# Patient Record
Sex: FEMALE | Race: WHITE | Employment: OTHER | URBAN - METROPOLITAN AREA
[De-identification: names, ages, dates, MRNs, and addresses within clinical notes are randomized per-mention and may not be internally consistent; named-entity substitution may affect disease eponyms.]

---

## 2020-11-17 ENCOUNTER — DOCUMENTATION ONLY (OUTPATIENT)
Dept: FAMILY MEDICINE CLINIC | Age: 80
End: 2020-11-17

## 2020-11-17 ENCOUNTER — VIRTUAL VISIT (OUTPATIENT)
Dept: FAMILY MEDICINE CLINIC | Age: 80
End: 2020-11-17
Payer: MEDICARE

## 2020-11-17 DIAGNOSIS — R41.3 MEMORY PROBLEM: ICD-10-CM

## 2020-11-17 DIAGNOSIS — I63.9 CEREBROVASCULAR ACCIDENT (CVA), UNSPECIFIED MECHANISM (HCC): ICD-10-CM

## 2020-11-17 DIAGNOSIS — R19.00 PELVIC MASS: ICD-10-CM

## 2020-11-17 DIAGNOSIS — L03.115 CELLULITIS OF RIGHT LOWER EXTREMITY: ICD-10-CM

## 2020-11-17 DIAGNOSIS — E78.5 HYPERLIPIDEMIA LDL GOAL <70: ICD-10-CM

## 2020-11-17 DIAGNOSIS — R53.1 WEAKNESS GENERALIZED: ICD-10-CM

## 2020-11-17 DIAGNOSIS — Z87.898 H/O ASCITES: ICD-10-CM

## 2020-11-17 DIAGNOSIS — I10 ESSENTIAL HYPERTENSION: Primary | ICD-10-CM

## 2020-11-17 PROCEDURE — G8432 DEP SCR NOT DOC, RNG: HCPCS | Performed by: NURSE PRACTITIONER

## 2020-11-17 PROCEDURE — G8400 PT W/DXA NO RESULTS DOC: HCPCS | Performed by: NURSE PRACTITIONER

## 2020-11-17 PROCEDURE — G8427 DOCREV CUR MEDS BY ELIG CLIN: HCPCS | Performed by: NURSE PRACTITIONER

## 2020-11-17 PROCEDURE — 1090F PRES/ABSN URINE INCON ASSESS: CPT | Performed by: NURSE PRACTITIONER

## 2020-11-17 PROCEDURE — G0463 HOSPITAL OUTPT CLINIC VISIT: HCPCS | Performed by: NURSE PRACTITIONER

## 2020-11-17 PROCEDURE — 99204 OFFICE O/P NEW MOD 45 MIN: CPT | Performed by: NURSE PRACTITIONER

## 2020-11-17 PROCEDURE — 1101F PT FALLS ASSESS-DOCD LE1/YR: CPT | Performed by: NURSE PRACTITIONER

## 2020-11-17 RX ORDER — AMLODIPINE BESYLATE 5 MG/1
5 TABLET ORAL DAILY
COMMUNITY
End: 2021-02-24 | Stop reason: SDUPTHER

## 2020-11-17 RX ORDER — SIMVASTATIN 20 MG/1
20 TABLET, FILM COATED ORAL
COMMUNITY
End: 2021-02-24 | Stop reason: SDUPTHER

## 2020-11-17 RX ORDER — ASPIRIN 81 MG/1
81 TABLET ORAL DAILY
COMMUNITY
End: 2021-02-24 | Stop reason: SDUPTHER

## 2020-11-17 NOTE — PROGRESS NOTES
Mena Mendenhall is a 68 y.o. female who was seen by synchronous (real-time) audio-video technology on 11/17/2020 for New Patient (Pt recenlty moved from [place]. ) and Hospital Follow Up ([address] [place] [address])        Assessment & Plan:   Diagnoses and all orders for this visit:    1. Essential hypertension  Control unknown  Continue amlodipine  Low sodium diet  Home health assessment  -     REFERRAL TO HOME HEALTH    2. Hyperlipidemia LDL goal <70  Continue rx  Heart healthy diet  Repeat fasting lipids in 6 weeks    3. Pelvic mass  4. H/O ascites  Obtain records from recent admission  Refer for further eval  -     REFERRAL TO GYN ONCOLOGY  -     REFERRAL TO [address]    5. Cellulitis of right lower extremity   resolving  -     [address]    6. Memory problem  Refer for further eval  -     REFERRAL TO NEUROLOGY    7. Weakness generalized  -     REFERRAL TO HOME HEALTH    8. Cerebrovascular accident (CVA), unspecified mechanism ([facility]  Refer for further eval/PT/OT  Continue secondary prevention (ASA, statin)  Smoking cessation strongly recommended  -     REFERRAL TO HOME HEALTH        Follow-up and Dispositions    · Return in about 4 weeks (around 12/15/2020), or if symptoms worsen or fail to improve, for blood pressure. I have discussed the diagnosis with the patient and the intended plan as seen in the above orders, and questions were answered concerning future plans. Patient conveyed understanding of the plan at the time of the visit. Subjective:     HPI:  Seen with her daughter. Hx obtained from daughter, patient did not offer any additional information on hx. Daughter does not have much information to offer as she was not present in [facility] during her mother's admission and has recently moved her up to [place] to provide care. Presents to establish care and for transition of care visit following hospitalization. No records available for review at time of visit.     Daughter reports recent admission to hospital in [facility] for cellulitis, unsure of dates of admission and discharge. Daughter also reports paracentesis for ascites during her admission and possible ovarian ca dx. States her brother told her that mom refused any surgical or chemotherapy for the dx. Daughter reports mom did not schedule any f/u or establish with a PCP in [facility] after discharge. Daughter has noticed memory problems- difficulty remembering recent events, poor short term memory, changes in mood and behavior such as becoming angry/irritable very easily. No assessment by neurologist in the past.   Daughter also expresses concern that patient seems weak, has difficulty ambulating. Daughter has purchased a walker for her but mom needs frequent breaks when walking. HPI addendum 11/18/2020:  Reviewed patient discharge instructions dropped at office by daughter. ED visit on 10/12/2020 [facility] for Cellulitis of right leg, discharged from ED on Bactrim. Admission on 10/27/2020 at [address] [place] [address], reason for visit listed as abdominal mass, acute CVA, ascites, intertrigo, pulmonary nodule, thyroid nodule, and weakness. Discharged on 11/2/2020 into care of family, with instructions to f/u for physical therapy after discharge and to see gyn oncology at [facility]. Prior to Admission medications    Medication Sig Start Date End Date Taking? Authorizing Provider   amLODIPine (NORVASC) 5 mg tablet Take 5 mg by mouth daily. Yes Provider, Historical   simvastatin (ZOCOR) 20 mg tablet Take 20 mg by mouth nightly. Yes Provider, Historical   aspirin delayed-release 81 mg tablet Take 81 mg by mouth daily. Yes Provider, Historical     There is no problem list on file for this patient. No Known Allergies  History reviewed. No pertinent past medical history. History reviewed. No pertinent surgical history. History reviewed. No pertinent family history.   Social History Tobacco Use    Smoking status: Current Every Day Smoker    Smokeless tobacco: Never Used   Substance Use Topics    Alcohol use: Not Currently       Review of Systems   Constitutional: Negative for chills, fever, malaise/fatigue and weight loss. HENT: Negative. Eyes: Negative. Respiratory: Negative. Cardiovascular: Negative. Gastrointestinal: Negative. Genitourinary: Negative. Musculoskeletal: Negative. Skin: Positive for rash. Neurological: Positive for weakness. Endo/Heme/Allergies: Negative. Psychiatric/Behavioral: Positive for memory loss. Objective:   No flowsheet data found.      [INSTRUCTIONS:  \"[x]\" Indicates a positive item  \"[]\" Indicates a negative item  -- DELETE ALL ITEMS NOT EXAMINED]    Constitutional: [x] Appears well-developed and well-nourished [x] No apparent distress      [] Abnormal -     Mental status: [x] Alert and awake  [x] Oriented to person/place/time [x] Able to follow commands    [] Abnormal -     Eyes:   EOM    [x]  Normal    [] Abnormal -   Sclera  [x]  Normal    [] Abnormal -          Discharge [x]  None visible   [] Abnormal -     HENT: [x] Normocephalic, atraumatic  [] Abnormal -   [x] Mouth/Throat: Mucous membranes are moist    External Ears [x] Normal  [] Abnormal -    Neck: [x] No visualized mass [] Abnormal -     Pulmonary/Chest: [x] Respiratory effort normal   [x] No visualized signs of difficulty breathing or respiratory distress        [] Abnormal -      Musculoskeletal:   [] Normal gait with no signs of ataxia         [x] Normal range of motion of neck        [x] Abnormal -   Generalized weakness, ambulates with walker    Neurological:        [x] No Facial Asymmetry (Cranial nerve 7 motor function) (limited exam due to video visit)          [x] No gaze palsy        [] Abnormal -          Skin:        [x] No significant exanthematous lesions or discoloration noted on facial skin         [] Abnormal -            Psychiatric:       [x] Normal Affect [] Abnormal -        [x] No Hallucinations    Other pertinent observable physical exam findings:-  Mild erythema  Lateral knee and calf right leg      We discussed the expected course, resolution and complications of the diagnosis(es) in detail. Medication risks, benefits, costs, interactions, and alternatives were discussed as indicated. I advised her to contact the office if her condition worsens, changes or fails to improve as anticipated. She expressed understanding with the diagnosis(es) and plan. Taye Douglas, who was evaluated through a patient-initiated, synchronous (real-time) audio-video encounter, and/or her healthcare decision maker, is aware that it is a billable service, with coverage as determined by her insurance carrier. She provided verbal consent to proceed: Yes, and patient identification was verified. It was conducted pursuant to the emergency declaration under the [facility], [address] [place] [address] authority and the [employer] Act. A caregiver was present when appropriate. Ability to conduct physical exam was limited. I was at home. The patient was at home.       Segun Hathaway NP

## 2020-11-17 NOTE — PROGRESS NOTES
Patients daughter dropped off records from [address] in provider bin on 11/17/2020.  TM

## 2020-11-18 PROBLEM — R53.1 WEAKNESS GENERALIZED: Status: ACTIVE | Noted: 2020-11-18

## 2020-11-18 PROBLEM — E78.5 HYPERLIPIDEMIA LDL GOAL <70: Status: ACTIVE | Noted: 2020-11-18

## 2020-11-18 PROBLEM — Z87.898 H/O ASCITES: Status: ACTIVE | Noted: 2020-11-18

## 2020-11-18 PROBLEM — L03.115 CELLULITIS OF RIGHT LOWER EXTREMITY: Status: ACTIVE | Noted: 2020-11-18

## 2020-11-18 PROBLEM — I63.9 CEREBROVASCULAR ACCIDENT (CVA) (HCC): Status: ACTIVE | Noted: 2020-11-18

## 2020-11-18 PROBLEM — R19.00 PELVIC MASS: Status: ACTIVE | Noted: 2020-11-18

## 2020-11-18 PROBLEM — I10 ESSENTIAL HYPERTENSION: Status: ACTIVE | Noted: 2020-11-18

## 2020-11-19 ENCOUNTER — HOME HEALTH ADMISSION (OUTPATIENT)
Dept: HOME HEALTH SERVICES | Facility: HOME HEALTH | Age: 80
End: 2020-11-19
Payer: MEDICARE

## 2020-11-19 NOTE — PROGRESS NOTES
Attempted to reach patient's daughter by phone to discuss findings from the discharge instruction sheets she dropped at the office, LM that I would try to reach her again tomorrow. If she calls back, please advise that I have entered referrals to neurology, gyn oncology, and home Health for nursing, PT, and OT assessments based on my review.     Adolfo James NP'

## 2020-11-19 NOTE — PATIENT INSTRUCTIONS
Low Sodium Diet (2,000 Milligram): Care Instructions Your Care Instructions Too much sodium causes your body to hold on to extra water. This can raise your blood pressure and force your heart and kidneys to work harder. In very serious cases, this could cause you to be put in the hospital. It might even be life-threatening. By limiting sodium, you will feel better and lower your risk of serious problems. The most common source of sodium is salt. People get most of the salt in their diet from canned, prepared, and packaged foods. Fast food and restaurant meals also are very high in sodium. Your doctor will probably limit your sodium to less than 2,000 milligrams (mg) a day. This limit counts all the sodium in prepared and packaged foods and any salt you add to your food. Follow-up care is a key part of your treatment and safety. Be sure to make and go to all appointments, and call your doctor if you are having problems. It's also a good idea to know your test results and keep a list of the medicines you take. How can you care for yourself at home? Read food labels · Read labels on cans and food packages. The labels tell you how much sodium is in each serving. Make sure that you look at the serving size. If you eat more than the serving size, you have eaten more sodium. · Food labels also tell you the Percent Daily Value for sodium. Choose products with low Percent Daily Values for sodium. · Be aware that sodium can come in forms other than salt, including monosodium glutamate (MSG), sodium citrate, and sodium bicarbonate (baking soda). MSG is often added to Asian food. When you eat out, you can sometimes ask for food without MSG or added salt. Buy low-sodium foods · Buy foods that are labeled \"unsalted\" (no salt added), \"sodium-free\" (less than 5 mg of sodium per serving), or \"low-sodium\" (less than 140 mg of sodium per serving).  Foods labeled \"reduced-sodium\" and \"light sodium\" may still have too much sodium. Be sure to read the label to see how much sodium you are getting. · Buy fresh vegetables, or frozen vegetables without added sauces. Buy low-sodium versions of canned vegetables, soups, and other canned goods. Prepare low-sodium meals · Cut back on the amount of salt you use in cooking. This will help you adjust to the taste. Do not add salt after cooking. One teaspoon of salt has about 2,300 mg of sodium. · Take the salt shaker off the table. · Flavor your food with garlic, lemon juice, onion, vinegar, herbs, and spices. Do not use soy sauce, lite soy sauce, steak sauce, onion salt, garlic salt, celery salt, mustard, or ketchup on your food. · Use low-sodium salad dressings, sauces, and ketchup. Or make your own salad dressings and sauces without adding salt. · Use less salt (or none) when recipes call for it. You can often use half the salt a recipe calls for without losing flavor. Other foods such as rice, pasta, and grains do not need added salt. · Rinse canned vegetables, and cook them in fresh water. This removes somebut not allof the salt. · Avoid water that is naturally high in sodium or that has been treated with water softeners, which add sodium. Call your local water company to find out the sodium content of your water supply. If you buy bottled water, read the label and choose a sodium-free brand. Avoid high-sodium foods · Avoid eating: 
? Smoked, cured, salted, and canned meat, fish, and poultry. ? Ham, mar, hot dogs, and luncheon meats. ? Regular, hard, and processed cheese and regular peanut butter. ? Crackers with salted tops, and other salted snack foods such as pretzels, chips, and salted popcorn. ? Frozen prepared meals, unless labeled low-sodium. ? Canned and dried soups, broths, and bouillon, unless labeled sodium-free or low-sodium. ? Canned vegetables, unless labeled sodium-free or low-sodium. ? [place] fries, pizza, tacos, and other fast foods. ? Pickles, olives, ketchup, and other condiments, especially soy sauce, unless labeled sodium-free or low-sodium. Where can you learn more? Go to http://www.gray.com/ Enter Z313 in the search box to learn more about \"Low Sodium Diet (2,000 Milligram): Care Instructions. \" Current as of: August 22, 2019               Content Version: 12.6 © 0474-6726 [employer]. Care instructions adapted under license by [employer] (which disclaims liability or warranty for this information). If you have questions about a medical condition or this instruction, always ask your healthcare professional. [address] any warranty or liability for your use of this information. Learning About How to Prevent Another Stroke What can you do to prevent another stroke? After a stroke, people feel lots of different emotions. Some people are worried that they could have another stroke. Or they may feel overwhelmed by how much there is to learn and do. Some people feel sad or depressed. No matter what emotions you are feeling, you can give yourself some control and peace of mind by making a plan to lower your risk of having another stroke. Take your medicines You'll need to take medicines to help prevent another stroke. Be sure to take your medicines exactly as prescribed. And don't stop taking them unless your doctor tells you to. If you stop taking your medicines, you can increase your risk of having another stroke. Some of the medicines your doctor may prescribe include: · Aspirin or some other blood thinner to prevent blood clots. · Statins and other medicines to lower cholesterol. · Blood pressure medicines to lower blood pressure. Manage other health problems You can help lower your chance of having another stroke by managing certain other health problems. Problems that increase your risk of having another stroke include: · High blood pressure. · High cholesterol. · Atrial fibrillation. · Diabetes. If you have any of these health problems, you can manage them with healthy lifestyle changes along with medicine. Adopt a healthy lifestyle · Do not smoke or allow others to smoke around you. If you need help quitting, talk to your doctor about stop-smoking programs and medicines. These can increase your chances of quitting for good. Smoking makes a stroke more likely. · Limit alcohol to 2 drinks a day for men and 1 drink a day for women. · Lose weight if you need to. Controlling your weight will help you keep your heart and body healthy. · Be active. Ask your doctor what type and level of activity is safe for you. · Eat heart-healthy foods, like fruits, vegetables, and high-fiber foods. It's also important to: · Get a flu shot every year. · Ask for help if you think you are depressed. Do stroke rehab Taking part in a stroke rehabilitation (rehab) program will help you to regain skills you lost or make the most of your abilities after a stroke. It also helps you take steps to prevent another stroke. Your rehab team will give you education and support to help you build new, healthy habits. You'll learn how to manage any other health problems that you might have. Rosemary Councilman also learn how to exercise safely, eat a healthy diet, and quit smoking if you smoke. Rosemary Councilman work with your team to decide what lifestyle choices are best for you. If your doctor hasn't already suggested it, ask him or her if stroke rehab is right for you. Know stroke symptoms Make sure you know the symptoms of stroke. FAST is a simple way to remember. Recognizing these symptoms helps you know when to call for medical help. FAST stands for: · F ace drooping. · A rm weakness. · S peech difficulty. · T jessica to call 911. Follow-up care is a key part of your treatment and safety. Be sure to make and go to all appointments, and call your doctor if you are having problems. It's also a good idea to know your test results and keep a list of the medicines you take. Where can you learn more? Go to http://www.gray.com/ Enter J656 in the search box to learn more about \"Learning About How to Prevent Another Stroke. \" Current as of: March 4, 2020               Content Version: 12.6 © 2006-2020 [employer]. Care instructions adapted under license by "[employer]" (which disclaims liability or warranty for this information). If you have questions about a medical condition or this instruction, always ask your healthcare professional. [address] any warranty or liability for your use of this information.

## 2020-11-20 ENCOUNTER — HOME CARE VISIT (OUTPATIENT)
Dept: SCHEDULING | Facility: HOME HEALTH | Age: 80
End: 2020-11-20
Payer: MEDICARE

## 2020-11-20 ENCOUNTER — DOCUMENTATION ONLY (OUTPATIENT)
Dept: FAMILY MEDICINE CLINIC | Age: 80
End: 2020-11-20

## 2020-11-20 PROCEDURE — G0299 HHS/HOSPICE OF RN EA 15 MIN: HCPCS

## 2020-11-20 PROCEDURE — 400013 HH SOC

## 2020-11-20 PROCEDURE — 3331090002 HH PPS REVENUE DEBIT

## 2020-11-20 PROCEDURE — 3331090001 HH PPS REVENUE CREDIT

## 2020-11-21 VITALS
TEMPERATURE: 97.8 F | SYSTOLIC BLOOD PRESSURE: 168 MMHG | DIASTOLIC BLOOD PRESSURE: 78 MMHG | RESPIRATION RATE: 20 BRPM | HEART RATE: 78 BPM | OXYGEN SATURATION: 97 %

## 2020-11-21 PROCEDURE — 3331090002 HH PPS REVENUE DEBIT

## 2020-11-21 PROCEDURE — 3331090001 HH PPS REVENUE CREDIT

## 2020-11-22 PROCEDURE — 3331090001 HH PPS REVENUE CREDIT

## 2020-11-22 PROCEDURE — 3331090002 HH PPS REVENUE DEBIT

## 2020-11-23 PROCEDURE — 3331090001 HH PPS REVENUE CREDIT

## 2020-11-23 PROCEDURE — 3331090002 HH PPS REVENUE DEBIT

## 2020-11-24 ENCOUNTER — HOME CARE VISIT (OUTPATIENT)
Dept: SCHEDULING | Facility: HOME HEALTH | Age: 80
End: 2020-11-24
Payer: MEDICARE

## 2020-11-24 VITALS
SYSTOLIC BLOOD PRESSURE: 152 MMHG | DIASTOLIC BLOOD PRESSURE: 90 MMHG | TEMPERATURE: 97.1 F | RESPIRATION RATE: 20 BRPM | OXYGEN SATURATION: 97 % | HEART RATE: 91 BPM

## 2020-11-24 VITALS
HEART RATE: 99 BPM | SYSTOLIC BLOOD PRESSURE: 158 MMHG | DIASTOLIC BLOOD PRESSURE: 78 MMHG | RESPIRATION RATE: 18 BRPM | OXYGEN SATURATION: 95 % | TEMPERATURE: 96.9 F

## 2020-11-24 VITALS
HEART RATE: 91 BPM | RESPIRATION RATE: 20 BRPM | DIASTOLIC BLOOD PRESSURE: 90 MMHG | SYSTOLIC BLOOD PRESSURE: 152 MMHG | OXYGEN SATURATION: 97 % | TEMPERATURE: 97.1 F

## 2020-11-24 PROCEDURE — G0152 HHCP-SERV OF OT,EA 15 MIN: HCPCS

## 2020-11-24 PROCEDURE — 3331090001 HH PPS REVENUE CREDIT

## 2020-11-24 PROCEDURE — G0153 HHCP-SVS OF S/L PATH,EA 15MN: HCPCS

## 2020-11-24 PROCEDURE — 3331090002 HH PPS REVENUE DEBIT

## 2020-11-24 PROCEDURE — G0300 HHS/HOSPICE OF LPN EA 15 MIN: HCPCS

## 2020-11-25 ENCOUNTER — HOME CARE VISIT (OUTPATIENT)
Dept: SCHEDULING | Facility: HOME HEALTH | Age: 80
End: 2020-11-25
Payer: MEDICARE

## 2020-11-25 ENCOUNTER — HOSPITAL ENCOUNTER (OUTPATIENT)
Dept: LAB | Age: 80
Discharge: HOME | End: 2020-11-25
Payer: MEDICARE

## 2020-11-25 ENCOUNTER — OFFICE VISIT (OUTPATIENT)
Dept: GYNECOLOGY | Age: 80
End: 2020-11-25
Payer: MEDICARE

## 2020-11-25 VITALS
HEART RATE: 103 BPM | SYSTOLIC BLOOD PRESSURE: 132 MMHG | WEIGHT: 219 LBS | DIASTOLIC BLOOD PRESSURE: 81 MMHG | HEIGHT: 66 IN | BODY MASS INDEX: 35.2 KG/M2

## 2020-11-25 DIAGNOSIS — E66.01 SEVERE OBESITY (HCC): ICD-10-CM

## 2020-11-25 DIAGNOSIS — C56.3 MALIGNANT NEOPLASM OF BOTH OVARIES (HCC): ICD-10-CM

## 2020-11-25 DIAGNOSIS — C56.3 MALIGNANT NEOPLASM OF BOTH OVARIES (HCC): Primary | ICD-10-CM

## 2020-11-25 PROCEDURE — 1101F PT FALLS ASSESS-DOCD LE1/YR: CPT | Performed by: OBSTETRICS & GYNECOLOGY

## 2020-11-25 PROCEDURE — 99204 OFFICE O/P NEW MOD 45 MIN: CPT | Performed by: OBSTETRICS & GYNECOLOGY

## 2020-11-25 PROCEDURE — G0463 HOSPITAL OUTPT CLINIC VISIT: HCPCS | Performed by: OBSTETRICS & GYNECOLOGY

## 2020-11-25 PROCEDURE — G8536 NO DOC ELDER MAL SCRN: HCPCS | Performed by: OBSTETRICS & GYNECOLOGY

## 2020-11-25 PROCEDURE — 80053 COMPREHEN METABOLIC PANEL: CPT

## 2020-11-25 PROCEDURE — G8400 PT W/DXA NO RESULTS DOC: HCPCS | Performed by: OBSTETRICS & GYNECOLOGY

## 2020-11-25 PROCEDURE — G8417 CALC BMI ABV UP PARAM F/U: HCPCS | Performed by: OBSTETRICS & GYNECOLOGY

## 2020-11-25 PROCEDURE — 3331090001 HH PPS REVENUE CREDIT

## 2020-11-25 PROCEDURE — 36415 COLL VENOUS BLD VENIPUNCTURE: CPT

## 2020-11-25 PROCEDURE — 3331090002 HH PPS REVENUE DEBIT

## 2020-11-25 PROCEDURE — G8432 DEP SCR NOT DOC, RNG: HCPCS | Performed by: OBSTETRICS & GYNECOLOGY

## 2020-11-25 PROCEDURE — 85025 COMPLETE CBC W/AUTO DIFF WBC: CPT

## 2020-11-25 PROCEDURE — G8427 DOCREV CUR MEDS BY ELIG CLIN: HCPCS | Performed by: OBSTETRICS & GYNECOLOGY

## 2020-11-25 PROCEDURE — 1090F PRES/ABSN URINE INCON ASSESS: CPT | Performed by: OBSTETRICS & GYNECOLOGY

## 2020-11-25 PROCEDURE — 86304 IMMUNOASSAY TUMOR CA 125: CPT

## 2020-11-25 PROCEDURE — G0158 HHC OT ASSISTANT EA 15: HCPCS

## 2020-11-25 NOTE — LETTER
2020 10:57 AM 
 
Patient:  Jyoti Valero YOB: 1940 Date of Visit: 2020 Dear Tommy Butler NP 
[address] [place] [address] [address] [id number] [facility] [address] VIA In Basket: Thank you for referring Ms. Jyoti Valero to me for evaluation/treatment. Below are the relevant portions of my assessment and plan of care. [address] [facility], [address] [facility], [address] 
P [phone]  F [phone] Office Note Patient ID: 
Name:  Jyoti Valero MRN:  [id number] :  1940/80 y.o. Date:  2020 HISTORY OF PRESENT ILLNESS: 
Jyoti Valero is a 68 y.o.  postmenopausal female who is being seen for probable ovarian cancer. She is referred by Lillian Briceno NP. She was admitted to the hospital in [place] in late October. A CT of the abdomen/pelvis revealed a a large heterogenous mass measuring 17 x 21 x 13 cm, presumed to be of gynecologic origin. There was also moderate ascites. There was no retroperitoneal lymphadenopathy and no mention of carcinomatosis. A CT of the chest revealed a spiculated pulmonary nodule in the right upper lobe. There was also mediastinal and left hilar lymphadenopathy, along with moderate left and trace right pleural effusions. A paracentesis was performed, removing 6.2 liters of clear yellow fluid, but the cytology was negative for malignancy. Tumor markers were drawn, including CEA, CA 19-9, and CA-125. The CA-125 was markedly elevated at 2344. The other markers were normal.  I have been asked to see her for further evaluation and management. ROS: 
 and GI review:  Negative Cardiopulmonary review:  Negative Musculoskeletal:  Negative A comprehensive review of systems was negative except for that written in the History of Present Illness. , 10 point ROS Problem List: 
Patient Active Problem List  
 Diagnosis Date Noted  Severe obesity ([address] 2020  Essential hypertension 11/18/2020  Hyperlipidemia LDL goal <70 11/18/2020  Pelvic mass 11/18/2020  H/O ascites 11/18/2020  Cellulitis of right lower extremity 11/18/2020  Cerebrovascular accident (CVA) ([facility] 11/18/2020  Weakness generalized 11/18/2020 PMH: 
Past Medical History:  
Diagnosis Date  CVA (cerebral vascular accident) ([facility] [address] [facility]  Hyperlipidemia  Hypertension PSH: 
History reviewed. No pertinent surgical history. Social History: 
Social History Tobacco Use  Smoking status: Current Every Day Smoker  Smokeless tobacco: Never Used Substance Use Topics  Alcohol use: Not Currently Family History: 
History reviewed. No pertinent family history. Medications: (reviewed) Current Outpatient Medications Medication Sig  
 menthol (GOLD BOND PAIN RELIEVING EX) Apply 1 Applicator to affected area two (2) times a day. thin layer lower extremities  naproxen sodium (Aleve) 220 mg cap Take 1 Tab by mouth daily as needed for Pain.  amLODIPine (NORVASC) 5 mg tablet Take 5 mg by mouth daily.  simvastatin (ZOCOR) 20 mg tablet Take 20 mg by mouth nightly.  aspirin delayed-release 81 mg tablet Take 81 mg by mouth daily. No current facility-administered medications for this visit. Allergies: (reviewed) No Known Allergies OBJECTIVE: 
 
Physical Exam: VITAL SIGNS: Vitals:  
 11/25/20 1102 BP: 132/81 Pulse: (!) 103 Weight: 219 lb (99.3 kg) Height: 5' 6\" (1.676 m) Body mass index is 35.35 kg/m². GENERAL ISAAK: Conversant, alert, oriented. No acute distress. HEENT: HEENT. No thyroid enlargement. No JVD. Neck: Supple without restrictions. RESPIRATORY: Clear to auscultation and percussion to the bases. No CVAT. CARDIOVASC: RRR without murmur/rub. GASTROINT: soft, non-tender, distended with ascites; mass in lower abdomen MUSCULOSKEL: no joint tenderness, deformity or swelling EXTREMITIES: extremities normal, atraumatic, no cyanosis or edema PELVIC: Normal vagina and cervix. Bimanual exam limited due to ascites. No appreciable cul de sac nodularity RECTAL: Deferred NAVEED SURVEY: No suspicious lymphadenopathy or edema noted. NEURO: Grossly intact. No acute deficit. Lab Data: 
 
No results found for: WBC, HGB, HCT, PLT, MCV, HGBEXT, HCTEXT, PLTEXT, HGBEXT, HCTEXT, PLTEXT No results found for: NA, K, CL, CO2, AGAP, GLU, BUN, CREA, BUCR, GFRAA, GFRNA, CA Imaging: Outside CT reports from [place] reviewed. Pertinent findings noted in HPI. IMPRESSION/PLAN: 
Cydney Charles is a 68 y.o. 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  This could also represent a second lung primary. I reviewed with Cydney Charles her medical records, physical exam, and review of symptoms. She presents with her daughter today. I explained what the likely diagnosis is and what the recommendations for treatment would be. The patient states that she doesn't want surgery or chemotherapy and doesn't want anything done. I suggested that we at least repeat imaging and labs prior to making any decisions on treatment. I will have her back after her CT and labs to review the results with her and her family. Signed By: Eloisa Larsen MD   
 11/25/2020/9:30 AM  
 
 
New patient referred by Aj Pete NP for a pelvic mass. If you have questions, please do not hesitate to call me. I look forward to following Ms. Lopezley  along with you.  
 
 
 
Sincerely, 
 
 
Eloisa Larsen MD

## 2020-11-25 NOTE — PROGRESS NOTES
[address] [place] [address] Mathias Moritz [address], [address]  P [phone]  F [phone]    Office Note  Patient ID:  Name:  Sean Hill  MRN:  [id number]  :   y.o. Date:  2020      HISTORY OF PRESENT ILLNESS:  Sean Hill is a 68 y.o.  postmenopausal female who is being seen for probable ovarian cancer. She is referred by Gita Parker NP. She was admitted to the hospital in [place] in late October. A CT of the abdomen/pelvis revealed a a large heterogenous mass measuring 17 x 21 x 13 cm, presumed to be of gynecologic origin. There was also moderate ascites. There was no retroperitoneal lymphadenopathy and no mention of carcinomatosis. A CT of the chest revealed a spiculated pulmonary nodule in the right upper lobe. There was also mediastinal and left hilar lymphadenopathy, along with moderate left and trace right pleural effusions. A paracentesis was performed, removing 6.2 liters of clear yellow fluid, but the cytology was negative for malignancy. Tumor markers were drawn, including CEA, CA 19-9, and CA-125. The CA-125 was markedly elevated at 2344. The other markers were normal.  I have been asked to see her for further evaluation and management. ROS:   and GI review:  Negative  Cardiopulmonary review:  Negative   Musculoskeletal:  Negative    A comprehensive review of systems was negative except for that written in the History of Present Illness. , 10 point ROS      Problem List:  Patient Active Problem List    Diagnosis Date Noted    Severe obesity ([address] 2020    Essential hypertension 2020    Hyperlipidemia LDL goal <70 2020    Pelvic mass 2020    H/O ascites 2020    Cellulitis of right lower extremity 2020    Cerebrovascular accident (CVA) ([address] 2020    Weakness generalized 2020     PMH:  Past Medical History:   Diagnosis Date    [facility]     Hyperlipidemia     Hypertension       PSH:  History reviewed. No pertinent surgical history. Social History:  Social History     Tobacco Use    Smoking status: Current Every Day Smoker    Smokeless tobacco: Never Used   Substance Use Topics    Alcohol use: Not Currently      Family History:  History reviewed. No pertinent family history. Medications: (reviewed)  Current Outpatient Medications   Medication Sig    menthol (GOLD BOND PAIN RELIEVING EX) Apply 1 Applicator to affected area two (2) times a day. thin layer lower extremities    naproxen sodium (Aleve) 220 mg cap Take 1 Tab by mouth daily as needed for Pain.  amLODIPine (NORVASC) 5 mg tablet Take 5 mg by mouth daily.  simvastatin (ZOCOR) 20 mg tablet Take 20 mg by mouth nightly.  aspirin delayed-release 81 mg tablet Take 81 mg by mouth daily. No current facility-administered medications for this visit. Allergies: (reviewed)  No Known Allergies       OBJECTIVE:    Physical Exam:  VITAL SIGNS: Vitals:    11/25/20 1102   BP: 132/81   Pulse: (!) 103   Weight: 219 lb (99.3 kg)   Height: 5' 6\" (1.676 m)     Body mass index is 35.35 kg/m². GENERAL ISAAK: Conversant, alert, oriented. No acute distress. HEENT: HEENT. No thyroid enlargement. No JVD. Neck: Supple without restrictions. RESPIRATORY: Clear to auscultation and percussion to the bases. No CVAT. CARDIOVASC: RRR without murmur/rub. GASTROINT: soft, non-tender, distended with ascites; mass in lower abdomen   MUSCULOSKEL: no joint tenderness, deformity or swelling   EXTREMITIES: extremities normal, atraumatic, no cyanosis or edema   PELVIC: Normal vagina and cervix. Bimanual exam limited due to ascites. No appreciable cul de sac nodularity   RECTAL: Deferred   NAVEED SURVEY: No suspicious lymphadenopathy or edema noted. NEURO: Grossly intact. No acute deficit.        Lab Data:    No results found for: WBC, HGB, HCT, PLT, MCV, HGBEXT, HCTEXT, PLTEXT, HGBEXT, HCTEXT, PLTEXT  No results found for: NA, K, CL, CO2, AGAP, GLU, BUN, CREA, BUCR, GFRAA, GFRNA, CA      Imaging: Outside CT reports from [place] reviewed. Pertinent findings noted in HPI. IMPRESSION/PLAN:  Miryam Francis is a 68 y.o. 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  This could also represent a second lung primary. I reviewed with Miryam Francis her medical records, physical exam, and review of symptoms. She presents with her daughter today. I explained what the likely diagnosis is and what the recommendations for treatment would be. The patient states that she doesn't want surgery or chemotherapy and doesn't want anything done. I suggested that we at least repeat imaging and labs prior to making any decisions on treatment. I will have her back after her CT and labs to review the results with her and her family.         Signed By: Kofi Troy MD     11/25/2020/9:30 AM

## 2020-11-26 ENCOUNTER — HOME CARE VISIT (OUTPATIENT)
Dept: HOME HEALTH SERVICES | Facility: HOME HEALTH | Age: 80
End: 2020-11-26
Payer: MEDICARE

## 2020-11-26 LAB
ALBUMIN SERPL-MCNC: 3.9 G/DL (ref 3.7–4.7)
ALBUMIN/GLOB SERPL: 1.1 {RATIO} (ref 1.2–2.2)
ALP SERPL-CCNC: 109 IU/L (ref 39–117)
ALT SERPL-CCNC: 16 IU/L (ref 0–32)
AST SERPL-CCNC: 23 IU/L (ref 0–40)
BASOPHILS # BLD AUTO: 0 X10E3/UL (ref 0–0.2)
BASOPHILS NFR BLD AUTO: 0 %
BILIRUB SERPL-MCNC: 0.3 MG/DL (ref 0–1.2)
BUN SERPL-MCNC: 22 MG/DL (ref 8–27)
BUN/CREAT SERPL: 26 (ref 12–28)
CALCIUM SERPL-MCNC: 9.9 MG/DL (ref 8.7–10.3)
CANCER AG125 SERPL-ACNC: 749 U/ML (ref 0–38.1)
CHLORIDE SERPL-SCNC: 100 MMOL/L (ref 96–106)
CO2 SERPL-SCNC: 23 MMOL/L (ref 20–29)
CREAT SERPL-MCNC: 0.85 MG/DL (ref 0.57–1)
EOSINOPHIL # BLD AUTO: 0.2 X10E3/UL (ref 0–0.4)
EOSINOPHIL NFR BLD AUTO: 2 %
ERYTHROCYTE [DISTWIDTH] IN BLOOD BY AUTOMATED COUNT: 15.7 % (ref 11.7–15.4)
GLOBULIN SER CALC-MCNC: 3.6 G/DL (ref 1.5–4.5)
GLUCOSE SERPL-MCNC: 105 MG/DL (ref 65–99)
HCT VFR BLD AUTO: 38.2 % (ref 34–46.6)
HGB BLD-MCNC: 12.4 G/DL (ref 11.1–15.9)
IMM GRANULOCYTES # BLD AUTO: 0 X10E3/UL (ref 0–0.1)
IMM GRANULOCYTES NFR BLD AUTO: 1 %
LYMPHOCYTES # BLD AUTO: 1.6 X10E3/UL (ref 0.7–3.1)
LYMPHOCYTES NFR BLD AUTO: 21 %
MCH RBC QN AUTO: 28.3 PG (ref 26.6–33)
MCHC RBC AUTO-ENTMCNC: 32.5 G/DL (ref 31.5–35.7)
MCV RBC AUTO: 87 FL (ref 79–97)
MONOCYTES # BLD AUTO: 0.7 X10E3/UL (ref 0.1–0.9)
MONOCYTES NFR BLD AUTO: 9 %
NEUTROPHILS # BLD AUTO: 5.3 X10E3/UL (ref 1.4–7)
NEUTROPHILS NFR BLD AUTO: 67 %
PLATELET # BLD AUTO: 442 X10E3/UL (ref 150–450)
POTASSIUM SERPL-SCNC: 4.9 MMOL/L (ref 3.5–5.2)
PROT SERPL-MCNC: 7.5 G/DL (ref 6–8.5)
RBC # BLD AUTO: 4.38 X10E6/UL (ref 3.77–5.28)
SODIUM SERPL-SCNC: 139 MMOL/L (ref 134–144)
WBC # BLD AUTO: 7.9 X10E3/UL (ref 3.4–10.8)

## 2020-11-26 PROCEDURE — 3331090001 HH PPS REVENUE CREDIT

## 2020-11-26 PROCEDURE — 3331090002 HH PPS REVENUE DEBIT

## 2020-11-27 VITALS
DIASTOLIC BLOOD PRESSURE: 80 MMHG | RESPIRATION RATE: 18 BRPM | SYSTOLIC BLOOD PRESSURE: 130 MMHG | OXYGEN SATURATION: 92 % | TEMPERATURE: 98.1 F | HEART RATE: 100 BPM

## 2020-11-27 PROCEDURE — 3331090002 HH PPS REVENUE DEBIT

## 2020-11-27 PROCEDURE — 3331090001 HH PPS REVENUE CREDIT

## 2020-11-28 PROCEDURE — 3331090002 HH PPS REVENUE DEBIT

## 2020-11-28 PROCEDURE — 3331090001 HH PPS REVENUE CREDIT

## 2020-11-29 PROCEDURE — 3331090001 HH PPS REVENUE CREDIT

## 2020-11-29 PROCEDURE — 3331090002 HH PPS REVENUE DEBIT

## 2020-11-30 ENCOUNTER — HOME CARE VISIT (OUTPATIENT)
Dept: HOME HEALTH SERVICES | Facility: HOME HEALTH | Age: 80
End: 2020-11-30
Payer: MEDICARE

## 2020-11-30 ENCOUNTER — HOME CARE VISIT (OUTPATIENT)
Dept: SCHEDULING | Facility: HOME HEALTH | Age: 80
End: 2020-11-30
Payer: MEDICARE

## 2020-11-30 ENCOUNTER — VIRTUAL VISIT (OUTPATIENT)
Dept: FAMILY MEDICINE CLINIC | Age: 80
End: 2020-11-30
Payer: MEDICARE

## 2020-11-30 VITALS
SYSTOLIC BLOOD PRESSURE: 130 MMHG | RESPIRATION RATE: 18 BRPM | HEART RATE: 96 BPM | DIASTOLIC BLOOD PRESSURE: 65 MMHG | TEMPERATURE: 97.4 F | OXYGEN SATURATION: 97 %

## 2020-11-30 DIAGNOSIS — R91.1 RIGHT UPPER LOBE PULMONARY NODULE: ICD-10-CM

## 2020-11-30 DIAGNOSIS — R53.1 WEAKNESS GENERALIZED: ICD-10-CM

## 2020-11-30 DIAGNOSIS — I10 ESSENTIAL HYPERTENSION: Primary | ICD-10-CM

## 2020-11-30 DIAGNOSIS — R19.00 PELVIC MASS: ICD-10-CM

## 2020-11-30 DIAGNOSIS — I63.9 CEREBROVASCULAR ACCIDENT (CVA), UNSPECIFIED MECHANISM (HCC): ICD-10-CM

## 2020-11-30 PROCEDURE — 3331090002 HH PPS REVENUE DEBIT

## 2020-11-30 PROCEDURE — 1101F PT FALLS ASSESS-DOCD LE1/YR: CPT | Performed by: NURSE PRACTITIONER

## 2020-11-30 PROCEDURE — G0152 HHCP-SERV OF OT,EA 15 MIN: HCPCS

## 2020-11-30 PROCEDURE — 99214 OFFICE O/P EST MOD 30 MIN: CPT | Performed by: NURSE PRACTITIONER

## 2020-11-30 PROCEDURE — G0463 HOSPITAL OUTPT CLINIC VISIT: HCPCS | Performed by: NURSE PRACTITIONER

## 2020-11-30 PROCEDURE — G8432 DEP SCR NOT DOC, RNG: HCPCS | Performed by: NURSE PRACTITIONER

## 2020-11-30 PROCEDURE — G8427 DOCREV CUR MEDS BY ELIG CLIN: HCPCS | Performed by: NURSE PRACTITIONER

## 2020-11-30 PROCEDURE — 3331090001 HH PPS REVENUE CREDIT

## 2020-11-30 PROCEDURE — 1090F PRES/ABSN URINE INCON ASSESS: CPT | Performed by: NURSE PRACTITIONER

## 2020-11-30 PROCEDURE — G8400 PT W/DXA NO RESULTS DOC: HCPCS | Performed by: NURSE PRACTITIONER

## 2020-12-01 ENCOUNTER — HOSPITAL ENCOUNTER (OUTPATIENT)
Dept: CT IMAGING | Age: 80
Discharge: HOME | End: 2020-12-01
Attending: OBSTETRICS & GYNECOLOGY
Payer: MEDICARE

## 2020-12-01 ENCOUNTER — HOME CARE VISIT (OUTPATIENT)
Dept: HOME HEALTH SERVICES | Facility: HOME HEALTH | Age: 80
End: 2020-12-01
Payer: MEDICARE

## 2020-12-01 DIAGNOSIS — C56.3 MALIGNANT NEOPLASM OF BOTH OVARIES (HCC): ICD-10-CM

## 2020-12-01 PROCEDURE — 74011000636 HC RX REV CODE- 636: Performed by: RADIOLOGY

## 2020-12-01 PROCEDURE — 71260 CT THORAX DX C+: CPT

## 2020-12-01 PROCEDURE — 74177 CT ABD & PELVIS W/CONTRAST: CPT

## 2020-12-01 PROCEDURE — 3331090001 HH PPS REVENUE CREDIT

## 2020-12-01 PROCEDURE — 3331090002 HH PPS REVENUE DEBIT

## 2020-12-01 RX ORDER — SODIUM CHLORIDE 0.9 % (FLUSH) 0.9 %
10 SYRINGE (ML) INJECTION
Status: DISCONTINUED | OUTPATIENT
Start: 2020-12-01 | End: 2020-12-02 | Stop reason: HOSPADM

## 2020-12-01 RX ADMIN — IOPAMIDOL 100 ML: 755 INJECTION, SOLUTION INTRAVENOUS at 14:51

## 2020-12-01 NOTE — PATIENT INSTRUCTIONS
Stopping Smoking: Care Instructions Your Care Instructions Cigarette smokers crave the nicotine in cigarettes. Giving it up is much harder than simply changing a habit. Your body has to stop craving the nicotine. It is hard to quit, but you can do it. There are many tools that people use to quit smoking. You may find that combining tools works best for you. There are several steps to quitting. First you get ready to quit. Then you get support to help you. After that, you learn new skills and behaviors to become a nonsmoker. For many people, a necessary step is getting and using medicine. Your doctor will help you set up the plan that best meets your needs. You may want to attend a smoking cessation program to help you quit smoking. When you choose a program, look for one that has proven success. Ask your doctor for ideas. You will greatly increase your chances of success if you take medicine as well as get counseling or join a cessation program. 
Some of the changes you feel when you first quit tobacco are uncomfortable. Your body will miss the nicotine at first, and you may feel short-tempered and grumpy. You may have trouble sleeping or concentrating. Medicine can help you deal with these symptoms. You may struggle with changing your smoking habits and rituals. The last step is the tricky one: Be prepared for the smoking urge to continue for a time. This is a lot to deal with, but keep at it. You will feel better. Follow-up care is a key part of your treatment and safety. Be sure to make and go to all appointments, and call your doctor if you are having problems. It's also a good idea to know your test results and keep a list of the medicines you take. How can you care for yourself at home? · Ask your family, friends, and coworkers for support. You have a better chance of quitting if you have help and support.  
· Join a support group, such as Nicotine Anonymous, for people who are trying to quit smoking. · Consider signing up for a smoking cessation program, such as the American Lung Association's Freedom from Smoking program. 
· Get text messaging support. Go to the website at www.smokefree. gov to sign up for the [facility] program. 
· Set a quit date. Pick your date carefully so that it is not right in the middle of a big deadline or stressful time. Once you quit, do not even take a puff. Get rid of all ashtrays and lighters after your last cigarette. Clean your house and your clothes so that they do not smell of smoke. · Learn how to be a nonsmoker. Think about ways you can avoid those things that make you reach for a cigarette. ? Avoid situations that put you at greatest risk for smoking. For some people, it is hard to have a drink with friends without smoking. For others, they might skip a coffee break with coworkers who smoke. ? Change your daily routine. Take a different route to work or eat a meal in a different place. · Cut down on stress. Calm yourself or release tension by doing an activity you enjoy, such as reading a book, taking a hot bath, or gardening. · Talk to your doctor or pharmacist about nicotine replacement therapy, which replaces the nicotine in your body. You still get nicotine but you do not use tobacco. Nicotine replacement products help you slowly reduce the amount of nicotine you need. These products come in several forms, many of them available over-the-counter: ? Nicotine patches ? Nicotine gum and lozenges ? Nicotine inhaler · Ask your doctor about bupropion (Wellbutrin) or varenicline (Chantix), which are prescription medicines. They do not contain nicotine. They help you by reducing withdrawal symptoms, such as stress and anxiety. · Some people find hypnosis, acupuncture, and massage helpful for ending the smoking habit. · Eat a healthy diet and get regular exercise. Having healthy habits will help your body move past its craving for nicotine. · Be prepared to keep trying. Most people are not successful the first few times they try to quit. Do not get mad at yourself if you smoke again. Make a list of things you learned and think about when you want to try again, such as next week, next month, or next year. Where can you learn more? Go to http://www.gray.com/ Enter U052 in the search box to learn more about \"Stopping Smoking: Care Instructions. \" Current as of: March 12, 2020               Content Version: 12.6 © 9664-3530 [employer]. Care instructions adapted under license by [employer] (which disclaims liability or warranty for this information). If you have questions about a medical condition or this instruction, always ask your healthcare professional. [address] any warranty or liability for your use of this information.

## 2020-12-01 NOTE — PROGRESS NOTES
Bowen Damian is a 68 y.o. female who was seen by synchronous (real-time) audio-video technology on 11/30/2020 for Other (Discuss getting DMV Placard )        Assessment & Plan:   Diagnoses and all orders for this visit:    1. Essential hypertension  At goal (home health data reviewed)  Continue amlodipine  Low sodium diet  Smoking cessation strongly encouraged    2. Pelvic mass  3. Right upper lobe pulmonary nodule  Further imaging/staging is planned  At this point patient does not want to consider any treatment, but has agreed to f/u to discuss again with Dr. Shabbir Ramires after imaging completed  I had a preliminary discussion about palliative care referral with patient's daughter today    4. Cerebrovascular accident (CVA), unspecified mechanism ([facility]  5. Weakness generalized  Continue home OT/PT  Will complete DMV application for handicapped placard, daughter will  at office      Follow-up and Dispositions    · Return in about 4 weeks (around 12/28/2020), or if symptoms worsen or fail to improve. I have discussed the diagnosis with the patient and the intended plan as seen in the above orders, and questions were answered concerning future plans. Patient conveyed understanding of the plan at the time of the visit. 712  Subjective:     HPI:    Presents for evaluation of HTN, pelvic mass, lung nodule, CVA, and generalized weakness. Seen with daughter today. Continues to c/o generalized weakness, difficulty ambulating and difficulty with ADLs. She has started home PT and OT. Daughter is requesting form completion for handicapped parking placard. Cardiovascular risk analysis - LDL goal is under 70  hypertension  hyperlipidemia  smoker  prior CVA/TIA or known carotid artery disease. Compliance with treatment thus far has been unsatisfactory. ROS: taking medications as instructed, no medication side effects noted.      Diet and Lifestyle: not attempting to follow a low fat, low cholesterol diet, not attempting to follow a low sodium diet, sedentary, smoker current every day  Home BP Monitoring: is well controlled at home    Cardiovascular ROS: taking medications as instructed, no medication side effects noted, no TIA's, no chest pain on exertion, no dyspnea on exertion, no swelling of ankles, no orthostatic dizziness or lightheadedness, no orthopnea or paroxysmal nocturnal dyspnea, no palpitations, no intermittent claudication symptoms. Note appt last week with Dr. Vane Matt for pelvic mass, spiculated pulm nodule R upper lobe and lymphadenopathy in chest, felt to represent either metastatic ovarian or peritoneal cancer vs second primary lung ca. Patient affirmed previous decision against treatment but agreed to proceed with additional imaging and will have follow up appt later this week with Dr. Vane Matt. Prior to Admission medications    Medication Sig Start Date End Date Taking? Authorizing Provider   naproxen sodium (Aleve) 220 mg cap Take 1 Tab by mouth daily as needed for Pain. Yes Provider, Historical   amLODIPine (NORVASC) 5 mg tablet Take 5 mg by mouth daily. Yes Provider, Historical   simvastatin (ZOCOR) 20 mg tablet Take 20 mg by mouth nightly. Yes Provider, Historical   aspirin delayed-release 81 mg tablet Take 81 mg by mouth daily. Yes Provider, Historical   menthol (GOLD BOND PAIN RELIEVING EX) Apply 1 Applicator to affected area two (2) times a day. thin layer lower extremities    Provider, Historical     Patient Active Problem List   Diagnosis Code    Essential hypertension I10    Hyperlipidemia LDL goal <70 E78.5    Pelvic mass R19.00    H/O ascites Z87.898    Cellulitis of right lower extremity L03.115    Cerebrovascular accident (CVA) ([address] I63.9    Weakness generalized R53.1    Severe obesity ([address] E66.01     No Known Allergies  Past Medical History:   Diagnosis Date    CVA (cerebral vascular accident) ([address]     Hyperlipidemia     Hypertension      History reviewed.  No pertinent surgical history. History reviewed. No pertinent family history. Social History     Tobacco Use    Smoking status: Current Every Day Smoker    Smokeless tobacco: Never Used   Substance Use Topics    Alcohol use: Not Currently       Review of Systems   Constitutional: Negative for chills, fever, malaise/fatigue and weight loss. HENT: Negative. Eyes: Negative. Respiratory: Positive for cough, sputum production and shortness of breath. Negative for hemoptysis and wheezing. Cardiovascular: Negative. Gastrointestinal: Negative. Genitourinary: Negative. Musculoskeletal: Negative. Skin: Negative. Neurological: Positive for weakness. Endo/Heme/Allergies: Negative. Psychiatric/Behavioral: Positive for memory loss. Negative for depression, hallucinations, substance abuse and suicidal ideas. The patient is not nervous/anxious and does not have insomnia. Objective:   No flowsheet data found. General: alert, cooperative, no distress   Mental  status: normal mood, behavior, speech, dress, motor activity, and thought processes, able to follow commands   HENT: NCAT   Neck: no visualized mass   Resp: no respiratory distress   Neuro: no gross deficits   Skin: no discoloration or lesions of concern on visible areas   Psychiatric: normal affect, consistent with stated mood, no evidence of hallucinations     Additional exam findings:   none    We discussed the expected course, resolution and complications of the diagnosis(es) in detail. Medication risks, benefits, costs, interactions, and alternatives were discussed as indicated. I advised her to contact the office if her condition worsens, changes or fails to improve as anticipated. She expressed understanding with the diagnosis(es) and plan.        Alondra Carballo, who was evaluated through a patient-initiated, synchronous (real-time) audio-video encounter, and/or her healthcare decision maker, is aware that it is a billable service, with coverage as determined by her insurance carrier. She provided verbal consent to proceed: Yes, and patient identification was verified. It was conducted pursuant to the emergency declaration under the [address] [place] [address] authority and the [employer] Act. A caregiver was present when appropriate. Ability to conduct physical exam was limited. I was at home. The patient was at home.       Elaina Lundberg NP

## 2020-12-02 ENCOUNTER — HOME CARE VISIT (OUTPATIENT)
Dept: SCHEDULING | Facility: HOME HEALTH | Age: 80
End: 2020-12-02
Payer: MEDICARE

## 2020-12-02 PROCEDURE — 3331090002 HH PPS REVENUE DEBIT

## 2020-12-02 PROCEDURE — 3331090001 HH PPS REVENUE CREDIT

## 2020-12-02 PROCEDURE — G0151 HHCP-SERV OF PT,EA 15 MIN: HCPCS

## 2020-12-03 ENCOUNTER — HOME CARE VISIT (OUTPATIENT)
Dept: SCHEDULING | Facility: HOME HEALTH | Age: 80
End: 2020-12-03
Payer: MEDICARE

## 2020-12-03 ENCOUNTER — VIRTUAL VISIT (OUTPATIENT)
Dept: GYNECOLOGY | Age: 80
End: 2020-12-03
Payer: MEDICARE

## 2020-12-03 VITALS
SYSTOLIC BLOOD PRESSURE: 142 MMHG | RESPIRATION RATE: 21 BRPM | HEART RATE: 90 BPM | OXYGEN SATURATION: 95 % | DIASTOLIC BLOOD PRESSURE: 78 MMHG | TEMPERATURE: 97.4 F

## 2020-12-03 VITALS
HEART RATE: 88 BPM | OXYGEN SATURATION: 95 % | SYSTOLIC BLOOD PRESSURE: 132 MMHG | DIASTOLIC BLOOD PRESSURE: 76 MMHG | TEMPERATURE: 98.4 F | RESPIRATION RATE: 20 BRPM

## 2020-12-03 DIAGNOSIS — C56.3 MALIGNANT NEOPLASM OF BOTH OVARIES (HCC): Primary | ICD-10-CM

## 2020-12-03 PROCEDURE — 3331090001 HH PPS REVENUE CREDIT

## 2020-12-03 PROCEDURE — G8428 CUR MEDS NOT DOCUMENT: HCPCS | Performed by: OBSTETRICS & GYNECOLOGY

## 2020-12-03 PROCEDURE — G0153 HHCP-SVS OF S/L PATH,EA 15MN: HCPCS

## 2020-12-03 PROCEDURE — 1090F PRES/ABSN URINE INCON ASSESS: CPT | Performed by: OBSTETRICS & GYNECOLOGY

## 2020-12-03 PROCEDURE — 1101F PT FALLS ASSESS-DOCD LE1/YR: CPT | Performed by: OBSTETRICS & GYNECOLOGY

## 2020-12-03 PROCEDURE — G0300 HHS/HOSPICE OF LPN EA 15 MIN: HCPCS

## 2020-12-03 PROCEDURE — 3331090002 HH PPS REVENUE DEBIT

## 2020-12-03 PROCEDURE — G8400 PT W/DXA NO RESULTS DOC: HCPCS | Performed by: OBSTETRICS & GYNECOLOGY

## 2020-12-03 PROCEDURE — G0463 HOSPITAL OUTPT CLINIC VISIT: HCPCS | Performed by: OBSTETRICS & GYNECOLOGY

## 2020-12-03 PROCEDURE — 99213 OFFICE O/P EST LOW 20 MIN: CPT | Performed by: OBSTETRICS & GYNECOLOGY

## 2020-12-03 PROCEDURE — G8432 DEP SCR NOT DOC, RNG: HCPCS | Performed by: OBSTETRICS & GYNECOLOGY

## 2020-12-03 NOTE — PROGRESS NOTES
[address] [place] [address] Mathias Moritz 901, 9[address] [facility]  P [phone]  F [phone]    Office Note  Patient ID:  Name:  Sean Hill  MRN:  [id number]  :   y.o. Date:  12/3/2020      HISTORY OF PRESENT ILLNESS:  Sean Hill is a 68 y.o.  postmenopausal female who is being seen for probable ovarian cancer. She is referred by Gita Parker NP. She was admitted to the hospital in [place] in late October. A CT of the abdomen/pelvis revealed a a large heterogenous mass measuring 17 x 21 x 13 cm, presumed to be of gynecologic origin. There was also moderate ascites. There was no retroperitoneal lymphadenopathy and no mention of carcinomatosis. A CT of the chest revealed a spiculated pulmonary nodule in the right upper lobe. There was also mediastinal and left hilar lymphadenopathy, along with moderate left and trace right pleural effusions. A paracentesis was performed, removing 6.2 liters of clear yellow fluid, but the cytology was negative for malignancy. Tumor markers were drawn, including CEA, CA 19-9, and CA-125. The CA-125 was markedly elevated at 2344. The other markers were normal.  I have been asked to see her for further evaluation and management. She presented for her initial consultation with her daughter. I explained what the likely diagnosis was and what the recommendations for treatment would be. The patient stated that she doesn't want surgery or chemotherapy and doesn't want anything done. I suggested that we at least repeat imaging and labs prior to making any decisions on treatment. She presents today to review those results. She has a large pelvic mass and ascites, plus what appears to be metastatic disease to the chest.  Her CA-125 was also very elevated.          ROS:   and GI review:  Negative  Cardiopulmonary review:  Negative   Musculoskeletal:  Negative    A comprehensive review of systems was negative except for that written in the History of Present Illness. , 10 point ROS      Problem List:  Patient Active Problem List    Diagnosis Date Noted    Severe obesity ([facility] [address] 11/25/2020    Essential hypertension 11/18/2020    Hyperlipidemia LDL goal <70 11/18/2020    Pelvic mass 11/18/2020    H/O ascites 11/18/2020    Cellulitis of right lower extremity 11/18/2020    Cerebrovascular accident (CVA) ([facility] [address] 11/18/2020    Weakness generalized 11/18/2020     PMH:  Past Medical History:   Diagnosis Date    CVA (cerebral vascular accident) ([facility] [address]     Hyperlipidemia     Hypertension       PSH:  No past surgical history on file. Social History:  Social History     Tobacco Use    Smoking status: Current Every Day Smoker    Smokeless tobacco: Never Used   Substance Use Topics    Alcohol use: Not Currently      Family History:  No family history on file. Medications: (reviewed)  Current Outpatient Medications   Medication Sig    menthol (GOLD BOND PAIN RELIEVING EX) Apply 1 Applicator to affected area two (2) times a day. thin layer lower extremities    naproxen sodium (Aleve) 220 mg cap Take 1 Tab by mouth daily as needed for Pain.  amLODIPine (NORVASC) 5 mg tablet Take 5 mg by mouth daily.  simvastatin (ZOCOR) 20 mg tablet Take 20 mg by mouth nightly.  aspirin delayed-release 81 mg tablet Take 81 mg by mouth daily. No current facility-administered medications for this visit. Allergies: (reviewed)  No Known Allergies       OBJECTIVE:    Physical Exam:  VITAL SIGNS: There were no vitals filed for this visit. There is no height or weight on file to calculate BMI.    GENERAL ISAAK:    HEENT:    RESPIRATORY:    CARDIOVASC:    GASTROINT:    MUSCULOSKEL:    EXTREMITIES:    PELVIC:    RECTAL:    NAVEED SURVEY:    NEURO:        Lab Data:    Lab Results   Component Value Date/Time    WBC 7.9 11/25/2020 12:31 PM    HGB 12.4 11/25/2020 12:31 PM    HCT 38.2 11/25/2020 12:31 PM    PLATELET 789 35/56/1250 12:31 PM    MCV 87 11/25/2020 12:31 PM     Lab Results   Component Value Date/Time    Sodium 139 11/25/2020 12:31 PM    Potassium 4.9 11/25/2020 12:31 PM    Chloride 100 11/25/2020 12:31 PM    CO2 23 11/25/2020 12:31 PM    Glucose 105 (H) 11/25/2020 12:31 PM    BUN 22 11/25/2020 12:31 PM    Creatinine 0.85 11/25/2020 12:31 PM    BUN/Creatinine ratio 26 11/25/2020 12:31 PM    GFR est AA 76 11/25/2020 12:31 PM    GFR est non-AA 66 11/25/2020 12:31 PM    Calcium 9.9 11/25/2020 12:31 PM     Lab Results   Component Value Date/Time    Cancer Ag (CA) 125 749.0 (H) 11/25/2020 12:31 PM       CT of chest/abdomen/pelivs (12/1/20)   THYROID: No nodule. MEDIASTINUM: There is a 19 mm AP window lymph node. A 13 mm pretracheal lymph  node. An 18 mm subcarinal lymph node which is confluent with a left hilar lymph  node which measures approximately 22 mm. JAY JAY: As above  THORACIC AORTA: Atherosclerotic disease. MAIN PULMONARY ARTERY: Normal in caliber. TRACHEA/BRONCHI: Patent. ESOPHAGUS: No wall thickening or dilatation. HEART: Coronary atherosclerotic disease. PLEURA: Small left pleural effusion. LUNGS: 11 mm right upper lobe pulmonary nodule series 4 image 19 is a calcified  granuloma in the right lower lobe. 12 mm pleural-based nodule left lower lobe  image 17. LIVER: No mass or biliary dilatation. GALLBLADDER: Cholecystectomy changes. SPLEEN: No mass. PANCREAS: No mass or ductal dilatation. ADRENALS: Unremarkable. KIDNEYS: No mass, calculus, or hydronephrosis. STOMACH: Unremarkable. SMALL BOWEL: No dilatation or wall thickening. COLON: Colonic diverticulosis. APPENDIX: Not visualized  PERITONEUM: Large mass in the central abdomen extending into the pelvis possibly  associated with the right ovary measuring approximately 12.9 cm AP by 20.5 cm  transverse and extending 20.5 cm craniocaudal. Perihepatic and perisplenic  ascites is noted with fluid tracking in the right paracolic gutter and into the  pelvis.   RETROPERITONEUM: No lymphadenopathy or aortic aneurysm. Slightly prominent  retroperitoneal lymph nodes which do not meet size criteria. REPRODUCTIVE ORGANS: The visualized uterus is unremarkable. URINARY BLADDER: Nondistended  BONES: Lucent lesion at L1 likely hemangioma. Remote right-sided rib fractures. ADDITIONAL COMMENTS: N/A     IMPRESSION:  1. Large mass in the central abdomen and pelvis possibly arising from right  ovary consistent with patient's history of ovarian malignancy.     2.  Mediastinal adenopathy extending into the left hilum consistent with  metastatic disease.     3. Nodule opacity suspicious for metastatic disease.     4. Abdominal and pelvic ascites. IMPRESSION/PLAN:  Ozzie Parry is a 68 y.o. 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  This could also represent a second lung primary. I again explained what the likely diagnosis is and what the recommendations for treatment would be. The patient again was somewhat uncertain whether she wanted surgery or chemotherapy, but her daughter convinced her to proceed. I recommended an X-lap with resection of mass, JOANA, BSO, tumor debulking. We will confirm the diagnosis with frozen section pathology. She was counseled on the risks, benefits, indications, and alternatives of surgery. Her questions were answered and she wishes to proceed. Signed By: Jeremiah Garcia MD     12/3/2020/9:30 AM       Ozzie Parry is a 68 y.o. female who was seen by synchronous (real-time) audio-video technology on 12/3/2020 for Results (Virtual visit. Follow up to review ct scan results)        Assessment & Plan:   Diagnoses and all orders for this visit:    1. Malignant neoplasm of both ovaries ([address]        I spent at least 15 minutes on this visit with this established patient.   712  Subjective:       Prior to Admission medications Medication Sig Start Date End Date Taking? Authorizing Provider   menthol (GOLD BOND PAIN RELIEVING EX) Apply 1 Applicator to affected area two (2) times a day. thin layer lower extremities   Yes Provider, Historical   naproxen sodium (Aleve) 220 mg cap Take 1 Tab by mouth daily as needed for Pain. Yes Provider, Historical   amLODIPine (NORVASC) 5 mg tablet Take 5 mg by mouth daily. Yes Provider, Historical   simvastatin (ZOCOR) 20 mg tablet Take 20 mg by mouth nightly. Yes Provider, Historical   aspirin delayed-release 81 mg tablet Take 81 mg by mouth daily. Yes Provider, Historical     Patient Active Problem List   Diagnosis Code    Essential hypertension I10    Hyperlipidemia LDL goal <70 E78.5    Pelvic mass R19.00    H/O ascites Z87.898    Cellulitis of right lower extremity L03.115    Cerebrovascular accident (CVA) ([address] I63.9    Weakness generalized R53.1    Severe obesity ([address] E66.01     Patient Active Problem List    Diagnosis Date Noted    Severe obesity ([address] 11/25/2020    Essential hypertension 11/18/2020    Hyperlipidemia LDL goal <70 11/18/2020    Pelvic mass 11/18/2020    H/O ascites 11/18/2020    Cellulitis of right lower extremity 11/18/2020    Cerebrovascular accident (CVA) ([address] 11/18/2020    Weakness generalized 11/18/2020     Current Outpatient Medications   Medication Sig Dispense Refill    menthol (GOLD BOND PAIN RELIEVING EX) Apply 1 Applicator to affected area two (2) times a day. thin layer lower extremities      naproxen sodium (Aleve) 220 mg cap Take 1 Tab by mouth daily as needed for Pain.  amLODIPine (NORVASC) 5 mg tablet Take 5 mg by mouth daily.  simvastatin (ZOCOR) 20 mg tablet Take 20 mg by mouth nightly.  aspirin delayed-release 81 mg tablet Take 81 mg by mouth daily.        No Known Allergies  Past Medical History:   Diagnosis Date    CVA (cerebral vascular accident) ([address]     Hyperlipidemia     Hypertension      No past surgical history on file. No family history on file. Social History     Tobacco Use    Smoking status: Current Every Day Smoker    Smokeless tobacco: Never Used   Substance Use Topics    Alcohol use: Not Currently       ROS    Objective:   No flowsheet data found. General: alert, cooperative, no distress   Mental  status: normal mood, behavior, speech, dress, motor activity, and thought processes, able to follow commands   HENT: NCAT   Neck: no visualized mass   Resp: no respiratory distress   Neuro: no gross deficits   Skin: no discoloration or lesions of concern on visible areas   Psychiatric: normal affect, consistent with stated mood, no evidence of hallucinations     Additional exam findings: We discussed the expected course, resolution and complications of the diagnosis(es) in detail. Medication risks, benefits, costs, interactions, and alternatives were discussed as indicated. I advised her to contact the office if her condition worsens, changes or fails to improve as anticipated. She expressed understanding with the diagnosis(es) and plan. Shannan Cloud, who was evaluated through a patient-initiated, synchronous (real-time) audio-video encounter, and/or her healthcare decision maker, is aware that it is a billable service, with coverage as determined by her insurance carrier. She provided verbal consent to proceed: Yes, and patient identification was verified. It was conducted pursuant to the emergency declaration under the [address] [place] [address] authority and the [employer] General Act. A caregiver was present when appropriate. Ability to conduct physical exam was limited. I was in the office. The patient was at home.       Gabriel Montenegro MD

## 2020-12-04 ENCOUNTER — HOME CARE VISIT (OUTPATIENT)
Dept: SCHEDULING | Facility: HOME HEALTH | Age: 80
End: 2020-12-04
Payer: MEDICARE

## 2020-12-04 VITALS
SYSTOLIC BLOOD PRESSURE: 148 MMHG | OXYGEN SATURATION: 98 % | DIASTOLIC BLOOD PRESSURE: 80 MMHG | HEART RATE: 78 BPM | TEMPERATURE: 98.1 F | RESPIRATION RATE: 18 BRPM

## 2020-12-04 PROCEDURE — 3331090002 HH PPS REVENUE DEBIT

## 2020-12-04 PROCEDURE — 3331090001 HH PPS REVENUE CREDIT

## 2020-12-04 PROCEDURE — G0152 HHCP-SERV OF OT,EA 15 MIN: HCPCS

## 2020-12-05 VITALS
HEART RATE: 85 BPM | RESPIRATION RATE: 14 BRPM | DIASTOLIC BLOOD PRESSURE: 80 MMHG | TEMPERATURE: 97.4 F | OXYGEN SATURATION: 96 % | SYSTOLIC BLOOD PRESSURE: 148 MMHG

## 2020-12-05 PROCEDURE — 3331090002 HH PPS REVENUE DEBIT

## 2020-12-05 PROCEDURE — 3331090001 HH PPS REVENUE CREDIT

## 2020-12-06 PROCEDURE — 3331090002 HH PPS REVENUE DEBIT

## 2020-12-06 PROCEDURE — 3331090001 HH PPS REVENUE CREDIT

## 2020-12-07 PROCEDURE — 3331090002 HH PPS REVENUE DEBIT

## 2020-12-07 PROCEDURE — 3331090001 HH PPS REVENUE CREDIT

## 2020-12-08 ENCOUNTER — HOME CARE VISIT (OUTPATIENT)
Dept: SCHEDULING | Facility: HOME HEALTH | Age: 80
End: 2020-12-08
Payer: MEDICARE

## 2020-12-08 VITALS
HEART RATE: 90 BPM | SYSTOLIC BLOOD PRESSURE: 134 MMHG | DIASTOLIC BLOOD PRESSURE: 68 MMHG | OXYGEN SATURATION: 98 % | RESPIRATION RATE: 22 BRPM | TEMPERATURE: 97.4 F

## 2020-12-08 VITALS
RESPIRATION RATE: 19 BRPM | DIASTOLIC BLOOD PRESSURE: 82 MMHG | OXYGEN SATURATION: 98 % | SYSTOLIC BLOOD PRESSURE: 140 MMHG | HEART RATE: 94 BPM | TEMPERATURE: 98.1 F

## 2020-12-08 PROCEDURE — 3331090002 HH PPS REVENUE DEBIT

## 2020-12-08 PROCEDURE — G0153 HHCP-SVS OF S/L PATH,EA 15MN: HCPCS

## 2020-12-08 PROCEDURE — 3331090001 HH PPS REVENUE CREDIT

## 2020-12-08 PROCEDURE — G0300 HHS/HOSPICE OF LPN EA 15 MIN: HCPCS

## 2020-12-09 ENCOUNTER — HOME CARE VISIT (OUTPATIENT)
Dept: SCHEDULING | Facility: HOME HEALTH | Age: 80
End: 2020-12-09
Payer: MEDICARE

## 2020-12-09 ENCOUNTER — OFFICE VISIT (OUTPATIENT)
Dept: NEUROLOGY | Age: 80
End: 2020-12-09
Payer: MEDICARE

## 2020-12-09 VITALS
TEMPERATURE: 97 F | HEART RATE: 70 BPM | DIASTOLIC BLOOD PRESSURE: 60 MMHG | OXYGEN SATURATION: 99 % | WEIGHT: 219 LBS | BODY MASS INDEX: 35.2 KG/M2 | HEIGHT: 66 IN | SYSTOLIC BLOOD PRESSURE: 142 MMHG | RESPIRATION RATE: 16 BRPM

## 2020-12-09 VITALS
HEART RATE: 94 BPM | TEMPERATURE: 97.4 F | DIASTOLIC BLOOD PRESSURE: 82 MMHG | OXYGEN SATURATION: 98 % | SYSTOLIC BLOOD PRESSURE: 130 MMHG | RESPIRATION RATE: 12 BRPM

## 2020-12-09 DIAGNOSIS — F03.90 DEMENTIA WITHOUT BEHAVIORAL DISTURBANCE, UNSPECIFIED DEMENTIA TYPE: Primary | ICD-10-CM

## 2020-12-09 DIAGNOSIS — F03.90 DEMENTIA WITHOUT BEHAVIORAL DISTURBANCE, UNSPECIFIED DEMENTIA TYPE: ICD-10-CM

## 2020-12-09 DIAGNOSIS — I63.9 CEREBROVASCULAR ACCIDENT (CVA), UNSPECIFIED MECHANISM (HCC): ICD-10-CM

## 2020-12-09 PROCEDURE — 1090F PRES/ABSN URINE INCON ASSESS: CPT | Performed by: PSYCHIATRY & NEUROLOGY

## 2020-12-09 PROCEDURE — 3331090002 HH PPS REVENUE DEBIT

## 2020-12-09 PROCEDURE — 3331090001 HH PPS REVENUE CREDIT

## 2020-12-09 PROCEDURE — G0151 HHCP-SERV OF PT,EA 15 MIN: HCPCS

## 2020-12-09 PROCEDURE — 99205 OFFICE O/P NEW HI 60 MIN: CPT | Performed by: PSYCHIATRY & NEUROLOGY

## 2020-12-09 RX ORDER — DONEPEZIL HYDROCHLORIDE 5 MG/1
5 TABLET, FILM COATED ORAL DAILY
Qty: 30 TAB | Refills: 0 | Status: SHIPPED | OUTPATIENT
Start: 2020-12-09 | End: 2021-02-24 | Stop reason: SDUPTHER

## 2020-12-09 NOTE — PROGRESS NOTES
NEUROLOGY NEW PATIENT OFFICE CONSULTATION      12/9/2020    RE: Bowen Damian         1940      REFERRED BY:  Kenny Leblanc NP        CHIEF COMPLAINT:  This is Bowen Damain is a 68 y.o. female  who had concerns including New Patient (Patient here today for some memory loss. Daughter states she seems better since moving here, and being on a set schedule. She said she has a speech therapist that comes to the house and administers cognitive tests--the therapist recommended to her PCP for her to see neuro.) and Memory Loss. HPI:     Patient previously lived in [place]. Daughter when she talks over the phone, that patient is forgetful. Patient now staying with daughter here in [place]. Patient will wake up in the middle of the night, patient will not sure where she is. Problem with recall. 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  This could also represent a second lung primary    Recent tests done:  MRI Brain from outside (10/29/20):  No mets; (+) subacute R parietal and L occipital strokes    ROS   (-) fever  (+) L leg cellulitis  All other systems reviewed and are negative    Past Medical Hx  Past Medical History:   Diagnosis Date    [facility]cere[facility]) [facility] [address]     Hyperlipidemia     Hypertension        Social Hx  Social History     Socioeconomic History    Marital status:      Spouse name: Not on file    Number of children: Not on file    Years of education: Not on file    Highest education level: Not on file   Tobacco Use    Smoking status: Current Every Day Smoker    Smokeless tobacco: Never Used   Substance and Sexual Activity    Alcohol use: Not Currently    Drug use: Not Currently       Family Hx  History reviewed. No pertinent family history. ALLERGIES  No Known Allergies    CURRENT MEDS  Current Outpatient Medications   Medication Sig Dispense Refill    donepeziL (Aricept) 5 mg tablet Take 1 Tab by mouth daily. 30 Tab 0    menthol (GOLD BOND PAIN RELIEVING EX) Apply 1 Applicator to affected area two (2) times a day. thin layer lower extremities      naproxen sodium (Aleve) 220 mg cap Take 1 Tab by mouth daily as needed for Pain.  amLODIPine (NORVASC) 5 mg tablet Take 5 mg by mouth daily.  simvastatin (ZOCOR) 20 mg tablet Take 20 mg by mouth nightly.  aspirin delayed-release 81 mg tablet Take 81 mg by mouth daily. PREVIOUS WORKUP: (reviewed)  IMAGING:    CT Results (recent):  Results from Hospital Encounter encounter on 12/01/20   CT ABD PELV W CONT    Narrative EXAM: CT CHEST W CONT, CT ABD PELV W CONT    INDICATION: Malignant ovarian neoplasm    COMPARISON: No comparison    CONTRAST: 100 mL of Isovue-370. TECHNIQUE:   Following the uneventful intravenous administration of contrast, thin axial  images were obtained through the chest, abdomen and pelvis. Coronal and sagittal  reconstructions were generated. Oral contrast was administered. CT dose  reduction was achieved through use of a standardized protocol tailored for this  examination and automatic exposure control for dose modulation. FINDINGS:     THYROID: No nodule. MEDIASTINUM: There is a 19 mm AP window lymph node. A 13 mm pretracheal lymph  node. An 18 mm subcarinal lymph node which is confluent with a left hilar lymph  node which measures approximately 22 mm. JAY JAY: As above  THORACIC AORTA: Atherosclerotic disease. MAIN PULMONARY ARTERY: Normal in caliber. TRACHEA/BRONCHI: Patent. ESOPHAGUS: No wall thickening or dilatation. HEART: Coronary atherosclerotic disease. PLEURA: Small left pleural effusion.   LUNGS: 11 mm right upper lobe pulmonary nodule series 4 image 19 is a calcified  granuloma in the right lower lobe. 12 mm pleural-based nodule left lower lobe  image 17. LIVER: No mass or biliary dilatation. GALLBLADDER: Cholecystectomy changes. SPLEEN: No mass. PANCREAS: No mass or ductal dilatation. ADRENALS: Unremarkable. KIDNEYS: No mass, calculus, or hydronephrosis. STOMACH: Unremarkable. SMALL BOWEL: No dilatation or wall thickening. COLON: Colonic diverticulosis. APPENDIX: Not visualized  PERITONEUM: Large mass in the central abdomen extending into the pelvis possibly  associated with the right ovary measuring approximately 12.9 cm AP by 20.5 cm  transverse and extending 20.5 cm craniocaudal. Perihepatic and perisplenic  ascites is noted with fluid tracking in the right paracolic gutter and into the  pelvis. RETROPERITONEUM: No lymphadenopathy or aortic aneurysm. Slightly prominent  retroperitoneal lymph nodes which do not meet size criteria. REPRODUCTIVE ORGANS: The visualized uterus is unremarkable. URINARY BLADDER: Nondistended  BONES: Lucent lesion at L1 likely hemangioma. Remote right-sided rib fractures. ADDITIONAL COMMENTS: N/A      Impression IMPRESSION:  1. Large mass in the central abdomen and pelvis possibly arising from right  ovary consistent with patient's history of ovarian malignancy. 2.  Mediastinal adenopathy extending into the left hilum consistent with  metastatic disease. 3.  Nodule opacity suspicious for metastatic disease. 4.  Abdominal and pelvic ascites. MRI Results (recent):  No results found for this or any previous visit. IR Results (recent):  No results found for this or any previous visit. VAS/US Results (recent):  No results found for this or any previous visit.         LABS (reviewed)  Results for orders placed or performed in visit on 11/25/20   CANCER ANTIGEN 125   Result Value Ref Range    Cancer Ag (CA) 125 749.0 (H) 0.0 - 38.1 U/mL   CBC WITH AUTOMATED DIFF   Result Value Ref Range    WBC 7.9 3.4 - 10.8 x10E3/uL    RBC 4.38 3.77 - 5.28 x10E6/uL    HGB 12.4 11.1 - 15.9 g/dL    HCT 38.2 34.0 - 46.6 %    MCV 87 79 - 97 fL    MCH 28.3 26.6 - 33.0 pg    MCHC 32.5 31.5 - 35.7 g/dL    RDW 15.7 (H) 11.7 - 15.4 %    PLATELET 659 797 - 023 x10E3/uL    NEUTROPHILS 67 Not Estab. %    Lymphocytes 21 Not Estab. %    MONOCYTES 9 Not Estab. %    EOSINOPHILS 2 Not Estab. %    BASOPHILS 0 Not Estab. %    ABS. NEUTROPHILS 5.3 1.4 - 7.0 x10E3/uL    Abs Lymphocytes 1.6 0.7 - 3.1 x10E3/uL    ABS. MONOCYTES 0.7 0.1 - 0.9 x10E3/uL    ABS. EOSINOPHILS 0.2 0.0 - 0.4 x10E3/uL    ABS. BASOPHILS 0.0 0.0 - 0.2 x10E3/uL    IMMATURE GRANULOCYTES 1 Not Estab. %    ABS. IMM. GRANS. 0.0 0.0 - 0.1 Z25O4/CE   METABOLIC PANEL, COMPREHENSIVE   Result Value Ref Range    Glucose 105 (H) 65 - 99 mg/dL    BUN 22 8 - 27 mg/dL    Creatinine 0.85 0.57 - 1.00 mg/dL    GFR est non-AA 66 >59 mL/min/1.73    GFR est AA 76 >59 mL/min/1.73    BUN/Creatinine ratio 26 12 - 28    Sodium 139 134 - 144 mmol/L    Potassium 4.9 3.5 - 5.2 mmol/L    Chloride 100 96 - 106 mmol/L    CO2 23 20 - 29 mmol/L    Calcium 9.9 8.7 - 10.3 mg/dL    Protein, total 7.5 6.0 - 8.5 g/dL    Albumin 3.9 3.7 - 4.7 g/dL    GLOBULIN, TOTAL 3.6 1.5 - 4.5 g/dL    A-G Ratio 1.1 (L) 1.2 - 2.2    Bilirubin, total 0.3 0.0 - 1.2 mg/dL    Alk. phosphatase 109 39 - 117 IU/L    AST (SGOT) 23 0 - 40 IU/L    ALT (SGPT) 16 0 - 32 IU/L       Physical Exam:     Visit Vitals  BP (!) 142/60 (BP 1 Location: Right arm, BP Patient Position: Sitting)   Pulse 70   Temp 97 °F (36.1 °C)   Resp 16   Ht 5' 6\" (1.676 m)   Wt 99.3 kg (219 lb)   SpO2 99%   BMI 35.35 kg/m²     General:  Alert, cooperative, no distress. Head:  Normocephalic, without obvious abnormality, atraumatic. Eyes:  Conjunctivae/corneas clear. Lungs:  Heart:   Non labored breathing  Regular rate and rhythm, no carotid bruits   Abdomen:   Soft, non-distended   Extremities: Extremities normal, atraumatic, no cyanosis or edema.    Pulses: 2+ and symmetric all extremities. Skin: Skin color, texture, turgor normal. No rashes or lesions. Neurologic Exam     Gen: MMSE 19/30 Attention normal  Disoriented to place and date             Language: naming, repetition, fluency normal             Memory: 0/3  Problem with spelling and copying  Cranial Nerves:  I: smell Not tested   II: visual fields Full to confrontation   II: pupils Equal, round, reactive to light   II: optic disc No papilledema   III,VII: ptosis none   III,IV,VI: extraocular muscles  Full ROM   V: mastication normal   V: facial light touch sensation  normal   VII: facial muscle function   symmetric   VIII: hearing symmetric   IX: soft palate elevation  normal   XI: trapezius strength  5/5   XI: sternocleidomastoid strength 5/5   XI: neck flexion strength  5/5   XII: tongue  midline     Motor: normal bulk and tone, no tremor              Strength: 5/5 all four extremities  Sensory: intact to LT, PP, vibration, and JPS  Reflexes: 2+ throughout; Down going toes  Coordination: Good FTN and HTS  Gait: normal gait including tandem            Impression:     Sahil Gonzalez is a 68 y.o. female who  has a past medical history of CVA (cerebral vascular accident) ([facility] Hyperlipidemia, and Hypertension. 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 Patient previously lived in [place]. Daughter when she talks over the phone, that patient is forgetful for at least 1 yr. Patient will wake up in the middle of the night, patient will not sure where she is. Problem with recall, misplaces things, problems with names, daughter is taking care of finances, loss of hygiene. MMSE is 19/30 consistent with dementia, moderate stage. Consideration includes vascular dementia (MRI Brain from outside (10/29/20):  No mets; (+) subacute R parietal and L occipital strokes) and/or Alzheimer's dementia. RECOMMENDATIONS  1. I had a long discussion with patient and daugther. Discussed diagnosis, prognosis, pathophysiology and available treatment. Reviewed test results. All questions were answered. 2. Reviewed medical records from outside  3. Blood test for TSH, Folate and Vit B12  4. Start Aricept 5 mg QHS  5. Already on ASA 81 every day for stroke prevention  6. Advise to quit smoking. Patient states she will not quit  7. Discussed the importance of having a will and testament and advance directive (power-of- and living will)  8. LDL 65 on Simvastatin 20 mg every day   9. Follow up with her oncologist regarding Ovarian CA treatment (going to get surgery)      Follow-up and Dispositions    · Return in about 1 month (around 1/9/2021).             Thank you for the consultation      Cachorro Mark MD  Diplomate, American Board of Psychiatry and Neurology  Diplomate, Neuromuscular Medicine  Diplomate, American Board of Electrodiagnostic Medicine        CC: More Neal NP  Fax: [phone]

## 2020-12-09 NOTE — LETTER
12/9/20    Patient: Cydney Charles   YOB: 1940   Date of Visit: 12/9/2020     Jacki Ren NP  [address] [place] [address]    Dear Jacki Ren NP,      Thank you for referring Ms. Cydney Charles to [facility] for evaluation. My notes for this consultation are attached. If you have questions, please do not hesitate to call me. I look forward to following your patient along with you.       Sincerely,    Emerald Corral MD

## 2020-12-09 NOTE — PROGRESS NOTES
Chief Complaint   Patient presents with    New Patient     Patient here today for some memory loss. Daughter states she seems better since moving here, and being on a set schedule. She said she has a speech therapist that comes to the house and administers cognitive tests--the therapist recommended to her PCP for her to see neuro.     Memory Loss     Visit Vitals  BP (!) 142/60 (BP 1 Location: Right arm, BP Patient Position: Sitting)   Pulse 70   Temp 97 °F (36.1 °C)   Resp 16   Ht 5' 6\" (1.676 m)   Wt 99.3 kg (219 lb)   SpO2 99%   BMI 35.35 kg/m²

## 2020-12-10 ENCOUNTER — HOME CARE VISIT (OUTPATIENT)
Dept: SCHEDULING | Facility: HOME HEALTH | Age: 80
End: 2020-12-10
Payer: MEDICARE

## 2020-12-10 VITALS
SYSTOLIC BLOOD PRESSURE: 149 MMHG | HEART RATE: 84 BPM | OXYGEN SATURATION: 98 % | DIASTOLIC BLOOD PRESSURE: 75 MMHG | TEMPERATURE: 97.1 F

## 2020-12-10 LAB
FOLATE SERPL-MCNC: 2.3 NG/ML
TSH SERPL DL<=0.005 MIU/L-ACNC: 6.54 UIU/ML (ref 0.45–4.5)
VIT B12 SERPL-MCNC: 263 PG/ML (ref 232–1245)

## 2020-12-10 PROCEDURE — 3331090001 HH PPS REVENUE CREDIT

## 2020-12-10 PROCEDURE — G0153 HHCP-SVS OF S/L PATH,EA 15MN: HCPCS

## 2020-12-10 PROCEDURE — 3331090002 HH PPS REVENUE DEBIT

## 2020-12-10 NOTE — PROGRESS NOTES
Cyndi Bedolla MD  [facility], April , LPN   Cc: Vinay Necessary, NP   Pls inform patient she has low Vit B12, low Folate and abnormal TSH     P> Advise to take Folate 800 mcg daily   Also to talk to PCP regarding abnormal TSH and to get Vit B12 1000 mcg IM injection Q weekly to bring up Vit B12 to high normal range (I.e. 900 to 1000)     Will CC: her PCP     Ivonne Saha MD   12/10/20 Sent a [facility] message with lab results and MDs recommendations.

## 2020-12-11 ENCOUNTER — HOME CARE VISIT (OUTPATIENT)
Dept: SCHEDULING | Facility: HOME HEALTH | Age: 80
End: 2020-12-11
Payer: MEDICARE

## 2020-12-11 VITALS
TEMPERATURE: 98 F | RESPIRATION RATE: 16 BRPM | DIASTOLIC BLOOD PRESSURE: 70 MMHG | OXYGEN SATURATION: 94 % | SYSTOLIC BLOOD PRESSURE: 130 MMHG | HEART RATE: 92 BPM

## 2020-12-11 PROCEDURE — 3331090001 HH PPS REVENUE CREDIT

## 2020-12-11 PROCEDURE — 3331090002 HH PPS REVENUE DEBIT

## 2020-12-11 PROCEDURE — G0152 HHCP-SERV OF OT,EA 15 MIN: HCPCS

## 2020-12-12 PROCEDURE — 3331090002 HH PPS REVENUE DEBIT

## 2020-12-12 PROCEDURE — 3331090001 HH PPS REVENUE CREDIT

## 2020-12-13 PROCEDURE — 3331090001 HH PPS REVENUE CREDIT

## 2020-12-13 PROCEDURE — 3331090002 HH PPS REVENUE DEBIT

## 2020-12-14 PROCEDURE — 3331090001 HH PPS REVENUE CREDIT

## 2020-12-14 PROCEDURE — 3331090002 HH PPS REVENUE DEBIT

## 2020-12-15 ENCOUNTER — HOME CARE VISIT (OUTPATIENT)
Dept: SCHEDULING | Facility: HOME HEALTH | Age: 80
End: 2020-12-15
Payer: MEDICARE

## 2020-12-15 VITALS
HEART RATE: 109 BPM | SYSTOLIC BLOOD PRESSURE: 108 MMHG | OXYGEN SATURATION: 96 % | TEMPERATURE: 96.8 F | DIASTOLIC BLOOD PRESSURE: 80 MMHG | RESPIRATION RATE: 18 BRPM

## 2020-12-15 VITALS
OXYGEN SATURATION: 96 % | RESPIRATION RATE: 21 BRPM | SYSTOLIC BLOOD PRESSURE: 106 MMHG | TEMPERATURE: 96 F | DIASTOLIC BLOOD PRESSURE: 77 MMHG | HEART RATE: 83 BPM

## 2020-12-15 PROCEDURE — 3331090002 HH PPS REVENUE DEBIT

## 2020-12-15 PROCEDURE — G0153 HHCP-SVS OF S/L PATH,EA 15MN: HCPCS

## 2020-12-15 PROCEDURE — G0300 HHS/HOSPICE OF LPN EA 15 MIN: HCPCS

## 2020-12-15 PROCEDURE — 3331090001 HH PPS REVENUE CREDIT

## 2020-12-15 NOTE — PROGRESS NOTES
Spoke to pt's daughter(hippa verified). Patient x2 id verified. VV schedule to discuss B12 as no office visit available until next year.

## 2020-12-16 PROCEDURE — 3331090001 HH PPS REVENUE CREDIT

## 2020-12-16 PROCEDURE — 3331090002 HH PPS REVENUE DEBIT

## 2020-12-17 PROCEDURE — 3331090001 HH PPS REVENUE CREDIT

## 2020-12-17 PROCEDURE — 3331090002 HH PPS REVENUE DEBIT

## 2020-12-18 PROCEDURE — 3331090002 HH PPS REVENUE DEBIT

## 2020-12-18 PROCEDURE — 3331090001 HH PPS REVENUE CREDIT

## 2020-12-19 PROCEDURE — 3331090001 HH PPS REVENUE CREDIT

## 2020-12-19 PROCEDURE — 3331090002 HH PPS REVENUE DEBIT

## 2020-12-20 PROCEDURE — 3331090002 HH PPS REVENUE DEBIT

## 2020-12-20 PROCEDURE — 3331090001 HH PPS REVENUE CREDIT

## 2020-12-21 PROCEDURE — 3331090002 HH PPS REVENUE DEBIT

## 2020-12-21 PROCEDURE — 3331090001 HH PPS REVENUE CREDIT

## 2020-12-22 PROCEDURE — 3331090001 HH PPS REVENUE CREDIT

## 2020-12-22 PROCEDURE — 3331090002 HH PPS REVENUE DEBIT

## 2020-12-23 ENCOUNTER — HOME CARE VISIT (OUTPATIENT)
Dept: SCHEDULING | Facility: HOME HEALTH | Age: 80
End: 2020-12-23
Payer: MEDICARE

## 2020-12-23 VITALS
TEMPERATURE: 98.2 F | HEART RATE: 107 BPM | DIASTOLIC BLOOD PRESSURE: 80 MMHG | SYSTOLIC BLOOD PRESSURE: 124 MMHG | RESPIRATION RATE: 16 BRPM | OXYGEN SATURATION: 96 %

## 2020-12-23 PROCEDURE — 3331090002 HH PPS REVENUE DEBIT

## 2020-12-23 PROCEDURE — G0299 HHS/HOSPICE OF RN EA 15 MIN: HCPCS

## 2020-12-23 PROCEDURE — 400013 HH SOC

## 2020-12-23 PROCEDURE — 3331090001 HH PPS REVENUE CREDIT

## 2020-12-24 PROCEDURE — 3331090002 HH PPS REVENUE DEBIT

## 2020-12-24 PROCEDURE — 3331090001 HH PPS REVENUE CREDIT

## 2020-12-25 PROCEDURE — 3331090001 HH PPS REVENUE CREDIT

## 2020-12-25 PROCEDURE — 3331090002 HH PPS REVENUE DEBIT

## 2020-12-26 PROCEDURE — 3331090002 HH PPS REVENUE DEBIT

## 2020-12-26 PROCEDURE — 3331090001 HH PPS REVENUE CREDIT

## 2020-12-27 PROCEDURE — 3331090001 HH PPS REVENUE CREDIT

## 2020-12-27 PROCEDURE — 3331090002 HH PPS REVENUE DEBIT

## 2020-12-28 ENCOUNTER — TELEPHONE (OUTPATIENT)
Dept: FAMILY MEDICINE CLINIC | Age: 80
End: 2020-12-28

## 2020-12-28 ENCOUNTER — VIRTUAL VISIT (OUTPATIENT)
Dept: FAMILY MEDICINE CLINIC | Age: 80
End: 2020-12-28
Payer: MEDICARE

## 2020-12-28 DIAGNOSIS — R79.89 ELEVATED TSH: ICD-10-CM

## 2020-12-28 DIAGNOSIS — E53.8 FOLATE DEFICIENCY: ICD-10-CM

## 2020-12-28 DIAGNOSIS — R19.00 PELVIC MASS: ICD-10-CM

## 2020-12-28 DIAGNOSIS — E53.8 B12 DEFICIENCY: Primary | ICD-10-CM

## 2020-12-28 PROCEDURE — 3331090002 HH PPS REVENUE DEBIT

## 2020-12-28 PROCEDURE — 3331090001 HH PPS REVENUE CREDIT

## 2020-12-28 PROCEDURE — G8432 DEP SCR NOT DOC, RNG: HCPCS | Performed by: NURSE PRACTITIONER

## 2020-12-28 PROCEDURE — G0463 HOSPITAL OUTPT CLINIC VISIT: HCPCS | Performed by: NURSE PRACTITIONER

## 2020-12-28 PROCEDURE — 1101F PT FALLS ASSESS-DOCD LE1/YR: CPT | Performed by: NURSE PRACTITIONER

## 2020-12-28 PROCEDURE — 1090F PRES/ABSN URINE INCON ASSESS: CPT | Performed by: NURSE PRACTITIONER

## 2020-12-28 PROCEDURE — 99214 OFFICE O/P EST MOD 30 MIN: CPT | Performed by: NURSE PRACTITIONER

## 2020-12-28 PROCEDURE — G8427 DOCREV CUR MEDS BY ELIG CLIN: HCPCS | Performed by: NURSE PRACTITIONER

## 2020-12-28 PROCEDURE — G8400 PT W/DXA NO RESULTS DOC: HCPCS | Performed by: NURSE PRACTITIONER

## 2020-12-28 RX ORDER — UREA 10 %
800 LOTION (ML) TOPICAL DAILY
COMMUNITY
End: 2021-02-24 | Stop reason: SDUPTHER

## 2020-12-28 RX ORDER — CYANOCOBALAMIN 1000 UG/ML
1000 INJECTION, SOLUTION INTRAMUSCULAR; SUBCUTANEOUS
Qty: 4 VIAL | Refills: 0 | Status: SHIPPED | OUTPATIENT
Start: 2020-12-28 | End: 2021-01-19

## 2020-12-28 RX ORDER — CYANOCOBALAMIN 1000 UG/ML
1000 INJECTION, SOLUTION INTRAMUSCULAR; SUBCUTANEOUS
Qty: 4 VIAL | Refills: 0
Start: 2020-12-28 | End: 2020-12-28 | Stop reason: SDUPTHER

## 2020-12-28 NOTE — PATIENT INSTRUCTIONS
Vitamin B12 Deficiency: Care Instructions Overview A vitamin B12 deficiency means that your body doesn't have enough of this vitamin. You need vitamin B12 to keep red blood cells and nerve cells healthy. Not enough B12 can cause anemia. It can also damage nerves and cause trouble with memory and thinking. Many things can cause low levels of vitamin B12. They include: · Not getting enough of this vitamin through food. · An autoimmune problem, like pernicious anemia. · Weight-loss surgery, like gastric bypass. · Long-term use of heartburn medicines. Low levels of B12 may not cause symptoms. But symptoms may include fatigue, depression, and thinking or memory problems. You may have tingling in your hands or feet and changes in the way you walk. Treatment depends on the reason for low vitamin B12. Eating more foods rich in B12 may be enough. Or you might take the vitamin as a pill, as shots, or as nasal spray. How can you care for yourself? · Take vitamin B12 as your doctor recommends. · Go to your appointments if you are getting B12 shots. · Eat more foods rich in vitamin B12. Examples are: ? Animal products. These include meat, seafood, milk products, poultry, and eggs. ? Foods that have B12 added. These are called fortified foods. They include soy products, nutritional yeast, and dry cereals. · Work with a nutritionist or dietitian if you need help getting more vitamin B12 from food. · Talk to your doctor about stopping medicines if they are adding to your B12 deficiency. When should you call for help? Call 911 anytime you think you may need emergency care. For example, call if: 
  · You passed out (lost consciousness). Call your doctor now or seek immediate medical care if: 
  · You are dizzy or lightheaded, or you feel like you may faint. Watch closely for changes in your health. Be sure to call your doctor if: 
  · You are confused or can't think clearly.   · You don't get better as expected. Where can you learn more? Go to http://www.gray.com/ Enter [phone] in the search box to learn more about \"Vitamin B12 Deficiency: Care Instructions. \" Current as of: November 8, 2019               Content Version: 12.6 © 7591-2495 [employer]. Care instructions adapted under license by [employer] (which disclaims liability or warranty for this information). If you have questions about a medical condition or this instruction, always ask your healthcare professional. [address] any warranty or liability for your use of this information.

## 2020-12-28 NOTE — PROGRESS NOTES
Pippa Delvalle is a 68 y.o. female who was seen by synchronous (real-time) audio-video technology on 12/28/2020 for Labs (discuss labs that were done by neuro) and Injection B12 (discuss starting b12 injections )        Assessment & Plan:   Diagnoses and all orders for this visit:    1. B12 deficiency  Will schedule nurse visit Wednesday to start injections  -     cyanocobalamin (VITAMIN B12) 1,000 mcg/mL injection; 1 mL by IntraMUSCular route every seven (7) days for 4 doses. 2. Folate deficiency  Continue OTC supplement    3. Elevated TSH  Repeat in 4 weeks, start replacement if remains elevated    4. Pelvic mass  Surgery is scheduled on 1/11/2021      Follow-up and Dispositions    · Return in about 2 days (around 12/30/2020) for b12 injection. I have discussed the diagnosis with the patient and the intended plan as seen in the above orders, and questions were answered concerning future plans. Patient conveyed understanding of the plan at the time of the visit. 712  Subjective:     HPI:    Presents for evaluation of elevated TSH, low vit b12 and folate. Seen with daughter today. Reports recent labs with neuro (Dr. Mariah Marc) which showed abnormal TSH, folate and vit b12 def. F/u was recommended with PCP. Folic acid supplement 814 mcg has been started. Weekly b12 injections recommended by neurologist until levels are 900-1000. TSH was noted to be 6.5. no known hx of hypothyroidism. Patient denies fatigue, constipation. + dry skin. Daughter notes decreased activity tolerance. Exp lap, resection of mass, JOANA, BSO, tumor debulking is scheduled at [facility] with Dr. Tien Martino on 1/11. Pre-op eval is scheduled on 12/30. Prior to Admission medications    Medication Sig Start Date End Date Taking? Authorizing Provider   cyanocobalamin (VITAMIN B12) 1,000 mcg/mL injection 1 mL by IntraMUSCular route every seven (7) days for 4 doses.  12/28/20 1/19/21 Yes Lorena Hernandez NP   folic acid ([place]) 800 mcg tablet Take 800 mcg by mouth daily. Indications: inadequate folic acid   Yes Provider, Historical   donepeziL (Aricept) 5 mg tablet Take 1 Tab by mouth daily. 12/9/20  Yes Hamzah Rincon MD   menthol (GOLD BOND PAIN RELIEVING EX) Apply 1 Applicator to affected area two (2) times a day. thin layer lower extremities   Yes Provider, Historical   amLODIPine (NORVASC) 5 mg tablet Take 5 mg by mouth daily. Yes Provider, Historical   simvastatin (ZOCOR) 20 mg tablet Take 20 mg by mouth nightly. Yes Provider, Historical   aspirin delayed-release 81 mg tablet Take 81 mg by mouth daily. Yes Provider, Historical   cyanocobalamin (VITAMIN B12) 1,000 mcg/mL injection 1 mL by IntraMUSCular route every seven (7) days for 4 doses. 12/28/20 12/28/20  Eloisa Arteaga NP   naproxen sodium (Aleve) 220 mg cap Take 1 Tab by mouth daily as needed for Pain. Provider, Historical     Patient Active Problem List   Diagnosis Code    Essential hypertension I10    Hyperlipidemia LDL goal <70 E78.5    Pelvic mass R19.00    H/O ascites Z87.898    Cellulitis of right lower extremity L03.115    Cerebrovascular accident (CVA) ([address] I63.9    Weakness generalized R53.1    Severe obesity ([address] E66.01     No Known Allergies  Past Medical History:   Diagnosis Date    [facility]     Hyperlipidemia     Hypertension      History reviewed. No pertinent surgical history. History reviewed. No pertinent family history. Social History     Tobacco Use    Smoking status: Current Every Day Smoker    Smokeless tobacco: Never Used   Substance Use Topics    Alcohol use: Not Currently       Review of Systems   Constitutional: Negative for chills, fever, malaise/fatigue and weight loss. HENT: Negative. Eyes: Negative. Respiratory: Negative. Cardiovascular: Negative. Gastrointestinal: Negative. Genitourinary: Negative. Musculoskeletal: Negative. Skin: Negative.     Neurological: Negative. Endo/Heme/Allergies: Negative. Psychiatric/Behavioral: Positive for memory loss. Objective:   No flowsheet data found. General: alert, cooperative, no distress   Mental  status: normal mood, behavior, speech, dress, motor activity, and thought processes, able to follow commands   HENT: NCAT   Neck: no visualized mass   Resp: no respiratory distress   Neuro: no gross deficits   Skin: no discoloration or lesions of concern on visible areas   Psychiatric: normal affect, consistent with stated mood, no evidence of hallucinations     Additional exam findings:   none    We discussed the expected course, resolution and complications of the diagnosis(es) in detail. Medication risks, benefits, costs, interactions, and alternatives were discussed as indicated. I advised her to contact the office if her condition worsens, changes or fails to improve as anticipated. She expressed understanding with the diagnosis(es) and plan. Saima Irving, who was evaluated through a patient-initiated, synchronous (real-time) audio-video encounter, and/or her healthcare decision maker, is aware that it is a billable service, with coverage as determined by her insurance carrier. She provided verbal consent to proceed: Yes, and patient identification was verified. It was conducted pursuant to the emergency declaration under the [facility], [address] [place] [address] authority and the [employer] General Act. A caregiver was present when appropriate. Ability to conduct physical exam was limited. I was at home. The patient was at home.       Dmitriy Nathan NP  12/28/20

## 2020-12-28 NOTE — TELEPHONE ENCOUNTER
----- Message from Tequila Calderon NP sent at 12/28/2020  9:58 AM EST -----  Regarding: b12 injection  Please add patient to my schedule Wednesday morning for b12 injection. Call daughter with appt time.

## 2020-12-28 NOTE — TELEPHONE ENCOUNTER
Pt's dgt Inez id x 3, notified that pt's appt is scheduled for 12/30 @ 10:00 and verbalized understanding.

## 2020-12-28 NOTE — PROGRESS NOTES
Bowen Damian is a 68 y.o. female , id x 2(name and ). Reviewed questionnaires, and  medications.     Chief Complaint   Patient presents with    Labs     discuss labs that were done by neuro    Injection B12     discuss starting b12 injections        3 most recent PHQ Screens 2020   Little interest or pleasure in doing things Not at all   Feeling down, depressed, irritable, or hopeless Not at all   Total Score PHQ 2 0

## 2020-12-28 NOTE — PERIOP NOTES
PATIENT'S DAUGHTER CALLED AND MADE AWARE OF COVID-19 TESTING NEEDED TO BE DONE WITHIN 96 HOURS OF SURGERY. COVID-19 TESTING APPOINTMENT MADE FOR PATIENT. INSTRUCTED ON SELF QUARANTINE BETWEEN TESTING AND ARRIVAL TIME DAY OF SURGERY. ALSO INFORMED TO NOT USE ANY NASAL SPRAYS OR NASAL OINTMENTS PRIOR TO NASAL SWAB.

## 2020-12-29 PROCEDURE — 3331090001 HH PPS REVENUE CREDIT

## 2020-12-29 PROCEDURE — 3331090002 HH PPS REVENUE DEBIT

## 2020-12-30 ENCOUNTER — DOCUMENTATION ONLY (OUTPATIENT)
Dept: FAMILY MEDICINE CLINIC | Age: 80
End: 2020-12-30

## 2020-12-30 ENCOUNTER — TRANSCRIBE ORDER (OUTPATIENT)
Dept: REGISTRATION | Age: 80
End: 2020-12-30

## 2020-12-30 ENCOUNTER — HOSPITAL ENCOUNTER (OUTPATIENT)
Dept: PREADMISSION TESTING | Age: 80
Discharge: HOME | End: 2020-12-30
Admitting: OBSTETRICS & GYNECOLOGY
Payer: MEDICARE

## 2020-12-30 ENCOUNTER — OFFICE VISIT (OUTPATIENT)
Dept: FAMILY MEDICINE CLINIC | Age: 80
End: 2020-12-30
Payer: MEDICARE

## 2020-12-30 VITALS
SYSTOLIC BLOOD PRESSURE: 173 MMHG | HEIGHT: 65 IN | HEART RATE: 99 BPM | TEMPERATURE: 98 F | DIASTOLIC BLOOD PRESSURE: 83 MMHG | OXYGEN SATURATION: 96 % | WEIGHT: 216.05 LBS | BODY MASS INDEX: 36 KG/M2

## 2020-12-30 DIAGNOSIS — Z01.812 PRE-PROCEDURE LAB EXAM: Primary | ICD-10-CM

## 2020-12-30 DIAGNOSIS — E53.8 B12 DEFICIENCY: Primary | ICD-10-CM

## 2020-12-30 LAB
ALBUMIN SERPL-MCNC: 3.4 G/DL (ref 3.5–5)
ALBUMIN/GLOB SERPL: 0.7 {RATIO} (ref 1.1–2.2)
ALP SERPL-CCNC: 118 U/L (ref 45–117)
ALT SERPL-CCNC: 26 U/L (ref 12–78)
ANION GAP SERPL CALC-SCNC: 4 MMOL/L (ref 5–15)
AST SERPL-CCNC: 20 U/L (ref 15–37)
ATRIAL RATE: 89 BPM
BASOPHILS # BLD: 0.1 K/UL (ref 0–0.1)
BASOPHILS NFR BLD: 1 % (ref 0–1)
BILIRUB SERPL-MCNC: 0.3 MG/DL (ref 0.2–1)
BUN SERPL-MCNC: 20 MG/DL (ref 6–20)
BUN/CREAT SERPL: 25 (ref 12–20)
CALCIUM SERPL-MCNC: 9.3 MG/DL (ref 8.5–10.1)
CALCULATED P AXIS, ECG09: 68 DEGREES
CALCULATED R AXIS, ECG10: 37 DEGREES
CALCULATED T AXIS, ECG11: 22 DEGREES
CHLORIDE SERPL-SCNC: 104 MMOL/L (ref 97–108)
CO2 SERPL-SCNC: 30 MMOL/L (ref 21–32)
CREAT SERPL-MCNC: 0.8 MG/DL (ref 0.55–1.02)
DIAGNOSIS, 93000: NORMAL
DIFFERENTIAL METHOD BLD: ABNORMAL
EOSINOPHIL # BLD: 0.2 K/UL (ref 0–0.4)
EOSINOPHIL NFR BLD: 2 % (ref 0–7)
ERYTHROCYTE [DISTWIDTH] IN BLOOD BY AUTOMATED COUNT: 16.8 % (ref 11.5–14.5)
GLOBULIN SER CALC-MCNC: 4.6 G/DL (ref 2–4)
GLUCOSE SERPL-MCNC: 102 MG/DL (ref 65–100)
HCT VFR BLD AUTO: 38.5 % (ref 35–47)
HGB BLD-MCNC: 11.9 G/DL (ref 11.5–16)
IMM GRANULOCYTES # BLD AUTO: 0 K/UL (ref 0–0.04)
IMM GRANULOCYTES NFR BLD AUTO: 0 % (ref 0–0.5)
LYMPHOCYTES # BLD: 1.9 K/UL (ref 0.8–3.5)
LYMPHOCYTES NFR BLD: 25 % (ref 12–49)
MCH RBC QN AUTO: 27.9 PG (ref 26–34)
MCHC RBC AUTO-ENTMCNC: 30.9 G/DL (ref 30–36.5)
MCV RBC AUTO: 90.4 FL (ref 80–99)
MONOCYTES # BLD: 0.8 K/UL (ref 0–1)
MONOCYTES NFR BLD: 10 % (ref 5–13)
NEUTS SEG # BLD: 4.8 K/UL (ref 1.8–8)
NEUTS SEG NFR BLD: 62 % (ref 32–75)
NRBC # BLD: 0 K/UL (ref 0–0.01)
NRBC BLD-RTO: 0 PER 100 WBC
P-R INTERVAL, ECG05: 186 MS
PLATELET # BLD AUTO: 393 K/UL (ref 150–400)
PMV BLD AUTO: 9.7 FL (ref 8.9–12.9)
POTASSIUM SERPL-SCNC: 4.1 MMOL/L (ref 3.5–5.1)
PROT SERPL-MCNC: 8 G/DL (ref 6.4–8.2)
Q-T INTERVAL, ECG07: 382 MS
QRS DURATION, ECG06: 86 MS
QTC CALCULATION (BEZET), ECG08: 464 MS
RBC # BLD AUTO: 4.26 M/UL (ref 3.8–5.2)
SODIUM SERPL-SCNC: 138 MMOL/L (ref 136–145)
VENTRICULAR RATE, ECG03: 89 BPM
WBC # BLD AUTO: 7.8 K/UL (ref 3.6–11)

## 2020-12-30 PROCEDURE — 85025 COMPLETE CBC W/AUTO DIFF WBC: CPT

## 2020-12-30 PROCEDURE — 93005 ELECTROCARDIOGRAM TRACING: CPT

## 2020-12-30 PROCEDURE — 36415 COLL VENOUS BLD VENIPUNCTURE: CPT

## 2020-12-30 PROCEDURE — 3331090002 HH PPS REVENUE DEBIT

## 2020-12-30 PROCEDURE — 3331090001 HH PPS REVENUE CREDIT

## 2020-12-30 PROCEDURE — 80053 COMPREHEN METABOLIC PANEL: CPT

## 2020-12-30 PROCEDURE — 96372 THER/PROPH/DIAG INJ SC/IM: CPT | Performed by: NURSE PRACTITIONER

## 2020-12-30 RX ORDER — CYANOCOBALAMIN 1000 UG/ML
1000 INJECTION, SOLUTION INTRAMUSCULAR; SUBCUTANEOUS ONCE
Status: COMPLETED | OUTPATIENT
Start: 2020-12-30 | End: 2020-12-30

## 2020-12-30 RX ADMIN — CYANOCOBALAMIN 1000 MCG: 1000 INJECTION, SOLUTION INTRAMUSCULAR at 11:00

## 2020-12-30 NOTE — PROGRESS NOTES
Here for injection only. No contraindications noted. Orders Placed This Encounter    cyanocobalamin (VITAMIN B12) injection 1,000 mcg       See nursing documentation for administration information.     Tommy Butler NP  12/30/20

## 2020-12-30 NOTE — PERIOP NOTES
Preoperative instructions reviewed with patient. Patient given SSI infection FAQS sheet. Given two bottles of skin prep chlorhexidine soap; given written and verbal instructions on use. Patient was given the opportunity to ask questions on the information provided. INFORMATION REGARDING COVID 19 TESTING PRIOR TO SURGERY WAS PROVIDED TO THE PATIENT AND HER DAUGHTER WITH OPPORTUNITY FOR QUESTIONS. PATIENT'S DAUGHTER, LIZZ, WAS WITH PT DURING PAT APPOINTMENT AND PROVIDED POWER OF  FOR HEALTH CARE PAPER WORK THAT WAS SENT FOR SCANNING. PT'S DAUGHTER WILL BE WITH HER ON DOS.

## 2020-12-31 PROCEDURE — 3331090002 HH PPS REVENUE DEBIT

## 2020-12-31 PROCEDURE — 3331090001 HH PPS REVENUE CREDIT

## 2021-01-01 ENCOUNTER — APPOINTMENT (OUTPATIENT)
Dept: ULTRASOUND IMAGING | Age: 81
DRG: 180 | End: 2021-01-01
Attending: INTERNAL MEDICINE
Payer: MEDICARE

## 2021-01-01 ENCOUNTER — HOME CARE VISIT (OUTPATIENT)
Dept: SCHEDULING | Facility: HOME HEALTH | Age: 81
End: 2021-01-01
Payer: MEDICARE

## 2021-01-01 ENCOUNTER — APPOINTMENT (OUTPATIENT)
Dept: GENERAL RADIOLOGY | Age: 81
DRG: 180 | End: 2021-01-01
Attending: NURSE PRACTITIONER
Payer: MEDICARE

## 2021-01-01 ENCOUNTER — APPOINTMENT (OUTPATIENT)
Dept: GENERAL RADIOLOGY | Age: 81
DRG: 180 | End: 2021-01-01
Attending: HOSPITALIST
Payer: MEDICARE

## 2021-01-01 ENCOUNTER — HOME CARE VISIT (OUTPATIENT)
Dept: HOSPICE | Facility: HOSPICE | Age: 81
End: 2021-01-01
Payer: MEDICARE

## 2021-01-01 ENCOUNTER — TELEPHONE (OUTPATIENT)
Dept: FAMILY MEDICINE CLINIC | Age: 81
End: 2021-01-01

## 2021-01-01 ENCOUNTER — APPOINTMENT (OUTPATIENT)
Dept: GENERAL RADIOLOGY | Age: 81
DRG: 180 | End: 2021-01-01
Attending: PHYSICIAN ASSISTANT
Payer: MEDICARE

## 2021-01-01 ENCOUNTER — APPOINTMENT (OUTPATIENT)
Dept: NON INVASIVE DIAGNOSTICS | Age: 81
DRG: 180 | End: 2021-01-01
Payer: MEDICARE

## 2021-01-01 ENCOUNTER — APPOINTMENT (OUTPATIENT)
Dept: CT IMAGING | Age: 81
DRG: 180 | End: 2021-01-01
Attending: INTERNAL MEDICINE
Payer: MEDICARE

## 2021-01-01 ENCOUNTER — HOSPICE ADMISSION (OUTPATIENT)
Dept: HOSPICE | Facility: HOSPICE | Age: 81
End: 2021-01-01
Payer: MEDICARE

## 2021-01-01 ENCOUNTER — APPOINTMENT (OUTPATIENT)
Dept: ULTRASOUND IMAGING | Age: 81
DRG: 180 | End: 2021-01-01
Attending: HOSPITALIST
Payer: MEDICARE

## 2021-01-01 ENCOUNTER — APPOINTMENT (OUTPATIENT)
Dept: GENERAL RADIOLOGY | Age: 81
DRG: 180 | End: 2021-01-01
Attending: ANESTHESIOLOGY
Payer: MEDICARE

## 2021-01-01 ENCOUNTER — APPOINTMENT (OUTPATIENT)
Dept: GENERAL RADIOLOGY | Age: 81
DRG: 180 | End: 2021-01-01
Attending: STUDENT IN AN ORGANIZED HEALTH CARE EDUCATION/TRAINING PROGRAM
Payer: MEDICARE

## 2021-01-01 ENCOUNTER — APPOINTMENT (OUTPATIENT)
Dept: GENERAL RADIOLOGY | Age: 81
DRG: 180 | End: 2021-01-01
Attending: EMERGENCY MEDICINE
Payer: MEDICARE

## 2021-01-01 ENCOUNTER — DOCUMENTATION ONLY (OUTPATIENT)
Dept: FAMILY MEDICINE CLINIC | Age: 81
End: 2021-01-01

## 2021-01-01 ENCOUNTER — HOSPITAL ENCOUNTER (EMERGENCY)
Age: 81
Discharge: OTHER INSTITUTION | DRG: 180 | End: 2021-07-29
Attending: EMERGENCY MEDICINE
Payer: MEDICARE

## 2021-01-01 ENCOUNTER — PATIENT OUTREACH (OUTPATIENT)
Dept: CASE MANAGEMENT | Age: 81
End: 2021-01-01

## 2021-01-01 ENCOUNTER — HOSPITAL ENCOUNTER (INPATIENT)
Age: 81
LOS: 18 days | Discharge: HOSPICE | DRG: 180 | End: 2021-08-16
Attending: EMERGENCY MEDICINE | Admitting: STUDENT IN AN ORGANIZED HEALTH CARE EDUCATION/TRAINING PROGRAM
Payer: MEDICARE

## 2021-01-01 ENCOUNTER — APPOINTMENT (OUTPATIENT)
Dept: MRI IMAGING | Age: 81
DRG: 180 | End: 2021-01-01
Attending: HOSPITALIST
Payer: MEDICARE

## 2021-01-01 ENCOUNTER — ANESTHESIA (OUTPATIENT)
Dept: ENDOSCOPY | Age: 81
DRG: 180 | End: 2021-01-01
Payer: MEDICARE

## 2021-01-01 ENCOUNTER — APPOINTMENT (OUTPATIENT)
Dept: CT IMAGING | Age: 81
DRG: 180 | End: 2021-01-01
Attending: RADIOLOGY
Payer: MEDICARE

## 2021-01-01 ENCOUNTER — APPOINTMENT (OUTPATIENT)
Dept: GENERAL RADIOLOGY | Age: 81
DRG: 180 | End: 2021-01-01
Attending: INTERNAL MEDICINE
Payer: MEDICARE

## 2021-01-01 ENCOUNTER — HOSPITAL ENCOUNTER (INPATIENT)
Dept: RADIATION THERAPY | Age: 81
Discharge: HOME | DRG: 180 | End: 2021-08-10
Payer: MEDICARE

## 2021-01-01 ENCOUNTER — APPOINTMENT (OUTPATIENT)
Dept: ULTRASOUND IMAGING | Age: 81
DRG: 180 | End: 2021-01-01
Attending: PHYSICIAN ASSISTANT
Payer: MEDICARE

## 2021-01-01 ENCOUNTER — HOSPITAL ENCOUNTER (INPATIENT)
Dept: RADIATION THERAPY | Age: 81
Discharge: HOME | DRG: 180 | End: 2021-08-12
Payer: MEDICARE

## 2021-01-01 ENCOUNTER — HOSPITAL ENCOUNTER (INPATIENT)
Dept: RADIATION THERAPY | Age: 81
Discharge: HOME | DRG: 180 | End: 2021-08-11
Payer: MEDICARE

## 2021-01-01 ENCOUNTER — VIRTUAL VISIT (OUTPATIENT)
Dept: FAMILY MEDICINE CLINIC | Age: 81
End: 2021-01-01
Payer: MEDICARE

## 2021-01-01 ENCOUNTER — ANESTHESIA EVENT (OUTPATIENT)
Dept: ENDOSCOPY | Age: 81
DRG: 180 | End: 2021-01-01
Payer: MEDICARE

## 2021-01-01 ENCOUNTER — HOSPITAL ENCOUNTER (INPATIENT)
Dept: RADIATION THERAPY | Age: 81
Discharge: HOME | DRG: 180 | End: 2021-08-06
Payer: MEDICARE

## 2021-01-01 ENCOUNTER — APPOINTMENT (OUTPATIENT)
Dept: ULTRASOUND IMAGING | Age: 81
DRG: 180 | End: 2021-01-01
Attending: NURSE PRACTITIONER
Payer: MEDICARE

## 2021-01-01 ENCOUNTER — HOSPITAL ENCOUNTER (OUTPATIENT)
Dept: CT IMAGING | Age: 81
Discharge: HOME | DRG: 180 | End: 2021-07-29
Attending: NURSE PRACTITIONER
Payer: MEDICARE

## 2021-01-01 VITALS — HEART RATE: 73 BPM | DIASTOLIC BLOOD PRESSURE: 54 MMHG | RESPIRATION RATE: 20 BRPM | SYSTOLIC BLOOD PRESSURE: 110 MMHG

## 2021-01-01 VITALS
RESPIRATION RATE: 20 BRPM | DIASTOLIC BLOOD PRESSURE: 67 MMHG | SYSTOLIC BLOOD PRESSURE: 141 MMHG | TEMPERATURE: 98.3 F | HEART RATE: 82 BPM | OXYGEN SATURATION: 93 %

## 2021-01-01 VITALS
SYSTOLIC BLOOD PRESSURE: 130 MMHG | RESPIRATION RATE: 18 BRPM | HEART RATE: 74 BPM | DIASTOLIC BLOOD PRESSURE: 62 MMHG | TEMPERATURE: 98.4 F

## 2021-01-01 VITALS
DIASTOLIC BLOOD PRESSURE: 62 MMHG | RESPIRATION RATE: 18 BRPM | SYSTOLIC BLOOD PRESSURE: 128 MMHG | TEMPERATURE: 98.4 F | HEART RATE: 70 BPM

## 2021-01-01 VITALS
DIASTOLIC BLOOD PRESSURE: 47 MMHG | OXYGEN SATURATION: 96 % | TEMPERATURE: 99.1 F | RESPIRATION RATE: 20 BRPM | SYSTOLIC BLOOD PRESSURE: 101 MMHG | HEART RATE: 97 BPM

## 2021-01-01 VITALS
SYSTOLIC BLOOD PRESSURE: 148 MMHG | RESPIRATION RATE: 18 BRPM | TEMPERATURE: 98.8 F | DIASTOLIC BLOOD PRESSURE: 70 MMHG | HEART RATE: 84 BPM

## 2021-01-01 VITALS — RESPIRATION RATE: 20 BRPM | DIASTOLIC BLOOD PRESSURE: 93 MMHG | HEART RATE: 78 BPM | SYSTOLIC BLOOD PRESSURE: 148 MMHG

## 2021-01-01 VITALS
DIASTOLIC BLOOD PRESSURE: 60 MMHG | HEART RATE: 68 BPM | TEMPERATURE: 98.8 F | SYSTOLIC BLOOD PRESSURE: 132 MMHG | RESPIRATION RATE: 18 BRPM

## 2021-01-01 VITALS
HEART RATE: 74 BPM | DIASTOLIC BLOOD PRESSURE: 61 MMHG | OXYGEN SATURATION: 97 % | TEMPERATURE: 98.2 F | RESPIRATION RATE: 22 BRPM | SYSTOLIC BLOOD PRESSURE: 120 MMHG

## 2021-01-01 VITALS
HEIGHT: 67 IN | OXYGEN SATURATION: 95 % | TEMPERATURE: 97.7 F | HEART RATE: 68 BPM | WEIGHT: 198.63 LBS | BODY MASS INDEX: 31.18 KG/M2 | DIASTOLIC BLOOD PRESSURE: 90 MMHG | RESPIRATION RATE: 22 BRPM | SYSTOLIC BLOOD PRESSURE: 162 MMHG

## 2021-01-01 VITALS
TEMPERATURE: 98.5 F | DIASTOLIC BLOOD PRESSURE: 71 MMHG | HEART RATE: 67 BPM | TEMPERATURE: 98.3 F | RESPIRATION RATE: 24 BRPM | OXYGEN SATURATION: 95 % | SYSTOLIC BLOOD PRESSURE: 134 MMHG | TEMPERATURE: 98.3 F | DIASTOLIC BLOOD PRESSURE: 47 MMHG | HEART RATE: 67 BPM | SYSTOLIC BLOOD PRESSURE: 117 MMHG | RESPIRATION RATE: 24 BRPM | HEART RATE: 73 BPM | OXYGEN SATURATION: 96 % | SYSTOLIC BLOOD PRESSURE: 132 MMHG | OXYGEN SATURATION: 96 % | RESPIRATION RATE: 24 BRPM | DIASTOLIC BLOOD PRESSURE: 71 MMHG

## 2021-01-01 VITALS
TEMPERATURE: 99.6 F | DIASTOLIC BLOOD PRESSURE: 64 MMHG | SYSTOLIC BLOOD PRESSURE: 141 MMHG | HEART RATE: 77 BPM | OXYGEN SATURATION: 95 %

## 2021-01-01 VITALS
RESPIRATION RATE: 18 BRPM | SYSTOLIC BLOOD PRESSURE: 135 MMHG | DIASTOLIC BLOOD PRESSURE: 41 MMHG | TEMPERATURE: 99 F | HEART RATE: 84 BPM | DIASTOLIC BLOOD PRESSURE: 70 MMHG | RESPIRATION RATE: 20 BRPM | OXYGEN SATURATION: 95 % | TEMPERATURE: 98.4 F | HEART RATE: 78 BPM | SYSTOLIC BLOOD PRESSURE: 161 MMHG | OXYGEN SATURATION: 94 %

## 2021-01-01 VITALS
TEMPERATURE: 98.4 F | HEART RATE: 76 BPM | DIASTOLIC BLOOD PRESSURE: 62 MMHG | SYSTOLIC BLOOD PRESSURE: 128 MMHG | RESPIRATION RATE: 18 BRPM

## 2021-01-01 VITALS
TEMPERATURE: 98.8 F | RESPIRATION RATE: 22 BRPM | SYSTOLIC BLOOD PRESSURE: 122 MMHG | HEART RATE: 82 BPM | DIASTOLIC BLOOD PRESSURE: 62 MMHG

## 2021-01-01 VITALS
SYSTOLIC BLOOD PRESSURE: 133 MMHG | DIASTOLIC BLOOD PRESSURE: 67 MMHG | OXYGEN SATURATION: 94 % | TEMPERATURE: 98.7 F | HEART RATE: 72 BPM | RESPIRATION RATE: 24 BRPM

## 2021-01-01 VITALS
TEMPERATURE: 98.4 F | TEMPERATURE: 98.3 F | HEART RATE: 64 BPM | SYSTOLIC BLOOD PRESSURE: 135 MMHG | HEART RATE: 78 BPM | OXYGEN SATURATION: 97 % | SYSTOLIC BLOOD PRESSURE: 128 MMHG | RESPIRATION RATE: 20 BRPM | RESPIRATION RATE: 22 BRPM | DIASTOLIC BLOOD PRESSURE: 64 MMHG | DIASTOLIC BLOOD PRESSURE: 70 MMHG

## 2021-01-01 VITALS
TEMPERATURE: 99 F | TEMPERATURE: 98.4 F | SYSTOLIC BLOOD PRESSURE: 130 MMHG | RESPIRATION RATE: 18 BRPM | DIASTOLIC BLOOD PRESSURE: 56 MMHG | OXYGEN SATURATION: 90 % | DIASTOLIC BLOOD PRESSURE: 68 MMHG | RESPIRATION RATE: 18 BRPM | HEART RATE: 88 BPM | SYSTOLIC BLOOD PRESSURE: 138 MMHG | HEART RATE: 72 BPM

## 2021-01-01 VITALS
SYSTOLIC BLOOD PRESSURE: 124 MMHG | TEMPERATURE: 98.4 F | DIASTOLIC BLOOD PRESSURE: 66 MMHG | OXYGEN SATURATION: 92 % | RESPIRATION RATE: 18 BRPM | HEART RATE: 74 BPM

## 2021-01-01 VITALS
HEART RATE: 88 BPM | RESPIRATION RATE: 18 BRPM | OXYGEN SATURATION: 95 % | TEMPERATURE: 98.4 F | DIASTOLIC BLOOD PRESSURE: 64 MMHG | SYSTOLIC BLOOD PRESSURE: 128 MMHG

## 2021-01-01 VITALS
TEMPERATURE: 98.8 F | RESPIRATION RATE: 22 BRPM | SYSTOLIC BLOOD PRESSURE: 124 MMHG | OXYGEN SATURATION: 95 % | HEART RATE: 78 BPM | DIASTOLIC BLOOD PRESSURE: 54 MMHG

## 2021-01-01 VITALS
SYSTOLIC BLOOD PRESSURE: 134 MMHG | TEMPERATURE: 99.1 F | RESPIRATION RATE: 24 BRPM | OXYGEN SATURATION: 95 % | HEART RATE: 88 BPM | DIASTOLIC BLOOD PRESSURE: 71 MMHG

## 2021-01-01 VITALS
TEMPERATURE: 99.4 F | DIASTOLIC BLOOD PRESSURE: 56 MMHG | SYSTOLIC BLOOD PRESSURE: 101 MMHG | HEART RATE: 74 BPM | OXYGEN SATURATION: 94 % | RESPIRATION RATE: 22 BRPM

## 2021-01-01 VITALS — SYSTOLIC BLOOD PRESSURE: 126 MMHG | DIASTOLIC BLOOD PRESSURE: 72 MMHG | HEART RATE: 85 BPM | RESPIRATION RATE: 20 BRPM

## 2021-01-01 VITALS
HEART RATE: 79 BPM | OXYGEN SATURATION: 94 % | DIASTOLIC BLOOD PRESSURE: 57 MMHG | RESPIRATION RATE: 20 BRPM | TEMPERATURE: 98.3 F | SYSTOLIC BLOOD PRESSURE: 121 MMHG

## 2021-01-01 VITALS
HEART RATE: 78 BPM | DIASTOLIC BLOOD PRESSURE: 64 MMHG | SYSTOLIC BLOOD PRESSURE: 136 MMHG | OXYGEN SATURATION: 94 % | TEMPERATURE: 98.4 F | RESPIRATION RATE: 18 BRPM

## 2021-01-01 VITALS
RESPIRATION RATE: 20 BRPM | OXYGEN SATURATION: 96 % | TEMPERATURE: 97.8 F | SYSTOLIC BLOOD PRESSURE: 131 MMHG | DIASTOLIC BLOOD PRESSURE: 69 MMHG | HEART RATE: 68 BPM

## 2021-01-01 VITALS
HEART RATE: 74 BPM | SYSTOLIC BLOOD PRESSURE: 140 MMHG | DIASTOLIC BLOOD PRESSURE: 67 MMHG | OXYGEN SATURATION: 97 % | TEMPERATURE: 97.1 F | RESPIRATION RATE: 20 BRPM

## 2021-01-01 VITALS
SYSTOLIC BLOOD PRESSURE: 140 MMHG | HEART RATE: 81 BPM | DIASTOLIC BLOOD PRESSURE: 70 MMHG | OXYGEN SATURATION: 99 % | RESPIRATION RATE: 17 BRPM

## 2021-01-01 VITALS — RESPIRATION RATE: 18 BRPM | TEMPERATURE: 98.4 F | DIASTOLIC BLOOD PRESSURE: 62 MMHG | SYSTOLIC BLOOD PRESSURE: 130 MMHG

## 2021-01-01 VITALS
RESPIRATION RATE: 22 BRPM | SYSTOLIC BLOOD PRESSURE: 144 MMHG | OXYGEN SATURATION: 97 % | HEART RATE: 96 BPM | TEMPERATURE: 97.3 F | DIASTOLIC BLOOD PRESSURE: 83 MMHG

## 2021-01-01 VITALS
OXYGEN SATURATION: 97 % | RESPIRATION RATE: 22 BRPM | DIASTOLIC BLOOD PRESSURE: 78 MMHG | HEART RATE: 70 BPM | SYSTOLIC BLOOD PRESSURE: 138 MMHG

## 2021-01-01 VITALS
DIASTOLIC BLOOD PRESSURE: 58 MMHG | OXYGEN SATURATION: 94 % | HEART RATE: 65 BPM | SYSTOLIC BLOOD PRESSURE: 130 MMHG | TEMPERATURE: 97.4 F | RESPIRATION RATE: 22 BRPM

## 2021-01-01 VITALS
BODY MASS INDEX: 31.38 KG/M2 | HEART RATE: 121 BPM | SYSTOLIC BLOOD PRESSURE: 148 MMHG | DIASTOLIC BLOOD PRESSURE: 91 MMHG | HEIGHT: 67 IN | RESPIRATION RATE: 26 BRPM | OXYGEN SATURATION: 100 % | WEIGHT: 199.96 LBS | TEMPERATURE: 100 F

## 2021-01-01 VITALS
HEART RATE: 55 BPM | RESPIRATION RATE: 17 BRPM | OXYGEN SATURATION: 98 % | DIASTOLIC BLOOD PRESSURE: 69 MMHG | SYSTOLIC BLOOD PRESSURE: 142 MMHG

## 2021-01-01 VITALS — DIASTOLIC BLOOD PRESSURE: 64 MMHG | SYSTOLIC BLOOD PRESSURE: 128 MMHG | TEMPERATURE: 99 F

## 2021-01-01 DIAGNOSIS — R04.2 COUGH WITH HEMOPTYSIS: ICD-10-CM

## 2021-01-01 DIAGNOSIS — C34.90 MALIGNANT NEOPLASM OF LUNG, UNSPECIFIED LATERALITY, UNSPECIFIED PART OF LUNG (HCC): Primary | ICD-10-CM

## 2021-01-01 DIAGNOSIS — J96.01 ACUTE RESPIRATORY FAILURE WITH HYPOXIA (HCC): ICD-10-CM

## 2021-01-01 DIAGNOSIS — J90 PLEURAL EFFUSION, LEFT: ICD-10-CM

## 2021-01-01 DIAGNOSIS — K52.9 GASTROENTERITIS: ICD-10-CM

## 2021-01-01 DIAGNOSIS — J90 PLEURAL EFFUSION: ICD-10-CM

## 2021-01-01 DIAGNOSIS — I48.91 ATRIAL FIBRILLATION WITH RAPID VENTRICULAR RESPONSE (HCC): ICD-10-CM

## 2021-01-01 DIAGNOSIS — Z79.899 ON ANTINEOPLASTIC CHEMOTHERAPY: ICD-10-CM

## 2021-01-01 DIAGNOSIS — R05.9 COUGH: ICD-10-CM

## 2021-01-01 DIAGNOSIS — Z71.89 GOALS OF CARE, COUNSELING/DISCUSSION: ICD-10-CM

## 2021-01-01 DIAGNOSIS — R04.2 COUGH WITH HEMOPTYSIS: Primary | ICD-10-CM

## 2021-01-01 DIAGNOSIS — E88.09 HYPOALBUMINEMIA: ICD-10-CM

## 2021-01-01 DIAGNOSIS — I47.29 NONSUSTAINED VENTRICULAR TACHYCARDIA: ICD-10-CM

## 2021-01-01 DIAGNOSIS — A41.9 SEPSIS, DUE TO UNSPECIFIED ORGANISM, UNSPECIFIED WHETHER ACUTE ORGAN DYSFUNCTION PRESENT (HCC): ICD-10-CM

## 2021-01-01 DIAGNOSIS — F03.90 DEMENTIA WITHOUT BEHAVIORAL DISTURBANCE, UNSPECIFIED DEMENTIA TYPE: ICD-10-CM

## 2021-01-01 DIAGNOSIS — R53.1 WEAKNESS GENERALIZED: ICD-10-CM

## 2021-01-01 DIAGNOSIS — R06.02 SHORTNESS OF BREATH: ICD-10-CM

## 2021-01-01 DIAGNOSIS — C34.90 SMALL CELL LUNG CANCER (HCC): ICD-10-CM

## 2021-01-01 DIAGNOSIS — J90 PLEURAL EFFUSION ON LEFT: ICD-10-CM

## 2021-01-01 DIAGNOSIS — J96.01 ACUTE RESPIRATORY FAILURE WITH HYPOXIA (HCC): Primary | ICD-10-CM

## 2021-01-01 DIAGNOSIS — R09.02 HYPOXIA: ICD-10-CM

## 2021-01-01 DIAGNOSIS — R06.09 DYSPNEA ON EXERTION: ICD-10-CM

## 2021-01-01 LAB
ALBUMIN SERPL-MCNC: 1.9 G/DL (ref 3.5–5)
ALBUMIN SERPL-MCNC: 2 G/DL (ref 3.5–5)
ALBUMIN SERPL-MCNC: 2.1 G/DL (ref 3.5–5)
ALBUMIN SERPL-MCNC: 2.1 G/DL (ref 3.5–5)
ALBUMIN SERPL-MCNC: 2.2 G/DL (ref 3.5–5)
ALBUMIN SERPL-MCNC: 2.2 G/DL (ref 3.5–5)
ALBUMIN SERPL-MCNC: 2.3 G/DL (ref 3.5–5)
ALBUMIN/GLOB SERPL: 0.4 {RATIO} (ref 1.1–2.2)
ALP SERPL-CCNC: 107 U/L (ref 45–117)
ALP SERPL-CCNC: 83 U/L (ref 45–117)
ALP SERPL-CCNC: 85 U/L (ref 45–117)
ALP SERPL-CCNC: 85 U/L (ref 45–117)
ALT SERPL-CCNC: 18 U/L (ref 12–78)
ALT SERPL-CCNC: 19 U/L (ref 12–78)
ALT SERPL-CCNC: 19 U/L (ref 12–78)
ALT SERPL-CCNC: 28 U/L (ref 12–78)
ANION GAP SERPL CALC-SCNC: 2 MMOL/L (ref 5–15)
ANION GAP SERPL CALC-SCNC: 3 MMOL/L (ref 5–15)
ANION GAP SERPL CALC-SCNC: 3 MMOL/L (ref 5–15)
ANION GAP SERPL CALC-SCNC: 4 MMOL/L (ref 5–15)
ANION GAP SERPL CALC-SCNC: 5 MMOL/L (ref 5–15)
ANION GAP SERPL CALC-SCNC: 6 MMOL/L (ref 5–15)
ANION GAP SERPL CALC-SCNC: 6 MMOL/L (ref 5–15)
ANION GAP SERPL CALC-SCNC: 7 MMOL/L (ref 5–15)
ANION GAP SERPL CALC-SCNC: 8 MMOL/L (ref 5–15)
ANION GAP SERPL CALC-SCNC: 8 MMOL/L (ref 5–15)
APPEARANCE FLD: ABNORMAL
APPEARANCE FLD: ABNORMAL
APPEARANCE FLD: CLEAR
APPEARANCE UR: CLEAR
APTT PPP: 102.2 SEC (ref 22.1–31)
APTT PPP: 26.1 SEC (ref 22.1–31)
APTT PPP: 26.5 SEC (ref 22.1–31)
APTT PPP: 27.2 SEC (ref 22.1–31)
APTT PPP: 28.6 SEC (ref 22.1–31)
APTT PPP: 29.1 SEC (ref 22.1–31)
APTT PPP: 30.5 SEC (ref 22.1–31)
APTT PPP: 30.8 SEC (ref 22.1–31)
APTT PPP: 36.3 SEC (ref 22.1–31)
APTT PPP: 37.2 SEC (ref 22.1–31)
APTT PPP: 40.2 SEC (ref 22.1–31)
APTT PPP: 46.6 SEC (ref 22.1–31)
APTT PPP: 59.7 SEC (ref 22.1–31)
APTT PPP: 64.8 SEC (ref 22.1–31)
APTT PPP: 67.4 SEC (ref 22.1–31)
APTT PPP: 75 SEC (ref 22.1–31)
APTT PPP: 80.5 SEC (ref 22.1–31)
APTT PPP: NORMAL SEC (ref 22.1–31)
AST SERPL-CCNC: 15 U/L (ref 15–37)
AST SERPL-CCNC: 17 U/L (ref 15–37)
AST SERPL-CCNC: 20 U/L (ref 15–37)
AST SERPL-CCNC: 25 U/L (ref 15–37)
ATRIAL RATE: 119 BPM
ATRIAL RATE: 163 BPM
BACTERIA SPEC CULT: NORMAL
BACTERIA URNS QL MICRO: NEGATIVE /HPF
BASOPHILS # BLD: 0 K/UL (ref 0–0.1)
BASOPHILS # BLD: 0.1 K/UL (ref 0–0.1)
BASOPHILS NFR BLD: 0 % (ref 0–1)
BASOPHILS NFR BLD: 1 % (ref 0–1)
BILIRUB SERPL-MCNC: 0.2 MG/DL (ref 0.2–1)
BILIRUB SERPL-MCNC: 0.3 MG/DL (ref 0.2–1)
BILIRUB SERPL-MCNC: 0.3 MG/DL (ref 0.2–1)
BILIRUB SERPL-MCNC: 0.5 MG/DL (ref 0.2–1)
BILIRUB UR QL: NEGATIVE
BNP SERPL-MCNC: 1466 PG/ML
BODY FLD TYPE: NORMAL
BUN SERPL-MCNC: 12 MG/DL (ref 6–20)
BUN SERPL-MCNC: 14 MG/DL (ref 6–20)
BUN SERPL-MCNC: 15 MG/DL (ref 6–20)
BUN SERPL-MCNC: 15 MG/DL (ref 6–20)
BUN SERPL-MCNC: 19 MG/DL (ref 6–20)
BUN SERPL-MCNC: 21 MG/DL (ref 6–20)
BUN SERPL-MCNC: 26 MG/DL (ref 6–20)
BUN SERPL-MCNC: 36 MG/DL (ref 6–20)
BUN SERPL-MCNC: 37 MG/DL (ref 6–20)
BUN SERPL-MCNC: 41 MG/DL (ref 6–20)
BUN SERPL-MCNC: 42 MG/DL (ref 6–20)
BUN SERPL-MCNC: 46 MG/DL (ref 6–20)
BUN SERPL-MCNC: 47 MG/DL (ref 6–20)
BUN SERPL-MCNC: 54 MG/DL (ref 6–20)
BUN SERPL-MCNC: 55 MG/DL (ref 6–20)
BUN SERPL-MCNC: 64 MG/DL (ref 6–20)
BUN SERPL-MCNC: 7 MG/DL (ref 6–20)
BUN SERPL-MCNC: 8 MG/DL (ref 6–20)
BUN/CREAT SERPL: 11 (ref 12–20)
BUN/CREAT SERPL: 11 (ref 12–20)
BUN/CREAT SERPL: 12 (ref 12–20)
BUN/CREAT SERPL: 12 (ref 12–20)
BUN/CREAT SERPL: 13 (ref 12–20)
BUN/CREAT SERPL: 14 (ref 12–20)
BUN/CREAT SERPL: 14 (ref 12–20)
BUN/CREAT SERPL: 15 (ref 12–20)
BUN/CREAT SERPL: 18 (ref 12–20)
BUN/CREAT SERPL: 20 (ref 12–20)
BUN/CREAT SERPL: 21 (ref 12–20)
BUN/CREAT SERPL: 23 (ref 12–20)
BUN/CREAT SERPL: 25 (ref 12–20)
BUN/CREAT SERPL: 26 (ref 12–20)
BUN/CREAT SERPL: 8 (ref 12–20)
BUN/CREAT SERPL: 9 (ref 12–20)
BUN/CREAT SERPL: 9 (ref 12–20)
CALCIUM SERPL-MCNC: 8.1 MG/DL (ref 8.5–10.1)
CALCIUM SERPL-MCNC: 8.1 MG/DL (ref 8.5–10.1)
CALCIUM SERPL-MCNC: 8.2 MG/DL (ref 8.5–10.1)
CALCIUM SERPL-MCNC: 8.2 MG/DL (ref 8.5–10.1)
CALCIUM SERPL-MCNC: 8.4 MG/DL (ref 8.5–10.1)
CALCIUM SERPL-MCNC: 8.5 MG/DL (ref 8.5–10.1)
CALCIUM SERPL-MCNC: 8.7 MG/DL (ref 8.5–10.1)
CALCIUM SERPL-MCNC: 8.7 MG/DL (ref 8.5–10.1)
CALCIUM SERPL-MCNC: 8.8 MG/DL (ref 8.5–10.1)
CALCIUM SERPL-MCNC: 8.9 MG/DL (ref 8.5–10.1)
CALCIUM SERPL-MCNC: 9 MG/DL (ref 8.5–10.1)
CALCIUM SERPL-MCNC: 9.1 MG/DL (ref 8.5–10.1)
CALCIUM SERPL-MCNC: 9.2 MG/DL (ref 8.5–10.1)
CALCULATED R AXIS, ECG10: 55 DEGREES
CALCULATED R AXIS, ECG10: 65 DEGREES
CALCULATED T AXIS, ECG11: -2 DEGREES
CALCULATED T AXIS, ECG11: 22 DEGREES
CHLORIDE SERPL-SCNC: 100 MMOL/L (ref 97–108)
CHLORIDE SERPL-SCNC: 103 MMOL/L (ref 97–108)
CHLORIDE SERPL-SCNC: 104 MMOL/L (ref 97–108)
CHLORIDE SERPL-SCNC: 104 MMOL/L (ref 97–108)
CHLORIDE SERPL-SCNC: 105 MMOL/L (ref 97–108)
CHLORIDE SERPL-SCNC: 106 MMOL/L (ref 97–108)
CHLORIDE SERPL-SCNC: 108 MMOL/L (ref 97–108)
CHLORIDE SERPL-SCNC: 108 MMOL/L (ref 97–108)
CHLORIDE SERPL-SCNC: 92 MMOL/L (ref 97–108)
CHLORIDE SERPL-SCNC: 92 MMOL/L (ref 97–108)
CHLORIDE SERPL-SCNC: 93 MMOL/L (ref 97–108)
CHLORIDE SERPL-SCNC: 95 MMOL/L (ref 97–108)
CHLORIDE SERPL-SCNC: 96 MMOL/L (ref 97–108)
CHLORIDE SERPL-SCNC: 96 MMOL/L (ref 97–108)
CHLORIDE SERPL-SCNC: 98 MMOL/L (ref 97–108)
CHLORIDE SERPL-SCNC: 98 MMOL/L (ref 97–108)
CHLORIDE SERPL-SCNC: 99 MMOL/L (ref 97–108)
CHLORIDE UR-SCNC: 71 MMOL/L
CK SERPL-CCNC: 61 U/L (ref 26–192)
CO2 SERPL-SCNC: 24 MMOL/L (ref 21–32)
CO2 SERPL-SCNC: 25 MMOL/L (ref 21–32)
CO2 SERPL-SCNC: 27 MMOL/L (ref 21–32)
CO2 SERPL-SCNC: 28 MMOL/L (ref 21–32)
CO2 SERPL-SCNC: 28 MMOL/L (ref 21–32)
CO2 SERPL-SCNC: 29 MMOL/L (ref 21–32)
CO2 SERPL-SCNC: 30 MMOL/L (ref 21–32)
CO2 SERPL-SCNC: 30 MMOL/L (ref 21–32)
CO2 SERPL-SCNC: 31 MMOL/L (ref 21–32)
CO2 SERPL-SCNC: 31 MMOL/L (ref 21–32)
CO2 SERPL-SCNC: 32 MMOL/L (ref 21–32)
CO2 SERPL-SCNC: 33 MMOL/L (ref 21–32)
CO2 SERPL-SCNC: 33 MMOL/L (ref 21–32)
CO2 SERPL-SCNC: 35 MMOL/L (ref 21–32)
CO2 SERPL-SCNC: 36 MMOL/L (ref 21–32)
CO2 SERPL-SCNC: 37 MMOL/L (ref 21–32)
CO2 SERPL-SCNC: 38 MMOL/L (ref 21–32)
COLOR FLD: ABNORMAL
COLOR FLD: ABNORMAL
COLOR FLD: YELLOW
COLOR UR: NORMAL
COMMENT, HOLDF: NORMAL
COVID-19 RAPID TEST, COVR: NOT DETECTED
CREAT SERPL-MCNC: 0.61 MG/DL (ref 0.55–1.02)
CREAT SERPL-MCNC: 0.64 MG/DL (ref 0.55–1.02)
CREAT SERPL-MCNC: 0.67 MG/DL (ref 0.55–1.02)
CREAT SERPL-MCNC: 0.72 MG/DL (ref 0.55–1.02)
CREAT SERPL-MCNC: 0.8 MG/DL (ref 0.55–1.02)
CREAT SERPL-MCNC: 0.93 MG/DL (ref 0.55–1.02)
CREAT SERPL-MCNC: 1.92 MG/DL (ref 0.55–1.02)
CREAT SERPL-MCNC: 2.21 MG/DL (ref 0.55–1.02)
CREAT SERPL-MCNC: 2.37 MG/DL (ref 0.55–1.02)
CREAT SERPL-MCNC: 2.42 MG/DL (ref 0.55–1.02)
CREAT SERPL-MCNC: 2.45 MG/DL (ref 0.55–1.02)
CREAT SERPL-MCNC: 2.5 MG/DL (ref 0.55–1.02)
CREAT SERPL-MCNC: 2.51 MG/DL (ref 0.55–1.02)
CREAT SERPL-MCNC: 2.57 MG/DL (ref 0.55–1.02)
CREAT SERPL-MCNC: 2.58 MG/DL (ref 0.55–1.02)
CREAT SERPL-MCNC: 2.63 MG/DL (ref 0.55–1.02)
CREAT SERPL-MCNC: 2.78 MG/DL (ref 0.55–1.02)
CREAT SERPL-MCNC: 2.92 MG/DL (ref 0.55–1.02)
CREAT SERPL-MCNC: 2.94 MG/DL (ref 0.55–1.02)
CREAT SERPL-MCNC: 3 MG/DL (ref 0.55–1.02)
CREAT SERPL-MCNC: 3.44 MG/DL (ref 0.55–1.02)
CREAT SERPL-MCNC: 3.64 MG/DL (ref 0.55–1.02)
CREAT UR-MCNC: 71.9 MG/DL
DIAGNOSIS, 93000: NORMAL
DIAGNOSIS, 93000: NORMAL
DIFFERENTIAL METHOD BLD: ABNORMAL
ECHO AO ROOT DIAM: 3.13 CM
ECHO AV AREA PEAK VELOCITY: 1.56 CM2
ECHO AV AREA VTI: 1.82 CM2
ECHO AV AREA/BSA PEAK VELOCITY: 0.8 CM2/M2
ECHO AV AREA/BSA VTI: 0.9 CM2/M2
ECHO AV MEAN GRADIENT: 10.43 MMHG
ECHO AV PEAK GRADIENT: 17.17 MMHG
ECHO AV PEAK VELOCITY: 207.21 CM/S
ECHO AV VTI: 46.61 CM
ECHO EST RA PRESSURE: 3 MMHG
ECHO LA AREA 4C: 17.41 CM2
ECHO LA VOL 2C: 34.59 ML (ref 22–52)
ECHO LA VOL 4C: 46.97 ML (ref 22–52)
ECHO LA VOL BP: 44.13 ML (ref 22–52)
ECHO LA VOL/BSA BIPLANE: 21.85 ML/M2 (ref 16–28)
ECHO LA VOLUME INDEX A2C: 17.12 ML/M2 (ref 16–28)
ECHO LA VOLUME INDEX A4C: 23.25 ML/M2 (ref 16–28)
ECHO LV E' LATERAL VELOCITY: 5.68 CM/S
ECHO LV E' SEPTAL VELOCITY: 4.55 CM/S
ECHO LVOT DIAM: 2.04 CM
ECHO LVOT PEAK GRADIENT: 3.91 MMHG
ECHO LVOT PEAK VELOCITY: 98.82 CM/S
ECHO LVOT SV: 84.9 ML
ECHO LVOT VTI: 25.99 CM
ECHO MV A VELOCITY: 122.5 CM/S
ECHO MV AREA PHT: 4.09 CM2
ECHO MV E DECELERATION TIME (DT): 185.27 MS
ECHO MV E VELOCITY: 110.85 CM/S
ECHO MV E/A RATIO: 0.9
ECHO MV E/E' LATERAL: 19.52
ECHO MV E/E' RATIO (AVERAGED): 21.94
ECHO MV E/E' SEPTAL: 24.36
ECHO MV PRESSURE HALF TIME (PHT): 53.73 MS
ECHO PV MAX VELOCITY: 91.53 CM/S
ECHO PV PEAK INSTANTANEOUS GRADIENT SYSTOLIC: 3.35 MMHG
ECHO RIGHT VENTRICULAR SYSTOLIC PRESSURE (RVSP): 43.43 MMHG
ECHO RV TAPSE: 1.96 CM (ref 1.5–2)
ECHO TV REGURGITANT MAX VELOCITY: 317.91 CM/S
ECHO TV REGURGITANT PEAK GRADIENT: 40.43 MMHG
EOSINOPHIL # BLD: 0 K/UL (ref 0–0.4)
EOSINOPHIL # BLD: 0 K/UL (ref 0–0.4)
EOSINOPHIL # BLD: 0.1 K/UL (ref 0–0.4)
EOSINOPHIL # BLD: 0.2 K/UL (ref 0–0.4)
EOSINOPHIL # BLD: 0.3 K/UL (ref 0–0.4)
EOSINOPHIL # BLD: 0.3 K/UL (ref 0–0.4)
EOSINOPHIL #/AREA URNS HPF: NEGATIVE /[HPF]
EOSINOPHIL NFR BLD: 0 % (ref 0–7)
EOSINOPHIL NFR BLD: 0 % (ref 0–7)
EOSINOPHIL NFR BLD: 1 % (ref 0–7)
EOSINOPHIL NFR BLD: 2 % (ref 0–7)
EOSINOPHIL NFR BLD: 2 % (ref 0–7)
EOSINOPHIL NFR BLD: 3 % (ref 0–7)
EPITH CASTS URNS QL MICRO: NORMAL /LPF
ERYTHROCYTE [DISTWIDTH] IN BLOOD BY AUTOMATED COUNT: 13.5 % (ref 11.5–14.5)
ERYTHROCYTE [DISTWIDTH] IN BLOOD BY AUTOMATED COUNT: 13.7 % (ref 11.5–14.5)
ERYTHROCYTE [DISTWIDTH] IN BLOOD BY AUTOMATED COUNT: 13.8 % (ref 11.5–14.5)
ERYTHROCYTE [DISTWIDTH] IN BLOOD BY AUTOMATED COUNT: 13.9 % (ref 11.5–14.5)
ERYTHROCYTE [DISTWIDTH] IN BLOOD BY AUTOMATED COUNT: 13.9 % (ref 11.5–14.5)
ERYTHROCYTE [DISTWIDTH] IN BLOOD BY AUTOMATED COUNT: 14 % (ref 11.5–14.5)
ERYTHROCYTE [DISTWIDTH] IN BLOOD BY AUTOMATED COUNT: 14.3 % (ref 11.5–14.5)
ERYTHROCYTE [DISTWIDTH] IN BLOOD BY AUTOMATED COUNT: 14.6 % (ref 11.5–14.5)
ERYTHROCYTE [DISTWIDTH] IN BLOOD BY AUTOMATED COUNT: 14.6 % (ref 11.5–14.5)
ERYTHROCYTE [DISTWIDTH] IN BLOOD BY AUTOMATED COUNT: 14.8 % (ref 11.5–14.5)
GLOBULIN SER CALC-MCNC: 4.8 G/DL (ref 2–4)
GLOBULIN SER CALC-MCNC: 5 G/DL (ref 2–4)
GLOBULIN SER CALC-MCNC: 5.4 G/DL (ref 2–4)
GLOBULIN SER CALC-MCNC: 5.5 G/DL (ref 2–4)
GLUCOSE BLD STRIP.AUTO-MCNC: 92 MG/DL (ref 65–117)
GLUCOSE BLD STRIP.AUTO-MCNC: 97 MG/DL (ref 65–117)
GLUCOSE FLD-MCNC: 115 MG/DL
GLUCOSE FLD-MCNC: 129 MG/DL
GLUCOSE SERPL-MCNC: 100 MG/DL (ref 65–100)
GLUCOSE SERPL-MCNC: 103 MG/DL (ref 65–100)
GLUCOSE SERPL-MCNC: 103 MG/DL (ref 65–100)
GLUCOSE SERPL-MCNC: 104 MG/DL (ref 65–100)
GLUCOSE SERPL-MCNC: 105 MG/DL (ref 65–100)
GLUCOSE SERPL-MCNC: 110 MG/DL (ref 65–100)
GLUCOSE SERPL-MCNC: 117 MG/DL (ref 65–100)
GLUCOSE SERPL-MCNC: 118 MG/DL (ref 65–100)
GLUCOSE SERPL-MCNC: 125 MG/DL (ref 65–100)
GLUCOSE SERPL-MCNC: 126 MG/DL (ref 65–100)
GLUCOSE SERPL-MCNC: 129 MG/DL (ref 65–100)
GLUCOSE SERPL-MCNC: 138 MG/DL (ref 65–100)
GLUCOSE SERPL-MCNC: 142 MG/DL (ref 65–100)
GLUCOSE SERPL-MCNC: 268 MG/DL (ref 65–100)
GLUCOSE SERPL-MCNC: 75 MG/DL (ref 65–100)
GLUCOSE SERPL-MCNC: 81 MG/DL (ref 65–100)
GLUCOSE SERPL-MCNC: 87 MG/DL (ref 65–100)
GLUCOSE SERPL-MCNC: 90 MG/DL (ref 65–100)
GLUCOSE SERPL-MCNC: 95 MG/DL (ref 65–100)
GLUCOSE SERPL-MCNC: 96 MG/DL (ref 65–100)
GLUCOSE UR STRIP.AUTO-MCNC: NEGATIVE MG/DL
GRAM STN SPEC: NORMAL
HCT VFR BLD AUTO: 30.7 % (ref 35–47)
HCT VFR BLD AUTO: 33.1 % (ref 35–47)
HCT VFR BLD AUTO: 33.5 % (ref 35–47)
HCT VFR BLD AUTO: 34.2 % (ref 35–47)
HCT VFR BLD AUTO: 34.8 % (ref 35–47)
HCT VFR BLD AUTO: 35.2 % (ref 35–47)
HCT VFR BLD AUTO: 35.7 % (ref 35–47)
HCT VFR BLD AUTO: 36.5 % (ref 35–47)
HCT VFR BLD AUTO: 36.9 % (ref 35–47)
HCT VFR BLD AUTO: 37.5 % (ref 35–47)
HCT VFR BLD AUTO: 38.1 % (ref 35–47)
HCT VFR BLD AUTO: 39.8 % (ref 35–47)
HGB BLD-MCNC: 10.3 G/DL (ref 11.5–16)
HGB BLD-MCNC: 10.4 G/DL (ref 11.5–16)
HGB BLD-MCNC: 10.7 G/DL (ref 11.5–16)
HGB BLD-MCNC: 10.7 G/DL (ref 11.5–16)
HGB BLD-MCNC: 10.9 G/DL (ref 11.5–16)
HGB BLD-MCNC: 11.1 G/DL (ref 11.5–16)
HGB BLD-MCNC: 11.3 G/DL (ref 11.5–16)
HGB BLD-MCNC: 11.3 G/DL (ref 11.5–16)
HGB BLD-MCNC: 11.4 G/DL (ref 11.5–16)
HGB BLD-MCNC: 11.7 G/DL (ref 11.5–16)
HGB BLD-MCNC: 12.6 G/DL (ref 11.5–16)
HGB BLD-MCNC: 9.5 G/DL (ref 11.5–16)
HGB UR QL STRIP: NEGATIVE
HYALINE CASTS URNS QL MICRO: NORMAL /LPF (ref 0–5)
IMM GRANULOCYTES # BLD AUTO: 0 K/UL (ref 0–0.04)
IMM GRANULOCYTES # BLD AUTO: 0.1 K/UL (ref 0–0.04)
IMM GRANULOCYTES NFR BLD AUTO: 0 % (ref 0–0.5)
IMM GRANULOCYTES NFR BLD AUTO: 1 % (ref 0–0.5)
INR PPP: 1.1 (ref 0.9–1.1)
INR PPP: 1.1 (ref 0.9–1.1)
KETONES UR QL STRIP.AUTO: NEGATIVE MG/DL
LACTATE SERPL-SCNC: 1.2 MMOL/L (ref 0.4–2)
LDH FLD L TO P-CCNC: 153 U/L
LDH FLD L TO P-CCNC: 172 U/L
LDH SERPL L TO P-CCNC: 199 U/L (ref 81–246)
LEUKOCYTE ESTERASE UR QL STRIP.AUTO: NEGATIVE
LYMPHOCYTES # BLD: 0.3 K/UL (ref 0.8–3.5)
LYMPHOCYTES # BLD: 0.4 K/UL (ref 0.8–3.5)
LYMPHOCYTES # BLD: 0.5 K/UL (ref 0.8–3.5)
LYMPHOCYTES # BLD: 0.6 K/UL (ref 0.8–3.5)
LYMPHOCYTES # BLD: 0.6 K/UL (ref 0.8–3.5)
LYMPHOCYTES # BLD: 0.7 K/UL (ref 0.8–3.5)
LYMPHOCYTES # BLD: 0.8 K/UL (ref 0.8–3.5)
LYMPHOCYTES # BLD: 0.8 K/UL (ref 0.8–3.5)
LYMPHOCYTES # BLD: 0.9 K/UL (ref 0.8–3.5)
LYMPHOCYTES # BLD: 1.4 K/UL (ref 0.8–3.5)
LYMPHOCYTES NFR BLD: 10 % (ref 12–49)
LYMPHOCYTES NFR BLD: 11 % (ref 12–49)
LYMPHOCYTES NFR BLD: 11 % (ref 12–49)
LYMPHOCYTES NFR BLD: 12 % (ref 12–49)
LYMPHOCYTES NFR BLD: 3 % (ref 12–49)
LYMPHOCYTES NFR BLD: 5 % (ref 12–49)
LYMPHOCYTES NFR BLD: 6 % (ref 12–49)
LYMPHOCYTES NFR BLD: 6 % (ref 12–49)
LYMPHOCYTES NFR BLD: 7 % (ref 12–49)
LYMPHOCYTES NFR BLD: 7 % (ref 12–49)
LYMPHOCYTES NFR BLD: 8 % (ref 12–49)
LYMPHOCYTES NFR BLD: 9 % (ref 12–49)
LYMPHOCYTES NFR FLD: 21 %
LYMPHOCYTES NFR FLD: 56 %
LYMPHOCYTES NFR FLD: 72 %
MAGNESIUM SERPL-MCNC: 1.5 MG/DL (ref 1.6–2.4)
MAGNESIUM SERPL-MCNC: 1.7 MG/DL (ref 1.6–2.4)
MAGNESIUM SERPL-MCNC: 1.7 MG/DL (ref 1.6–2.4)
MAGNESIUM SERPL-MCNC: 1.8 MG/DL (ref 1.6–2.4)
MAGNESIUM SERPL-MCNC: 1.9 MG/DL (ref 1.6–2.4)
MAGNESIUM SERPL-MCNC: 2 MG/DL (ref 1.6–2.4)
MAGNESIUM SERPL-MCNC: 2.1 MG/DL (ref 1.6–2.4)
MAGNESIUM SERPL-MCNC: 2.2 MG/DL (ref 1.6–2.4)
MAGNESIUM SERPL-MCNC: 2.2 MG/DL (ref 1.6–2.4)
MAGNESIUM SERPL-MCNC: NORMAL MG/DL (ref 1.6–2.4)
MCH RBC QN AUTO: 28.5 PG (ref 26–34)
MCH RBC QN AUTO: 28.5 PG (ref 26–34)
MCH RBC QN AUTO: 28.6 PG (ref 26–34)
MCH RBC QN AUTO: 28.6 PG (ref 26–34)
MCH RBC QN AUTO: 28.7 PG (ref 26–34)
MCH RBC QN AUTO: 28.7 PG (ref 26–34)
MCH RBC QN AUTO: 28.8 PG (ref 26–34)
MCH RBC QN AUTO: 28.9 PG (ref 26–34)
MCH RBC QN AUTO: 29.2 PG (ref 26–34)
MCHC RBC AUTO-ENTMCNC: 29.9 G/DL (ref 30–36.5)
MCHC RBC AUTO-ENTMCNC: 30.1 G/DL (ref 30–36.5)
MCHC RBC AUTO-ENTMCNC: 30.4 G/DL (ref 30–36.5)
MCHC RBC AUTO-ENTMCNC: 30.9 G/DL (ref 30–36.5)
MCHC RBC AUTO-ENTMCNC: 31 G/DL (ref 30–36.5)
MCHC RBC AUTO-ENTMCNC: 31 G/DL (ref 30–36.5)
MCHC RBC AUTO-ENTMCNC: 31.1 G/DL (ref 30–36.5)
MCHC RBC AUTO-ENTMCNC: 31.1 G/DL (ref 30–36.5)
MCHC RBC AUTO-ENTMCNC: 31.3 G/DL (ref 30–36.5)
MCHC RBC AUTO-ENTMCNC: 31.3 G/DL (ref 30–36.5)
MCHC RBC AUTO-ENTMCNC: 31.7 G/DL (ref 30–36.5)
MCHC RBC AUTO-ENTMCNC: 31.7 G/DL (ref 30–36.5)
MCV RBC AUTO: 90.4 FL (ref 80–99)
MCV RBC AUTO: 90.9 FL (ref 80–99)
MCV RBC AUTO: 91.3 FL (ref 80–99)
MCV RBC AUTO: 91.9 FL (ref 80–99)
MCV RBC AUTO: 92.2 FL (ref 80–99)
MCV RBC AUTO: 92.2 FL (ref 80–99)
MCV RBC AUTO: 92.5 FL (ref 80–99)
MCV RBC AUTO: 92.8 FL (ref 80–99)
MCV RBC AUTO: 93.3 FL (ref 80–99)
MCV RBC AUTO: 94.5 FL (ref 80–99)
MCV RBC AUTO: 95.5 FL (ref 80–99)
MCV RBC AUTO: 96.2 FL (ref 80–99)
MESOTHL CELL NFR FLD: 1 %
MESOTHL CELL NFR FLD: 4 %
MONOCYTES # BLD: 0.6 K/UL (ref 0–1)
MONOCYTES # BLD: 0.6 K/UL (ref 0–1)
MONOCYTES # BLD: 0.7 K/UL (ref 0–1)
MONOCYTES # BLD: 0.7 K/UL (ref 0–1)
MONOCYTES # BLD: 0.8 K/UL (ref 0–1)
MONOCYTES # BLD: 0.9 K/UL (ref 0–1)
MONOCYTES # BLD: 1 K/UL (ref 0–1)
MONOCYTES # BLD: 1.1 K/UL (ref 0–1)
MONOCYTES NFR BLD: 10 % (ref 5–13)
MONOCYTES NFR BLD: 11 % (ref 5–13)
MONOCYTES NFR BLD: 12 % (ref 5–13)
MONOCYTES NFR BLD: 5 % (ref 5–13)
MONOCYTES NFR BLD: 7 % (ref 5–13)
MONOCYTES NFR BLD: 7 % (ref 5–13)
MONOCYTES NFR BLD: 8 % (ref 5–13)
MONOCYTES NFR BLD: 8 % (ref 5–13)
MONOCYTES NFR BLD: 9 % (ref 5–13)
MONOCYTES NFR BLD: 9 % (ref 5–13)
MONOS+MACROS NFR FLD: 15 %
MONOS+MACROS NFR FLD: 23 %
MONOS+MACROS NFR FLD: 70 %
NEUTROPHILS NFR FLD: 13 %
NEUTROPHILS NFR FLD: 20 %
NEUTROPHILS NFR FLD: 5 %
NEUTS SEG # BLD: 10.5 K/UL (ref 1.8–8)
NEUTS SEG # BLD: 5.6 K/UL (ref 1.8–8)
NEUTS SEG # BLD: 5.6 K/UL (ref 1.8–8)
NEUTS SEG # BLD: 6 K/UL (ref 1.8–8)
NEUTS SEG # BLD: 6.1 K/UL (ref 1.8–8)
NEUTS SEG # BLD: 6.4 K/UL (ref 1.8–8)
NEUTS SEG # BLD: 7.2 K/UL (ref 1.8–8)
NEUTS SEG # BLD: 8.2 K/UL (ref 1.8–8)
NEUTS SEG # BLD: 8.5 K/UL (ref 1.8–8)
NEUTS SEG # BLD: 8.5 K/UL (ref 1.8–8)
NEUTS SEG # BLD: 8.6 K/UL (ref 1.8–8)
NEUTS SEG # BLD: 9 K/UL (ref 1.8–8)
NEUTS SEG NFR BLD: 73 % (ref 32–75)
NEUTS SEG NFR BLD: 75 % (ref 32–75)
NEUTS SEG NFR BLD: 75 % (ref 32–75)
NEUTS SEG NFR BLD: 76 % (ref 32–75)
NEUTS SEG NFR BLD: 76 % (ref 32–75)
NEUTS SEG NFR BLD: 80 % (ref 32–75)
NEUTS SEG NFR BLD: 80 % (ref 32–75)
NEUTS SEG NFR BLD: 82 % (ref 32–75)
NEUTS SEG NFR BLD: 84 % (ref 32–75)
NEUTS SEG NFR BLD: 84 % (ref 32–75)
NEUTS SEG NFR BLD: 85 % (ref 32–75)
NEUTS SEG NFR BLD: 91 % (ref 32–75)
NITRITE UR QL STRIP.AUTO: NEGATIVE
NRBC # BLD: 0 K/UL (ref 0–0.01)
NRBC BLD-RTO: 0 PER 100 WBC
NUC CELL # FLD: 1183 /CU MM
NUC CELL # FLD: 1222 /CU MM
NUC CELL # FLD: 1389 /CU MM
P-R INTERVAL, ECG05: 240 MS
PH FLD: 8 [PH]
PH UR STRIP: 5 [PH] (ref 5–8)
PHOSPHATE SERPL-MCNC: 2.8 MG/DL (ref 2.6–4.7)
PHOSPHATE SERPL-MCNC: 3 MG/DL (ref 2.6–4.7)
PHOSPHATE SERPL-MCNC: 3.3 MG/DL (ref 2.6–4.7)
PHOSPHATE SERPL-MCNC: 3.4 MG/DL (ref 2.6–4.7)
PHOSPHATE SERPL-MCNC: 3.4 MG/DL (ref 2.6–4.7)
PHOSPHATE SERPL-MCNC: 3.6 MG/DL (ref 2.6–4.7)
PHOSPHATE SERPL-MCNC: 3.7 MG/DL (ref 2.6–4.7)
PHOSPHATE SERPL-MCNC: 3.9 MG/DL (ref 2.6–4.7)
PHOSPHATE SERPL-MCNC: 4.1 MG/DL (ref 2.6–4.7)
PHOSPHATE SERPL-MCNC: 4.2 MG/DL (ref 2.6–4.7)
PHOSPHATE SERPL-MCNC: 4.4 MG/DL (ref 2.6–4.7)
PHOSPHATE SERPL-MCNC: 4.9 MG/DL (ref 2.6–4.7)
PHOSPHATE SERPL-MCNC: 4.9 MG/DL (ref 2.6–4.7)
PHOSPHATE SERPL-MCNC: 5.3 MG/DL (ref 2.6–4.7)
PHOSPHATE SERPL-MCNC: 5.3 MG/DL (ref 2.6–4.7)
PHOSPHATE SERPL-MCNC: 5.4 MG/DL (ref 2.6–4.7)
PHOSPHATE SERPL-MCNC: 5.6 MG/DL (ref 2.6–4.7)
PHOSPHATE SERPL-MCNC: 5.7 MG/DL (ref 2.6–4.7)
PHOSPHATE SERPL-MCNC: 7.4 MG/DL (ref 2.6–4.7)
PHOSPHATE SERPL-MCNC: NORMAL MG/DL (ref 2.6–4.7)
PLATELET # BLD AUTO: 330 K/UL (ref 150–400)
PLATELET # BLD AUTO: 336 K/UL (ref 150–400)
PLATELET # BLD AUTO: 336 K/UL (ref 150–400)
PLATELET # BLD AUTO: 339 K/UL (ref 150–400)
PLATELET # BLD AUTO: 352 K/UL (ref 150–400)
PLATELET # BLD AUTO: 376 K/UL (ref 150–400)
PLATELET # BLD AUTO: 379 K/UL (ref 150–400)
PLATELET # BLD AUTO: 395 K/UL (ref 150–400)
PLATELET # BLD AUTO: 399 K/UL (ref 150–400)
PLATELET # BLD AUTO: 418 K/UL (ref 150–400)
PLATELET # BLD AUTO: 466 K/UL (ref 150–400)
PLATELET # BLD AUTO: 471 K/UL (ref 150–400)
PLATELET COMMENTS,PCOM: ABNORMAL
PMV BLD AUTO: 10.2 FL (ref 8.9–12.9)
PMV BLD AUTO: 9.2 FL (ref 8.9–12.9)
PMV BLD AUTO: 9.3 FL (ref 8.9–12.9)
PMV BLD AUTO: 9.4 FL (ref 8.9–12.9)
PMV BLD AUTO: 9.4 FL (ref 8.9–12.9)
PMV BLD AUTO: 9.5 FL (ref 8.9–12.9)
PMV BLD AUTO: 9.6 FL (ref 8.9–12.9)
PMV BLD AUTO: 9.6 FL (ref 8.9–12.9)
PMV BLD AUTO: 9.7 FL (ref 8.9–12.9)
PMV BLD AUTO: 9.8 FL (ref 8.9–12.9)
POTASSIUM SERPL-SCNC: 2.9 MMOL/L (ref 3.5–5.1)
POTASSIUM SERPL-SCNC: 3.1 MMOL/L (ref 3.5–5.1)
POTASSIUM SERPL-SCNC: 3.2 MMOL/L (ref 3.5–5.1)
POTASSIUM SERPL-SCNC: 3.2 MMOL/L (ref 3.5–5.1)
POTASSIUM SERPL-SCNC: 3.6 MMOL/L (ref 3.5–5.1)
POTASSIUM SERPL-SCNC: 3.6 MMOL/L (ref 3.5–5.1)
POTASSIUM SERPL-SCNC: 3.7 MMOL/L (ref 3.5–5.1)
POTASSIUM SERPL-SCNC: 3.8 MMOL/L (ref 3.5–5.1)
POTASSIUM SERPL-SCNC: 4 MMOL/L (ref 3.5–5.1)
POTASSIUM SERPL-SCNC: 4 MMOL/L (ref 3.5–5.1)
POTASSIUM SERPL-SCNC: 4.1 MMOL/L (ref 3.5–5.1)
POTASSIUM SERPL-SCNC: 4.5 MMOL/L (ref 3.5–5.1)
POTASSIUM SERPL-SCNC: 4.6 MMOL/L (ref 3.5–5.1)
POTASSIUM SERPL-SCNC: 4.6 MMOL/L (ref 3.5–5.1)
POTASSIUM SERPL-SCNC: 4.7 MMOL/L (ref 3.5–5.1)
POTASSIUM SERPL-SCNC: 4.7 MMOL/L (ref 3.5–5.1)
POTASSIUM SERPL-SCNC: 5.6 MMOL/L (ref 3.5–5.1)
PROCALCITONIN SERPL-MCNC: 0.05 NG/ML
PROT FLD-MCNC: 2.8 G/DL
PROT FLD-MCNC: 3.7 G/DL
PROT SERPL-MCNC: 6.4 G/DL (ref 6.4–8.2)
PROT SERPL-MCNC: 6.8 G/DL (ref 6.4–8.2)
PROT SERPL-MCNC: 7.1 G/DL (ref 6.4–8.2)
PROT SERPL-MCNC: 7.7 G/DL (ref 6.4–8.2)
PROT SERPL-MCNC: 7.8 G/DL (ref 6.4–8.2)
PROT UR STRIP-MCNC: NEGATIVE MG/DL
PROTHROMBIN TIME: 10.9 SEC (ref 9–11.1)
PROTHROMBIN TIME: 11 SEC (ref 9–11.1)
Q-T INTERVAL, ECG07: 278 MS
Q-T INTERVAL, ECG07: 334 MS
QRS DURATION, ECG06: 82 MS
QRS DURATION, ECG06: 84 MS
QTC CALCULATION (BEZET), ECG08: 409 MS
QTC CALCULATION (BEZET), ECG08: 458 MS
RBC # BLD AUTO: 3.29 M/UL (ref 3.8–5.2)
RBC # BLD AUTO: 3.58 M/UL (ref 3.8–5.2)
RBC # BLD AUTO: 3.61 M/UL (ref 3.8–5.2)
RBC # BLD AUTO: 3.66 M/UL (ref 3.8–5.2)
RBC # BLD AUTO: 3.72 M/UL (ref 3.8–5.2)
RBC # BLD AUTO: 3.81 M/UL (ref 3.8–5.2)
RBC # BLD AUTO: 3.87 M/UL (ref 3.8–5.2)
RBC # BLD AUTO: 3.96 M/UL (ref 3.8–5.2)
RBC # BLD AUTO: 3.97 M/UL (ref 3.8–5.2)
RBC # BLD AUTO: 3.99 M/UL (ref 3.8–5.2)
RBC # BLD AUTO: 4.08 M/UL (ref 3.8–5.2)
RBC # BLD AUTO: 4.38 M/UL (ref 3.8–5.2)
RBC # FLD: >100 /CU MM
RBC #/AREA URNS HPF: NORMAL /HPF (ref 0–5)
RBC MORPH BLD: ABNORMAL
SAMPLES BEING HELD,HOLD: NORMAL
SERVICE CMNT-IMP: NORMAL
SODIUM SERPL-SCNC: 132 MMOL/L (ref 136–145)
SODIUM SERPL-SCNC: 132 MMOL/L (ref 136–145)
SODIUM SERPL-SCNC: 133 MMOL/L (ref 136–145)
SODIUM SERPL-SCNC: 135 MMOL/L (ref 136–145)
SODIUM SERPL-SCNC: 135 MMOL/L (ref 136–145)
SODIUM SERPL-SCNC: 136 MMOL/L (ref 136–145)
SODIUM SERPL-SCNC: 137 MMOL/L (ref 136–145)
SODIUM SERPL-SCNC: 138 MMOL/L (ref 136–145)
SODIUM SERPL-SCNC: 139 MMOL/L (ref 136–145)
SODIUM SERPL-SCNC: 140 MMOL/L (ref 136–145)
SODIUM UR-SCNC: 81 MMOL/L
SOURCE, COVRS: NORMAL
SP GR UR REFRACTOMETRY: 1.01 (ref 1–1.03)
SPECIMEN SOURCE FLD: ABNORMAL
SPECIMEN SOURCE FLD: NORMAL
THERAPEUTIC RANGE,PTTT: ABNORMAL SECS (ref 58–77)
THERAPEUTIC RANGE,PTTT: NORMAL SECS (ref 58–77)
TROPONIN I SERPL-MCNC: <0.05 NG/ML
TSH SERPL DL<=0.05 MIU/L-ACNC: 3.12 UIU/ML (ref 0.36–3.74)
UR CULT HOLD, URHOLD: NORMAL
UROBILINOGEN UR QL STRIP.AUTO: 0.2 EU/DL (ref 0.2–1)
VANCOMYCIN SERPL-MCNC: 15.5 UG/ML
VANCOMYCIN SERPL-MCNC: 28.5 UG/ML
VANCOMYCIN SERPL-MCNC: NORMAL UG/ML
VENTRICULAR RATE, ECG03: 113 BPM
VENTRICULAR RATE, ECG03: 130 BPM
WBC # BLD AUTO: 10.2 K/UL (ref 3.6–11)
WBC # BLD AUTO: 10.2 K/UL (ref 3.6–11)
WBC # BLD AUTO: 10.4 K/UL (ref 3.6–11)
WBC # BLD AUTO: 10.5 K/UL (ref 3.6–11)
WBC # BLD AUTO: 11.5 K/UL (ref 3.6–11)
WBC # BLD AUTO: 11.8 K/UL (ref 3.6–11)
WBC # BLD AUTO: 7.4 K/UL (ref 3.6–11)
WBC # BLD AUTO: 7.6 K/UL (ref 3.6–11)
WBC # BLD AUTO: 7.6 K/UL (ref 3.6–11)
WBC # BLD AUTO: 7.9 K/UL (ref 3.6–11)
WBC # BLD AUTO: 8 K/UL (ref 3.6–11)
WBC # BLD AUTO: 8.6 K/UL (ref 3.6–11)
WBC URNS QL MICRO: NORMAL /HPF (ref 0–4)

## 2021-01-01 PROCEDURE — 77300 RADIATION THERAPY DOSE PLAN: CPT

## 2021-01-01 PROCEDURE — G0463 HOSPITAL OUTPT CLINIC VISIT: HCPCS | Performed by: NURSE PRACTITIONER

## 2021-01-01 PROCEDURE — 99222 1ST HOSP IP/OBS MODERATE 55: CPT | Performed by: NURSE PRACTITIONER

## 2021-01-01 PROCEDURE — 77030002986 HC SUT PROL J&J -A

## 2021-01-01 PROCEDURE — 82570 ASSAY OF URINE CREATININE: CPT

## 2021-01-01 PROCEDURE — 97535 SELF CARE MNGMENT TRAINING: CPT

## 2021-01-01 PROCEDURE — 77030038269 HC DRN EXT URIN PURWCK BARD -A

## 2021-01-01 PROCEDURE — 65660000001 HC RM ICU INTERMED STEPDOWN

## 2021-01-01 PROCEDURE — HOSPICE MEDICATION HC HH HOSPICE MEDICATION

## 2021-01-01 PROCEDURE — 65660000000 HC RM CCU STEPDOWN

## 2021-01-01 PROCEDURE — 85610 PROTHROMBIN TIME: CPT

## 2021-01-01 PROCEDURE — 77387 GUIDANCE FOR RADJ TX DLVR: CPT

## 2021-01-01 PROCEDURE — 07D78ZX EXTRACTION OF THORAX LYMPHATIC, VIA NATURAL OR ARTIFICIAL OPENING ENDOSCOPIC, DIAGNOSTIC: ICD-10-PCS | Performed by: INTERNAL MEDICINE

## 2021-01-01 PROCEDURE — 77030026438 HC STYL ET INTUB CARD -A: Performed by: ANESTHESIOLOGY

## 2021-01-01 PROCEDURE — 0651 HSPC ROUTINE HOME CARE

## 2021-01-01 PROCEDURE — 85730 THROMBOPLASTIN TIME PARTIAL: CPT

## 2021-01-01 PROCEDURE — G0299 HHS/HOSPICE OF RN EA 15 MIN: HCPCS

## 2021-01-01 PROCEDURE — 76937 US GUIDE VASCULAR ACCESS: CPT

## 2021-01-01 PROCEDURE — 74011250637 HC RX REV CODE- 250/637: Performed by: NURSE PRACTITIONER

## 2021-01-01 PROCEDURE — 84100 ASSAY OF PHOSPHORUS: CPT

## 2021-01-01 PROCEDURE — 74011250636 HC RX REV CODE- 250/636: Performed by: NURSE PRACTITIONER

## 2021-01-01 PROCEDURE — 94760 N-INVAS EAR/PLS OXIMETRY 1: CPT

## 2021-01-01 PROCEDURE — G0156 HHCP-SVS OF AIDE,EA 15 MIN: HCPCS

## 2021-01-01 PROCEDURE — 77010033678 HC OXYGEN DAILY

## 2021-01-01 PROCEDURE — 88305 TISSUE EXAM BY PATHOLOGIST: CPT

## 2021-01-01 PROCEDURE — 32555 ASPIRATE PLEURA W/ IMAGING: CPT

## 2021-01-01 PROCEDURE — 85025 COMPLETE CBC W/AUTO DIFF WBC: CPT

## 2021-01-01 PROCEDURE — 74011000250 HC RX REV CODE- 250: Performed by: INTERNAL MEDICINE

## 2021-01-01 PROCEDURE — 88173 CYTOPATH EVAL FNA REPORT: CPT

## 2021-01-01 PROCEDURE — 71250 CT THORAX DX C-: CPT

## 2021-01-01 PROCEDURE — 88184 FLOWCYTOMETRY/ TC 1 MARKER: CPT

## 2021-01-01 PROCEDURE — 74011250636 HC RX REV CODE- 250/636: Performed by: EMERGENCY MEDICINE

## 2021-01-01 PROCEDURE — 97530 THERAPEUTIC ACTIVITIES: CPT

## 2021-01-01 PROCEDURE — 82436 ASSAY OF URINE CHLORIDE: CPT

## 2021-01-01 PROCEDURE — 74011250636 HC RX REV CODE- 250/636: Performed by: INTERNAL MEDICINE

## 2021-01-01 PROCEDURE — 99232 SBSQ HOSP IP/OBS MODERATE 35: CPT | Performed by: INTERNAL MEDICINE

## 2021-01-01 PROCEDURE — 36415 COLL VENOUS BLD VENIPUNCTURE: CPT

## 2021-01-01 PROCEDURE — 77010033711 HC HIGH FLOW OXYGEN

## 2021-01-01 PROCEDURE — 89050 BODY FLUID CELL COUNT: CPT

## 2021-01-01 PROCEDURE — 99231 SBSQ HOSP IP/OBS SF/LOW 25: CPT | Performed by: PHYSICIAN ASSISTANT

## 2021-01-01 PROCEDURE — 74011000258 HC RX REV CODE- 258: Performed by: EMERGENCY MEDICINE

## 2021-01-01 PROCEDURE — 74011000258 HC RX REV CODE- 258: Performed by: INTERNAL MEDICINE

## 2021-01-01 PROCEDURE — 74011000250 HC RX REV CODE- 250: Performed by: NURSE ANESTHETIST, CERTIFIED REGISTERED

## 2021-01-01 PROCEDURE — 99231 SBSQ HOSP IP/OBS SF/LOW 25: CPT | Performed by: INTERNAL MEDICINE

## 2021-01-01 PROCEDURE — 96374 THER/PROPH/DIAG INJ IV PUSH: CPT

## 2021-01-01 PROCEDURE — 88112 CYTOPATH CELL ENHANCE TECH: CPT

## 2021-01-01 PROCEDURE — 99285 EMERGENCY DEPT VISIT HI MDM: CPT

## 2021-01-01 PROCEDURE — 94664 DEMO&/EVAL PT USE INHALER: CPT

## 2021-01-01 PROCEDURE — 74011000250 HC RX REV CODE- 250: Performed by: PHYSICIAN ASSISTANT

## 2021-01-01 PROCEDURE — 83735 ASSAY OF MAGNESIUM: CPT

## 2021-01-01 PROCEDURE — 83880 ASSAY OF NATRIURETIC PEPTIDE: CPT

## 2021-01-01 PROCEDURE — 3336500001 HSPC ELECTION

## 2021-01-01 PROCEDURE — 74011000250 HC RX REV CODE- 250: Performed by: NURSE PRACTITIONER

## 2021-01-01 PROCEDURE — 94640 AIRWAY INHALATION TREATMENT: CPT

## 2021-01-01 PROCEDURE — 76770 US EXAM ABDO BACK WALL COMP: CPT

## 2021-01-01 PROCEDURE — 80202 ASSAY OF VANCOMYCIN: CPT

## 2021-01-01 PROCEDURE — 88172 CYTP DX EVAL FNA 1ST EA SITE: CPT

## 2021-01-01 PROCEDURE — 96376 TX/PRO/DX INJ SAME DRUG ADON: CPT

## 2021-01-01 PROCEDURE — 80048 BASIC METABOLIC PNL TOTAL CA: CPT

## 2021-01-01 PROCEDURE — 77334 RADIATION TREATMENT AID(S): CPT

## 2021-01-01 PROCEDURE — 82945 GLUCOSE OTHER FLUID: CPT

## 2021-01-01 PROCEDURE — 71045 X-RAY EXAM CHEST 1 VIEW: CPT

## 2021-01-01 PROCEDURE — 3331090001 HH PPS REVENUE CREDIT

## 2021-01-01 PROCEDURE — 74011250636 HC RX REV CODE- 250/636: Performed by: NURSE ANESTHETIST, CERTIFIED REGISTERED

## 2021-01-01 PROCEDURE — 80069 RENAL FUNCTION PANEL: CPT

## 2021-01-01 PROCEDURE — 87205 SMEAR GRAM STAIN: CPT

## 2021-01-01 PROCEDURE — 87040 BLOOD CULTURE FOR BACTERIA: CPT

## 2021-01-01 PROCEDURE — 77336 RADIATION PHYSICS CONSULT: CPT

## 2021-01-01 PROCEDURE — 2709999900 HC NON-CHARGEABLE SUPPLY

## 2021-01-01 PROCEDURE — 74011000250 HC RX REV CODE- 250: Performed by: HOSPITALIST

## 2021-01-01 PROCEDURE — 77014 CT GUIDED THERAPY PLAN/PLACEMENT: CPT

## 2021-01-01 PROCEDURE — 80053 COMPREHEN METABOLIC PANEL: CPT

## 2021-01-01 PROCEDURE — 84443 ASSAY THYROID STIM HORMONE: CPT

## 2021-01-01 PROCEDURE — 76040000020: Performed by: INTERNAL MEDICINE

## 2021-01-01 PROCEDURE — 84145 PROCALCITONIN (PCT): CPT

## 2021-01-01 PROCEDURE — 74011250637 HC RX REV CODE- 250/637: Performed by: INTERNAL MEDICINE

## 2021-01-01 PROCEDURE — 83986 ASSAY PH BODY FLUID NOS: CPT

## 2021-01-01 PROCEDURE — 87070 CULTURE OTHR SPECIMN AEROBIC: CPT

## 2021-01-01 PROCEDURE — 99232 SBSQ HOSP IP/OBS MODERATE 35: CPT | Performed by: NURSE PRACTITIONER

## 2021-01-01 PROCEDURE — 74011000258 HC RX REV CODE- 258: Performed by: NURSE PRACTITIONER

## 2021-01-01 PROCEDURE — G8432 DEP SCR NOT DOC, RNG: HCPCS | Performed by: NURSE PRACTITIONER

## 2021-01-01 PROCEDURE — 76604 US EXAM CHEST: CPT

## 2021-01-01 PROCEDURE — 84484 ASSAY OF TROPONIN QUANT: CPT

## 2021-01-01 PROCEDURE — 77030040212 HC SYS EVAC ASEPT DISP BBMI -A

## 2021-01-01 PROCEDURE — 74011250636 HC RX REV CODE- 250/636: Performed by: STUDENT IN AN ORGANIZED HEALTH CARE EDUCATION/TRAINING PROGRAM

## 2021-01-01 PROCEDURE — 87015 SPECIMEN INFECT AGNT CONCNTJ: CPT

## 2021-01-01 PROCEDURE — 74011250636 HC RX REV CODE- 250/636: Performed by: HOSPITALIST

## 2021-01-01 PROCEDURE — 77030008684 HC TU ET CUF COVD -B: Performed by: ANESTHESIOLOGY

## 2021-01-01 PROCEDURE — 93306 TTE W/DOPPLER COMPLETE: CPT | Performed by: INTERNAL MEDICINE

## 2021-01-01 PROCEDURE — 99223 1ST HOSP IP/OBS HIGH 75: CPT | Performed by: SPECIALIST

## 2021-01-01 PROCEDURE — G0300 HHS/HOSPICE OF LPN EA 15 MIN: HCPCS

## 2021-01-01 PROCEDURE — 96365 THER/PROPH/DIAG IV INF INIT: CPT

## 2021-01-01 PROCEDURE — 65610000006 HC RM INTENSIVE CARE

## 2021-01-01 PROCEDURE — 3331090002 HH PPS REVENUE DEBIT

## 2021-01-01 PROCEDURE — 83615 LACTATE (LD) (LDH) ENZYME: CPT

## 2021-01-01 PROCEDURE — G8400 PT W/DXA NO RESULTS DOC: HCPCS | Performed by: NURSE PRACTITIONER

## 2021-01-01 PROCEDURE — 77412 RADIATION TX DELIVERY LVL 3: CPT

## 2021-01-01 PROCEDURE — 84157 ASSAY OF PROTEIN OTHER: CPT

## 2021-01-01 PROCEDURE — 82962 GLUCOSE BLOOD TEST: CPT

## 2021-01-01 PROCEDURE — C1729 CATH, DRAINAGE: HCPCS

## 2021-01-01 PROCEDURE — 74011250637 HC RX REV CODE- 250/637: Performed by: HOSPITALIST

## 2021-01-01 PROCEDURE — 99214 OFFICE O/P EST MOD 30 MIN: CPT | Performed by: NURSE PRACTITIONER

## 2021-01-01 PROCEDURE — 82550 ASSAY OF CK (CPK): CPT

## 2021-01-01 PROCEDURE — 83605 ASSAY OF LACTIC ACID: CPT

## 2021-01-01 PROCEDURE — 76060000034 HC ANESTHESIA 1.5 TO 2 HR: Performed by: INTERNAL MEDICINE

## 2021-01-01 PROCEDURE — 84155 ASSAY OF PROTEIN SERUM: CPT

## 2021-01-01 PROCEDURE — 77417 THER RADIOLOGY PORT IMAGE(S): CPT

## 2021-01-01 PROCEDURE — 99233 SBSQ HOSP IP/OBS HIGH 50: CPT | Performed by: NURSE PRACTITIONER

## 2021-01-01 PROCEDURE — 74011000258 HC RX REV CODE- 258: Performed by: NURSE ANESTHETIST, CERTIFIED REGISTERED

## 2021-01-01 PROCEDURE — 77290 THER RAD SIMULAJ FIELD CPLX: CPT

## 2021-01-01 PROCEDURE — 77030011229 HC DIL VESL COON COOK -A

## 2021-01-01 PROCEDURE — 1101F PT FALLS ASSESS-DOCD LE1/YR: CPT | Performed by: NURSE PRACTITIONER

## 2021-01-01 PROCEDURE — 74150 CT ABDOMEN W/O CONTRAST: CPT

## 2021-01-01 PROCEDURE — 84300 ASSAY OF URINE SODIUM: CPT

## 2021-01-01 PROCEDURE — G0155 HHCP-SVS OF CSW,EA 15 MIN: HCPCS

## 2021-01-01 PROCEDURE — 93306 TTE W/DOPPLER COMPLETE: CPT

## 2021-01-01 PROCEDURE — 0W9B30Z DRAINAGE OF LEFT PLEURAL CAVITY WITH DRAINAGE DEVICE, PERCUTANEOUS APPROACH: ICD-10-PCS | Performed by: STUDENT IN AN ORGANIZED HEALTH CARE EDUCATION/TRAINING PROGRAM

## 2021-01-01 PROCEDURE — 76040000019: Performed by: INTERNAL MEDICINE

## 2021-01-01 PROCEDURE — 88341 IMHCHEM/IMCYTCHM EA ADD ANTB: CPT

## 2021-01-01 PROCEDURE — 97165 OT EVAL LOW COMPLEX 30 MIN: CPT

## 2021-01-01 PROCEDURE — 99233 SBSQ HOSP IP/OBS HIGH 50: CPT | Performed by: INTERNAL MEDICINE

## 2021-01-01 PROCEDURE — 99223 1ST HOSP IP/OBS HIGH 75: CPT | Performed by: INTERNAL MEDICINE

## 2021-01-01 PROCEDURE — C1769 GUIDE WIRE: HCPCS

## 2021-01-01 PROCEDURE — 0W9B3ZX DRAINAGE OF LEFT PLEURAL CAVITY, PERCUTANEOUS APPROACH, DIAGNOSTIC: ICD-10-PCS | Performed by: RADIOLOGY

## 2021-01-01 PROCEDURE — 77030029684 HC NEB SM VOL KT MONA -A

## 2021-01-01 PROCEDURE — 93005 ELECTROCARDIOGRAM TRACING: CPT

## 2021-01-01 PROCEDURE — 0W993ZZ DRAINAGE OF RIGHT PLEURAL CAVITY, PERCUTANEOUS APPROACH: ICD-10-PCS | Performed by: RADIOLOGY

## 2021-01-01 PROCEDURE — 88185 FLOWCYTOMETRY/TC ADD-ON: CPT

## 2021-01-01 PROCEDURE — 87102 FUNGUS ISOLATION CULTURE: CPT

## 2021-01-01 PROCEDURE — 87635 SARS-COV-2 COVID-19 AMP PRB: CPT

## 2021-01-01 PROCEDURE — 96375 TX/PRO/DX INJ NEW DRUG ADDON: CPT

## 2021-01-01 PROCEDURE — 99233 SBSQ HOSP IP/OBS HIGH 50: CPT | Performed by: SPECIALIST

## 2021-01-01 PROCEDURE — 99221 1ST HOSP IP/OBS SF/LOW 40: CPT | Performed by: PHYSICIAN ASSISTANT

## 2021-01-01 PROCEDURE — 51798 US URINE CAPACITY MEASURE: CPT

## 2021-01-01 PROCEDURE — 99232 SBSQ HOSP IP/OBS MODERATE 35: CPT | Performed by: SPECIALIST

## 2021-01-01 PROCEDURE — 77030012390 HC DRN CHST BTL GTNG -B

## 2021-01-01 PROCEDURE — G8427 DOCREV CUR MEDS BY ELIG CLIN: HCPCS | Performed by: NURSE PRACTITIONER

## 2021-01-01 PROCEDURE — 81001 URINALYSIS AUTO W/SCOPE: CPT

## 2021-01-01 PROCEDURE — 94761 N-INVAS EAR/PLS OXIMETRY MLT: CPT

## 2021-01-01 PROCEDURE — 1090F PRES/ABSN URINE INCON ASSESS: CPT | Performed by: NURSE PRACTITIONER

## 2021-01-01 PROCEDURE — 77295 3-D RADIOTHERAPY PLAN: CPT

## 2021-01-01 PROCEDURE — 94762 N-INVAS EAR/PLS OXIMTRY CONT: CPT

## 2021-01-01 PROCEDURE — 97161 PT EVAL LOW COMPLEX 20 MIN: CPT

## 2021-01-01 PROCEDURE — 88342 IMHCHEM/IMCYTCHM 1ST ANTB: CPT

## 2021-01-01 PROCEDURE — G8756 NO BP MEASURE DOC: HCPCS | Performed by: NURSE PRACTITIONER

## 2021-01-01 RX ORDER — SODIUM CHLORIDE 9 MG/ML
100 INJECTION, SOLUTION INTRAVENOUS CONTINUOUS
Status: DISPENSED | OUTPATIENT
Start: 2021-01-01 | End: 2021-01-01

## 2021-01-01 RX ORDER — HYDRALAZINE HYDROCHLORIDE 20 MG/ML
10 INJECTION INTRAMUSCULAR; INTRAVENOUS
Status: DISCONTINUED | OUTPATIENT
Start: 2021-01-01 | End: 2021-01-01 | Stop reason: HOSPADM

## 2021-01-01 RX ORDER — MAGNESIUM SULFATE HEPTAHYDRATE 40 MG/ML
2 INJECTION, SOLUTION INTRAVENOUS ONCE
Status: COMPLETED | OUTPATIENT
Start: 2021-01-01 | End: 2021-01-01

## 2021-01-01 RX ORDER — SODIUM CHLORIDE 0.9 % (FLUSH) 0.9 %
10 SYRINGE (ML) INJECTION AS NEEDED
Status: CANCELLED | OUTPATIENT
Start: 2021-01-01

## 2021-01-01 RX ORDER — AMLODIPINE BESYLATE 5 MG/1
5 TABLET ORAL DAILY
Status: DISCONTINUED | OUTPATIENT
Start: 2021-01-01 | End: 2021-01-01 | Stop reason: HOSPADM

## 2021-01-01 RX ORDER — LIDOCAINE HYDROCHLORIDE AND EPINEPHRINE 10; 10 MG/ML; UG/ML
INJECTION, SOLUTION INFILTRATION; PERINEURAL
Status: DISPENSED
Start: 2021-01-01 | End: 2021-01-01

## 2021-01-01 RX ORDER — HEPARIN SODIUM 10000 [USP'U]/100ML
11-25 INJECTION, SOLUTION INTRAVENOUS
Status: DISCONTINUED | OUTPATIENT
Start: 2021-01-01 | End: 2021-01-01 | Stop reason: SDUPTHER

## 2021-01-01 RX ORDER — HEPARIN SODIUM 10000 [USP'U]/100ML
11-25 INJECTION, SOLUTION INTRAVENOUS
Status: DISCONTINUED | OUTPATIENT
Start: 2021-01-01 | End: 2021-01-01

## 2021-01-01 RX ORDER — ALBUTEROL SULFATE 0.83 MG/ML
2.5 SOLUTION RESPIRATORY (INHALATION) AS NEEDED
Status: CANCELLED
Start: 2021-01-01

## 2021-01-01 RX ORDER — SODIUM CHLORIDE 450 MG/100ML
50 INJECTION, SOLUTION INTRAVENOUS CONTINUOUS
Status: DISCONTINUED | OUTPATIENT
Start: 2021-01-01 | End: 2021-01-01

## 2021-01-01 RX ORDER — ASPIRIN 81 MG/1
81 TABLET ORAL DAILY
Status: DISCONTINUED | OUTPATIENT
Start: 2021-01-01 | End: 2021-01-01 | Stop reason: HOSPADM

## 2021-01-01 RX ORDER — ONDANSETRON 2 MG/ML
4 INJECTION INTRAMUSCULAR; INTRAVENOUS
Status: DISCONTINUED | OUTPATIENT
Start: 2021-01-01 | End: 2021-01-01 | Stop reason: HOSPADM

## 2021-01-01 RX ORDER — EPINEPHRINE 1 MG/ML
0.3 INJECTION, SOLUTION, CONCENTRATE INTRAVENOUS AS NEEDED
Status: CANCELLED | OUTPATIENT
Start: 2021-01-01

## 2021-01-01 RX ORDER — SODIUM BICARBONATE 42 MG/ML
2 INJECTION, SOLUTION INTRAVENOUS
Status: COMPLETED | OUTPATIENT
Start: 2021-01-01 | End: 2021-01-01

## 2021-01-01 RX ORDER — DEXAMETHASONE SODIUM PHOSPHATE 4 MG/ML
8 INJECTION, SOLUTION INTRA-ARTICULAR; INTRALESIONAL; INTRAMUSCULAR; INTRAVENOUS; SOFT TISSUE EVERY 24 HOURS
Status: COMPLETED | OUTPATIENT
Start: 2021-01-01 | End: 2021-01-01

## 2021-01-01 RX ORDER — SODIUM CHLORIDE 9 MG/ML
75 INJECTION, SOLUTION INTRAVENOUS CONTINUOUS
Status: DISPENSED | OUTPATIENT
Start: 2021-01-01 | End: 2021-01-01

## 2021-01-01 RX ORDER — POLYETHYLENE GLYCOL 3350 17 G/17G
17 POWDER, FOR SOLUTION ORAL DAILY
Qty: 30 PACKET | Refills: 1 | Status: SHIPPED
Start: 2021-01-01 | End: 2021-01-01

## 2021-01-01 RX ORDER — SODIUM CHLORIDE 9 MG/ML
50 INJECTION, SOLUTION INTRAVENOUS CONTINUOUS
Status: DISCONTINUED | OUTPATIENT
Start: 2021-01-01 | End: 2021-01-01

## 2021-01-01 RX ORDER — SODIUM CHLORIDE 0.9 % (FLUSH) 0.9 %
5-40 SYRINGE (ML) INJECTION EVERY 8 HOURS
Status: DISCONTINUED | OUTPATIENT
Start: 2021-01-01 | End: 2021-01-01 | Stop reason: HOSPADM

## 2021-01-01 RX ORDER — LIDOCAINE HYDROCHLORIDE AND EPINEPHRINE 10; 10 MG/ML; UG/ML
INJECTION, SOLUTION INFILTRATION; PERINEURAL AS NEEDED
Status: DISCONTINUED | OUTPATIENT
Start: 2021-01-01 | End: 2021-01-01 | Stop reason: HOSPADM

## 2021-01-01 RX ORDER — SUCCINYLCHOLINE CHLORIDE 20 MG/ML
INJECTION INTRAMUSCULAR; INTRAVENOUS AS NEEDED
Status: DISCONTINUED | OUTPATIENT
Start: 2021-01-01 | End: 2021-01-01 | Stop reason: HOSPADM

## 2021-01-01 RX ORDER — IPRATROPIUM BROMIDE AND ALBUTEROL SULFATE 2.5; .5 MG/3ML; MG/3ML
3 SOLUTION RESPIRATORY (INHALATION)
Status: DISCONTINUED | OUTPATIENT
Start: 2021-01-01 | End: 2021-01-01

## 2021-01-01 RX ORDER — ONDANSETRON 4 MG/1
4 TABLET, ORALLY DISINTEGRATING ORAL
Status: DISCONTINUED | OUTPATIENT
Start: 2021-01-01 | End: 2021-01-01 | Stop reason: HOSPADM

## 2021-01-01 RX ORDER — SODIUM CHLORIDE 9 MG/ML
25 INJECTION, SOLUTION INTRAVENOUS CONTINUOUS
Status: DISPENSED | OUTPATIENT
Start: 2021-01-01 | End: 2021-01-01

## 2021-01-01 RX ORDER — ATORVASTATIN CALCIUM 10 MG/1
10 TABLET, FILM COATED ORAL
Status: DISCONTINUED | OUTPATIENT
Start: 2021-01-01 | End: 2021-01-01 | Stop reason: HOSPADM

## 2021-01-01 RX ORDER — AMIODARONE HYDROCHLORIDE 200 MG/1
400 TABLET ORAL DAILY
Status: DISCONTINUED | OUTPATIENT
Start: 2021-01-01 | End: 2021-01-01 | Stop reason: HOSPADM

## 2021-01-01 RX ORDER — OXYCODONE HYDROCHLORIDE 5 MG/1
5 TABLET ORAL
Qty: 30 TABLET | Refills: 0 | Status: SHIPPED | OUTPATIENT
Start: 2021-01-01 | End: 2021-01-01

## 2021-01-01 RX ORDER — ROCURONIUM BROMIDE 10 MG/ML
INJECTION, SOLUTION INTRAVENOUS AS NEEDED
Status: DISCONTINUED | OUTPATIENT
Start: 2021-01-01 | End: 2021-01-01 | Stop reason: HOSPADM

## 2021-01-01 RX ORDER — FUROSEMIDE 10 MG/ML
40 INJECTION INTRAMUSCULAR; INTRAVENOUS 2 TIMES DAILY
Status: DISCONTINUED | OUTPATIENT
Start: 2021-01-01 | End: 2021-01-01

## 2021-01-01 RX ORDER — LIDOCAINE HYDROCHLORIDE 10 MG/ML
10 INJECTION, SOLUTION EPIDURAL; INFILTRATION; INTRACAUDAL; PERINEURAL
Status: COMPLETED | OUTPATIENT
Start: 2021-01-01 | End: 2021-01-01

## 2021-01-01 RX ORDER — HEPARIN SODIUM 1000 [USP'U]/ML
2000 INJECTION, SOLUTION INTRAVENOUS; SUBCUTANEOUS ONCE
Status: COMPLETED | OUTPATIENT
Start: 2021-01-01 | End: 2021-01-01

## 2021-01-01 RX ORDER — HEPARIN SODIUM 5000 [USP'U]/ML
4000 INJECTION, SOLUTION INTRAVENOUS; SUBCUTANEOUS ONCE
Status: COMPLETED | OUTPATIENT
Start: 2021-01-01 | End: 2021-01-01

## 2021-01-01 RX ORDER — ACETAMINOPHEN 650 MG/1
650 SUPPOSITORY RECTAL
Status: DISCONTINUED | OUTPATIENT
Start: 2021-01-01 | End: 2021-01-01 | Stop reason: HOSPADM

## 2021-01-01 RX ORDER — HEPARIN SODIUM 10000 [USP'U]/100ML
12-25 INJECTION, SOLUTION INTRAVENOUS
Status: DISCONTINUED | OUTPATIENT
Start: 2021-01-01 | End: 2021-01-01 | Stop reason: HOSPADM

## 2021-01-01 RX ORDER — POTASSIUM CHLORIDE 750 MG/1
40 TABLET, FILM COATED, EXTENDED RELEASE ORAL EVERY 4 HOURS
Status: COMPLETED | OUTPATIENT
Start: 2021-01-01 | End: 2021-01-01

## 2021-01-01 RX ORDER — BALSAM PERU/CASTOR OIL
OINTMENT (GRAM) TOPICAL 2 TIMES DAILY
Status: DISCONTINUED | OUTPATIENT
Start: 2021-01-01 | End: 2021-01-01 | Stop reason: HOSPADM

## 2021-01-01 RX ORDER — AMLODIPINE BESYLATE 5 MG/1
TABLET ORAL
Qty: 90 TABLET | Refills: 1 | Status: SHIPPED | OUTPATIENT
Start: 2021-01-01

## 2021-01-01 RX ORDER — ACETAMINOPHEN 325 MG/1
650 TABLET ORAL
Status: DISCONTINUED | OUTPATIENT
Start: 2021-01-01 | End: 2021-01-01 | Stop reason: HOSPADM

## 2021-01-01 RX ORDER — SIMVASTATIN 20 MG/1
TABLET, FILM COATED ORAL
Qty: 90 TABLET | Refills: 1 | Status: SHIPPED | OUTPATIENT
Start: 2021-01-01

## 2021-01-01 RX ORDER — HEPARIN 100 UNIT/ML
300-500 SYRINGE INTRAVENOUS AS NEEDED
Status: CANCELLED
Start: 2021-01-01

## 2021-01-01 RX ORDER — LIDOCAINE HYDROCHLORIDE 20 MG/ML
INJECTION, SOLUTION EPIDURAL; INFILTRATION; INTRACAUDAL; PERINEURAL AS NEEDED
Status: DISCONTINUED | OUTPATIENT
Start: 2021-01-01 | End: 2021-01-01 | Stop reason: HOSPADM

## 2021-01-01 RX ORDER — SODIUM CHLORIDE 0.9 % (FLUSH) 0.9 %
5-40 SYRINGE (ML) INJECTION AS NEEDED
Status: DISCONTINUED | OUTPATIENT
Start: 2021-01-01 | End: 2021-01-01 | Stop reason: HOSPADM

## 2021-01-01 RX ORDER — ONDANSETRON 2 MG/ML
8 INJECTION INTRAMUSCULAR; INTRAVENOUS AS NEEDED
Status: CANCELLED | OUTPATIENT
Start: 2021-01-01

## 2021-01-01 RX ORDER — HYDROCORTISONE SODIUM SUCCINATE 100 MG/2ML
100 INJECTION, POWDER, FOR SOLUTION INTRAMUSCULAR; INTRAVENOUS AS NEEDED
Status: CANCELLED | OUTPATIENT
Start: 2021-01-01

## 2021-01-01 RX ORDER — ONDANSETRON 2 MG/ML
INJECTION INTRAMUSCULAR; INTRAVENOUS AS NEEDED
Status: DISCONTINUED | OUTPATIENT
Start: 2021-01-01 | End: 2021-01-01 | Stop reason: HOSPADM

## 2021-01-01 RX ORDER — HEPARIN SODIUM 1000 [USP'U]/ML
2721 INJECTION, SOLUTION INTRAVENOUS; SUBCUTANEOUS ONCE
Status: DISCONTINUED | OUTPATIENT
Start: 2021-01-01 | End: 2021-01-01

## 2021-01-01 RX ORDER — IPRATROPIUM BROMIDE AND ALBUTEROL SULFATE 2.5; .5 MG/3ML; MG/3ML
3 SOLUTION RESPIRATORY (INHALATION)
Status: DISCONTINUED | OUTPATIENT
Start: 2021-01-01 | End: 2021-01-01 | Stop reason: HOSPADM

## 2021-01-01 RX ORDER — ACETAMINOPHEN 325 MG/1
650 TABLET ORAL AS NEEDED
Status: CANCELLED
Start: 2021-01-01

## 2021-01-01 RX ORDER — DIPHENHYDRAMINE HYDROCHLORIDE 50 MG/ML
50 INJECTION, SOLUTION INTRAMUSCULAR; INTRAVENOUS AS NEEDED
Status: CANCELLED
Start: 2021-01-01

## 2021-01-01 RX ORDER — SODIUM CHLORIDE 9 MG/ML
10 INJECTION INTRAMUSCULAR; INTRAVENOUS; SUBCUTANEOUS AS NEEDED
Status: CANCELLED | OUTPATIENT
Start: 2021-01-01

## 2021-01-01 RX ORDER — OXYCODONE HYDROCHLORIDE 5 MG/1
5 TABLET ORAL
Status: DISCONTINUED | OUTPATIENT
Start: 2021-01-01 | End: 2021-01-01 | Stop reason: HOSPADM

## 2021-01-01 RX ORDER — MICONAZOLE NITRATE 2 %
POWDER (GRAM) TOPICAL 2 TIMES DAILY
Status: DISCONTINUED | OUTPATIENT
Start: 2021-01-01 | End: 2021-01-01 | Stop reason: HOSPADM

## 2021-01-01 RX ORDER — POTASSIUM CHLORIDE 750 MG/1
40 TABLET, FILM COATED, EXTENDED RELEASE ORAL
Status: COMPLETED | OUTPATIENT
Start: 2021-01-01 | End: 2021-01-01

## 2021-01-01 RX ORDER — DEXAMETHASONE SODIUM PHOSPHATE 4 MG/ML
INJECTION, SOLUTION INTRA-ARTICULAR; INTRALESIONAL; INTRAMUSCULAR; INTRAVENOUS; SOFT TISSUE AS NEEDED
Status: DISCONTINUED | OUTPATIENT
Start: 2021-01-01 | End: 2021-01-01 | Stop reason: HOSPADM

## 2021-01-01 RX ORDER — PROPOFOL 10 MG/ML
INJECTION, EMULSION INTRAVENOUS AS NEEDED
Status: DISCONTINUED | OUTPATIENT
Start: 2021-01-01 | End: 2021-01-01 | Stop reason: HOSPADM

## 2021-01-01 RX ORDER — VANCOMYCIN 2 GRAM/500 ML IN 0.9 % SODIUM CHLORIDE INTRAVENOUS
2 ONCE
Status: DISCONTINUED | OUTPATIENT
Start: 2021-01-01 | End: 2021-01-01 | Stop reason: DRUGHIGH

## 2021-01-01 RX ORDER — FUROSEMIDE 10 MG/ML
20 INJECTION INTRAMUSCULAR; INTRAVENOUS 2 TIMES DAILY
Status: DISCONTINUED | OUTPATIENT
Start: 2021-01-01 | End: 2021-01-01

## 2021-01-01 RX ORDER — DEXAMETHASONE SODIUM PHOSPHATE 4 MG/ML
8 INJECTION, SOLUTION INTRA-ARTICULAR; INTRALESIONAL; INTRAMUSCULAR; INTRAVENOUS; SOFT TISSUE EVERY 24 HOURS
Status: CANCELLED | OUTPATIENT
Start: 2021-01-01

## 2021-01-01 RX ORDER — DIPHENHYDRAMINE HYDROCHLORIDE 50 MG/ML
25 INJECTION, SOLUTION INTRAMUSCULAR; INTRAVENOUS AS NEEDED
Status: CANCELLED
Start: 2021-01-01

## 2021-01-01 RX ORDER — POTASSIUM CHLORIDE 7.45 MG/ML
10 INJECTION INTRAVENOUS
Status: DISPENSED | OUTPATIENT
Start: 2021-01-01 | End: 2021-01-01

## 2021-01-01 RX ORDER — IBUPROFEN 200 MG
1 TABLET ORAL DAILY PRN
Status: DISCONTINUED | OUTPATIENT
Start: 2021-01-01 | End: 2021-01-01 | Stop reason: HOSPADM

## 2021-01-01 RX ORDER — POLYETHYLENE GLYCOL 3350 17 G/17G
17 POWDER, FOR SOLUTION ORAL DAILY PRN
Status: DISCONTINUED | OUTPATIENT
Start: 2021-01-01 | End: 2021-01-01 | Stop reason: HOSPADM

## 2021-01-01 RX ORDER — POTASSIUM CHLORIDE 750 MG/1
20 TABLET, FILM COATED, EXTENDED RELEASE ORAL EVERY 4 HOURS
Status: DISCONTINUED | OUTPATIENT
Start: 2021-01-01 | End: 2021-01-01

## 2021-01-01 RX ORDER — SODIUM CHLORIDE 9 MG/ML
25 INJECTION, SOLUTION INTRAVENOUS CONTINUOUS
Status: CANCELLED | OUTPATIENT
Start: 2021-01-01

## 2021-01-01 RX ORDER — SODIUM CHLORIDE 0.9 % (FLUSH) 0.9 %
5-10 SYRINGE (ML) INJECTION AS NEEDED
Status: DISCONTINUED | OUTPATIENT
Start: 2021-01-01 | End: 2021-01-01 | Stop reason: HOSPADM

## 2021-01-01 RX ORDER — AMIODARONE HYDROCHLORIDE 400 MG/1
400 TABLET ORAL DAILY
Qty: 30 TABLET | Refills: 4 | Status: SHIPPED | OUTPATIENT
Start: 2021-01-01

## 2021-01-01 RX ORDER — ONDANSETRON 4 MG/1
4 TABLET, ORALLY DISINTEGRATING ORAL
Qty: 30 TABLET | Refills: 2 | Status: SHIPPED | OUTPATIENT
Start: 2021-01-01 | End: 2022-01-01

## 2021-01-01 RX ORDER — PHENYLEPHRINE HCL IN 0.9% NACL 0.4MG/10ML
SYRINGE (ML) INTRAVENOUS AS NEEDED
Status: DISCONTINUED | OUTPATIENT
Start: 2021-01-01 | End: 2021-01-01 | Stop reason: HOSPADM

## 2021-01-01 RX ORDER — OXYCODONE HYDROCHLORIDE 5 MG/1
5 TABLET ORAL ONCE
Status: COMPLETED | OUTPATIENT
Start: 2021-01-01 | End: 2021-01-01

## 2021-01-01 RX ORDER — POTASSIUM CHLORIDE 14.9 MG/ML
10 INJECTION INTRAVENOUS
Status: DISPENSED | OUTPATIENT
Start: 2021-01-01 | End: 2021-01-01

## 2021-01-01 RX ADMIN — Medication: at 17:57

## 2021-01-01 RX ADMIN — Medication 10 ML: at 15:57

## 2021-01-01 RX ADMIN — AMLODIPINE BESYLATE 5 MG: 5 TABLET ORAL at 11:05

## 2021-01-01 RX ADMIN — FAMOTIDINE 20 MG: 10 INJECTION, SOLUTION INTRAVENOUS at 17:30

## 2021-01-01 RX ADMIN — AMIODARONE HYDROCHLORIDE 0.5 MG/MIN: 50 INJECTION, SOLUTION INTRAVENOUS at 23:16

## 2021-01-01 RX ADMIN — IPRATROPIUM BROMIDE AND ALBUTEROL SULFATE 3 ML: .5; 3 SOLUTION RESPIRATORY (INHALATION) at 23:06

## 2021-01-01 RX ADMIN — CARBOPLATIN 147 MG: 10 INJECTION, SOLUTION INTRAVENOUS at 13:50

## 2021-01-01 RX ADMIN — ETOPOSIDE 103 MG: 20 INJECTION INTRAVENOUS at 14:23

## 2021-01-01 RX ADMIN — SODIUM CHLORIDE 75 ML/HR: 9 INJECTION, SOLUTION INTRAVENOUS at 17:16

## 2021-01-01 RX ADMIN — SODIUM BICARBONATE 1 ML: 42 INJECTION, SOLUTION INTRAVENOUS at 15:11

## 2021-01-01 RX ADMIN — VANCOMYCIN HYDROCHLORIDE 1000 MG: 1 INJECTION, POWDER, LYOPHILIZED, FOR SOLUTION INTRAVENOUS at 04:43

## 2021-01-01 RX ADMIN — FAMOTIDINE 20 MG: 10 INJECTION, SOLUTION INTRAVENOUS at 09:49

## 2021-01-01 RX ADMIN — AMIODARONE HYDROCHLORIDE 1 MG/MIN: 50 INJECTION, SOLUTION INTRAVENOUS at 18:20

## 2021-01-01 RX ADMIN — Medication: at 09:00

## 2021-01-01 RX ADMIN — AMIODARONE HYDROCHLORIDE 1 MG/MIN: 50 INJECTION, SOLUTION INTRAVENOUS at 03:39

## 2021-01-01 RX ADMIN — SODIUM CHLORIDE 75 ML/HR: 9 INJECTION, SOLUTION INTRAVENOUS at 09:01

## 2021-01-01 RX ADMIN — Medication: at 09:46

## 2021-01-01 RX ADMIN — Medication: at 11:20

## 2021-01-01 RX ADMIN — Medication: at 21:00

## 2021-01-01 RX ADMIN — IPRATROPIUM BROMIDE AND ALBUTEROL SULFATE 3 ML: .5; 3 SOLUTION RESPIRATORY (INHALATION) at 14:34

## 2021-01-01 RX ADMIN — PROPOFOL 150 MG: 10 INJECTION, EMULSION INTRAVENOUS at 13:12

## 2021-01-01 RX ADMIN — DEXAMETHASONE SODIUM PHOSPHATE 12 MG: 4 INJECTION, SOLUTION INTRAMUSCULAR; INTRAVENOUS at 12:35

## 2021-01-01 RX ADMIN — IPRATROPIUM BROMIDE AND ALBUTEROL SULFATE 3 ML: .5; 3 SOLUTION RESPIRATORY (INHALATION) at 09:20

## 2021-01-01 RX ADMIN — LIDOCAINE HYDROCHLORIDE 100 MG: 20 INJECTION, SOLUTION EPIDURAL; INFILTRATION; INTRACAUDAL; PERINEURAL at 13:12

## 2021-01-01 RX ADMIN — Medication 10 ML: at 12:27

## 2021-01-01 RX ADMIN — SUCCINYLCHOLINE CHLORIDE 20 MG: 20 INJECTION, SOLUTION INTRAMUSCULAR; INTRAVENOUS at 13:33

## 2021-01-01 RX ADMIN — OXYCODONE 5 MG: 5 TABLET ORAL at 20:01

## 2021-01-01 RX ADMIN — ATORVASTATIN CALCIUM 10 MG: 10 TABLET, FILM COATED ORAL at 21:18

## 2021-01-01 RX ADMIN — Medication: at 18:06

## 2021-01-01 RX ADMIN — MICONAZOLE NITRATE 2 % TOPICAL POWDER: at 21:00

## 2021-01-01 RX ADMIN — SUCCINYLCHOLINE CHLORIDE 20 MG: 20 INJECTION, SOLUTION INTRAMUSCULAR; INTRAVENOUS at 13:34

## 2021-01-01 RX ADMIN — MICONAZOLE NITRATE 2 % TOPICAL POWDER: at 11:20

## 2021-01-01 RX ADMIN — MICONAZOLE NITRATE 2 % TOPICAL POWDER: at 17:21

## 2021-01-01 RX ADMIN — AMIODARONE HYDROCHLORIDE 400 MG: 200 TABLET ORAL at 08:45

## 2021-01-01 RX ADMIN — FOSAPREPITANT 150 MG: 150 INJECTION, POWDER, LYOPHILIZED, FOR SOLUTION INTRAVENOUS at 12:30

## 2021-01-01 RX ADMIN — PIPERACILLIN AND TAZOBACTAM 3.38 G: 3; .375 INJECTION, POWDER, LYOPHILIZED, FOR SOLUTION INTRAVENOUS at 07:05

## 2021-01-01 RX ADMIN — Medication 10 ML: at 06:47

## 2021-01-01 RX ADMIN — OXYCODONE 5 MG: 5 TABLET ORAL at 09:12

## 2021-01-01 RX ADMIN — MICONAZOLE NITRATE 2 % TOPICAL POWDER: at 09:00

## 2021-01-01 RX ADMIN — ASPIRIN 81 MG: 81 TABLET, COATED ORAL at 09:15

## 2021-01-01 RX ADMIN — ROCURONIUM BROMIDE 5 MG: 10 SOLUTION INTRAVENOUS at 13:12

## 2021-01-01 RX ADMIN — HEPARIN SODIUM 18 UNITS/KG/HR: 10000 INJECTION, SOLUTION INTRAVENOUS at 06:24

## 2021-01-01 RX ADMIN — AMIODARONE HYDROCHLORIDE 0.5 MG/MIN: 50 INJECTION, SOLUTION INTRAVENOUS at 05:44

## 2021-01-01 RX ADMIN — FUROSEMIDE 20 MG: 10 INJECTION, SOLUTION INTRAMUSCULAR; INTRAVENOUS at 09:14

## 2021-01-01 RX ADMIN — IPRATROPIUM BROMIDE AND ALBUTEROL SULFATE 3 ML: .5; 3 SOLUTION RESPIRATORY (INHALATION) at 02:53

## 2021-01-01 RX ADMIN — ONDANSETRON 4 MG: 2 INJECTION INTRAMUSCULAR; INTRAVENOUS at 08:59

## 2021-01-01 RX ADMIN — FAMOTIDINE 20 MG: 10 INJECTION, SOLUTION INTRAVENOUS at 10:51

## 2021-01-01 RX ADMIN — FUROSEMIDE 40 MG: 10 INJECTION, SOLUTION INTRAMUSCULAR; INTRAVENOUS at 23:00

## 2021-01-01 RX ADMIN — ASPIRIN 81 MG: 81 TABLET, COATED ORAL at 10:17

## 2021-01-01 RX ADMIN — Medication 10 ML: at 06:00

## 2021-01-01 RX ADMIN — HEPARIN SODIUM 4000 UNITS: 5000 INJECTION INTRAVENOUS; SUBCUTANEOUS at 18:05

## 2021-01-01 RX ADMIN — SODIUM CHLORIDE 75 ML/HR: 9 INJECTION, SOLUTION INTRAVENOUS at 08:01

## 2021-01-01 RX ADMIN — POTASSIUM CHLORIDE 10 MEQ: 14.9 INJECTION, SOLUTION INTRAVENOUS at 07:21

## 2021-01-01 RX ADMIN — OXYCODONE 5 MG: 5 TABLET ORAL at 21:24

## 2021-01-01 RX ADMIN — Medication: at 20:19

## 2021-01-01 RX ADMIN — SODIUM CHLORIDE 50 ML/HR: 4.5 INJECTION, SOLUTION INTRAVENOUS at 04:07

## 2021-01-01 RX ADMIN — PIPERACILLIN AND TAZOBACTAM 3.38 G: 3; .375 INJECTION, POWDER, LYOPHILIZED, FOR SOLUTION INTRAVENOUS at 10:02

## 2021-01-01 RX ADMIN — MICONAZOLE NITRATE 2 % TOPICAL POWDER: at 17:18

## 2021-01-01 RX ADMIN — ATORVASTATIN CALCIUM 10 MG: 10 TABLET, FILM COATED ORAL at 21:58

## 2021-01-01 RX ADMIN — MICONAZOLE NITRATE 2 % TOPICAL POWDER: at 18:00

## 2021-01-01 RX ADMIN — Medication 10 ML: at 06:28

## 2021-01-01 RX ADMIN — MICONAZOLE NITRATE 2 % TOPICAL POWDER: at 09:46

## 2021-01-01 RX ADMIN — POTASSIUM CHLORIDE 40 MEQ: 750 TABLET, FILM COATED, EXTENDED RELEASE ORAL at 10:31

## 2021-01-01 RX ADMIN — PROPOFOL 50 MG: 10 INJECTION, EMULSION INTRAVENOUS at 13:50

## 2021-01-01 RX ADMIN — SODIUM CHLORIDE 100 ML/HR: 900 INJECTION, SOLUTION INTRAVENOUS at 13:30

## 2021-01-01 RX ADMIN — ASPIRIN 81 MG: 81 TABLET, COATED ORAL at 09:14

## 2021-01-01 RX ADMIN — HEPARIN SODIUM 4000 UNITS: 5000 INJECTION INTRAVENOUS; SUBCUTANEOUS at 04:14

## 2021-01-01 RX ADMIN — ATORVASTATIN CALCIUM 10 MG: 10 TABLET, FILM COATED ORAL at 23:15

## 2021-01-01 RX ADMIN — FUROSEMIDE 20 MG: 10 INJECTION, SOLUTION INTRAMUSCULAR; INTRAVENOUS at 18:05

## 2021-01-01 RX ADMIN — Medication: at 08:45

## 2021-01-01 RX ADMIN — IPRATROPIUM BROMIDE AND ALBUTEROL SULFATE 3 ML: .5; 3 SOLUTION RESPIRATORY (INHALATION) at 07:50

## 2021-01-01 RX ADMIN — ASPIRIN 81 MG: 81 TABLET, COATED ORAL at 08:45

## 2021-01-01 RX ADMIN — Medication 10 ML: at 06:22

## 2021-01-01 RX ADMIN — Medication: at 09:16

## 2021-01-01 RX ADMIN — SUCCINYLCHOLINE CHLORIDE 40 MG: 20 INJECTION, SOLUTION INTRAMUSCULAR; INTRAVENOUS at 13:50

## 2021-01-01 RX ADMIN — VANCOMYCIN HYDROCHLORIDE 2250 MG: 10 INJECTION, POWDER, LYOPHILIZED, FOR SOLUTION INTRAVENOUS at 17:19

## 2021-01-01 RX ADMIN — AMIODARONE HYDROCHLORIDE 400 MG: 200 TABLET ORAL at 11:05

## 2021-01-01 RX ADMIN — AMLODIPINE BESYLATE 5 MG: 5 TABLET ORAL at 09:19

## 2021-01-01 RX ADMIN — Medication 10 ML: at 13:30

## 2021-01-01 RX ADMIN — ATORVASTATIN CALCIUM 10 MG: 10 TABLET, FILM COATED ORAL at 22:52

## 2021-01-01 RX ADMIN — FUROSEMIDE 20 MG: 10 INJECTION, SOLUTION INTRAMUSCULAR; INTRAVENOUS at 17:17

## 2021-01-01 RX ADMIN — PIPERACILLIN AND TAZOBACTAM 3.38 G: 3; .375 INJECTION, POWDER, LYOPHILIZED, FOR SOLUTION INTRAVENOUS at 23:30

## 2021-01-01 RX ADMIN — OXYCODONE 5 MG: 5 TABLET ORAL at 00:19

## 2021-01-01 RX ADMIN — ASPIRIN 81 MG: 81 TABLET, COATED ORAL at 10:00

## 2021-01-01 RX ADMIN — SODIUM CHLORIDE 50 ML/HR: 900 INJECTION, SOLUTION INTRAVENOUS at 21:59

## 2021-01-01 RX ADMIN — AMIODARONE HYDROCHLORIDE 0.5 MG/MIN: 50 INJECTION, SOLUTION INTRAVENOUS at 15:51

## 2021-01-01 RX ADMIN — Medication: at 18:49

## 2021-01-01 RX ADMIN — IPRATROPIUM BROMIDE AND ALBUTEROL SULFATE 3 ML: .5; 3 SOLUTION RESPIRATORY (INHALATION) at 03:31

## 2021-01-01 RX ADMIN — HEPARIN SODIUM 4000 UNITS: 5000 INJECTION INTRAVENOUS; SUBCUTANEOUS at 17:47

## 2021-01-01 RX ADMIN — Medication 10 ML: at 14:00

## 2021-01-01 RX ADMIN — Medication 10 ML: at 21:03

## 2021-01-01 RX ADMIN — ASPIRIN 81 MG: 81 TABLET, COATED ORAL at 09:45

## 2021-01-01 RX ADMIN — HYDRALAZINE HYDROCHLORIDE 10 MG: 20 INJECTION, SOLUTION INTRAMUSCULAR; INTRAVENOUS at 21:17

## 2021-01-01 RX ADMIN — Medication: at 14:24

## 2021-01-01 RX ADMIN — ONDANSETRON HYDROCHLORIDE 4 MG: 2 INJECTION, SOLUTION INTRAMUSCULAR; INTRAVENOUS at 13:17

## 2021-01-01 RX ADMIN — MICONAZOLE NITRATE 2 % TOPICAL POWDER: at 20:15

## 2021-01-01 RX ADMIN — AMIODARONE HYDROCHLORIDE 400 MG: 200 TABLET ORAL at 09:43

## 2021-01-01 RX ADMIN — Medication 10 ML: at 14:38

## 2021-01-01 RX ADMIN — AMLODIPINE BESYLATE 5 MG: 5 TABLET ORAL at 09:06

## 2021-01-01 RX ADMIN — AMLODIPINE BESYLATE 5 MG: 5 TABLET ORAL at 09:15

## 2021-01-01 RX ADMIN — HEPARIN SODIUM 19 UNITS/KG/HR: 10000 INJECTION, SOLUTION INTRAVENOUS at 18:06

## 2021-01-01 RX ADMIN — PIPERACILLIN AND TAZOBACTAM 3.38 G: 3; .375 INJECTION, POWDER, LYOPHILIZED, FOR SOLUTION INTRAVENOUS at 15:10

## 2021-01-01 RX ADMIN — AMIODARONE HYDROCHLORIDE 400 MG: 200 TABLET ORAL at 11:17

## 2021-01-01 RX ADMIN — AMIODARONE HYDROCHLORIDE 0.5 MG/MIN: 50 INJECTION, SOLUTION INTRAVENOUS at 13:36

## 2021-01-01 RX ADMIN — IPRATROPIUM BROMIDE AND ALBUTEROL SULFATE 3 ML: .5; 3 SOLUTION RESPIRATORY (INHALATION) at 21:14

## 2021-01-01 RX ADMIN — IPRATROPIUM BROMIDE AND ALBUTEROL SULFATE 3 ML: 2.5; .5 SOLUTION RESPIRATORY (INHALATION) at 10:50

## 2021-01-01 RX ADMIN — ASPIRIN 81 MG: 81 TABLET, COATED ORAL at 08:53

## 2021-01-01 RX ADMIN — HEPARIN SODIUM 21 UNITS/KG/HR: 10000 INJECTION, SOLUTION INTRAVENOUS at 12:41

## 2021-01-01 RX ADMIN — AMIODARONE HYDROCHLORIDE 400 MG: 200 TABLET ORAL at 09:00

## 2021-01-01 RX ADMIN — MICONAZOLE NITRATE 2 % TOPICAL POWDER: at 11:11

## 2021-01-01 RX ADMIN — Medication: at 09:27

## 2021-01-01 RX ADMIN — HEPARIN SODIUM 12 UNITS/KG/HR: 10000 INJECTION, SOLUTION INTRAVENOUS at 17:49

## 2021-01-01 RX ADMIN — SODIUM CHLORIDE 25 ML/HR: 9 INJECTION, SOLUTION INTRAVENOUS at 12:35

## 2021-01-01 RX ADMIN — VANCOMYCIN HYDROCHLORIDE 750 MG: 750 INJECTION, POWDER, LYOPHILIZED, FOR SOLUTION INTRAVENOUS at 18:23

## 2021-01-01 RX ADMIN — VANCOMYCIN HYDROCHLORIDE 1000 MG: 1 INJECTION, POWDER, LYOPHILIZED, FOR SOLUTION INTRAVENOUS at 17:10

## 2021-01-01 RX ADMIN — FAMOTIDINE 20 MG: 10 INJECTION, SOLUTION INTRAVENOUS at 10:30

## 2021-01-01 RX ADMIN — PIPERACILLIN AND TAZOBACTAM 3.38 G: 3; .375 INJECTION, POWDER, LYOPHILIZED, FOR SOLUTION INTRAVENOUS at 22:20

## 2021-01-01 RX ADMIN — Medication 10 ML: at 05:35

## 2021-01-01 RX ADMIN — FUROSEMIDE 20 MG: 10 INJECTION, SOLUTION INTRAMUSCULAR; INTRAVENOUS at 10:30

## 2021-01-01 RX ADMIN — Medication: at 08:20

## 2021-01-01 RX ADMIN — IPRATROPIUM BROMIDE AND ALBUTEROL SULFATE 3 ML: .5; 3 SOLUTION RESPIRATORY (INHALATION) at 09:50

## 2021-01-01 RX ADMIN — HEPARIN SODIUM 20 UNITS/KG/HR: 10000 INJECTION, SOLUTION INTRAVENOUS at 08:00

## 2021-01-01 RX ADMIN — Medication 10 ML: at 22:37

## 2021-01-01 RX ADMIN — SODIUM CHLORIDE 50 ML/HR: 900 INJECTION, SOLUTION INTRAVENOUS at 15:25

## 2021-01-01 RX ADMIN — Medication: at 09:07

## 2021-01-01 RX ADMIN — Medication 10 ML: at 22:02

## 2021-01-01 RX ADMIN — OXYCODONE 5 MG: 5 TABLET ORAL at 08:54

## 2021-01-01 RX ADMIN — MICONAZOLE NITRATE 2 % TOPICAL POWDER: at 08:46

## 2021-01-01 RX ADMIN — AMLODIPINE BESYLATE 5 MG: 5 TABLET ORAL at 09:00

## 2021-01-01 RX ADMIN — Medication: at 18:00

## 2021-01-01 RX ADMIN — AMIODARONE HYDROCHLORIDE 400 MG: 200 TABLET ORAL at 09:16

## 2021-01-01 RX ADMIN — HEPARIN SODIUM 4000 UNITS: 5000 INJECTION INTRAVENOUS; SUBCUTANEOUS at 05:45

## 2021-01-01 RX ADMIN — AMIODARONE HYDROCHLORIDE 150 MG: 50 INJECTION, SOLUTION INTRAVENOUS at 17:31

## 2021-01-01 RX ADMIN — FUROSEMIDE 20 MG: 10 INJECTION, SOLUTION INTRAMUSCULAR; INTRAVENOUS at 17:10

## 2021-01-01 RX ADMIN — PIPERACILLIN AND TAZOBACTAM 3.38 G: 3; .375 INJECTION, POWDER, LYOPHILIZED, FOR SOLUTION INTRAVENOUS at 03:40

## 2021-01-01 RX ADMIN — ASPIRIN 81 MG: 81 TABLET, COATED ORAL at 09:19

## 2021-01-01 RX ADMIN — MICONAZOLE NITRATE 2 % TOPICAL POWDER: at 20:17

## 2021-01-01 RX ADMIN — ASPIRIN 81 MG: 81 TABLET, COATED ORAL at 09:11

## 2021-01-01 RX ADMIN — Medication: at 17:19

## 2021-01-01 RX ADMIN — PIPERACILLIN AND TAZOBACTAM 3.38 G: 3; .375 INJECTION, POWDER, LYOPHILIZED, FOR SOLUTION INTRAVENOUS at 06:56

## 2021-01-01 RX ADMIN — HYDRALAZINE HYDROCHLORIDE 10 MG: 20 INJECTION, SOLUTION INTRAMUSCULAR; INTRAVENOUS at 09:55

## 2021-01-01 RX ADMIN — FUROSEMIDE 20 MG: 10 INJECTION, SOLUTION INTRAMUSCULAR; INTRAVENOUS at 17:30

## 2021-01-01 RX ADMIN — ATORVASTATIN CALCIUM 10 MG: 10 TABLET, FILM COATED ORAL at 23:30

## 2021-01-01 RX ADMIN — FUROSEMIDE 20 MG: 10 INJECTION, SOLUTION INTRAMUSCULAR; INTRAVENOUS at 09:46

## 2021-01-01 RX ADMIN — Medication 10 ML: at 21:17

## 2021-01-01 RX ADMIN — Medication: at 21:18

## 2021-01-01 RX ADMIN — FAMOTIDINE 20 MG: 10 INJECTION, SOLUTION INTRAVENOUS at 17:10

## 2021-01-01 RX ADMIN — ASPIRIN 81 MG: 81 TABLET, COATED ORAL at 11:05

## 2021-01-01 RX ADMIN — VANCOMYCIN HYDROCHLORIDE 1000 MG: 1 INJECTION, POWDER, LYOPHILIZED, FOR SOLUTION INTRAVENOUS at 05:15

## 2021-01-01 RX ADMIN — Medication: at 12:00

## 2021-01-01 RX ADMIN — AMIODARONE HYDROCHLORIDE 400 MG: 200 TABLET ORAL at 09:11

## 2021-01-01 RX ADMIN — Medication: at 11:06

## 2021-01-01 RX ADMIN — AMLODIPINE BESYLATE 5 MG: 5 TABLET ORAL at 09:16

## 2021-01-01 RX ADMIN — ATORVASTATIN CALCIUM 10 MG: 10 TABLET, FILM COATED ORAL at 21:35

## 2021-01-01 RX ADMIN — FAMOTIDINE 20 MG: 10 INJECTION, SOLUTION INTRAVENOUS at 22:59

## 2021-01-01 RX ADMIN — AMIODARONE HYDROCHLORIDE 400 MG: 200 TABLET ORAL at 09:46

## 2021-01-01 RX ADMIN — ATORVASTATIN CALCIUM 10 MG: 10 TABLET, FILM COATED ORAL at 21:26

## 2021-01-01 RX ADMIN — HEPARIN SODIUM 11 UNITS/KG/HR: 10000 INJECTION, SOLUTION INTRAVENOUS at 20:46

## 2021-01-01 RX ADMIN — DEXAMETHASONE SODIUM PHOSPHATE 8 MG: 4 INJECTION, SOLUTION INTRAMUSCULAR; INTRAVENOUS at 13:15

## 2021-01-01 RX ADMIN — PIPERACILLIN AND TAZOBACTAM 3.38 G: 3; .375 INJECTION, POWDER, LYOPHILIZED, FOR SOLUTION INTRAVENOUS at 00:30

## 2021-01-01 RX ADMIN — HEPARIN SODIUM 2000 UNITS: 1000 INJECTION INTRAVENOUS; SUBCUTANEOUS at 00:58

## 2021-01-01 RX ADMIN — Medication: at 17:56

## 2021-01-01 RX ADMIN — AMIODARONE HYDROCHLORIDE 0.5 MG/MIN: 50 INJECTION, SOLUTION INTRAVENOUS at 01:38

## 2021-01-01 RX ADMIN — Medication 10 ML: at 16:52

## 2021-01-01 RX ADMIN — Medication: at 08:46

## 2021-01-01 RX ADMIN — Medication: at 20:18

## 2021-01-01 RX ADMIN — Medication: at 17:52

## 2021-01-01 RX ADMIN — AMIODARONE HYDROCHLORIDE 400 MG: 200 TABLET ORAL at 14:17

## 2021-01-01 RX ADMIN — OXYCODONE 5 MG: 5 TABLET ORAL at 02:33

## 2021-01-01 RX ADMIN — Medication 10 ML: at 21:35

## 2021-01-01 RX ADMIN — ATORVASTATIN CALCIUM 10 MG: 10 TABLET, FILM COATED ORAL at 21:17

## 2021-01-01 RX ADMIN — ATORVASTATIN CALCIUM 10 MG: 10 TABLET, FILM COATED ORAL at 21:02

## 2021-01-01 RX ADMIN — HEPARIN SODIUM 16 UNITS/KG/HR: 10000 INJECTION, SOLUTION INTRAVENOUS at 02:31

## 2021-01-01 RX ADMIN — Medication: at 11:00

## 2021-01-01 RX ADMIN — ASPIRIN 81 MG: 81 TABLET, COATED ORAL at 11:18

## 2021-01-01 RX ADMIN — VANCOMYCIN HYDROCHLORIDE 750 MG: 750 INJECTION, POWDER, LYOPHILIZED, FOR SOLUTION INTRAVENOUS at 05:03

## 2021-01-01 RX ADMIN — DEXAMETHASONE SODIUM PHOSPHATE 4 MG: 4 INJECTION, SOLUTION INTRAMUSCULAR; INTRAVENOUS at 13:17

## 2021-01-01 RX ADMIN — FUROSEMIDE 20 MG: 10 INJECTION, SOLUTION INTRAMUSCULAR; INTRAVENOUS at 10:51

## 2021-01-01 RX ADMIN — Medication 10 ML: at 21:19

## 2021-01-01 RX ADMIN — SODIUM CHLORIDE 50 ML/HR: 900 INJECTION, SOLUTION INTRAVENOUS at 13:05

## 2021-01-01 RX ADMIN — Medication 10 ML: at 07:07

## 2021-01-01 RX ADMIN — SUCCINYLCHOLINE CHLORIDE 120 MG: 20 INJECTION, SOLUTION INTRAMUSCULAR; INTRAVENOUS at 13:12

## 2021-01-01 RX ADMIN — Medication 80 MCG: at 13:19

## 2021-01-01 RX ADMIN — Medication: at 08:50

## 2021-01-01 RX ADMIN — MICONAZOLE NITRATE 2 % TOPICAL POWDER: at 10:42

## 2021-01-01 RX ADMIN — AMIODARONE HYDROCHLORIDE 400 MG: 200 TABLET ORAL at 09:06

## 2021-01-01 RX ADMIN — ATORVASTATIN CALCIUM 10 MG: 10 TABLET, FILM COATED ORAL at 21:03

## 2021-01-01 RX ADMIN — AMIODARONE HYDROCHLORIDE 0.5 MG/MIN: 50 INJECTION, SOLUTION INTRAVENOUS at 18:47

## 2021-01-01 RX ADMIN — ATORVASTATIN CALCIUM 10 MG: 10 TABLET, FILM COATED ORAL at 21:37

## 2021-01-01 RX ADMIN — Medication 10 ML: at 06:26

## 2021-01-01 RX ADMIN — AMLODIPINE BESYLATE 5 MG: 5 TABLET ORAL at 09:11

## 2021-01-01 RX ADMIN — OXYCODONE 5 MG: 5 TABLET ORAL at 03:43

## 2021-01-01 RX ADMIN — OXYCODONE 5 MG: 5 TABLET ORAL at 21:26

## 2021-01-01 RX ADMIN — PIPERACILLIN AND TAZOBACTAM 3.38 G: 3; .375 INJECTION, POWDER, LYOPHILIZED, FOR SOLUTION INTRAVENOUS at 15:00

## 2021-01-01 RX ADMIN — OXYCODONE HYDROCHLORIDE 5 MG: 5 TABLET ORAL at 22:02

## 2021-01-01 RX ADMIN — Medication 10 ML: at 17:22

## 2021-01-01 RX ADMIN — MICONAZOLE NITRATE 2 % TOPICAL POWDER: at 18:28

## 2021-01-01 RX ADMIN — POTASSIUM CHLORIDE 10 MEQ: 14.9 INJECTION, SOLUTION INTRAVENOUS at 09:57

## 2021-01-01 RX ADMIN — AMLODIPINE BESYLATE 5 MG: 5 TABLET ORAL at 08:45

## 2021-01-01 RX ADMIN — MICONAZOLE NITRATE 2 % TOPICAL POWDER: at 08:20

## 2021-01-01 RX ADMIN — HEPARIN SODIUM 12 UNITS/KG/HR: 10000 INJECTION, SOLUTION INTRAVENOUS at 18:04

## 2021-01-01 RX ADMIN — POTASSIUM BICARBONATE 40 MEQ: 782 TABLET, EFFERVESCENT ORAL at 07:21

## 2021-01-01 RX ADMIN — MICONAZOLE NITRATE 2 % TOPICAL POWDER: at 17:52

## 2021-01-01 RX ADMIN — PIPERACILLIN AND TAZOBACTAM 3.38 G: 3; .375 INJECTION, POWDER, LYOPHILIZED, FOR SOLUTION INTRAVENOUS at 06:25

## 2021-01-01 RX ADMIN — ATORVASTATIN CALCIUM 10 MG: 10 TABLET, FILM COATED ORAL at 22:01

## 2021-01-01 RX ADMIN — AMLODIPINE BESYLATE 5 MG: 5 TABLET ORAL at 09:14

## 2021-01-01 RX ADMIN — POTASSIUM CHLORIDE 10 MEQ: 7.46 INJECTION, SOLUTION INTRAVENOUS at 17:19

## 2021-01-01 RX ADMIN — AMIODARONE HYDROCHLORIDE 400 MG: 200 TABLET ORAL at 08:53

## 2021-01-01 RX ADMIN — Medication: at 18:07

## 2021-01-01 RX ADMIN — SODIUM CHLORIDE 75 ML/HR: 4.5 INJECTION, SOLUTION INTRAVENOUS at 16:43

## 2021-01-01 RX ADMIN — AMIODARONE HYDROCHLORIDE 400 MG: 200 TABLET ORAL at 09:19

## 2021-01-01 RX ADMIN — IPRATROPIUM BROMIDE AND ALBUTEROL SULFATE 3 ML: .5; 3 SOLUTION RESPIRATORY (INHALATION) at 02:46

## 2021-01-01 RX ADMIN — ASPIRIN 81 MG: 81 TABLET, COATED ORAL at 09:00

## 2021-01-01 RX ADMIN — ATORVASTATIN CALCIUM 10 MG: 10 TABLET, FILM COATED ORAL at 21:15

## 2021-01-01 RX ADMIN — PIPERACILLIN AND TAZOBACTAM 3.38 G: 3; .375 INJECTION, POWDER, LYOPHILIZED, FOR SOLUTION INTRAVENOUS at 14:21

## 2021-01-01 RX ADMIN — AMLODIPINE BESYLATE 5 MG: 5 TABLET ORAL at 11:19

## 2021-01-01 RX ADMIN — ATORVASTATIN CALCIUM 10 MG: 10 TABLET, FILM COATED ORAL at 22:26

## 2021-01-01 RX ADMIN — MICONAZOLE NITRATE 2 % TOPICAL POWDER: at 14:23

## 2021-01-01 RX ADMIN — MAGNESIUM SULFATE HEPTAHYDRATE 2 G: 40 INJECTION, SOLUTION INTRAVENOUS at 07:21

## 2021-01-01 RX ADMIN — Medication: at 09:12

## 2021-01-01 RX ADMIN — Medication 10 ML: at 21:16

## 2021-01-01 RX ADMIN — AMLODIPINE BESYLATE 5 MG: 5 TABLET ORAL at 10:05

## 2021-01-01 RX ADMIN — LIDOCAINE HYDROCHLORIDE 5 ML: 10 INJECTION, SOLUTION EPIDURAL; INFILTRATION; INTRACAUDAL; PERINEURAL at 15:22

## 2021-01-01 RX ADMIN — FAMOTIDINE 20 MG: 10 INJECTION, SOLUTION INTRAVENOUS at 17:17

## 2021-01-01 RX ADMIN — ASPIRIN 81 MG: 81 TABLET, COATED ORAL at 09:43

## 2021-01-01 RX ADMIN — FAMOTIDINE 20 MG: 10 INJECTION, SOLUTION INTRAVENOUS at 09:14

## 2021-01-01 RX ADMIN — AMIODARONE HYDROCHLORIDE 0.5 MG/MIN: 50 INJECTION, SOLUTION INTRAVENOUS at 09:55

## 2021-01-01 RX ADMIN — ASPIRIN 81 MG: 81 TABLET, COATED ORAL at 10:52

## 2021-01-01 RX ADMIN — AMLODIPINE BESYLATE 5 MG: 5 TABLET ORAL at 09:46

## 2021-01-01 RX ADMIN — OXYCODONE 5 MG: 5 TABLET ORAL at 17:10

## 2021-01-01 RX ADMIN — DEXAMETHASONE SODIUM PHOSPHATE 8 MG: 4 INJECTION, SOLUTION INTRAMUSCULAR; INTRAVENOUS at 12:00

## 2021-01-01 RX ADMIN — MICONAZOLE NITRATE 2 % TOPICAL POWDER: at 09:11

## 2021-01-01 RX ADMIN — FAMOTIDINE 20 MG: 10 INJECTION, SOLUTION INTRAVENOUS at 10:15

## 2021-01-01 RX ADMIN — MICONAZOLE NITRATE 2 % TOPICAL POWDER: at 08:30

## 2021-01-01 RX ADMIN — SODIUM CHLORIDE 100 ML/HR: 900 INJECTION, SOLUTION INTRAVENOUS at 21:17

## 2021-01-01 RX ADMIN — Medication 10 ML: at 06:56

## 2021-01-01 RX ADMIN — IPRATROPIUM BROMIDE AND ALBUTEROL SULFATE 3 ML: .5; 3 SOLUTION RESPIRATORY (INHALATION) at 21:48

## 2021-01-01 RX ADMIN — ASPIRIN 81 MG: 81 TABLET, COATED ORAL at 09:17

## 2021-01-01 RX ADMIN — AMLODIPINE BESYLATE 5 MG: 5 TABLET ORAL at 10:52

## 2021-01-01 RX ADMIN — Medication: at 11:12

## 2021-01-01 RX ADMIN — HEPARIN SODIUM 4000 UNITS: 5000 INJECTION INTRAVENOUS; SUBCUTANEOUS at 03:40

## 2021-01-01 RX ADMIN — AMIODARONE HYDROCHLORIDE 400 MG: 200 TABLET ORAL at 09:15

## 2021-01-01 RX ADMIN — Medication 10 ML: at 07:20

## 2021-01-01 RX ADMIN — MICONAZOLE NITRATE 2 % TOPICAL POWDER: at 09:16

## 2021-01-01 RX ADMIN — Medication 10 ML: at 13:19

## 2021-01-01 RX ADMIN — Medication 10 ML: at 23:58

## 2021-01-01 RX ADMIN — VANCOMYCIN HYDROCHLORIDE 1000 MG: 1 INJECTION, POWDER, LYOPHILIZED, FOR SOLUTION INTRAVENOUS at 17:17

## 2021-01-01 RX ADMIN — MICONAZOLE NITRATE 2 % TOPICAL POWDER: at 18:49

## 2021-01-01 RX ADMIN — MICONAZOLE NITRATE 2 % TOPICAL POWDER: at 18:06

## 2021-01-01 RX ADMIN — Medication: at 17:51

## 2021-01-01 RX ADMIN — ETOPOSIDE 103 MG: 20 INJECTION INTRAVENOUS at 13:12

## 2021-01-01 RX ADMIN — ONDANSETRON 4 MG: 2 INJECTION INTRAMUSCULAR; INTRAVENOUS at 06:47

## 2021-01-01 RX ADMIN — Medication 10 ML: at 06:01

## 2021-01-01 RX ADMIN — ATORVASTATIN CALCIUM 10 MG: 10 TABLET, FILM COATED ORAL at 22:00

## 2021-01-01 RX ADMIN — MICONAZOLE NITRATE 2 % TOPICAL POWDER: at 11:06

## 2021-01-01 RX ADMIN — PIPERACILLIN AND TAZOBACTAM 3.38 G: 3; .375 INJECTION, POWDER, LYOPHILIZED, FOR SOLUTION INTRAVENOUS at 00:00

## 2021-01-01 RX ADMIN — PIPERACILLIN AND TAZOBACTAM 3.38 G: 3; .375 INJECTION, POWDER, LYOPHILIZED, FOR SOLUTION INTRAVENOUS at 23:02

## 2021-01-01 RX ADMIN — Medication 10 ML: at 21:01

## 2021-01-01 RX ADMIN — ASPIRIN 81 MG: 81 TABLET, COATED ORAL at 09:08

## 2021-01-01 RX ADMIN — ATORVASTATIN CALCIUM 10 MG: 10 TABLET, FILM COATED ORAL at 21:59

## 2021-01-01 RX ADMIN — PIPERACILLIN AND TAZOBACTAM 3.38 G: 3; .375 INJECTION, POWDER, LYOPHILIZED, FOR SOLUTION INTRAVENOUS at 06:01

## 2021-01-01 RX ADMIN — MAGNESIUM SULFATE HEPTAHYDRATE 2 G: 40 INJECTION, SOLUTION INTRAVENOUS at 10:30

## 2021-01-01 RX ADMIN — Medication 10 ML: at 22:20

## 2021-01-01 RX ADMIN — OXYCODONE 5 MG: 5 TABLET ORAL at 09:23

## 2021-01-01 RX ADMIN — FUROSEMIDE 20 MG: 10 INJECTION, SOLUTION INTRAMUSCULAR; INTRAVENOUS at 10:13

## 2021-01-01 RX ADMIN — POTASSIUM CHLORIDE 40 MEQ: 750 TABLET, FILM COATED, EXTENDED RELEASE ORAL at 13:18

## 2021-01-01 RX ADMIN — Medication 10 ML: at 03:41

## 2021-01-01 RX ADMIN — Medication 10 ML: at 15:26

## 2021-01-01 RX ADMIN — PIPERACILLIN AND TAZOBACTAM 3.38 G: 3; .375 INJECTION, POWDER, LYOPHILIZED, FOR SOLUTION INTRAVENOUS at 17:17

## 2021-01-01 RX ADMIN — ASPIRIN 81 MG: 81 TABLET, COATED ORAL at 09:54

## 2021-01-01 RX ADMIN — IPRATROPIUM BROMIDE AND ALBUTEROL SULFATE 3 ML: .5; 3 SOLUTION RESPIRATORY (INHALATION) at 13:18

## 2021-01-01 RX ADMIN — Medication 10 ML: at 22:00

## 2021-01-01 RX ADMIN — MICONAZOLE NITRATE 2 % TOPICAL POWDER: at 17:57

## 2021-01-01 RX ADMIN — AMIODARONE HYDROCHLORIDE 1 MG/MIN: 50 INJECTION, SOLUTION INTRAVENOUS at 20:02

## 2021-01-01 RX ADMIN — PHENYLEPHRINE HYDROCHLORIDE 40 MCG/MIN: 10 INJECTION INTRAVENOUS at 13:16

## 2021-01-01 RX ADMIN — FAMOTIDINE 20 MG: 10 INJECTION, SOLUTION INTRAVENOUS at 18:05

## 2021-01-01 RX ADMIN — SODIUM CHLORIDE 75 ML/HR: 4.5 INJECTION, SOLUTION INTRAVENOUS at 04:15

## 2021-01-01 RX ADMIN — IPRATROPIUM BROMIDE AND ALBUTEROL SULFATE 3 ML: .5; 3 SOLUTION RESPIRATORY (INHALATION) at 07:52

## 2021-01-01 RX ADMIN — Medication 10 ML: at 22:26

## 2021-01-01 RX ADMIN — ASPIRIN 81 MG: 81 TABLET, COATED ORAL at 08:44

## 2021-01-01 RX ADMIN — Medication 10 ML: at 05:45

## 2021-01-01 RX ADMIN — IPRATROPIUM BROMIDE AND ALBUTEROL SULFATE 3 ML: .5; 3 SOLUTION RESPIRATORY (INHALATION) at 07:44

## 2021-01-01 RX ADMIN — Medication 10 ML: at 09:15

## 2021-01-01 RX ADMIN — POTASSIUM CHLORIDE 40 MEQ: 750 TABLET, FILM COATED, EXTENDED RELEASE ORAL at 10:52

## 2021-01-01 RX ADMIN — MICONAZOLE NITRATE 2 % TOPICAL POWDER: at 09:06

## 2021-01-01 RX ADMIN — VANCOMYCIN HYDROCHLORIDE 750 MG: 750 INJECTION, POWDER, LYOPHILIZED, FOR SOLUTION INTRAVENOUS at 04:13

## 2021-01-01 RX ADMIN — ETOPOSIDE 103 MG: 20 INJECTION INTRAVENOUS at 13:59

## 2021-01-01 RX ADMIN — ATORVASTATIN CALCIUM 10 MG: 10 TABLET, FILM COATED ORAL at 22:20

## 2021-01-01 RX ADMIN — AMLODIPINE BESYLATE 5 MG: 5 TABLET ORAL at 09:43

## 2021-01-01 RX ADMIN — AMLODIPINE BESYLATE 5 MG: 5 TABLET ORAL at 10:31

## 2021-01-02 PROCEDURE — 3331090002 HH PPS REVENUE DEBIT

## 2021-01-02 PROCEDURE — 3331090001 HH PPS REVENUE CREDIT

## 2021-01-03 PROCEDURE — 3331090001 HH PPS REVENUE CREDIT

## 2021-01-03 PROCEDURE — 3331090002 HH PPS REVENUE DEBIT

## 2021-01-04 PROCEDURE — 3331090002 HH PPS REVENUE DEBIT

## 2021-01-04 PROCEDURE — 3331090001 HH PPS REVENUE CREDIT

## 2021-01-05 PROCEDURE — 3331090002 HH PPS REVENUE DEBIT

## 2021-01-05 PROCEDURE — 3331090001 HH PPS REVENUE CREDIT

## 2021-01-06 ENCOUNTER — HOME CARE VISIT (OUTPATIENT)
Dept: SCHEDULING | Facility: HOME HEALTH | Age: 81
End: 2021-01-06
Payer: MEDICARE

## 2021-01-06 PROCEDURE — 3331090001 HH PPS REVENUE CREDIT

## 2021-01-06 PROCEDURE — 3331090002 HH PPS REVENUE DEBIT

## 2021-01-06 PROCEDURE — G0300 HHS/HOSPICE OF LPN EA 15 MIN: HCPCS

## 2021-01-07 ENCOUNTER — HOSPITAL ENCOUNTER (OUTPATIENT)
Dept: PREADMISSION TESTING | Age: 81
Discharge: HOME | End: 2021-01-07
Payer: MEDICARE

## 2021-01-07 DIAGNOSIS — Z01.812 PRE-PROCEDURE LAB EXAM: ICD-10-CM

## 2021-01-07 PROCEDURE — 87635 SARS-COV-2 COVID-19 AMP PRB: CPT

## 2021-01-07 PROCEDURE — 3331090002 HH PPS REVENUE DEBIT

## 2021-01-07 PROCEDURE — 3331090001 HH PPS REVENUE CREDIT

## 2021-01-08 ENCOUNTER — ANESTHESIA EVENT (OUTPATIENT)
Dept: SURGERY | Age: 81
DRG: 742 | End: 2021-01-08
Payer: MEDICARE

## 2021-01-08 LAB — SARS-COV-2, COV2NT: NOT DETECTED

## 2021-01-08 PROCEDURE — 3331090002 HH PPS REVENUE DEBIT

## 2021-01-08 PROCEDURE — 3331090001 HH PPS REVENUE CREDIT

## 2021-01-09 PROCEDURE — 3331090002 HH PPS REVENUE DEBIT

## 2021-01-09 PROCEDURE — 3331090001 HH PPS REVENUE CREDIT

## 2021-01-10 VITALS
DIASTOLIC BLOOD PRESSURE: 89 MMHG | SYSTOLIC BLOOD PRESSURE: 150 MMHG | TEMPERATURE: 97.1 F | OXYGEN SATURATION: 97 % | HEART RATE: 104 BPM | RESPIRATION RATE: 18 BRPM

## 2021-01-10 PROCEDURE — 3331090002 HH PPS REVENUE DEBIT

## 2021-01-10 PROCEDURE — 3331090001 HH PPS REVENUE CREDIT

## 2021-01-11 ENCOUNTER — ANESTHESIA (OUTPATIENT)
Dept: SURGERY | Age: 81
DRG: 742 | End: 2021-01-11
Payer: MEDICARE

## 2021-01-11 ENCOUNTER — HOSPITAL ENCOUNTER (INPATIENT)
Age: 81
LOS: 4 days | Discharge: HOME | DRG: 742 | End: 2021-01-15
Attending: OBSTETRICS & GYNECOLOGY | Admitting: OBSTETRICS & GYNECOLOGY
Payer: MEDICARE

## 2021-01-11 ENCOUNTER — HOME CARE VISIT (OUTPATIENT)
Dept: HOME HEALTH SERVICES | Facility: HOME HEALTH | Age: 81
End: 2021-01-11
Payer: MEDICARE

## 2021-01-11 DIAGNOSIS — R19.00 PELVIC MASS: Primary | ICD-10-CM

## 2021-01-11 PROCEDURE — 65410000002 HC RM PRIVATE OB

## 2021-01-11 PROCEDURE — 0UT20ZZ RESECTION OF BILATERAL OVARIES, OPEN APPROACH: ICD-10-PCS | Performed by: OBSTETRICS & GYNECOLOGY

## 2021-01-11 PROCEDURE — 77030008462 HC STPLR SKN PROX J&J -A: Performed by: OBSTETRICS & GYNECOLOGY

## 2021-01-11 PROCEDURE — 74011000250 HC RX REV CODE- 250: Performed by: NURSE ANESTHETIST, CERTIFIED REGISTERED

## 2021-01-11 PROCEDURE — 2709999900 HC NON-CHARGEABLE SUPPLY: Performed by: OBSTETRICS & GYNECOLOGY

## 2021-01-11 PROCEDURE — 74011250637 HC RX REV CODE- 250/637: Performed by: ANESTHESIOLOGY

## 2021-01-11 PROCEDURE — 0DBU0ZZ EXCISION OF OMENTUM, OPEN APPROACH: ICD-10-PCS | Performed by: OBSTETRICS & GYNECOLOGY

## 2021-01-11 PROCEDURE — 77030040361 HC SLV COMPR DVT MDII -B: Performed by: OBSTETRICS & GYNECOLOGY

## 2021-01-11 PROCEDURE — 0UT70ZZ RESECTION OF BILATERAL FALLOPIAN TUBES, OPEN APPROACH: ICD-10-PCS | Performed by: OBSTETRICS & GYNECOLOGY

## 2021-01-11 PROCEDURE — 2709999900 HC NON-CHARGEABLE SUPPLY

## 2021-01-11 PROCEDURE — 76210000000 HC OR PH I REC 2 TO 2.5 HR: Performed by: OBSTETRICS & GYNECOLOGY

## 2021-01-11 PROCEDURE — 76010000153 HC OR TIME 1.5 TO 2 HR: Performed by: OBSTETRICS & GYNECOLOGY

## 2021-01-11 PROCEDURE — 88342 IMHCHEM/IMCYTCHM 1ST ANTB: CPT

## 2021-01-11 PROCEDURE — 74011250636 HC RX REV CODE- 250/636: Performed by: ANESTHESIOLOGY

## 2021-01-11 PROCEDURE — 74011000258 HC RX REV CODE- 258: Performed by: OBSTETRICS & GYNECOLOGY

## 2021-01-11 PROCEDURE — 77030002966 HC SUT PDS J&J -A: Performed by: OBSTETRICS & GYNECOLOGY

## 2021-01-11 PROCEDURE — 74011250636 HC RX REV CODE- 250/636: Performed by: NURSE ANESTHETIST, CERTIFIED REGISTERED

## 2021-01-11 PROCEDURE — 77030031139 HC SUT VCRL2 J&J -A: Performed by: OBSTETRICS & GYNECOLOGY

## 2021-01-11 PROCEDURE — 77030011278 HC ELECTRD LIG IMPT COVD -F: Performed by: OBSTETRICS & GYNECOLOGY

## 2021-01-11 PROCEDURE — 88307 TISSUE EXAM BY PATHOLOGIST: CPT

## 2021-01-11 PROCEDURE — 88341 IMHCHEM/IMCYTCHM EA ADD ANTB: CPT

## 2021-01-11 PROCEDURE — 74011000250 HC RX REV CODE- 250: Performed by: OBSTETRICS & GYNECOLOGY

## 2021-01-11 PROCEDURE — 88305 TISSUE EXAM BY PATHOLOGIST: CPT

## 2021-01-11 PROCEDURE — 3331090003 HH PPS REVENUE ADJ

## 2021-01-11 PROCEDURE — 74011000250 HC RX REV CODE- 250: Performed by: ANESTHESIOLOGY

## 2021-01-11 PROCEDURE — 77030011264 HC ELECTRD BLD EXT COVD -A: Performed by: OBSTETRICS & GYNECOLOGY

## 2021-01-11 PROCEDURE — 77030012935 HC DRSG AQUACEL BMS -B: Performed by: OBSTETRICS & GYNECOLOGY

## 2021-01-11 PROCEDURE — 77030040922 HC BLNKT HYPOTHRM STRY -A

## 2021-01-11 PROCEDURE — 77030028271 HC SRGFL HEMSTAT MTRX KT J&J -C: Performed by: OBSTETRICS & GYNECOLOGY

## 2021-01-11 PROCEDURE — 74011250637 HC RX REV CODE- 250/637: Performed by: OBSTETRICS & GYNECOLOGY

## 2021-01-11 PROCEDURE — 3331090002 HH PPS REVENUE DEBIT

## 2021-01-11 PROCEDURE — 88331 PATH CONSLTJ SURG 1 BLK 1SPC: CPT

## 2021-01-11 PROCEDURE — 76060000035 HC ANESTHESIA 2 TO 2.5 HR: Performed by: OBSTETRICS & GYNECOLOGY

## 2021-01-11 PROCEDURE — 3331090001 HH PPS REVENUE CREDIT

## 2021-01-11 PROCEDURE — 88112 CYTOPATH CELL ENHANCE TECH: CPT

## 2021-01-11 PROCEDURE — 77030040830 HC CATH URETH FOL MDII -A: Performed by: OBSTETRICS & GYNECOLOGY

## 2021-01-11 PROCEDURE — 74011250636 HC RX REV CODE- 250/636: Performed by: OBSTETRICS & GYNECOLOGY

## 2021-01-11 RX ORDER — LIDOCAINE HYDROCHLORIDE 10 MG/ML
0.1 INJECTION, SOLUTION EPIDURAL; INFILTRATION; INTRACAUDAL; PERINEURAL AS NEEDED
Status: DISCONTINUED | OUTPATIENT
Start: 2021-01-11 | End: 2021-01-11 | Stop reason: HOSPADM

## 2021-01-11 RX ORDER — ACETAMINOPHEN 325 MG/1
650 TABLET ORAL ONCE
Status: COMPLETED | OUTPATIENT
Start: 2021-01-11 | End: 2021-01-11

## 2021-01-11 RX ORDER — NALOXONE HYDROCHLORIDE 0.4 MG/ML
INJECTION, SOLUTION INTRAMUSCULAR; INTRAVENOUS; SUBCUTANEOUS AS NEEDED
Status: DISCONTINUED | OUTPATIENT
Start: 2021-01-11 | End: 2021-01-11 | Stop reason: HOSPADM

## 2021-01-11 RX ORDER — SODIUM CHLORIDE 0.9 % (FLUSH) 0.9 %
5-40 SYRINGE (ML) INJECTION AS NEEDED
Status: DISCONTINUED | OUTPATIENT
Start: 2021-01-11 | End: 2021-01-15 | Stop reason: HOSPADM

## 2021-01-11 RX ORDER — ONDANSETRON 2 MG/ML
INJECTION INTRAMUSCULAR; INTRAVENOUS AS NEEDED
Status: DISCONTINUED | OUTPATIENT
Start: 2021-01-11 | End: 2021-01-11 | Stop reason: HOSPADM

## 2021-01-11 RX ORDER — LORAZEPAM 2 MG/ML
1 INJECTION INTRAMUSCULAR
Status: DISCONTINUED | OUTPATIENT
Start: 2021-01-11 | End: 2021-01-15 | Stop reason: HOSPADM

## 2021-01-11 RX ORDER — PHENYLEPHRINE HCL IN 0.9% NACL 0.4MG/10ML
SYRINGE (ML) INTRAVENOUS AS NEEDED
Status: DISCONTINUED | OUTPATIENT
Start: 2021-01-11 | End: 2021-01-11 | Stop reason: HOSPADM

## 2021-01-11 RX ORDER — SUCCINYLCHOLINE CHLORIDE 20 MG/ML
INJECTION INTRAMUSCULAR; INTRAVENOUS AS NEEDED
Status: DISCONTINUED | OUTPATIENT
Start: 2021-01-11 | End: 2021-01-11 | Stop reason: HOSPADM

## 2021-01-11 RX ORDER — ROCURONIUM BROMIDE 10 MG/ML
INJECTION, SOLUTION INTRAVENOUS AS NEEDED
Status: DISCONTINUED | OUTPATIENT
Start: 2021-01-11 | End: 2021-01-11 | Stop reason: HOSPADM

## 2021-01-11 RX ORDER — FENTANYL CITRATE 50 UG/ML
25 INJECTION, SOLUTION INTRAMUSCULAR; INTRAVENOUS
Status: COMPLETED | OUTPATIENT
Start: 2021-01-11 | End: 2021-01-11

## 2021-01-11 RX ORDER — DONEPEZIL HYDROCHLORIDE 5 MG/1
5 TABLET, FILM COATED ORAL DAILY
Status: DISCONTINUED | OUTPATIENT
Start: 2021-01-12 | End: 2021-01-15 | Stop reason: HOSPADM

## 2021-01-11 RX ORDER — FENTANYL CITRATE 50 UG/ML
50 INJECTION, SOLUTION INTRAMUSCULAR; INTRAVENOUS AS NEEDED
Status: DISCONTINUED | OUTPATIENT
Start: 2021-01-11 | End: 2021-01-11 | Stop reason: HOSPADM

## 2021-01-11 RX ORDER — HYDROCODONE BITARTRATE AND ACETAMINOPHEN 5; 325 MG/1; MG/1
1 TABLET ORAL AS NEEDED
Status: DISCONTINUED | OUTPATIENT
Start: 2021-01-11 | End: 2021-01-11 | Stop reason: HOSPADM

## 2021-01-11 RX ORDER — SODIUM CHLORIDE, SODIUM LACTATE, POTASSIUM CHLORIDE, CALCIUM CHLORIDE 600; 310; 30; 20 MG/100ML; MG/100ML; MG/100ML; MG/100ML
1000 INJECTION, SOLUTION INTRAVENOUS CONTINUOUS
Status: DISCONTINUED | OUTPATIENT
Start: 2021-01-11 | End: 2021-01-11 | Stop reason: HOSPADM

## 2021-01-11 RX ORDER — NEOSTIGMINE METHYLSULFATE 1 MG/ML
INJECTION INTRAVENOUS AS NEEDED
Status: DISCONTINUED | OUTPATIENT
Start: 2021-01-11 | End: 2021-01-11 | Stop reason: HOSPADM

## 2021-01-11 RX ORDER — AMLODIPINE BESYLATE 5 MG/1
5 TABLET ORAL DAILY
Status: DISCONTINUED | OUTPATIENT
Start: 2021-01-12 | End: 2021-01-15 | Stop reason: HOSPADM

## 2021-01-11 RX ORDER — SODIUM CHLORIDE, SODIUM LACTATE, POTASSIUM CHLORIDE, CALCIUM CHLORIDE 600; 310; 30; 20 MG/100ML; MG/100ML; MG/100ML; MG/100ML
INJECTION, SOLUTION INTRAVENOUS
Status: DISCONTINUED | OUTPATIENT
Start: 2021-01-11 | End: 2021-01-11 | Stop reason: HOSPADM

## 2021-01-11 RX ORDER — EPHEDRINE SULFATE/0.9% NACL/PF 50 MG/5 ML
5 SYRINGE (ML) INTRAVENOUS ONCE
Status: COMPLETED | OUTPATIENT
Start: 2021-01-11 | End: 2021-01-11

## 2021-01-11 RX ORDER — ACETAMINOPHEN 325 MG/1
650 TABLET ORAL EVERY 6 HOURS
Status: DISCONTINUED | OUTPATIENT
Start: 2021-01-11 | End: 2021-01-15 | Stop reason: HOSPADM

## 2021-01-11 RX ORDER — SODIUM CHLORIDE 9 MG/ML
125 INJECTION, SOLUTION INTRAVENOUS CONTINUOUS
Status: DISCONTINUED | OUTPATIENT
Start: 2021-01-11 | End: 2021-01-12

## 2021-01-11 RX ORDER — PROCHLORPERAZINE EDISYLATE 5 MG/ML
10 INJECTION INTRAMUSCULAR; INTRAVENOUS
Status: DISCONTINUED | OUTPATIENT
Start: 2021-01-11 | End: 2021-01-11 | Stop reason: SDUPTHER

## 2021-01-11 RX ORDER — LABETALOL HYDROCHLORIDE 5 MG/ML
INJECTION, SOLUTION INTRAVENOUS AS NEEDED
Status: DISCONTINUED | OUTPATIENT
Start: 2021-01-11 | End: 2021-01-11 | Stop reason: HOSPADM

## 2021-01-11 RX ORDER — HYDROMORPHONE HCL/0.9% NACL/PF 0.5 MG/ML
PLASTIC BAG, INJECTION (ML) INTRAVENOUS CONTINUOUS
Status: DISCONTINUED | OUTPATIENT
Start: 2021-01-11 | End: 2021-01-12

## 2021-01-11 RX ORDER — HYDROMORPHONE HYDROCHLORIDE 2 MG/ML
INJECTION, SOLUTION INTRAMUSCULAR; INTRAVENOUS; SUBCUTANEOUS AS NEEDED
Status: DISCONTINUED | OUTPATIENT
Start: 2021-01-11 | End: 2021-01-11 | Stop reason: HOSPADM

## 2021-01-11 RX ORDER — ENOXAPARIN SODIUM 100 MG/ML
40 INJECTION SUBCUTANEOUS EVERY 24 HOURS
Status: DISCONTINUED | OUTPATIENT
Start: 2021-01-11 | End: 2021-01-15 | Stop reason: HOSPADM

## 2021-01-11 RX ORDER — MIDAZOLAM HYDROCHLORIDE 1 MG/ML
1 INJECTION, SOLUTION INTRAMUSCULAR; INTRAVENOUS AS NEEDED
Status: DISCONTINUED | OUTPATIENT
Start: 2021-01-11 | End: 2021-01-11 | Stop reason: HOSPADM

## 2021-01-11 RX ORDER — DIPHENHYDRAMINE HYDROCHLORIDE 50 MG/ML
12.5 INJECTION, SOLUTION INTRAMUSCULAR; INTRAVENOUS
Status: DISCONTINUED | OUTPATIENT
Start: 2021-01-11 | End: 2021-01-15 | Stop reason: HOSPADM

## 2021-01-11 RX ORDER — KETOROLAC TROMETHAMINE 30 MG/ML
15 INJECTION, SOLUTION INTRAMUSCULAR; INTRAVENOUS EVERY 6 HOURS
Status: COMPLETED | OUTPATIENT
Start: 2021-01-11 | End: 2021-01-12

## 2021-01-11 RX ORDER — EPHEDRINE SULFATE/0.9% NACL/PF 50 MG/5 ML
SYRINGE (ML) INTRAVENOUS
Status: DISPENSED
Start: 2021-01-11 | End: 2021-01-11

## 2021-01-11 RX ORDER — MIDAZOLAM HYDROCHLORIDE 1 MG/ML
0.5 INJECTION, SOLUTION INTRAMUSCULAR; INTRAVENOUS
Status: DISCONTINUED | OUTPATIENT
Start: 2021-01-11 | End: 2021-01-11 | Stop reason: HOSPADM

## 2021-01-11 RX ORDER — GLYCOPYRROLATE 0.2 MG/ML
0.2 INJECTION INTRAMUSCULAR; INTRAVENOUS
Status: DISCONTINUED | OUTPATIENT
Start: 2021-01-11 | End: 2021-01-11 | Stop reason: HOSPADM

## 2021-01-11 RX ORDER — SODIUM CHLORIDE 9 MG/ML
25 INJECTION, SOLUTION INTRAVENOUS CONTINUOUS
Status: DISCONTINUED | OUTPATIENT
Start: 2021-01-11 | End: 2021-01-11 | Stop reason: HOSPADM

## 2021-01-11 RX ORDER — ALBUTEROL SULFATE 0.83 MG/ML
2.5 SOLUTION RESPIRATORY (INHALATION) AS NEEDED
Status: DISCONTINUED | OUTPATIENT
Start: 2021-01-11 | End: 2021-01-11 | Stop reason: HOSPADM

## 2021-01-11 RX ORDER — LIDOCAINE HYDROCHLORIDE 20 MG/ML
INJECTION, SOLUTION EPIDURAL; INFILTRATION; INTRACAUDAL; PERINEURAL AS NEEDED
Status: DISCONTINUED | OUTPATIENT
Start: 2021-01-11 | End: 2021-01-11 | Stop reason: HOSPADM

## 2021-01-11 RX ORDER — MORPHINE SULFATE 10 MG/ML
2 INJECTION, SOLUTION INTRAMUSCULAR; INTRAVENOUS
Status: DISCONTINUED | OUTPATIENT
Start: 2021-01-11 | End: 2021-01-11 | Stop reason: HOSPADM

## 2021-01-11 RX ORDER — ONDANSETRON 2 MG/ML
4 INJECTION INTRAMUSCULAR; INTRAVENOUS AS NEEDED
Status: DISCONTINUED | OUTPATIENT
Start: 2021-01-11 | End: 2021-01-11 | Stop reason: HOSPADM

## 2021-01-11 RX ORDER — DIPHENHYDRAMINE HYDROCHLORIDE 50 MG/ML
12.5 INJECTION, SOLUTION INTRAMUSCULAR; INTRAVENOUS AS NEEDED
Status: DISCONTINUED | OUTPATIENT
Start: 2021-01-11 | End: 2021-01-11 | Stop reason: HOSPADM

## 2021-01-11 RX ORDER — ONDANSETRON 2 MG/ML
4 INJECTION INTRAMUSCULAR; INTRAVENOUS
Status: DISCONTINUED | OUTPATIENT
Start: 2021-01-11 | End: 2021-01-15 | Stop reason: HOSPADM

## 2021-01-11 RX ORDER — HYDROMORPHONE HYDROCHLORIDE 1 MG/ML
1 INJECTION, SOLUTION INTRAMUSCULAR; INTRAVENOUS; SUBCUTANEOUS
Status: DISCONTINUED | OUTPATIENT
Start: 2021-01-11 | End: 2021-01-15 | Stop reason: HOSPADM

## 2021-01-11 RX ORDER — SODIUM CHLORIDE 0.9 % (FLUSH) 0.9 %
5-40 SYRINGE (ML) INJECTION EVERY 8 HOURS
Status: DISCONTINUED | OUTPATIENT
Start: 2021-01-11 | End: 2021-01-15 | Stop reason: HOSPADM

## 2021-01-11 RX ORDER — HYDROMORPHONE HYDROCHLORIDE 1 MG/ML
0.2 INJECTION, SOLUTION INTRAMUSCULAR; INTRAVENOUS; SUBCUTANEOUS
Status: DISCONTINUED | OUTPATIENT
Start: 2021-01-11 | End: 2021-01-11 | Stop reason: HOSPADM

## 2021-01-11 RX ORDER — DEXAMETHASONE SODIUM PHOSPHATE 4 MG/ML
INJECTION, SOLUTION INTRA-ARTICULAR; INTRALESIONAL; INTRAMUSCULAR; INTRAVENOUS; SOFT TISSUE AS NEEDED
Status: DISCONTINUED | OUTPATIENT
Start: 2021-01-11 | End: 2021-01-11 | Stop reason: HOSPADM

## 2021-01-11 RX ORDER — FENTANYL CITRATE 50 UG/ML
INJECTION, SOLUTION INTRAMUSCULAR; INTRAVENOUS AS NEEDED
Status: DISCONTINUED | OUTPATIENT
Start: 2021-01-11 | End: 2021-01-11 | Stop reason: HOSPADM

## 2021-01-11 RX ORDER — ROPIVACAINE HYDROCHLORIDE 5 MG/ML
30 INJECTION, SOLUTION EPIDURAL; INFILTRATION; PERINEURAL AS NEEDED
Status: DISCONTINUED | OUTPATIENT
Start: 2021-01-11 | End: 2021-01-11 | Stop reason: HOSPADM

## 2021-01-11 RX ORDER — PROPOFOL 10 MG/ML
INJECTION, EMULSION INTRAVENOUS AS NEEDED
Status: DISCONTINUED | OUTPATIENT
Start: 2021-01-11 | End: 2021-01-11 | Stop reason: HOSPADM

## 2021-01-11 RX ORDER — GLYCOPYRROLATE 0.2 MG/ML
INJECTION INTRAMUSCULAR; INTRAVENOUS AS NEEDED
Status: DISCONTINUED | OUTPATIENT
Start: 2021-01-11 | End: 2021-01-11 | Stop reason: HOSPADM

## 2021-01-11 RX ORDER — NALOXONE HYDROCHLORIDE 0.4 MG/ML
0.4 INJECTION, SOLUTION INTRAMUSCULAR; INTRAVENOUS; SUBCUTANEOUS AS NEEDED
Status: DISCONTINUED | OUTPATIENT
Start: 2021-01-11 | End: 2021-01-15 | Stop reason: HOSPADM

## 2021-01-11 RX ADMIN — FENTANYL CITRATE 25 MCG: 50 INJECTION, SOLUTION INTRAMUSCULAR; INTRAVENOUS at 09:39

## 2021-01-11 RX ADMIN — SUGAMMADEX 200 MG: 100 INJECTION, SOLUTION INTRAVENOUS at 09:05

## 2021-01-11 RX ADMIN — ONDANSETRON HYDROCHLORIDE 4 MG: 2 INJECTION, SOLUTION INTRAMUSCULAR; INTRAVENOUS at 07:58

## 2021-01-11 RX ADMIN — SODIUM CHLORIDE 125 ML/HR: 9 INJECTION, SOLUTION INTRAVENOUS at 11:31

## 2021-01-11 RX ADMIN — LABETALOL HYDROCHLORIDE 10 MG: 5 INJECTION INTRAVENOUS at 09:04

## 2021-01-11 RX ADMIN — FENTANYL CITRATE 25 MCG: 50 INJECTION, SOLUTION INTRAMUSCULAR; INTRAVENOUS at 07:41

## 2021-01-11 RX ADMIN — ENOXAPARIN SODIUM 40 MG: 100 INJECTION SUBCUTANEOUS at 13:16

## 2021-01-11 RX ADMIN — DEXAMETHASONE SODIUM PHOSPHATE 4 MG: 4 INJECTION, SOLUTION INTRAMUSCULAR; INTRAVENOUS at 07:58

## 2021-01-11 RX ADMIN — ACETAMINOPHEN 650 MG: 325 TABLET ORAL at 19:00

## 2021-01-11 RX ADMIN — ROCURONIUM BROMIDE 30 MG: 10 SOLUTION INTRAVENOUS at 07:45

## 2021-01-11 RX ADMIN — SODIUM CHLORIDE, POTASSIUM CHLORIDE, SODIUM LACTATE AND CALCIUM CHLORIDE 1000 ML: 600; 310; 30; 20 INJECTION, SOLUTION INTRAVENOUS at 07:07

## 2021-01-11 RX ADMIN — SODIUM CHLORIDE, POTASSIUM CHLORIDE, SODIUM LACTATE AND CALCIUM CHLORIDE 500 ML: 600; 310; 30; 20 INJECTION, SOLUTION INTRAVENOUS at 10:28

## 2021-01-11 RX ADMIN — HYDROMORPHONE HYDROCHLORIDE 0.4 MG: 2 INJECTION, SOLUTION INTRAMUSCULAR; INTRAVENOUS; SUBCUTANEOUS at 09:28

## 2021-01-11 RX ADMIN — KETOROLAC TROMETHAMINE 15 MG: 30 INJECTION, SOLUTION INTRAMUSCULAR at 13:12

## 2021-01-11 RX ADMIN — CEFAZOLIN 1 G: 1 INJECTION, POWDER, FOR SOLUTION INTRAMUSCULAR; INTRAVENOUS at 22:16

## 2021-01-11 RX ADMIN — FENTANYL CITRATE 25 MCG: 50 INJECTION, SOLUTION INTRAMUSCULAR; INTRAVENOUS at 07:58

## 2021-01-11 RX ADMIN — CEFOTETAN DISODIUM 2 G: 2 INJECTION, POWDER, FOR SOLUTION INTRAMUSCULAR; INTRAVENOUS at 07:54

## 2021-01-11 RX ADMIN — Medication: at 10:03

## 2021-01-11 RX ADMIN — FENTANYL CITRATE 50 MCG: 50 INJECTION, SOLUTION INTRAMUSCULAR; INTRAVENOUS at 08:06

## 2021-01-11 RX ADMIN — SODIUM CHLORIDE, POTASSIUM CHLORIDE, SODIUM LACTATE AND CALCIUM CHLORIDE: 600; 310; 30; 20 INJECTION, SOLUTION INTRAVENOUS at 07:30

## 2021-01-11 RX ADMIN — CEFAZOLIN 1 G: 1 INJECTION, POWDER, FOR SOLUTION INTRAMUSCULAR; INTRAVENOUS at 13:12

## 2021-01-11 RX ADMIN — LIDOCAINE HYDROCHLORIDE 80 MG: 20 INJECTION, SOLUTION EPIDURAL; INFILTRATION; INTRACAUDAL; PERINEURAL at 07:41

## 2021-01-11 RX ADMIN — Medication 5 MG: at 10:30

## 2021-01-11 RX ADMIN — HYDROMORPHONE HYDROCHLORIDE 0.2 MG: 2 INJECTION, SOLUTION INTRAMUSCULAR; INTRAVENOUS; SUBCUTANEOUS at 08:46

## 2021-01-11 RX ADMIN — NALOXONE HYDROCHLORIDE 1.2 MG: 0.4 INJECTION, SOLUTION INTRAMUSCULAR; INTRAVENOUS; SUBCUTANEOUS at 09:03

## 2021-01-11 RX ADMIN — ACETAMINOPHEN 650 MG: 325 TABLET ORAL at 07:07

## 2021-01-11 RX ADMIN — Medication 80 MCG: at 07:43

## 2021-01-11 RX ADMIN — PROPOFOL 140 MG: 10 INJECTION, EMULSION INTRAVENOUS at 07:41

## 2021-01-11 RX ADMIN — SUCCINYLCHOLINE CHLORIDE 140 MG: 20 INJECTION, SOLUTION INTRAMUSCULAR; INTRAVENOUS at 07:41

## 2021-01-11 RX ADMIN — FENTANYL CITRATE 25 MCG: 50 INJECTION, SOLUTION INTRAMUSCULAR; INTRAVENOUS at 09:46

## 2021-01-11 RX ADMIN — FENTANYL CITRATE 25 MCG: 50 INJECTION, SOLUTION INTRAMUSCULAR; INTRAVENOUS at 09:58

## 2021-01-11 RX ADMIN — FENTANYL CITRATE 25 MCG: 50 INJECTION, SOLUTION INTRAMUSCULAR; INTRAVENOUS at 10:31

## 2021-01-11 RX ADMIN — GLYCOPYRROLATE 0.4 MG: 0.2 INJECTION, SOLUTION INTRAMUSCULAR; INTRAVENOUS at 08:40

## 2021-01-11 RX ADMIN — KETOROLAC TROMETHAMINE 15 MG: 30 INJECTION, SOLUTION INTRAMUSCULAR at 19:00

## 2021-01-11 RX ADMIN — NEOSTIGMINE METHYLSULFATE 3 MG: 1 INJECTION, SOLUTION INTRAVENOUS at 08:40

## 2021-01-11 RX ADMIN — ACETAMINOPHEN 650 MG: 325 TABLET ORAL at 13:12

## 2021-01-11 NOTE — BRIEF OP NOTE
Brief Postoperative Note    Patient: Shira Madrid  YOB: 1940  MRN: [id number]    Date of Procedure: 1/11/2021     Pre-Op Diagnosis: OVARIAN CANCER    Post-Op Diagnosis: OVARIAN FIBROMA      Procedure(s):  EXPLORATORY LAPAROTOMY, RESECTION OF MASS >10 cm, BILATERAL SALPINGOOOPHORECTOMY, OMENTECTOMY    Surgeon(s):  Arlette Durán MD    Surgical Assistant: Physician Assistant: Brenda Galvin PA-C    Anesthesia: General     Estimated Blood Loss (mL): less than 50     Complications: None    Specimens:   ID Type Source Tests Collected by Time Destination   1 : LEFT PELVIC MASS, LEFT TUBE AND OVARY Frozen Section Mass  Arlette Durán MD 1/11/2021 4253 Pathology   2 : RIGHT TUBE AND OVARY Fresh Fallopian [place] MD Jg 1/11/2021 5935 Pathology   3 : OMENTUM Fresh Omentum  Arlette Durán MD 1/11/2021 0818 Pathology   1 : PELVIC WASHINGS Fresh Pelvis  Arlette Durán MD 1/11/2021 0808 Cytology        Implants: * No implants in log *    Drains: * No LDAs found *    Findings: Clear yellow ascites encountered upon entering the abdominal cavity. Large, solid mass arising from the left ovary. The mass was torsed about 3 times. Normal uterus. Normal right ovary. No peritoneal abnormalities. Normal appendix. Normal small and large bowel  Frozen section pathology consistent an ovarian fibroma. No evidence of malignancy.     Electronically Signed by Mj Brown MD on 1/11/2021 at 8:47 AM

## 2021-01-11 NOTE — H&P
[address] [place] [address] [address], [address] [facility]  P [phone]  F [phone]        Patient ID:  Name:  Yohan Kessler  MRN:  [id number]  :  80 y.o. Date:  1/10/2021      HISTORY OF PRESENT ILLNESS:  Yohan Kessler is a 68 y.o.  postmenopausal female who is being seen for probable ovarian cancer. She is referred by Celso Bray NP. She was admitted to the hospital in [place] in late October. A CT of the abdomen/pelvis revealed a a large heterogenous mass measuring 17 x 21 x 13 cm, presumed to be of gynecologic origin. There was also moderate ascites. There was no retroperitoneal lymphadenopathy and no mention of carcinomatosis. A CT of the chest revealed a spiculated pulmonary nodule in the right upper lobe. There was also mediastinal and left hilar lymphadenopathy, along with moderate left and trace right pleural effusions. A paracentesis was performed, removing 6.2 liters of clear yellow fluid, but the cytology was negative for malignancy. Tumor markers were drawn, including CEA, CA 19-9, and CA-125. The CA-125 was markedly elevated at 2344. The other markers were normal.  I have been asked to see her for further evaluation and management. She presented for her initial consultation with her daughter. I explained what the likely diagnosis was and what the recommendations for treatment would be. The patient stated that she doesn't want surgery or chemotherapy and doesn't want anything done. I suggested that we at least repeat imaging and labs prior to making any decisions on treatment. She presents today to review those results. She has a large pelvic mass and ascites, plus what appears to be metastatic disease to the chest.  Her CA-125 was also very elevated.          ROS:   and GI review:  Negative  Cardiopulmonary review:  Negative   Musculoskeletal:  Negative    A comprehensive review of systems was negative except for that written in the History of Present Illness. , 10 point ROS      Problem List:  Patient Active Problem List    Diagnosis Date Noted    Severe obesity ([facility] 11/25/2020    Essential hypertension 11/18/2020    Hyperlipidemia LDL goal <70 11/18/2020    Pelvic mass 11/18/2020    H/O ascites 11/18/2020    Cellulitis of right lower extremity 11/18/2020    Cerebrovascular accident (CVA) ([facility] 11/18/2020    Weakness generalized 11/18/2020     PMH:  Past Medical History:   Diagnosis Date    Cellulitis     R LEG    CVA (cerebral vascular accident) [facility]     Hyperlipidemia     Hypertension       PSH:  Past Surgical History:   Procedure Laterality Date    HX CHOLECYSTECTOMY      HX OTHER SURGICAL  10/2020    PARACENTESIS      Social History:  Social History     Tobacco Use    Smoking status: Current Every Day Smoker     Packs/day: 1.50     Years: 72.00     Pack years: 108.00    Smokeless tobacco: Never Used   Substance Use Topics    Alcohol use: Not Currently      Family History:  Family History   Problem Relation Age of Onset    Anesth Problems Neg Hx       Medications: (reviewed)  No current facility-administered medications for this encounter. Current Outpatient Medications   Medication Sig    cyanocobalamin (VITAMIN B12) 1,000 mcg/mL injection 1 mL by IntraMUSCular route every seven (7) days for 4 doses.  folic acid (FOLVITE) 230 mcg tablet Take 800 mcg by mouth daily. Indications: inadequate folic acid    donepeziL (Aricept) 5 mg tablet Take 1 Tab by mouth daily.  menthol (GOLD BOND PAIN RELIEVING EX) Apply 1 Applicator to affected area as needed. thin layer lower extremities    naproxen sodium (Aleve) 220 mg cap Take 1 Tab by mouth daily as needed for Pain.  amLODIPine (NORVASC) 5 mg tablet Take 5 mg by mouth daily.  simvastatin (ZOCOR) 20 mg tablet Take 20 mg by mouth nightly.  aspirin delayed-release 81 mg tablet Take 81 mg by mouth daily.      Allergies: (reviewed)  No Known Allergies       OBJECTIVE:    Physical Exam:  VITAL SIGNS: There were no vitals filed for this visit. There is no height or weight on file to calculate BMI. GENERAL ISAAK:    HEENT:    RESPIRATORY:    CARDIOVASC:    GASTROINT:    MUSCULOSKEL:    EXTREMITIES:    PELVIC:    RECTAL:    NAVEED SURVEY:    NEURO:        Lab Data:    Lab Results   Component Value Date/Time    WBC 7.8 12/30/2020 02:14 PM    HGB 11.9 12/30/2020 02:14 PM    HCT 38.5 12/30/2020 02:14 PM    PLATELET 004 78/28/3991 02:14 PM    MCV 90.4 12/30/2020 02:14 PM     Lab Results   Component Value Date/Time    Sodium 138 12/30/2020 02:14 PM    Potassium 4.1 12/30/2020 02:14 PM    Chloride 104 12/30/2020 02:14 PM    CO2 30 12/30/2020 02:14 PM    Anion gap 4 (L) 12/30/2020 02:14 PM    Glucose 102 (H) 12/30/2020 02:14 PM    BUN 20 12/30/2020 02:14 PM    Creatinine 0.80 12/30/2020 02:14 PM    BUN/Creatinine ratio 25 (H) 12/30/2020 02:14 PM    GFR est AA >60 12/30/2020 02:14 PM    GFR est non-AA >60 12/30/2020 02:14 PM    Calcium 9.3 12/30/2020 02:14 PM     Lab Results   Component Value Date/Time    Cancer Ag (CA) 125 749.0 (H) 11/25/2020 12:31 PM       CT of chest/abdomen/pelivs (12/1/20)   THYROID: No nodule. MEDIASTINUM: There is a 19 mm AP window lymph node. A 13 mm pretracheal lymph  node. An 18 mm subcarinal lymph node which is confluent with a left hilar lymph  node which measures approximately 22 mm. JAY JAY: As above  THORACIC AORTA: Atherosclerotic disease. MAIN PULMONARY ARTERY: Normal in caliber. TRACHEA/BRONCHI: Patent. ESOPHAGUS: No wall thickening or dilatation. HEART: Coronary atherosclerotic disease. PLEURA: Small left pleural effusion. LUNGS: 11 mm right upper lobe pulmonary nodule series 4 image 19 is a calcified  granuloma in the right lower lobe. 12 mm pleural-based nodule left lower lobe  image 17. LIVER: No mass or biliary dilatation. GALLBLADDER: Cholecystectomy changes. SPLEEN: No mass.   PANCREAS: No mass or ductal dilatation. ADRENALS: Unremarkable. KIDNEYS: No mass, calculus, or hydronephrosis. STOMACH: Unremarkable. SMALL BOWEL: No dilatation or wall thickening. COLON: Colonic diverticulosis. APPENDIX: Not visualized  PERITONEUM: Large mass in the central abdomen extending into the pelvis possibly  associated with the right ovary measuring approximately 12.9 cm AP by 20.5 cm  transverse and extending 20.5 cm craniocaudal. Perihepatic and perisplenic  ascites is noted with fluid tracking in the right paracolic gutter and into the  pelvis. RETROPERITONEUM: No lymphadenopathy or aortic aneurysm. Slightly prominent  retroperitoneal lymph nodes which do not meet size criteria. REPRODUCTIVE ORGANS: The visualized uterus is unremarkable. URINARY BLADDER: Nondistended  BONES: Lucent lesion at L1 likely hemangioma. Remote right-sided rib fractures. ADDITIONAL COMMENTS: N/A     IMPRESSION:  1. Large mass in the central abdomen and pelvis possibly arising from right  ovary consistent with patient's history of ovarian malignancy.     2.  Mediastinal adenopathy extending into the left hilum consistent with  metastatic disease.     3. Nodule opacity suspicious for metastatic disease.     4. Abdominal and pelvic ascites. IMPRESSION/PLAN:  Clarissa Osullivan is a 68 y.o. 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  This could also represent a second lung primary. I again explained what the likely diagnosis is and what the recommendations for treatment would be. The patient again was somewhat uncertain whether she wanted surgery or chemotherapy, but her daughter convinced her to proceed. I recommended an X-lap with resection of mass, JOANA, BSO, tumor debulking. We will confirm the diagnosis with frozen section pathology.   She was counseled on the risks, benefits, indications, and alternatives of surgery. Her questions were answered and she wishes to proceed. Bryanna Hong MD  01/11/21  7:23 AM      Date of Surgery Update:  Kimo Santacruz was seen and examined. History and physical has been reviewed. The patient has been examined. There have been no significant clinical changes since the completion of the originally dated History and Physical.  Patient identified by surgeon; surgical site was confirmed by patient and surgeon.     Signed By: Bryanna Hong MD     January 11, 2021 7:23 AM

## 2021-01-11 NOTE — OP NOTES
Gynecologic Oncology Operative Report    Kian Garrett  1/11/2021    Pre-operative dx:  Pelvic mass, ascites, elevated CA-125, presumed ovarian cancer    Post-operative dx:  Ovarian fibroma    Procedure:  Exploratory laparotomy, resection of mass >10 cm, bilateral salpingooophorectomy, omentectomy    Surgeon:  Bianca Leung MD    Assistant:  Cyndy Cunningham PA-C     Anesthesia:  GETA    EBL:  <54 cc    Complications:  None    Implants:  None    Specimens:    ID Type Source Tests Collected by Time Destination   1 : LEFT PELVIC MASS, LEFT TUBE AND OVARY Frozen Section Mass   Deysi Moreira MD 1/11/2021 5151 Pathology   2 : RIGHT TUBE AND OVARY Fresh Fallopian Tube Courtney Villar MD 1/11/2021 3945 Pathology   3 : OMENTUM Fresh Omentum   Deysi Moreira MD 1/11/2021 0818 Pathology   1 : PELVIC WASHINGS Fresh Pelvis   Deysi Moreira MD 1/11/2021 0808 Cytology     Operative indications:  69 yo WF with a large mass, elevated CA-125, and ascites. This presentation was concerning for an ovarian cancer. I recommended laparotomy, resection of the mass, and probable tumor debulking. Operative findings:  Clear yellow ascites encountered upon entering the abdominal cavity. Large, solid mass arising from the left ovary. The mass was torsed about 3 times. Normal uterus. Normal right ovary. No peritoneal abnormalities. Normal appendix. Normal small and large bowel  Frozen section pathology consistent an ovarian fibroma. No evidence of malignancy. Procedure in detail:  After the risks, benefits, indications, and alternatives of the procedure were discussed with the patient and informed consent was obtained, the patient was taken to the operating room where she was identified as the correct patient. She was then administered general anesthesia and placed in the dorsal lithotomy position in [address] [place] [address]. She was then prepped and draped in the usual fashion. A Caldwell catheter was inserted.  The abdomen was entered via vertical midline skin incision extending from the pubic symphysis to just below the umbilicus. Upon entering the abdominal cavity, the above-mentioned findings were noted. Ascites was collected for cytology. I was able to manipulate the mass out of the incision and identify the vascular supply. The mass was arising from the left ovary. There was torsion noted. The bowel were packed away with moist lap sponges. A window was created in the the peritoneum between the round ligament and the ovarian vessels. A window was then placed in the posterior leaf of the broad ligament between the ovarian vessels and the ureter. The ovarian vessels were then coagulated and transected with the Ligasure Impact device. The mass was then  from the uterine cornua using the Ligasure device. The mass was then sent for frozen section pathology. While waiting on the frozen section, we proceeded with removal of the right tube and ovary. A window was created in the the peritoneum between the round ligament and the ovarian vessels. A window was then placed in the posterior leaf of the broad ligament between the ovarian vessels and the ureter. The ovarian vessels were then coagulated and transected with the Ligasure device. The mass was then  from the uterine cornua using the Ligasure device. It was sent for permanent pathology. We then sampled the distal omentum while awaiting frozen section results. The omentum distal to the transverse colon was divided with the Ligasure device. It was sent for permanent pathology. We then received the frozen section results. Findings were consistent with a benign fibroma. We decided to leave the uterus to reduce her surgical risks as much as possible. At this point, we irrigated the pelvis and made sure there was no evidence of bleeding.  We then removed all lap sponges and retractors, and the abdominal wall was then closed with a number 1 looped PDS suture in a running, nonlocking fashion. Subcutaneous tissues were made hemostatic with electrocautery, and the skin was closed with stainless steel staples. A sterile pressure dressing was then applied. The patient was then awakened from anesthesia and taken to the recovery room in stable condition. All sponge, lap, and needle counts were correct.       Reta Summers MD  1/11/2021  8:50 AM

## 2021-01-11 NOTE — ANESTHESIA PREPROCEDURE EVALUATION
Relevant Problems   No relevant active problems       Anesthetic History   No history of anesthetic complications            Review of Systems / Medical History  Patient summary reviewed, nursing notes reviewed and pertinent labs reviewed    Pulmonary  Within defined limits                 Neuro/Psych       CVA       Cardiovascular    Hypertension              Exercise tolerance: <4 METS     GI/Hepatic/Renal  Within defined limits             Comments: Ascites ovarian CA Endo/Other        Obesity and cancer     Other Findings              Physical Exam    Airway  Mallampati: II  TM Distance: > 6 cm  Neck ROM: normal range of motion   Mouth opening: Normal     Cardiovascular  Regular rate and rhythm,  S1 and S2 normal,  no murmur, click, rub, or gallop             Dental    Dentition: Full lower dentures and Full upper dentures     Pulmonary  Breath sounds clear to auscultation               Abdominal  GI exam deferred       Other Findings            Anesthetic Plan    ASA: 3  Anesthesia type: general          Induction: Intravenous  Anesthetic plan and risks discussed with: Patient

## 2021-01-11 NOTE — PROGRESS NOTES
TRANSFER - IN REPORT:    Verbal report received from [address] [facility] RN(name) on [facility]  being received from PACU(unit) for routine post - op      Report consisted of patients Situation, Background, Assessment and   Recommendations(SBAR). Information from the following report(s) SBAR, Kardex, OR Summary and Med Rec Status was reviewed with the receiving nurse. Opportunity for questions and clarification was provided. Assessment completed upon patients arrival to unit and care assumed. 3:21 PM:  Incentive Spirometry teaching completed with pt and daughter. Demonstrated. Pt up to 1250.

## 2021-01-11 NOTE — PERIOP NOTES
TRANSFER - OUT REPORT:    Verbal report given to Senthil(name) on Bob Wood  being transferred to 319(unit) for routine post - op       Report consisted of patients Situation, Background, Assessment and   Recommendations(SBAR). Time Pre op antibiotic given:0754  Anesthesia Stop time: 6291  Caldwell Present on Transfer to floor:y  Order for Caldwell on Chart:y  Discharge Prescriptions with Chart:n    Information from the following report(s) SBAR, OR Summary, Intake/Output, MAR and Accordion was reviewed with the receiving nurse. Opportunity for questions and clarification was provided. Is the patient on 02? YES       L/Min 2       Other     Is the patient on a monitor? NO    Is the nurse transporting with the patient? NO    Surgical Waiting Area notified of patient's transfer from PACU? YES      The following personal items collected during your admission accompanied patient upon transfer:   Dental Appliance: Dental Appliances:  Other (comment)(dentures to pacu)  Vision:    Hearing Aid:    Jewelry:    Clothing: Clothing: Other (comment)(clothing bag to [employer])  Other Valuables:    Valuables sent to safe:        Walker, dentures and clothes to floor with pt

## 2021-01-11 NOTE — ANESTHESIA POSTPROCEDURE EVALUATION
Post-Anesthesia Evaluation and Assessment    Patient: Marlin Chairez MRN: [id number]  SSN: [id number]    YOB: 1940  Age: 68 y.o. Sex: female      I have evaluated the patient and they are stable and ready for discharge from the PACU. Cardiovascular Function/Vital Signs  Visit Vitals  BP (!) 101/50   Pulse 73   Temp 36.6 °C (97.8 °F)   Resp 21   Ht 5' 5\" (1.651 m)   Wt 98 kg (216 lb 0.8 oz)   SpO2 95%   BMI 35.95 kg/m²       Patient is status post General anesthesia for Procedure(s):  EXPLORATORY LAPAROTOMY, RESECTION OF MASS, BILATERAL SALPINGO-OOPHORECTOMY , OMENTECTOMY. Nausea/Vomiting: None    Postoperative hydration reviewed and adequate. Pain:  Pain Scale 1: (med- large pain) (01/11/21 0958)  Pain Intensity 1: 0 (01/11/21 0649)   Managed    Neurological Status:   Neuro (WDL): Within Defined Limits (01/11/21 0657)  Neuro  Neurologic State: Drowsy (01/11/21 5982)  Orientation Level: Oriented to person;Oriented to time(aware of year, but not month) (01/11/21 0925)  Cognition: Decreased attention/concentration (01/11/21 0925)  Speech: Clear (01/11/21 0925)  LUE Motor Response: Purposeful (01/11/21 0925)  LLE Motor Response: Purposeful (01/11/21 0925)  RUE Motor Response: Purposeful (01/11/21 0925)  RLE Motor Response: Purposeful (01/11/21 0925)   At baseline    Mental Status, Level of Consciousness: Alert and  oriented to person, place, and time    Pulmonary Status:   O2 Device: Nasal cannula (01/11/21 0957)   Adequate oxygenation and airway patent    Complications related to anesthesia: None    Post-anesthesia assessment completed. No concerns    Signed By: Lorren Libman, MD     January 11, 2021              Procedure(s):  EXPLORATORY LAPAROTOMY, RESECTION OF MASS, BILATERAL SALPINGO-OOPHORECTOMY , OMENTECTOMY.     general    <BSHSIANPOST>    INITIAL Post-op Vital signs:   Vitals Value Taken Time   /52 01/11/21 1015   Temp 36.6 °C (97.8 °F) 01/11/21 0928   Pulse 72 01/11/21 1015 Resp 14 01/11/21 1018   SpO2 96 % 01/11/21 1018   Vitals shown include unvalidated device data.

## 2021-01-12 LAB
ANION GAP SERPL CALC-SCNC: 6 MMOL/L (ref 5–15)
BASOPHILS # BLD: 0 K/UL (ref 0–0.1)
BASOPHILS NFR BLD: 0 % (ref 0–1)
BUN SERPL-MCNC: 17 MG/DL (ref 6–20)
BUN/CREAT SERPL: 25 (ref 12–20)
CALCIUM SERPL-MCNC: 8.9 MG/DL (ref 8.5–10.1)
CHLORIDE SERPL-SCNC: 105 MMOL/L (ref 97–108)
CO2 SERPL-SCNC: 25 MMOL/L (ref 21–32)
CREAT SERPL-MCNC: 0.68 MG/DL (ref 0.55–1.02)
DIFFERENTIAL METHOD BLD: ABNORMAL
EOSINOPHIL # BLD: 0 K/UL (ref 0–0.4)
EOSINOPHIL NFR BLD: 0 % (ref 0–7)
ERYTHROCYTE [DISTWIDTH] IN BLOOD BY AUTOMATED COUNT: 15.7 % (ref 11.5–14.5)
GLUCOSE SERPL-MCNC: 105 MG/DL (ref 65–100)
HCT VFR BLD AUTO: 33.8 % (ref 35–47)
HGB BLD-MCNC: 10.8 G/DL (ref 11.5–16)
IMM GRANULOCYTES # BLD AUTO: 0 K/UL (ref 0–0.04)
IMM GRANULOCYTES NFR BLD AUTO: 0 % (ref 0–0.5)
LYMPHOCYTES # BLD: 1.4 K/UL (ref 0.8–3.5)
LYMPHOCYTES NFR BLD: 15 % (ref 12–49)
MCH RBC QN AUTO: 28.9 PG (ref 26–34)
MCHC RBC AUTO-ENTMCNC: 32 G/DL (ref 30–36.5)
MCV RBC AUTO: 90.4 FL (ref 80–99)
MONOCYTES # BLD: 1.1 K/UL (ref 0–1)
MONOCYTES NFR BLD: 12 % (ref 5–13)
NEUTS SEG # BLD: 6.6 K/UL (ref 1.8–8)
NEUTS SEG NFR BLD: 73 % (ref 32–75)
NRBC # BLD: 0 K/UL (ref 0–0.01)
NRBC BLD-RTO: 0 PER 100 WBC
PLATELET # BLD AUTO: 327 K/UL (ref 150–400)
PMV BLD AUTO: 9.3 FL (ref 8.9–12.9)
POTASSIUM SERPL-SCNC: 4 MMOL/L (ref 3.5–5.1)
RBC # BLD AUTO: 3.74 M/UL (ref 3.8–5.2)
SODIUM SERPL-SCNC: 136 MMOL/L (ref 136–145)
WBC # BLD AUTO: 9.2 K/UL (ref 3.6–11)

## 2021-01-12 PROCEDURE — 74011000258 HC RX REV CODE- 258: Performed by: OBSTETRICS & GYNECOLOGY

## 2021-01-12 PROCEDURE — 74011250637 HC RX REV CODE- 250/637: Performed by: OBSTETRICS & GYNECOLOGY

## 2021-01-12 PROCEDURE — 74011250636 HC RX REV CODE- 250/636: Performed by: PHYSICIAN ASSISTANT

## 2021-01-12 PROCEDURE — 85025 COMPLETE CBC W/AUTO DIFF WBC: CPT

## 2021-01-12 PROCEDURE — 65410000002 HC RM PRIVATE OB

## 2021-01-12 PROCEDURE — 74011250636 HC RX REV CODE- 250/636: Performed by: OBSTETRICS & GYNECOLOGY

## 2021-01-12 PROCEDURE — 99024 POSTOP FOLLOW-UP VISIT: CPT | Performed by: PHYSICIAN ASSISTANT

## 2021-01-12 PROCEDURE — 3331090002 HH PPS REVENUE DEBIT

## 2021-01-12 PROCEDURE — 36415 COLL VENOUS BLD VENIPUNCTURE: CPT

## 2021-01-12 PROCEDURE — 94640 AIRWAY INHALATION TREATMENT: CPT

## 2021-01-12 PROCEDURE — 74011000250 HC RX REV CODE- 250: Performed by: PHYSICIAN ASSISTANT

## 2021-01-12 PROCEDURE — 74011250637 HC RX REV CODE- 250/637: Performed by: PHYSICIAN ASSISTANT

## 2021-01-12 PROCEDURE — 3331090001 HH PPS REVENUE CREDIT

## 2021-01-12 PROCEDURE — 80048 BASIC METABOLIC PNL TOTAL CA: CPT

## 2021-01-12 RX ORDER — SODIUM CHLORIDE 9 MG/ML
100 INJECTION, SOLUTION INTRAVENOUS CONTINUOUS
Status: DISCONTINUED | OUTPATIENT
Start: 2021-01-12 | End: 2021-01-15

## 2021-01-12 RX ORDER — FUROSEMIDE 10 MG/ML
20 INJECTION INTRAMUSCULAR; INTRAVENOUS ONCE
Status: COMPLETED | OUTPATIENT
Start: 2021-01-12 | End: 2021-01-12

## 2021-01-12 RX ORDER — OXYCODONE HYDROCHLORIDE 5 MG/1
5-10 TABLET ORAL
Status: DISCONTINUED | OUTPATIENT
Start: 2021-01-12 | End: 2021-01-13

## 2021-01-12 RX ORDER — DOCUSATE SODIUM 100 MG/1
100 CAPSULE, LIQUID FILLED ORAL DAILY
Status: DISCONTINUED | OUTPATIENT
Start: 2021-01-13 | End: 2021-01-15 | Stop reason: HOSPADM

## 2021-01-12 RX ORDER — HYDRALAZINE HYDROCHLORIDE 20 MG/ML
10 INJECTION INTRAMUSCULAR; INTRAVENOUS
Status: DISCONTINUED | OUTPATIENT
Start: 2021-01-12 | End: 2021-01-15 | Stop reason: HOSPADM

## 2021-01-12 RX ORDER — LABETALOL 100 MG/1
100 TABLET, FILM COATED ORAL EVERY 12 HOURS
Status: DISCONTINUED | OUTPATIENT
Start: 2021-01-12 | End: 2021-01-15 | Stop reason: HOSPADM

## 2021-01-12 RX ORDER — IPRATROPIUM BROMIDE AND ALBUTEROL SULFATE 2.5; .5 MG/3ML; MG/3ML
3 SOLUTION RESPIRATORY (INHALATION) 2 TIMES DAILY
Status: DISPENSED | OUTPATIENT
Start: 2021-01-12 | End: 2021-01-13

## 2021-01-12 RX ADMIN — CEFAZOLIN 1 G: 1 INJECTION, POWDER, FOR SOLUTION INTRAMUSCULAR; INTRAVENOUS at 05:22

## 2021-01-12 RX ADMIN — SODIUM CHLORIDE 100 ML/HR: 9 INJECTION, SOLUTION INTRAVENOUS at 09:33

## 2021-01-12 RX ADMIN — AMLODIPINE BESYLATE 5 MG: 5 TABLET ORAL at 09:03

## 2021-01-12 RX ADMIN — ENOXAPARIN SODIUM 40 MG: 100 INJECTION SUBCUTANEOUS at 14:32

## 2021-01-12 RX ADMIN — Medication 5 ML: at 22:00

## 2021-01-12 RX ADMIN — ACETAMINOPHEN 650 MG: 325 TABLET ORAL at 19:36

## 2021-01-12 RX ADMIN — ACETAMINOPHEN 650 MG: 325 TABLET ORAL at 06:01

## 2021-01-12 RX ADMIN — ACETAMINOPHEN 650 MG: 325 TABLET ORAL at 14:30

## 2021-01-12 RX ADMIN — OXYCODONE HYDROCHLORIDE 5 MG: 5 TABLET ORAL at 21:43

## 2021-01-12 RX ADMIN — Medication 10 ML: at 00:30

## 2021-01-12 RX ADMIN — DONEPEZIL HYDROCHLORIDE 5 MG: 5 TABLET, FILM COATED ORAL at 09:03

## 2021-01-12 RX ADMIN — ACETAMINOPHEN 650 MG: 325 TABLET ORAL at 00:29

## 2021-01-12 RX ADMIN — LABETALOL HYDROCHLORIDE 100 MG: 100 TABLET, FILM COATED ORAL at 20:41

## 2021-01-12 RX ADMIN — IPRATROPIUM BROMIDE AND ALBUTEROL SULFATE 3 ML: .5; 3 SOLUTION RESPIRATORY (INHALATION) at 20:01

## 2021-01-12 RX ADMIN — FUROSEMIDE 20 MG: 10 INJECTION, SOLUTION INTRAMUSCULAR; INTRAVENOUS at 09:03

## 2021-01-12 RX ADMIN — KETOROLAC TROMETHAMINE 15 MG: 30 INJECTION, SOLUTION INTRAMUSCULAR at 00:29

## 2021-01-12 RX ADMIN — Medication 5 ML: at 06:00

## 2021-01-12 RX ADMIN — KETOROLAC TROMETHAMINE 15 MG: 30 INJECTION, SOLUTION INTRAMUSCULAR at 06:01

## 2021-01-12 NOTE — PROGRESS NOTES
Gynecologic Oncology - [address], [address] [facility]  P [phone]  F [phone]       Patient: Ra Mcdowell  Admit Date: 1/11/2021  Admit Dx: Pelvic mass [R19.00]    Subjective:     No events, pain controlled. Denies F/C, CP/SOB. Ambulated. Objective:     Date 01/11/21 0700 - 01/12/21 0659 01/12/21 0700 - 01/13/21 0659   Shift 7908-0692 0173-5167 24 Hour Total 7783-9766 1929-7717 24 Hour Total   INTAKE   P.O. 800 100 900        P. O. 800 100 900      I. V.(mL/kg/hr) 2000(1.7) 2085.1(1.8) 4085.1(1.7)        I.V. 800  800        Volume (lactated Ringers infusion) 1200  1200        Volume (0.9% sodium chloride infusion)  2085. 1 2085. 1      Shift Total(mL/kg) 4827(63.6) 2185. 1(22.3) 4985. 1(50.9)      OUTPUT   Urine(mL/kg/hr) 75(0.1) 1300(1.1) 1375(0.6)        Urine Output 25  25        Urine Output (mL) ([REMOVED] Urinary Catheter 01/11/21 2- way) 50 1300 1350      Other 50  50        Other Output 50  50      Blood 50  50        Estimated Blood Loss 50  50      Shift Total(mL/kg) 175(1.8) 1300(13.3) 1475(15.1)      NET 2625 885. 1 3510.1      Weight (kg) 98 98 98 98 98 98       Physical Exam  BP (!) 155/71 (BP 1 Location: Right arm, BP Patient Position: At rest)   Pulse 89   Temp 97.8 °F (36.6 °C)   Resp 16   Ht 5' 5\" (1.651 m)   Wt 216 lb 0.8 oz (98 kg)   SpO2 93%   BMI 35.95 kg/m²      General:  alert, cooperative, no distress     Cardiac:  Regular rate and rhythm        Lungs:  bilat rales/wheeze  Abdomen:  soft, protuberant, absent bowel sounds, mildly tender periwound. No guarding       Wound:  clean, dry, dressed   Extremity: extremities normal, atraumatic, no cyanosis or edema, +SCDs.     Wt Readings from Last 3 Encounters:   01/11/21 216 lb 0.8 oz (98 kg)   12/30/20 216 lb 0.8 oz (98 kg)   12/09/20 219 lb (99.3 kg)         Data Review      Recent Labs     01/12/21  0522   WBC 9.2   ANEU 6.6   HGB 10.8*   HCT 33.8*        Recent Labs     01/12/21  0522    K 4.0      *   BUN 17   CREA 0.68   CA 8.9         Assessment/Plan:     Admitted for Pelvic mass [R19.00]    Active Hospital Problems    Diagnosis Date Noted    Pelvic mass 11/18/2020       [facility] 1 Day Post-Op Procedure(s):  EXPLORATORY LAPAROTOMY, RESECTION OF MASS, BILATERAL SALPINGO-OOPHORECTOMY , OMENTECTOMY for OVARIAN CANCER    Onc: pelvic mass, ovarian fibroma frozen, await final path  Heme/CV: HTN - continue home Norvasc, wean fluids, add BB therapy  Pulm: hx tob abuse. Likely some underlying COPD. Neb therapy  Renal: UOP/indices baseline. DC farnsworth  FEN/GI: advance diet as tolerated  ID/Wound: afeb, abd benign postop  Neuro: DC PCA  PPX: ambulate, SCDs, lovenox, IS  Disposition: Doing well postoperatively. Continue routine postop care. Home with daughter on discharge.       Brown Longo PA-C

## 2021-01-12 NOTE — PROGRESS NOTES
Bedside shift change report given to Lety Apodaca RN (oncoming nurse) by [facility], DARCIE (offgoing nurse). Report included the following information SBAR, Kardex, Procedure Summary, Intake/Output and MAR.

## 2021-01-12 NOTE — PROGRESS NOTES
T.O.C:   Pt expected to d/c to home with daughter   Family to provide transport at d/c   Emergency Contact: Caitie Brand, daughter, [phone]    Reason for Admission:   Ovarian Cancer                   RUR Score:  4%                   Plan for utilizing home health:   TBD       PCP: First and Last name:  Tahir Ruth NP   Name of Practice:    Are you a current patient: Yes/No:  Yes   Approximate date of last visit: 1/5/2021   Can you participate in a virtual visit with your PCP: yes with daughter                    Current Advanced Directive/Advance Care Plan: on file                         Transition of Care Plan:  Pt expected to d/c to home with daughter to provide transport. Pt lives with daughter and her family                      CM met with pt at bedside; verified demographics, insurance, PCP and emergency contact. Pt expected to d/c to home with family to provide transport. Prior to admission pt ambulates with a walker; has adequate support. Pt does  have AMD/POA on file; current copy. Pt's daughter Caitie Brand is her primary decision maker. No other issues at present. CM will continue to follow.     Asher Quiles RN      Care Management Interventions  PCP Verified by CM: Yes(last week, unsure of date)  Palliative Care Criteria Met (RRAT>21 & CHF Dx)?: No  MyChart Signup: Yes(active)  Discharge Durable Medical Equipment: No  Physical Therapy Consult: No  Occupational Therapy Consult: No  Speech Therapy Consult: No  Current Support Network: Relative's Home(lives with daughter and her family)  Confirm Follow Up Transport: Family  The Plan for Transition of Care is Related to the Following Treatment Goals : medically stable  The Patient and/or Patient Representative was Provided with a Choice of Provider and Agrees with the Discharge Plan?: (n/a)  Freedom of Choice List was Provided with Basic Dialogue that Supports the Patient's Individualized Plan of Care/Goals, Treatment Preferences and Shares the Quality Data Associated with the Providers?: (n/a)  Discharge Location  Discharge Placement: Home with family assistance    Jazmin Narayan RN

## 2021-01-13 LAB
ANION GAP SERPL CALC-SCNC: 5 MMOL/L (ref 5–15)
BUN SERPL-MCNC: 12 MG/DL (ref 6–20)
BUN/CREAT SERPL: 17 (ref 12–20)
CALCIUM SERPL-MCNC: 8.5 MG/DL (ref 8.5–10.1)
CHLORIDE SERPL-SCNC: 105 MMOL/L (ref 97–108)
CO2 SERPL-SCNC: 25 MMOL/L (ref 21–32)
CREAT SERPL-MCNC: 0.7 MG/DL (ref 0.55–1.02)
ERYTHROCYTE [DISTWIDTH] IN BLOOD BY AUTOMATED COUNT: 16.1 % (ref 11.5–14.5)
GLUCOSE SERPL-MCNC: 136 MG/DL (ref 65–100)
HCT VFR BLD AUTO: 31.8 % (ref 35–47)
HGB BLD-MCNC: 10 G/DL (ref 11.5–16)
MCH RBC QN AUTO: 28.7 PG (ref 26–34)
MCHC RBC AUTO-ENTMCNC: 31.4 G/DL (ref 30–36.5)
MCV RBC AUTO: 91.1 FL (ref 80–99)
NRBC # BLD: 0 K/UL (ref 0–0.01)
NRBC BLD-RTO: 0 PER 100 WBC
PLATELET # BLD AUTO: 288 K/UL (ref 150–400)
PMV BLD AUTO: 9.5 FL (ref 8.9–12.9)
POTASSIUM SERPL-SCNC: 3.3 MMOL/L (ref 3.5–5.1)
RBC # BLD AUTO: 3.49 M/UL (ref 3.8–5.2)
SODIUM SERPL-SCNC: 135 MMOL/L (ref 136–145)
WBC # BLD AUTO: 7.6 K/UL (ref 3.6–11)

## 2021-01-13 PROCEDURE — 74011250637 HC RX REV CODE- 250/637: Performed by: PHYSICIAN ASSISTANT

## 2021-01-13 PROCEDURE — 74011000250 HC RX REV CODE- 250: Performed by: PHYSICIAN ASSISTANT

## 2021-01-13 PROCEDURE — 85027 COMPLETE CBC AUTOMATED: CPT

## 2021-01-13 PROCEDURE — 65410000002 HC RM PRIVATE OB

## 2021-01-13 PROCEDURE — 3331090002 HH PPS REVENUE DEBIT

## 2021-01-13 PROCEDURE — 36415 COLL VENOUS BLD VENIPUNCTURE: CPT

## 2021-01-13 PROCEDURE — 80048 BASIC METABOLIC PNL TOTAL CA: CPT

## 2021-01-13 PROCEDURE — 74011250636 HC RX REV CODE- 250/636: Performed by: OBSTETRICS & GYNECOLOGY

## 2021-01-13 PROCEDURE — 3331090001 HH PPS REVENUE CREDIT

## 2021-01-13 PROCEDURE — 94640 AIRWAY INHALATION TREATMENT: CPT

## 2021-01-13 PROCEDURE — 74011250637 HC RX REV CODE- 250/637: Performed by: OBSTETRICS & GYNECOLOGY

## 2021-01-13 RX ORDER — TRAMADOL HYDROCHLORIDE 50 MG/1
50 TABLET ORAL
Status: DISCONTINUED | OUTPATIENT
Start: 2021-01-13 | End: 2021-01-15 | Stop reason: HOSPADM

## 2021-01-13 RX ORDER — OXYCODONE HYDROCHLORIDE 5 MG/1
5-10 TABLET ORAL
Status: DISCONTINUED | OUTPATIENT
Start: 2021-01-13 | End: 2021-01-15 | Stop reason: HOSPADM

## 2021-01-13 RX ADMIN — OXYCODONE HYDROCHLORIDE 5 MG: 5 TABLET ORAL at 04:54

## 2021-01-13 RX ADMIN — LABETALOL HYDROCHLORIDE 100 MG: 100 TABLET, FILM COATED ORAL at 20:20

## 2021-01-13 RX ADMIN — AMLODIPINE BESYLATE 5 MG: 5 TABLET ORAL at 10:07

## 2021-01-13 RX ADMIN — TRAMADOL HYDROCHLORIDE 50 MG: 50 TABLET, FILM COATED ORAL at 15:48

## 2021-01-13 RX ADMIN — ACETAMINOPHEN 650 MG: 325 TABLET ORAL at 19:41

## 2021-01-13 RX ADMIN — Medication 10 ML: at 06:56

## 2021-01-13 RX ADMIN — DIPHENHYDRAMINE HYDROCHLORIDE 12.5 MG: 50 INJECTION, SOLUTION INTRAMUSCULAR; INTRAVENOUS at 02:17

## 2021-01-13 RX ADMIN — ACETAMINOPHEN 650 MG: 325 TABLET ORAL at 00:44

## 2021-01-13 RX ADMIN — DOCUSATE SODIUM 100 MG: 100 CAPSULE, LIQUID FILLED ORAL at 10:06

## 2021-01-13 RX ADMIN — Medication 5 ML: at 22:00

## 2021-01-13 RX ADMIN — ACETAMINOPHEN 650 MG: 325 TABLET ORAL at 06:55

## 2021-01-13 RX ADMIN — DONEPEZIL HYDROCHLORIDE 5 MG: 5 TABLET, FILM COATED ORAL at 10:06

## 2021-01-13 RX ADMIN — ENOXAPARIN SODIUM 40 MG: 100 INJECTION SUBCUTANEOUS at 13:38

## 2021-01-13 RX ADMIN — ACETAMINOPHEN 650 MG: 325 TABLET ORAL at 13:38

## 2021-01-13 RX ADMIN — IPRATROPIUM BROMIDE AND ALBUTEROL SULFATE 3 ML: .5; 3 SOLUTION RESPIRATORY (INHALATION) at 07:39

## 2021-01-13 RX ADMIN — Medication 10 ML: at 15:50

## 2021-01-13 NOTE — PROGRESS NOTES
Bedside and Verbal shift change report given to Alexa Dwyer RN (oncoming nurse) by JAVIER Gimenez RN (offgoing nurse). Report included the following information SBAR, Kardex, Intake/Output, MAR and Recent Results.

## 2021-01-13 NOTE — PROGRESS NOTES
Bedside shift change report given to Rose Patel RN (oncoming nurse) by Alisia Mortensen RN (offgoing nurse). Report included the following information SBAR, Kardex, Procedure Summary, Intake/Output and MAR.     0000: [place] patient calling out from room from nurses station. Came into room to find patient had accidentally removed IV by trying to get out of bed without help. Pt had gown off and when asked what happened stated \"I don't really know where I am\". This RN and Charge Nurse DARCIE Carias bandaged IV site, cleaned pt up with CHG wipes and helped her back into bed. Pt stated she knew she was at [facility] in the women's division\" when asked if she knew where she was. Use and importance of the call bell was reinforced to patient and bed alarm was turned on. New IV was placed and RN left the room with patient comfortably in bed watching TV.

## 2021-01-13 NOTE — PROGRESS NOTES
[address] [facility] [address]  P [phone]  F [phone]       Patient Name: Clarissa Osullivan   Admit Date: 1/11/2021   OR Date: 1/11/2021   Visit Date: 1/13/2021        SUBJECTIVE:    No complaints this morning. She states that her pain is minimal and well controlled. Discomfort with activity but otherwise comfortable. Tolerating full liquid diet. No nausea/vomiting. Denies flatus, - BM. Voiding. Did get out of bed with staff to ambulate. Nursing reports that patient attempted to get out of bed on her own last night and IV pulled out. Intermittent confusion reported. At times not oriented to place. This morning, does not recall that her IV pulled out. OBJECTIVE:    Patient Vitals for the past 24 hrs:   Temp Pulse Resp BP SpO2   01/13/21 0404 97.4 °F (36.3 °C) 79 16 115/61 92 %   01/13/21 0000 98.2 °F (36.8 °C) 100 16 109/61 91 %   01/12/21 2027 98.1 °F (36.7 °C) 90 16 (!) 160/55 93 %   01/12/21 1634 98.4 °F (36.9 °C) 89 16 (!) 159/80 92 %   01/12/21 1212 97.5 °F (36.4 °C) 92 16 (!) 171/80 93 %   01/12/21 0801 97.6 °F (36.4 °C) 92 16 (!) 183/94 95 %       Date 01/12/21 0700 - 01/13/21 0659 01/13/21 0700 - 01/14/21 0659   Shift 5648-6782 1878-7523 24 Hour Total 2735-0049 9097-7302 24 Hour Total   INTAKE   P.O.  200 200        P. O.  200 200      I. V.(mL/kg/hr)  1850(1.6) 1850(0.8)        Volume (0.9% sodium chloride infusion)  1850 1850      Shift Total(mL/kg)  2050(20.9) 2050(20.9)      OUTPUT   Urine(mL/kg/hr) 1350(1.1) 350(0.3) 1700(0.7) 200  200     Urine Voided 9160 869 9465 200  200   Shift Total(mL/kg) 1350(13.8) 350(3.6) 1700(17.3) 200(2)  200(2)   NET -1350 1700 350 -200  -200   Weight (kg) 98 98 98 98 98 98       Physical Exam     General:  alert, cooperative, no distress     Cardiac:  Regular rate and rhythm        Lungs:  clear to auscultation bilaterally  Abdomen:  soft, distended and tympanic, normal bowel sounds, nontender Wound:  dressing intact. clean and dry. mild shadowing noted. Extremity: extremities normal, atraumatic, no cyanosis or edema    Data Review  Lab Results   Component Value Date/Time    WBC 7.6 01/13/2021 12:39 AM    ABS. NEUTROPHILS 6.6 01/12/2021 05:22 AM    HGB 10.0 (L) 01/13/2021 12:39 AM    HCT 31.8 (L) 01/13/2021 12:39 AM    MCV 91.1 01/13/2021 12:39 AM    MCH 28.7 01/13/2021 12:39 AM    PLATELET 230 99/78/7328 12:39 AM     Lab Results   Component Value Date/Time    Sodium 135 (L) 01/13/2021 12:39 AM    Potassium 3.3 (L) 01/13/2021 12:39 AM    Chloride 105 01/13/2021 12:39 AM    CO2 25 01/13/2021 12:39 AM    Glucose 136 (H) 01/13/2021 12:39 AM    BUN 12 01/13/2021 12:39 AM    Creatinine 0.70 01/13/2021 12:39 AM    Calcium 8.5 01/13/2021 12:39 AM    Albumin 3.4 (L) 12/30/2020 02:14 PM    Bilirubin, total 0.3 12/30/2020 02:14 PM    ALT (SGPT) 26 12/30/2020 02:14 PM    Alk. phosphatase 118 (H) 12/30/2020 02:14 PM     IMPRESSION/PLAN:    2 Days Post-Op Procedure(s):  EXPLORATORY LAPAROTOMY, RESECTION OF MASS, BILATERAL SALPINGO-OOPHORECTOMY , OMENTECTOMY for OVARIAN CANCER    Oncologic:  67 yo WF with a large mass, elevated CA-125, and ascites. This presentation was concerning for an ovarian cancer. Frozen section shows Ovarian Fibroma. Final pathology pending. Heme/CV:  Hgb 10.0 this am.  Hemodynamically stable. May need to hold anti-hypertensives this am as BP 100s/60s. Renal:  decreased urine output overnight. Monitor. Continue IVF. Encourage PO fluids as tolerated. Creatinine 0.70       FEN/GI:  no nausea. Continue full liquid diet. Gas X prn   ID/Wound:  afebrile. Dressing clean. PPX:  SCDs. IS. lovenox   Dispostion:  Doing well postoperatively. Some confusion overnight. Uncertain if this is normal for baseline or related to narcotics. Limit narcotics. Continue to monitor. Awaiting return of bowel function.         Leta Roegr PA-C

## 2021-01-14 PROCEDURE — 74011250637 HC RX REV CODE- 250/637: Performed by: PHYSICIAN ASSISTANT

## 2021-01-14 PROCEDURE — 65410000002 HC RM PRIVATE OB

## 2021-01-14 PROCEDURE — 97161 PT EVAL LOW COMPLEX 20 MIN: CPT

## 2021-01-14 PROCEDURE — 97535 SELF CARE MNGMENT TRAINING: CPT

## 2021-01-14 PROCEDURE — 97116 GAIT TRAINING THERAPY: CPT

## 2021-01-14 PROCEDURE — 3331090001 HH PPS REVENUE CREDIT

## 2021-01-14 PROCEDURE — 74011250636 HC RX REV CODE- 250/636: Performed by: OBSTETRICS & GYNECOLOGY

## 2021-01-14 PROCEDURE — 3331090002 HH PPS REVENUE DEBIT

## 2021-01-14 PROCEDURE — 74011250637 HC RX REV CODE- 250/637: Performed by: OBSTETRICS & GYNECOLOGY

## 2021-01-14 PROCEDURE — 97165 OT EVAL LOW COMPLEX 30 MIN: CPT

## 2021-01-14 RX ORDER — SIMETHICONE 80 MG
80 TABLET,CHEWABLE ORAL
Status: CANCELLED | OUTPATIENT
Start: 2021-01-14

## 2021-01-14 RX ADMIN — ACETAMINOPHEN 650 MG: 325 TABLET ORAL at 19:34

## 2021-01-14 RX ADMIN — DONEPEZIL HYDROCHLORIDE 5 MG: 5 TABLET, FILM COATED ORAL at 08:05

## 2021-01-14 RX ADMIN — AMLODIPINE BESYLATE 5 MG: 5 TABLET ORAL at 08:05

## 2021-01-14 RX ADMIN — ENOXAPARIN SODIUM 40 MG: 100 INJECTION SUBCUTANEOUS at 15:05

## 2021-01-14 RX ADMIN — DIPHENHYDRAMINE HYDROCHLORIDE 12.5 MG: 50 INJECTION, SOLUTION INTRAMUSCULAR; INTRAVENOUS at 00:49

## 2021-01-14 RX ADMIN — ACETAMINOPHEN 650 MG: 325 TABLET ORAL at 06:36

## 2021-01-14 RX ADMIN — LABETALOL HYDROCHLORIDE 100 MG: 100 TABLET, FILM COATED ORAL at 21:39

## 2021-01-14 RX ADMIN — TRAMADOL HYDROCHLORIDE 50 MG: 50 TABLET, FILM COATED ORAL at 08:35

## 2021-01-14 RX ADMIN — ACETAMINOPHEN 650 MG: 325 TABLET ORAL at 00:49

## 2021-01-14 RX ADMIN — LABETALOL HYDROCHLORIDE 100 MG: 100 TABLET, FILM COATED ORAL at 08:05

## 2021-01-14 RX ADMIN — ACETAMINOPHEN 650 MG: 325 TABLET ORAL at 15:04

## 2021-01-14 RX ADMIN — Medication 10 ML: at 15:09

## 2021-01-14 RX ADMIN — Medication 5 ML: at 06:00

## 2021-01-14 RX ADMIN — DOCUSATE SODIUM 100 MG: 100 CAPSULE, LIQUID FILLED ORAL at 08:05

## 2021-01-14 NOTE — PROGRESS NOTES
Bedside shift change report given to Thalia Yuan RN (oncoming nurse) by Ginna Gomez RN (offgoing nurse). Report included the following information SBAR, Kardex, Intake/Output and MAR.  \

## 2021-01-14 NOTE — PROGRESS NOTES
OCCUPATIONAL THERAPY EVALUATION/DISCHARGE  Patient: Burgess Wakefield (57 y.o. female)  Date: 1/14/2021  Primary Diagnosis: Pelvic mass [R19.00]  Procedure(s) (LRB):  EXPLORATORY LAPAROTOMY, RESECTION OF MASS, BILATERAL SALPINGO-OOPHORECTOMY , OMENTECTOMY (N/A) 3 Days Post-Op   Precautions:        ASSESSMENT  Based on the objective data described below, the patient presents with only minor assistance required for basic ADL task completion at this time (assist to don socks), which may be remedied by familiar seating used upon return home. Patient other wise is completing ADL related mobility and ambulation in lynch (witnessed with nursing staff) with stand by assist (to manage IV pole), no assistive device used. Patient does not complain of abdominal discomfort during any positioning or movement, only when coughing. Patient educated on use of pillow to brace self during cough to reduce discomfort. Patient's cognitive status appears intact and appropriate. Current Level of Function (ADLs/self-care): Mod I for all tasks except donning socks    Functional Outcome Measure: The patient scored 90/100 on the Barthel Index outcome measure which is indicative of minor assistance for LB dressing, supervision assist ambulating stairs. Other factors to consider for discharge: Patient lives with family who she reports can assist if needed     PLAN :  Recommend with staff: OOB activity, active ADL participation, ambulation    Recommendation for discharge: (in order for the patient to meet his/her long term goals)  No skilled occupational therapy/ follow up rehabilitation needs identified at this time. This discharge recommendation:  Has been made in collaboration with the attending provider and/or case management    IF patient discharges home will need the following DME: none       SUBJECTIVE:   Patient stated Sure I can get up.     OBJECTIVE DATA SUMMARY:   HISTORY:   Past Medical History:   Diagnosis Date    Cellulitis R LEG    CVA [facility]cerebral vascular accident) ([facility] [address]     Hyperlipidemia     Hypertension      Past Surgical History:   Procedure Laterality Date    HX CHOLECYSTECTOMY      HX OTHER SURGICAL  10/2020    PARACENTESIS       Prior Level of Function/Environment/Context: Patient was independent with all self care and ambulation with no assistive devices  Expanded or extensive additional review of patient history:   Home Situation  Home Environment: Private residence  # Steps to Enter: 0  One/Two Story Residence: Two story  Interior Rails: Right  Living Alone: No  Support Systems: Family member(s)  Patient Expects to be Discharged to[de-identified] Private residence  Current DME Used/Available at Home: None, Shower chair  Tub or Shower Type: Tub/Shower combination    Hand dominance: Right    EXAMINATION OF PERFORMANCE DEFICITS:  Cognitive/Behavioral Status:  Neurologic State: Alert  Orientation Level: Oriented to person;Oriented to place;Oriented to time  Cognition: Appropriate decision making               Edema: none    Hearing: Auditory  Auditory Impairment: Hard of hearing, bilateral    Vision/Perceptual:                           Acuity: Within Defined Limits         Range of Motion:    AROM: Within functional limits  PROM: Within functional limits                      Strength:    Strength: Within functional limits                Coordination:  Coordination: Within functional limits  Fine Motor Skills-Upper: Left Intact; Right Intact    Gross Motor Skills-Upper: Left Intact; Right Intact    Tone & Sensation:    Tone: Normal  Sensation: Intact                      Balance:  Sitting: Intact; Without support  Standing: Intact; With support    Functional Mobility and Transfers for ADLs:  Bed Mobility:  Rolling: Modified independent  Supine to Sit: Modified independent  Sit to Supine: Modified independent  Scooting: Modified independent    Transfers:  Sit to Stand: Modified independent  Stand to Sit: Modified independent  Bed to Chair: Supervision  Toilet Transfer : Supervision    ADL Assessment:  Feeding: Independent    Oral Facial Hygiene/Grooming: Setup    Bathing: Supervision    Upper Body Dressing: Independent    Lower Body Dressing: Minimum assistance    Toileting: Supervision                ADL Intervention and task modifications:               Functional Measure:  Barthel Index:    Bathin  Bladder: 10  Bowels: 10  Groomin  Dressin  Feeding: 10  Mobility: 15  Stairs: 5  Toilet Use: 10  Transfer (Bed to Chair and Back): 15  Total: 90/100        The Barthel ADL Index: Guidelines  1. The index should be used as a record of what a patient does, not as a record of what a patient could do. 2. The main aim is to establish degree of independence from any help, physical or verbal, however minor and for whatever reason. 3. The need for supervision renders the patient not independent. 4. A patient's performance should be established using the best available evidence. Asking the patient, friends/relatives and nurses are the usual sources, but direct observation and common sense are also important. However direct testing is not needed. 5. Usually the patient's performance over the preceding 24-48 hours is important, but occasionally longer periods will be relevant. 6. Middle categories imply that the patient supplies over 50 per cent of the effort. 7. Use of aids to be independent is allowed. James Bermudez., Barthel, D.W. (9733). Functional evaluation: the Barthel Index. [address] [facility] (14)2. ROSENDA Hogan, Maria D Beltran., VladimirKettering [facility], [address] [place] [address] (). Measuring the change indisability after inpatient rehabilitation; comparison of the responsiveness of the Barthel Index and Functional Beltrami Measure. Journal of Neurology, Neurosurgery, and Psychiatry, 66(4), 464-363.   Filiberto Rosales, N.J.A, TRINITY Flores, & Stiven Farley, M.A. (2004.) Assessment of post-stroke quality of life in cost-effectiveness studies: The usefulness of the Barthel Index and the EuroQoL-5D. Quality of Life Research, 15, 221-92         Occupational Therapy Evaluation Charge Determination   History Examination Decision-Making   LOW Complexity : Brief history review  LOW Complexity : 1-3 performance deficits relating to physical, cognitive , or psychosocial skils that result in activity limitations and / or participation restrictions  LOW Complexity : No comorbidities that affect functional and no verbal or physical assistance needed to complete eval tasks       Based on the above components, the patient evaluation is determined to be of the following complexity level: LOW   Pain Rating:  None reported except during coughing    Activity Tolerance:   Good    After treatment patient left in no apparent distress:    Supine in bed and Call bell within reach    COMMUNICATION/EDUCATION:   The patients plan of care was discussed with: Registered nurse, Physician and Case management.      Thank you for this referral.  Jolly Leyden, OT

## 2021-01-14 NOTE — PROGRESS NOTES
[facility]  [address] [facility], [address], [address]  P [phone]  F [phone]       Patient Name: Rick Gage   Admit Date: 1/11/2021   OR Date: 1/11/2021   Visit Date: 1/14/2021        SUBJECTIVE:    Again, feeling well this morning. She states that her pain is minimal and well controlled. Discomfort with activity but otherwise comfortable. Tolerated full liquid diet though she did not try much. She states that she does not like most of the menu options available for full liquid diet. Denies flatus/BM. Has been out of bed with nursing staff and worked with OT this morning. Doing well with activity. Able to walk in halls with minimal assistance. OBJECTIVE:    Patient Vitals for the past 24 hrs:   Temp Pulse Resp BP SpO2   01/14/21 0805 98.2 °F (36.8 °C) 92 20 (!) 149/83 93 %   01/14/21 0058 97.8 °F (36.6 °C) 78 16 (!) 160/76 92 %   01/13/21 2005 98.1 °F (36.7 °C) 84 16 (!) 151/78 92 %   01/13/21 1856    (!) 140/80 90 %   01/13/21 1750    (!) 152/79 (!) 89 %   01/13/21 1636 98.3 °F (36.8 °C) 88 16 (!) 171/76 92 %   01/13/21 1206 98 °F (36.7 °C) 84 16 (!) 159/80 91 %       Date 01/13/21 0700 - 01/14/21 0659 01/14/21 0700 - 01/15/21 0659   Shift 4761-6083 0278-8235 24 Hour Total 1639-8359 2336-8027 24 Hour Total   INTAKE   P.O. 300  300        P. O. 300  300      I. V.(mL/kg/hr)  9921(5) 0715(2)        Volume (0.9% sodium chloride infusion)  2395 2395      Shift Total(mL/kg) 300(3.1) 9832(21.8) 9201(30.6)      OUTPUT   Urine(mL/kg/hr) 1200(1) 800(0.7) 2000(0.9)        Urine Voided 2774 044 0169        Urine Occurrence(s)  5 x 5 x      Shift Total(mL/kg) 8749(35.7) 800(8.2) 2000(20.4)      NET -900 1595 695      Weight (kg) 98 98 98 98 98 98       Physical Exam     General:  alert, cooperative, no distress     Cardiac:  Regular rate and rhythm        Lungs:  clear to auscultation bilaterally  Abdomen:  soft, distended and tympanic, normal bowel sounds, nontender       Wound:  dressing intact. clean and dry. mild shadowing noted. Extremity: extremities normal, atraumatic, no cyanosis or edema    Data Review  Lab Results   Component Value Date/Time    WBC 7.6 01/13/2021 12:39 AM    ABS. NEUTROPHILS 6.6 01/12/2021 05:22 AM    HGB 10.0 (L) 01/13/2021 12:39 AM    HCT 31.8 (L) 01/13/2021 12:39 AM    MCV 91.1 01/13/2021 12:39 AM    MCH 28.7 01/13/2021 12:39 AM    PLATELET 501 85/49/4690 12:39 AM     Lab Results   Component Value Date/Time    Sodium 135 (L) 01/13/2021 12:39 AM    Potassium 3.3 (L) 01/13/2021 12:39 AM    Chloride 105 01/13/2021 12:39 AM    CO2 25 01/13/2021 12:39 AM    Glucose 136 (H) 01/13/2021 12:39 AM    BUN 12 01/13/2021 12:39 AM    Creatinine 0.70 01/13/2021 12:39 AM    Calcium 8.5 01/13/2021 12:39 AM    Albumin 3.4 (L) 12/30/2020 02:14 PM    Bilirubin, total 0.3 12/30/2020 02:14 PM    ALT (SGPT) 26 12/30/2020 02:14 PM    Alk. phosphatase 118 (H) 12/30/2020 02:14 PM     IMPRESSION/PLAN:    3 Days Post-Op Procedure(s):  EXPLORATORY LAPAROTOMY, RESECTION OF MASS, BILATERAL SALPINGO-OOPHORECTOMY , OMENTECTOMY for OVARIAN CANCER    Oncologic:  67 yo WF with a large mass, elevated CA-125, and ascites. This presentation was concerning for an ovarian cancer. Frozen section shows Ovarian Fibroma. Final pathology pending. Heme/CV:  Hemodynamically stable. Vital signs stable. Renal:  good urine output. Voiding without a problem. Encourage PO fluids as tolerated. Can stop IVF. FEN/GI:  no nausea/vomiting. Yesterday reported symptoms contradicted each other. Stated she was not nauseous and then said she was nauseated. Denies nausea this morning. Will start on gi lite diet. ID/Wound:  afebrile. Dressing clean. PPX:  SCDs. IS. lovenox   Dispostion:  Doing well postoperatively. Awaiting return of bowel function. Pain minimal.  If tolerates gi lite diet and begins to pass gas, may be able to go home.   Goal is for her to go home tomorrow afternoon. Appreciate OT eval.  May not need much support upon discharge.          Cindy Hathaway, PA-C

## 2021-01-14 NOTE — PROGRESS NOTES
T.O.C:              Pt expected to d/c to home with daughter              Family to provide transport at d/c              Emergency Contact: Ceci Garcia, daughter, [phone]: PT and OT therapists have seen pt; no home therapy recommended at present time. Phylicia Fairas and bedside RN aware pt will not need any Home Health at d/c.    CM received consult for [facility] PT/OT. PT/OT consult has been ordered. CM awaiting PT/OT evaluation.     Loly Hernandez RN

## 2021-01-14 NOTE — PROGRESS NOTES
Problem: Mobility Impaired (Adult and Pediatric)  Goal: *Acute Goals and Plan of Care (Insert Text)  Description: FUNCTIONAL STATUS PRIOR TO ADMISSION: Patient was independent and active without use of DME.    HOME SUPPORT PRIOR TO ADMISSION: The patient lived with her daughter but did not require assist.    Physical Therapy Goals  Initiated 1/14/2021  1. Patient will move from supine to sit and sit to supine , scoot up and down, and roll side to side in bed with independence within 7 day(s). 2.  Patient will transfer from bed to chair and chair to bed with independence using the least restrictive device within 7 day(s). 3.  Patient will perform sit to stand with independence within 7 day(s). 4.  Patient will ambulate with independence for 300 feet with the least restrictive device within 7 day(s). Outcome: Progressing Towards Goal   PHYSICAL THERAPY EVALUATION  Patient: Dawn Leyva (9 y.o. female)  Date: 1/14/2021  Primary Diagnosis: Pelvic mass [R19.00]  Procedure(s) (LRB):  EXPLORATORY LAPAROTOMY, RESECTION OF MASS, BILATERAL SALPINGO-OOPHORECTOMY , OMENTECTOMY (N/A) 3 Days Post-Op   Precautions: fall,       ASSESSMENT  Based on the objective data described below, the patient presents with slightly impaired standing balance but overall did well with mobility is s/p exploratory lab, resection of mass. Bilateral salpingo-oophorectomy, omentectomy POD 3. She was VOSS and was on room air the entire session. Lowest 02 sat reading was 89% and post amb with pursed lip breathing dhara to 95%. She declined up to the chair post session stating that she wanted to nap since she did not sleep well last night. I strongly encouraged her to make it her goal to take her meals up to the chair. Discussed with her nurse emphasizing the importance of maintaining appropriate day/night cycle. Anticipate steady gains and discharge to home. .    Current Level of Function Impacting Discharge (mobility/balance): provided contact guard at most.    Functional Outcome Measure: The patient scored 12 (extrapolated) on the TUG outcome measure which is indicative of increased fall risk. .      Other factors to consider for discharge: none     Patient will benefit from skilled therapy intervention to address the above noted impairments. PLAN :  Recommendations and Planned Interventions: bed mobility training, transfer training, gait training, therapeutic exercises, patient and family training/education, and therapeutic activities      Frequency/Duration: Patient will be followed by physical therapy:  3 times a week to address goals. Recommendation for discharge: (in order for the patient to meet his/her long term goals)  No skilled physical therapy/ follow up rehabilitation needs identified at this time. This discharge recommendation:  A follow-up discussion with the attending provider and/or case management is planned    IF patient discharges home will need the following DME: none         SUBJECTIVE:   Patient stated It only hurts when I cough.     OBJECTIVE DATA SUMMARY:   Chart checked, pt cleared by nursing  HISTORY:    Past Medical History:   Diagnosis Date    Cellulitis     R LEG    CVA (cerebral vascular accident) ([facility] [address]     Hyperlipidemia     Hypertension      Past Surgical History:   Procedure Laterality Date    HX CHOLECYSTECTOMY      HX OTHER SURGICAL  10/2020    PARACENTESIS       Personal factors and/or comorbidities impacting plan of care: see medical history    Home Situation  Home Environment: Private residence  # Steps to Enter: 0  One/Two Story Residence: Two story, live on 1st floor  Interior Rails: Right  Living Alone: No  Support Systems: Family member(s)  Patient Expects to be Discharged to[de-identified] Private residence  Current DME Used/Available at Home: Walker, rolling  Tub or Shower Type: Tub/Shower combination    EXAMINATION/PRESENTATION/DECISION MAKING:   Critical Behavior:  Neurologic State: Alert  Orientation Level: Oriented X4  Cognition: Appropriate decision making     Hearing: Auditory  Auditory Impairment: Hard of hearing, bilateral  Skin:  refer to MD and nursing notes  Edema: none noted  Range Of Motion:  AROM: Within functional limits           PROM: Within functional limits           Strength:    Strength: Within functional limits                    Tone & Sensation:   Tone: Normal              Sensation: Intact               Coordination:  Coordination: Within functional limits  Vision:   Acuity: Within Defined Limits  Functional Mobility:  Bed Mobility:  Rolling: Modified independent  Supine to Sit: Modified independent;Bed Modified  Sit to Supine: Independent  Scooting: Modified independent  Transfers:  Sit to Stand: Supervision  Stand to Sit: Supervision        Bed to Chair: Supervision              Balance:   Sitting: Intact; Without support  Standing: Impaired; Without support  Standing - Static: Good  Standing - Dynamic : Good  Ambulation/Gait Training:  Distance (ft): 100 Feet (ft)  Assistive Device: Gait belt  Ambulation - Level of Assistance: Contact guard assistance        Gait Abnormalities: Decreased step clearance        Base of Support: Widened                             Stairs: Therapeutic Exercises: Ankle pumps    Functional Measure:  Timed up and go:    Timed Get Up And Go Test: 12(extrapolated)       < than 10 seconds=Normal  Greater then 13.5 seconds (in elderly)=Increased fall risk   Vicente Galindo [place] M. Predicting the probability for falls in community dwelling older adults using the Timed Up and Go Test. Phys Ther. 2000;80:896-903.              Physical Therapy Evaluation Charge Determination   History Examination Presentation Decision-Making   MEDIUM  Complexity : 1-2 comorbidities / personal factors will impact the outcome/ POC  LOW Complexity : 1-2 Standardized tests and measures addressing body structure, function, activity limitation and / or participation in recreation  MEDIUM Complexity : Evolving with changing characteristics  LOW Complexity : FOTO score of       Based on the above components, the patient evaluation is determined to be of the following complexity level: LOW     Pain Rating:  Rated at 0 when asked    Activity Tolerance:   observed SOB with activity and see assessment    After treatment patient left in no apparent distress:   Supine in bed, Call bell within reach, Side rails x 3, and declined up to the chair    COMMUNICATION/EDUCATION:   The patients plan of care was discussed with: Occupational therapist and Registered nurse. Fall prevention education was provided and the patient/caregiver indicated understanding., Patient/family have participated as able in goal setting and plan of care. , and Patient/family agree to work toward stated goals and plan of care.     Thank you for this referral.  Adrian Ceja   Time Calculation: 27 mins

## 2021-01-14 NOTE — PROGRESS NOTES
Bedside and Verbal shift change report given to Rustam Velazco RN (oncoming nurse) by JAVIER Lebron RN (offgoing nurse). Report included the following information SBAR, Kardex, Intake/Output, MAR and Recent Results.

## 2021-01-14 NOTE — PROGRESS NOTES
Bedside and Verbal shift change report given to [facility] E Jesús Ty (oncoming nurse) by Mikey Cornell (offgoing nurse). Report included the following information SBAR, Kardex, OR Summary, Intake/Output and MAR.

## 2021-01-15 VITALS
DIASTOLIC BLOOD PRESSURE: 67 MMHG | BODY MASS INDEX: 36 KG/M2 | HEIGHT: 65 IN | HEART RATE: 82 BPM | SYSTOLIC BLOOD PRESSURE: 157 MMHG | TEMPERATURE: 98 F | WEIGHT: 216.05 LBS | RESPIRATION RATE: 16 BRPM | OXYGEN SATURATION: 95 %

## 2021-01-15 PROCEDURE — 3331090001 HH PPS REVENUE CREDIT

## 2021-01-15 PROCEDURE — 97116 GAIT TRAINING THERAPY: CPT

## 2021-01-15 PROCEDURE — 74011250637 HC RX REV CODE- 250/637: Performed by: PHYSICIAN ASSISTANT

## 2021-01-15 PROCEDURE — 3331090002 HH PPS REVENUE DEBIT

## 2021-01-15 PROCEDURE — 74011250637 HC RX REV CODE- 250/637: Performed by: OBSTETRICS & GYNECOLOGY

## 2021-01-15 RX ORDER — ACETAMINOPHEN 325 MG/1
650 TABLET ORAL
Qty: 30 TAB | Refills: 0 | Status: SHIPPED
Start: 2021-01-15

## 2021-01-15 RX ORDER — TRAMADOL HYDROCHLORIDE 50 MG/1
50 TABLET ORAL
Qty: 10 TAB | Refills: 0 | Status: SHIPPED | OUTPATIENT
Start: 2021-01-15 | End: 2021-01-18

## 2021-01-15 RX ADMIN — AMLODIPINE BESYLATE 5 MG: 5 TABLET ORAL at 11:04

## 2021-01-15 RX ADMIN — ACETAMINOPHEN 650 MG: 325 TABLET ORAL at 02:00

## 2021-01-15 RX ADMIN — DOCUSATE SODIUM 100 MG: 100 CAPSULE, LIQUID FILLED ORAL at 11:04

## 2021-01-15 RX ADMIN — LABETALOL HYDROCHLORIDE 100 MG: 100 TABLET, FILM COATED ORAL at 11:04

## 2021-01-15 RX ADMIN — DONEPEZIL HYDROCHLORIDE 5 MG: 5 TABLET, FILM COATED ORAL at 11:04

## 2021-01-15 RX ADMIN — ACETAMINOPHEN 650 MG: 325 TABLET ORAL at 06:44

## 2021-01-15 NOTE — PROGRESS NOTES
Problem: Mobility Impaired (Adult and Pediatric)  Goal: *Acute Goals and Plan of Care (Insert Text)  Description: FUNCTIONAL STATUS PRIOR TO ADMISSION: Patient was independent and active without use of DME.    HOME SUPPORT PRIOR TO ADMISSION: The patient lived with her daughter but did not require assist.    Physical Therapy Goals  Initiated 1/14/2021  1. Patient will move from supine to sit and sit to supine , scoot up and down, and roll side to side in bed with independence within 7 day(s). 2.  Patient will transfer from bed to chair and chair to bed with independence using the least restrictive device within 7 day(s). 3.  Patient will perform sit to stand with independence within 7 day(s). 4.  Patient will ambulate with independence for 300 feet with the least restrictive device within 7 day(s). Outcome: Progressing Towards Goal  PHYSICAL THERAPY TREATMENT  Patient: Laila Mandujano (89 y.o. female)  Date: 1/15/2021  Diagnosis: Pelvic mass [R19.00] <principal problem not specified>  Procedure(s) (LRB):  EXPLORATORY LAPAROTOMY, RESECTION OF MASS, BILATERAL SALPINGO-OOPHORECTOMY , OMENTECTOMY (N/A) 4 Days Post-Op  Precautions:    Chart, physical therapy assessment, plan of care and goals were reviewed. ASSESSMENT  Patient continues with skilled PT services and is progressing towards goals. Reports she is feeling good and ready to go home. She continues to demonstrate w/ Mod I-Supervision for all functional mobility. She tolerated activity on RA, noted mild SOB post-activity which subsided w/ rest and PLB. She initially amb w/ RW x50 Ft, but complete remainder of gait w/o RW and no LOB or significant gait abnormalities. Noted balance intact standing at bedside w/o support. Has no further questions regarding mobility. Declined stair training as she reports she has no steps to navigate once home. She is ready for d/c home from PT standpoint.      Current Level of Function Impacting Discharge (mobility/balance): Mod I to Supervision/SBA for functional transfers    Other factors to consider for discharge: none         PLAN :  Patient continues to benefit from skilled intervention to address the above impairments. Continue treatment per established plan of care. to address goals. Recommendation for discharge: (in order for the patient to meet his/her long term goals)  No skilled physical therapy/ follow up rehabilitation needs identified at this time. This discharge recommendation:  Has been made in collaboration with the attending provider and/or case management    IF patient discharges home will need the following DME: none       SUBJECTIVE:   Patient stated I'm feeling good.     OBJECTIVE DATA SUMMARY:   Critical Behavior:  Neurologic State: Alert  Orientation Level: Oriented X4  Cognition: Follows commands     Functional Mobility Training:  Bed Mobility:     Supine to Sit: Modified independent  Sit to Supine: Modified independent           Transfers:  Sit to Stand: Supervision  Stand to Sit: Supervision                             Balance:  Sitting: Intact  Standing: Intact; With support  Standing - Static: Good  Standing - Dynamic : Good  Ambulation/Gait Training:  Distance (ft): 100 Feet (ft)  Assistive Device: Gait belt;Walker, rolling  Ambulation - Level of Assistance: Contact guard assistance;Assist x1        Gait Abnormalities: Decreased step clearance        Base of Support: Widened     Speed/Brittany: Pace decreased (<100 feet/min)  Step Length: Left shortened;Right shortened    Stairs:     Stairs - Level of Assistance: (reports 0 HARRIS)        Therapeutic Exercises:   PLB post-activity  Pain Rating:  No c/o pain    Activity Tolerance:   Good    After treatment patient left in no apparent distress:   Supine in bed, Call bell within reach, and Side rails x 3    COMMUNICATION/COLLABORATION:   The patients plan of care was discussed with: Registered nurse.      Mary Hudson,PTA   Time Calculation: 14 mins

## 2021-01-15 NOTE — PROGRESS NOTES
I have reviewed discharge instructions and medication with the patient and daughter. The patient and daughter verbalized understanding.

## 2021-01-15 NOTE — PROGRESS NOTES
[facility] GYNECOLOGIC ONCOLOGY  [address] [place] [address], [address], [address]  P [phone]  F [phone]       Patient Name: Dharmesh Rutledge   Admit Date: 1/11/2021   OR Date: 1/11/2021   Visit Date: 1/15/2021        SUBJECTIVE:    Doing very well this morning. No complaints. Discomfort with activity but otherwise comfortable. Tolerating a regular diet. Is passing gas. No BM yet. No nausea/vomiting. Has been active. Making progress with PT/OT. OBJECTIVE:    Patient Vitals for the past 24 hrs:   Temp Pulse Resp BP SpO2   01/15/21 0012 98.1 °F (36.7 °C) 69 16 125/68 92 %   01/14/21 2231    (!) 143/74    01/14/21 2129 98.3 °F (36.8 °C) 77 16 (!) 162/78 92 %   01/14/21 1605 98.3 °F (36.8 °C) 80 16 (!) 168/72 90 %       Date 01/14/21 0700 - 01/15/21 0659 01/15/21 0700 - 01/16/21 0659   Shift 6867-3118 4580-5895 24 Hour Total 0687-4360 5574-4769 24 Hour Total   INTAKE   Shift Total(mL/kg)         OUTPUT   Urine(mL/kg/hr)  250(0.2) 250(0.1)        Urine Voided  250 250        Urine Occurrence(s)  3 x 3 x      Shift Total(mL/kg)  250(2.6) 250(2.6)      NET  -250 -250      Weight (kg) 98 98 98 98 98 98       Physical Exam     General:  alert, cooperative, no distress     Cardiac:  Regular rate and rhythm        Lungs:  clear to auscultation bilaterally  Abdomen:  soft, distended and tympanic, normal bowel sounds, nontender       Wound: dressing removed. Skin edges well approximated with staples. No erythema or drainage. Extremity: extremities normal, atraumatic, no cyanosis or edema    Data Review  Lab Results   Component Value Date/Time    WBC 7.6 01/13/2021 12:39 AM    ABS.  NEUTROPHILS 6.6 01/12/2021 05:22 AM    HGB 10.0 (L) 01/13/2021 12:39 AM    HCT 31.8 (L) 01/13/2021 12:39 AM    MCV 91.1 01/13/2021 12:39 AM    MCH 28.7 01/13/2021 12:39 AM    PLATELET 796 37/34/7398 12:39 AM     Lab Results   Component Value Date/Time    Sodium 135 (L) 01/13/2021 12:39 AM    Potassium 3.3 (L) 01/13/2021 12:39 AM    Chloride 105 01/13/2021 12:39 AM    CO2 25 01/13/2021 12:39 AM    Glucose 136 (H) 01/13/2021 12:39 AM    BUN 12 01/13/2021 12:39 AM    Creatinine 0.70 01/13/2021 12:39 AM    Calcium 8.5 01/13/2021 12:39 AM    Albumin 3.4 (L) 12/30/2020 02:14 PM    Bilirubin, total 0.3 12/30/2020 02:14 PM    ALT (SGPT) 26 12/30/2020 02:14 PM    Alk. phosphatase 118 (H) 12/30/2020 02:14 PM     IMPRESSION/PLAN:    3 Days Post-Op Procedure(s):  EXPLORATORY LAPAROTOMY, RESECTION OF MASS, BILATERAL SALPINGO-OOPHORECTOMY , OMENTECTOMY for OVARIAN CANCER    Oncologic:  67 yo WF with a large mass, elevated CA-125, and ascites. This presentation was concerning for an ovarian cancer. Frozen section shows Ovarian Fibroma. Final pathology:   FINAL PATHOLOGIC DIAGNOSIS   1. Ovary and fallopian tube, left salpingo-oophorectomy:   Fibroma   Paratubal cysts   2. Ovary and fallopian tube, right salpingo-oophorectomy:   Benign physiologic changes and paratubal cyst   3. Omentum, omentectomy:   Focal fibrosis and diffuse chronic inflammation    Heme/CV:  Hemodynamically stable. Vital signs stable. Renal:  good urine output. Voiding without a problem. Encourage PO fluids as tolerated. Can stop IVF. FEN/GI:  no nausea/vomiting. Tolerated gi lite diet yesterday. + flatus. No BM. ID/Wound:  afebrile. Incision clean, dry. No drainage. [place] PPX:  SCDs. IS. lovenox while admitted. No need for anticoagulation upon discharge. Dispostion:  Doing well postoperatively. Eating well.  + flatus. Bowel function returning. Narendra Chau for her to go home even if has not have a BM yet. Discharge instructions reviewed. Likely discharge this afternoon if continues to do well.           Candido Cowan PA-C

## 2021-01-15 NOTE — DISCHARGE INSTRUCTIONS
[address] [facility] [address], [address]  P [phone]  F [phone]     Jayy Swenson      Dear Ms. Yohan Checo,      Please review your instructions with your nurse and ask any questions so you have all the information you need to recover well at home. If you do not feel you have everything you need to succeed and be safe after you leave the hospital, please discuss these concerns with your nurse. As always, call for any questions at home. Your doctor: Dr. Ritika Crum  Diagnosis: Pelvic mass [R19.00]  Procedure: Procedure(s):  EXPLORATORY LAPAROTOMY, RESECTION OF MASS, BILATERAL SALPINGO-OOPHORECTOMY , OMENTECTOMY  Date of Discharge: 1/15/2021      Take Home Medications     See Discharge Medication Review provided to you by your nurse. If you did not receive one, request this prior to your discharge. · It is important that you take your medications as they are prescribed. · Keep your medications in the bottles provided by the pharmacist and keep a list of the medication names, dosages, times to be taken and what they are for in your wallet. · Do not take other medications without consulting your doctor. · If you no longer need your prescribed pain medication, you may take Tylenol or Aleve alone. · You should take a daily gentle stool softener such as a colace pill or dulcolax suppository for constipation as this is not uncommon after surgery and/or while on pain medication. If not prescribed, this can be found over the counter. If constipation persists for >24 hours you should take a dose of Milk of Magnesia. Call if your constipation continues. Diet    · Stay hydrated and drink fluids such as gatorade and water. This will also help prevent constipation and dehydration. Limit somewhat any usual caffeine intake of beverages such as soda, tea and coffee as this may serve to dehydrate you.   · For the first 2-3 days keep a low fiber diet avoiding raw vegetables or fruits with skin. A diet consisting of soup, cereal, yogurt, eggs, fish, Boost/Ensure. Avoid fatty/greasy foods. · If nauseated, keep your diet limited to liquids and call if this persists. Activity    · If possible, have someone with you at all times until you feel stable. · Gradually increase your activity each day. There are generally no restrictions on walking, climbing stairs or riding in a car. Try to walk at least 4 times per day. · Showers are okay. If you have an incision, no tub bathing/swimming for two weeks. · No driving for 2 week and/or while on pain medication. · There are no lifting restrictions if you did not have surgery. · If you had surgery, the following restrictions are in place:  1. If you had an open procedure, no heavy lifting greater than 15 lbs for 8 weeks. 2. If you had a hysterectomy, nothing per vagina for 6-8 weeks. 3. If you had any type of vaginal procedure, you may experience vaginal spotting. Should the bleeding require more that 4 pads a day please call our office. Incision    · You should expect some discomfort in the area of your incision, particularly as you increase your activity. If you notice an area of increasing redness or new drainage, please call your doctor. · Staples are generally removed in 10-14 days. Causes For Concern    If any of the following occur, please call our office and speak with the Nurse/aid who will help you with your problem or ask the doctor to call you.  Problems with the incision, including increasing pain, swelling, redness or drainage.  Inability to pass urine    Increasing abdominal pain despite medication   Persisting nausea or vomiting.  Fever or chills and a temperature >101F   Constipation (no bowel movement for three days).  Diarrhea (more than three watery stools within 24 hours).  Excessive vaginal or wound bleeding.    If after hours and you cannot reach an on-call physician, call 911. Follow-Up    Call [phone] to schedule an appointment with Dr. Christiano Zavaleta in 4 weeks. Please also follow up with Dr. Tono Acevedo nurse in 7-10 days for staple removal.          Information obtained by :  I understand that if any problems occur once I am at home I am to contact my physician. I understand and acknowledge receipt of the instructions indicated above.                                                                                                                                            Physician's or R.N.'s Signature                                                                  Date/Time                                                                                                                                              Patient or Representative Signature                                                          Date/Time

## 2021-01-15 NOTE — PROGRESS NOTES
.Bedside and Verbal shift change report given to Edgar Al RN (oncoming nurse) by Keyana Ramesh RN (offgoing nurse). Report included the following information SBAR, Kardex, Procedure Summary, Intake/Output, MAR, Accordion and Recent Results.

## 2021-01-16 PROCEDURE — 3331090001 HH PPS REVENUE CREDIT

## 2021-01-16 PROCEDURE — 3331090002 HH PPS REVENUE DEBIT

## 2021-01-17 PROCEDURE — 3331090001 HH PPS REVENUE CREDIT

## 2021-01-17 PROCEDURE — 3331090002 HH PPS REVENUE DEBIT

## 2021-01-18 ENCOUNTER — HOME CARE VISIT (OUTPATIENT)
Dept: SCHEDULING | Facility: HOME HEALTH | Age: 81
End: 2021-01-18
Payer: MEDICARE

## 2021-01-18 VITALS
RESPIRATION RATE: 20 BRPM | OXYGEN SATURATION: 98 % | DIASTOLIC BLOOD PRESSURE: 78 MMHG | HEART RATE: 86 BPM | TEMPERATURE: 98 F | SYSTOLIC BLOOD PRESSURE: 150 MMHG

## 2021-01-18 PROCEDURE — 3331090001 HH PPS REVENUE CREDIT

## 2021-01-18 PROCEDURE — 3331090002 HH PPS REVENUE DEBIT

## 2021-01-18 PROCEDURE — G0299 HHS/HOSPICE OF RN EA 15 MIN: HCPCS

## 2021-01-18 PROCEDURE — 3331090003 HH PPS REVENUE ADJ

## 2021-01-18 NOTE — DISCHARGE SUMMARY
[facility] GYNECOLOGIC ONCOLOGY  [address] [place] [address], [address], [address]  P [phone]  F [phone]        Discharge Summary  Patient ID:  Clarissa Osullivan  [id number]  51 y.o.  1940    Admit date: 1/11/2021    PCP: Armin Marquez NP    Admit MD: Day Arechiga MD    Discharge MD: Day Arechiga MD    Discharge date and time: 1/15/2021  1:58 PM    Admission Diagnoses:   Pelvic mass [R19.00]  Patient Active Problem List   Diagnosis Code    Essential hypertension I10    Hyperlipidemia LDL goal <70 E78.5    Pelvic mass R19.00    H/O ascites Z87.898    Cellulitis of right lower extremity L03.115    Cerebrovascular accident (CVA) ([facility] [address] I63.9    Weakness generalized R53.1    Severe obesity ([facility] [address] E66.01       Discharge Diagnoses:    same    OR Date: 1/11/2021  OR procedure: Procedure(s):  EXPLORATORY LAPAROTOMY, RESECTION OF MASS, BILATERAL SALPINGO-OOPHORECTOMY , OMENTECTOMY    Hospital Course:   67 yo WF with a large mass, elevated CA-125, and ascites. This presentation was concerning for an ovarian cancer. .  She was admitted and underwent the above procedures. See op note for details, but briefly: Clear yellow ascites encountered upon entering the abdominal cavity.  Large, solid mass arising from the left ovary.  The mass was torsed about 3 times.  Normal uterus.  Normal right ovary.  No peritoneal abnormalities.  Normal appendix.  Normal small and large bowel.  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  No evidence of malignancy. Final path returned as below. She was admitted postoperatively for medical management. Pain was initially controlled with a PCA but this was discontinued on POD 1. Thereafter, she was requiring minimal pain medication. She was started on clear liquid diet. With no complaints of nausea and good bowel sounds, her diet was advanced. At the time of discharge, she was tolerating a GI lite diet. She was passing gas and having BMs.   Voiding without problems.  She was ambulating with minimal assistance.  She was deemed ready for release on POD 4.  She was afebrile with a benign abdomen and wound. Followup, meds and education provided as below.    Pathology:    FINAL PATHOLOGIC DIAGNOSIS   1. Ovary and fallopian tube, left salpingo-oophorectomy:   Fibroma   Paratubal cysts   2. Ovary and fallopian tube, right salpingo-oophorectomy:   Benign physiologic changes and paratubal cyst   3. Omentum, omentectomy:   Focal fibrosis and diffuse chronic inflammation     Consults: PT/OT    Significant Diagnostic Studies:  Lab Results   Component Value Date/Time    WBC 7.6 01/13/2021 12:39 AM    ABS. NEUTROPHILS 6.6 01/12/2021 05:22 AM    HGB 10.0 (L) 01/13/2021 12:39 AM    HCT 31.8 (L) 01/13/2021 12:39 AM    MCV 91.1 01/13/2021 12:39 AM    MCH 28.7 01/13/2021 12:39 AM    PLATELET 288 01/13/2021 12:39 AM     Lab Results   Component Value Date/Time    Sodium 135 (L) 01/13/2021 12:39 AM    Potassium 3.3 (L) 01/13/2021 12:39 AM    Chloride 105 01/13/2021 12:39 AM    CO2 25 01/13/2021 12:39 AM    Glucose 136 (H) 01/13/2021 12:39 AM    BUN 12 01/13/2021 12:39 AM    Creatinine 0.70 01/13/2021 12:39 AM    Calcium 8.5 01/13/2021 12:39 AM    Albumin 3.4 (L) 12/30/2020 02:14 PM    Bilirubin, total 0.3 12/30/2020 02:14 PM    ALT (SGPT) 26 12/30/2020 02:14 PM    Alk. phosphatase 118 (H) 12/30/2020 02:14 PM       Condition on Discharge: good    Disposition: home    Patient Instructions:   Discharge Medication List as of 1/15/2021 11:24 AM      START taking these medications    Details   traMADoL (ULTRAM) 50 mg tablet Take 1 Tab by mouth every eight (8) hours as needed for Pain for up to 3 days. Max Daily Amount: 150 mg., Normal, Disp-10 Tab, R-0      acetaminophen (TYLENOL) 325 mg tablet Take 2 Tabs by mouth every six (6) hours as needed for Pain., No Print, Disp-30 Tab, R-0      docusate sodium (COLACE) 50 mg capsule Take 1 Cap by mouth two (2) times daily as needed for  Constipation for up to 30 days. Stop for loose stools, No Print, Disp-30 Cap, R-0         CONTINUE these medications which have NOT CHANGED    Details   cyanocobalamin (VITAMIN B12) 1,000 mcg/mL injection 1 mL by IntraMUSCular route every seven (7) days for 4 doses. , Normal, Disp-4 Vial, R-0      folic acid (FOLVITE) 253 mcg tablet Take 800 mcg by mouth daily. Indications: inadequate folic acid, Historical Med      donepeziL (Aricept) 5 mg tablet Take 1 Tab by mouth daily. , Normal, Disp-30 Tab,R-0      menthol (GOLD BOND PAIN RELIEVING EX) Apply 1 Applicator to affected area as needed. thin layer lower extremities, Historical Med      naproxen sodium (Aleve) 220 mg cap Take 1 Tab by mouth daily as needed for Pain., Historical Med      amLODIPine (NORVASC) 5 mg tablet Take 5 mg by mouth daily. , Historical Med      simvastatin (ZOCOR) 20 mg tablet Take 20 mg by mouth nightly., Historical Med      aspirin delayed-release 81 mg tablet Take 81 mg by mouth daily. , Historical Med           Activity: no driving for 4 weeks and/or while on analgesics, no heavy lifting for 6 weeks. Nothing per vagina for 6 weeks  Walk four or more times daily  Showers okay, no tub bathing for 2 weeks if surgery  Stool softner for constipation  Diet: Regular Diet and Encourage fluids  Wound Care: Keep wound clean and dry, Reinforce dressing PRN and Ice to area for comfort    Follow-up with Dr. Alisia Poon in 4 weeks.   Also follow up with Dr. Azael Mckeon nurse in 7-10 days for staple removal.     Signed:  Shilpa Dumont PA-C  1/18/2021/1:33 PM

## 2021-01-19 ENCOUNTER — HOME CARE VISIT (OUTPATIENT)
Dept: HOME HEALTH SERVICES | Facility: HOME HEALTH | Age: 81
End: 2021-01-19

## 2021-01-19 PROCEDURE — 400018 HH-NO PAY CLAIM PROCEDURE

## 2021-01-22 ENCOUNTER — OFFICE VISIT (OUTPATIENT)
Dept: GYNECOLOGY | Age: 81
End: 2021-01-22
Payer: MEDICARE

## 2021-01-22 DIAGNOSIS — Z48.02: Primary | ICD-10-CM

## 2021-01-22 PROCEDURE — 99024 POSTOP FOLLOW-UP VISIT: CPT | Performed by: OBSTETRICS & GYNECOLOGY

## 2021-01-29 ENCOUNTER — HOME CARE VISIT (OUTPATIENT)
Dept: SCHEDULING | Facility: HOME HEALTH | Age: 81
End: 2021-01-29

## 2021-01-29 VITALS
RESPIRATION RATE: 20 BRPM | DIASTOLIC BLOOD PRESSURE: 80 MMHG | SYSTOLIC BLOOD PRESSURE: 110 MMHG | OXYGEN SATURATION: 96 % | HEART RATE: 84 BPM | TEMPERATURE: 97.4 F

## 2021-01-29 PROCEDURE — G0300 HHS/HOSPICE OF LPN EA 15 MIN: HCPCS

## 2021-02-02 ENCOUNTER — HOME CARE VISIT (OUTPATIENT)
Dept: HOME HEALTH SERVICES | Facility: HOME HEALTH | Age: 81
End: 2021-02-02

## 2021-02-02 NOTE — PROGRESS NOTES
TC to [employer], patients brynnthierno. Advised that [place] was not ordered post hospital stay and that we had made last 2 visits in error an that patients insurance would not be billed. Also verified that she is in agreement and feels comfortable taking care of patient without HH. She states she is working from home and is fine with current arrangement. Shivgutierrez Rape NP office and was on hold for > 20 minutes and needed to hang up. Relaying this message via [employer]. If you have any quections or concerns please call [phone] and ask for Nancy.  Thank you

## 2021-02-09 ENCOUNTER — OFFICE VISIT (OUTPATIENT)
Dept: GYNECOLOGY | Age: 81
End: 2021-02-09
Payer: MEDICARE

## 2021-02-09 VITALS
DIASTOLIC BLOOD PRESSURE: 75 MMHG | WEIGHT: 202 LBS | BODY MASS INDEX: 33.66 KG/M2 | SYSTOLIC BLOOD PRESSURE: 156 MMHG | HEIGHT: 65 IN

## 2021-02-09 DIAGNOSIS — D27.1: Primary | ICD-10-CM

## 2021-02-09 PROCEDURE — 99024 POSTOP FOLLOW-UP VISIT: CPT | Performed by: OBSTETRICS & GYNECOLOGY

## 2021-02-09 NOTE — PROGRESS NOTES
[address] [place] [address] [address], [address] [facility]  P [phone]  F [phone]    Postoperative Office Note  Patient ID:  Name: Macarena Sexton  MRM: [id number]  : 1940/80 y.o. Date: 2021    Diagnosis:     ICD-10-CM ICD-9-CM    1. Fibroma of left ovary  D27.1 220        Problem List:   Patient Active Problem List   Diagnosis Code    Essential hypertension I10    Hyperlipidemia LDL goal <70 E78.5    Pelvic mass R19.00    H/O ascites Z87.898    Cellulitis of right lower extremity L03.115    Cerebrovascular accident (CVA) ([facility] [address] I63.9    Weakness generalized R53.1    Severe obesity ([facility] [address] E66.01       SUBJECTIVE:    Macarena Sexton is a 68 y.o. female who presents for followup postoperative care following exploratory laparotomy, resection of mass >10 cm, bilateral salpingooophorectomy, omentectomy for a large pelvic mass. Operative findings:  Clear yellow ascites encountered upon entering the abdominal cavity.  Large, solid mass arising from the left ovary.  The mass was torsed about 3 times.  Normal uterus.  Normal right ovary.  No peritoneal abnormalities.  Normal appendix.  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  No evidence of malignancy. The patient's pathology revealed: FINAL PATHOLOGIC DIAGNOSIS   1. Ovary and fallopian tube, left salpingo-oophorectomy:   Fibroma   Paratubal cysts   2. Ovary and fallopian tube, right salpingo-oophorectomy:   Benign physiologic changes and paratubal cyst   3.  Omentum, omentectomy:   Focal fibrosis and diffuse chronic inflammation     CYTOLOGIC INTERPRETATION:   Pelvic Washing, ThinPrep and cell block   Scattered mixed inflammatory cells with few atypical cell groups   See comment   General Categorization   Atypical, favor benign reactive process   Specimen Adequacy   Satisfactory for evaluation   Comment   Atypical cell groups are favored to represent either reactive mesothelial cells or a benign glandular process such as endosalpingiosis or endometriosis. Please correlate with concurrent surgical pathology results [phone] Currently she has no problems with eating, bowel movements, or voiding. Appetite is good. Eating a regular diet without difficulty. Bowel movements are regular. The patient is not having any pain. Medications:     Current Outpatient Medications on File Prior to Visit   Medication Sig Dispense Refill    donepeziL (Aricept) 5 mg tablet Take 1 Tab by mouth daily. 30 Tab 0    menthol (GOLD BOND PAIN RELIEVING EX) Apply 1 Applicator to affected area as needed. thin layer lower extremities      amLODIPine (NORVASC) 5 mg tablet Take 5 mg by mouth daily.  simvastatin (ZOCOR) 20 mg tablet Take 20 mg by mouth nightly.  aspirin delayed-release 81 mg tablet Take 81 mg by mouth daily.  acetaminophen (TYLENOL) 325 mg tablet Take 2 Tabs by mouth every six (6) hours as needed for Pain. 30 Tab 0    docusate sodium (COLACE) 50 mg capsule Take 1 Cap by mouth two (2) times daily as needed for Constipation for up to 30 days. Stop for loose stools 30 Cap 0    folic acid (FOLVITE) 293 mcg tablet Take 800 mcg by mouth daily. Indications: inadequate folic acid      naproxen sodium (Aleve) 220 mg cap Take 1 Tab by mouth daily as needed for Pain. No current facility-administered medications on file prior to visit.       Allergies:   No Known Allergies  Past Medical History:   Diagnosis Date    Cellulitis     R LEG    CVA (cerebral vascular accident) ([facility] [address]     Hyperlipidemia     Hypertension      Past Surgical History:   Procedure Laterality Date    HX CHOLECYSTECTOMY      HX OTHER SURGICAL  10/2020    PARACENTESIS     Social History     Socioeconomic History    Marital status:      Spouse name: Not on file    Number of children: Not on file    Years of education: Not on file    Highest education level: Not on file Occupational History    Not on file   Social Needs    Financial resource strain: Not on file    Food insecurity     Worry: Not on file     Inability: Not on file    Transportation needs     Medical: Not on file     Non-medical: Not on file   Tobacco Use    Smoking status: Current Every Day Smoker     Packs/day: 1.50     Years: 72.00     Pack years: 108.00    Smokeless tobacco: Never Used   Substance and Sexual Activity    Alcohol use: Not Currently    Drug use: Not Currently    Sexual activity: Not on file   Lifestyle    Physical activity     Days per week: Not on file     Minutes per session: Not on file    Stress: Not on file   Relationships    Social connections     Talks on phone: Not on file     Gets together: Not on file     Attends Voodoo service: Not on file     Active member of club or organization: Not on file     Attends meetings of clubs or organizations: Not on file     Relationship status: Not on file    Intimate partner violence     Fear of current or ex partner: Not on file     Emotionally abused: Not on file     Physically abused: Not on file     Forced sexual activity: Not on file   Other Topics Concern    Not on file   Social History Narrative    Not on file       OBJECTIVE:    Vitals:   Vitals:    02/09/21 1431   BP: (!) 156/75   Weight: 202 lb (91.6 kg)   Height: 5' 5\" (1.651 m)     Physical Examination:     General:  alert, cooperative, no distress   Lungs: lungs clear to auscultation   Cardiac: Regular rate and rhythm   Abdomen: soft, bowel sounds active, non-tender  No CVA tenderness   Incision: healing well   Pelvic: External genitalia: normal general appearance  Urinary system: urethral meatus normal  Vaginal: normal without tenderness, induration or masses  Cervix: normal appearance  Adnexa: removed surgically  Uterus: normal single, nontender   Rectal: not done   Extremity:   extremities normal, atraumatic, no cyanosis or edema     IMPRESSION:    Olamide Ortiz is doing well postoperatively. She has no apparent complications from surgery. She has a diagnosis of an ovarian fibroma, without evidence of malignancy. Medical problems:     Patient Active Problem List   Diagnosis Code    Essential hypertension I10    Hyperlipidemia LDL goal <70 E78.5    Pelvic mass R19.00    H/O ascites Z87.898    Cellulitis of right lower extremity L03.115    Cerebrovascular accident (CVA) ([facility] [address] I63.9    Weakness generalized R53.1    Severe obesity ([facility] [address] E66.01       PLAN:    The operative procedures and clinical results have been reviewed with the patient. Implications of diagnosis discussed at length. All questions answered. The patient may return to all of her normal activities without restrictions at this time. I will see the patient back prn. The patient is advised to call our office with any problems or concerns. An electronic signature was used to sign this note.     Delores Marx MD  2/9/2021

## 2021-02-24 ENCOUNTER — OFFICE VISIT (OUTPATIENT)
Dept: FAMILY MEDICINE CLINIC | Age: 81
End: 2021-02-24
Payer: MEDICARE

## 2021-02-24 VITALS
TEMPERATURE: 98.4 F | BODY MASS INDEX: 33.49 KG/M2 | OXYGEN SATURATION: 95 % | SYSTOLIC BLOOD PRESSURE: 144 MMHG | WEIGHT: 201 LBS | RESPIRATION RATE: 18 BRPM | DIASTOLIC BLOOD PRESSURE: 60 MMHG | HEART RATE: 94 BPM | HEIGHT: 65 IN

## 2021-02-24 DIAGNOSIS — L81.9 DISCOLORATION OF SKIN OF FOOT: Primary | ICD-10-CM

## 2021-02-24 DIAGNOSIS — R09.89 ABSENCE OF RIGHT DORSALIS PEDIS ARTERY PULSE: ICD-10-CM

## 2021-02-24 DIAGNOSIS — F17.210 CIGARETTE NICOTINE DEPENDENCE WITHOUT COMPLICATION: ICD-10-CM

## 2021-02-24 DIAGNOSIS — I10 ESSENTIAL HYPERTENSION: ICD-10-CM

## 2021-02-24 DIAGNOSIS — E53.8 B12 DEFICIENCY: ICD-10-CM

## 2021-02-24 PROCEDURE — G8753 SYS BP > OR = 140: HCPCS | Performed by: NURSE PRACTITIONER

## 2021-02-24 PROCEDURE — 99214 OFFICE O/P EST MOD 30 MIN: CPT | Performed by: NURSE PRACTITIONER

## 2021-02-24 PROCEDURE — G8400 PT W/DXA NO RESULTS DOC: HCPCS | Performed by: NURSE PRACTITIONER

## 2021-02-24 PROCEDURE — 1101F PT FALLS ASSESS-DOCD LE1/YR: CPT | Performed by: NURSE PRACTITIONER

## 2021-02-24 PROCEDURE — G8754 DIAS BP LESS 90: HCPCS | Performed by: NURSE PRACTITIONER

## 2021-02-24 PROCEDURE — 1090F PRES/ABSN URINE INCON ASSESS: CPT | Performed by: NURSE PRACTITIONER

## 2021-02-24 PROCEDURE — G8432 DEP SCR NOT DOC, RNG: HCPCS | Performed by: NURSE PRACTITIONER

## 2021-02-24 PROCEDURE — G8417 CALC BMI ABV UP PARAM F/U: HCPCS | Performed by: NURSE PRACTITIONER

## 2021-02-24 PROCEDURE — G8427 DOCREV CUR MEDS BY ELIG CLIN: HCPCS | Performed by: NURSE PRACTITIONER

## 2021-02-24 PROCEDURE — G0463 HOSPITAL OUTPT CLINIC VISIT: HCPCS | Performed by: NURSE PRACTITIONER

## 2021-02-24 PROCEDURE — G8536 NO DOC ELDER MAL SCRN: HCPCS | Performed by: NURSE PRACTITIONER

## 2021-02-24 RX ORDER — DONEPEZIL HYDROCHLORIDE 5 MG/1
5 TABLET, FILM COATED ORAL DAILY
Qty: 90 TAB | Refills: 1 | Status: SHIPPED | OUTPATIENT
Start: 2021-02-24 | End: 2021-01-01

## 2021-02-24 RX ORDER — ASPIRIN 81 MG/1
81 TABLET ORAL DAILY
Qty: 90 TAB | Refills: 1 | Status: SHIPPED | OUTPATIENT
Start: 2021-02-24

## 2021-02-24 RX ORDER — CYANOCOBALAMIN 1000 UG/ML
1000 INJECTION, SOLUTION INTRAMUSCULAR; SUBCUTANEOUS ONCE
Qty: 1 ML | Refills: 0
Start: 2021-02-24 | End: 2021-02-24

## 2021-02-24 RX ORDER — UREA 10 %
800 LOTION (ML) TOPICAL DAILY
Qty: 90 TAB | Refills: 1 | Status: SHIPPED | OUTPATIENT
Start: 2021-02-24 | End: 2021-01-01

## 2021-02-24 RX ORDER — SIMVASTATIN 20 MG/1
20 TABLET, FILM COATED ORAL
Qty: 90 TAB | Refills: 1 | Status: SHIPPED | OUTPATIENT
Start: 2021-02-24 | End: 2021-01-01

## 2021-02-24 RX ORDER — AMLODIPINE BESYLATE 5 MG/1
5 TABLET ORAL DAILY
Qty: 90 TAB | Refills: 1 | Status: SHIPPED | OUTPATIENT
Start: 2021-02-24 | End: 2021-01-01

## 2021-02-24 NOTE — PROGRESS NOTES
Skyla Live is a 68 y.o. female    Chief Complaint   Patient presents with    Immunization/Injection     B12    Foot Burn       1. Have you been to the ER, urgent care clinic since your last visit? Hospitalized since your last visit? No    2. Have you seen or consulted any other health care providers outside of the [address] [place] [address] since your last visit? Include any pap smears or colon screening.  No

## 2021-02-24 NOTE — PATIENT INSTRUCTIONS
Stopping Smoking: Care Instructions Your Care Instructions Cigarette smokers crave the nicotine in cigarettes. Giving it up is much harder than simply changing a habit. Your body has to stop craving the nicotine. It is hard to quit, but you can do it. There are many tools that people use to quit smoking. You may find that combining tools works best for you. There are several steps to quitting. First you get ready to quit. Then you get support to help you. After that, you learn new skills and behaviors to become a nonsmoker. For many people, a necessary step is getting and using medicine. Your doctor will help you set up the plan that best meets your needs. You may want to attend a smoking cessation program to help you quit smoking. When you choose a program, look for one that has proven success. Ask your doctor for ideas. You will greatly increase your chances of success if you take medicine as well as get counseling or join a cessation program. 
Some of the changes you feel when you first quit tobacco are uncomfortable. Your body will miss the nicotine at first, and you may feel short-tempered and grumpy. You may have trouble sleeping or concentrating. Medicine can help you deal with these symptoms. You may struggle with changing your smoking habits and rituals. The last step is the tricky one: Be prepared for the smoking urge to continue for a time. This is a lot to deal with, but keep at it. You will feel better. Follow-up care is a key part of your treatment and safety. Be sure to make and go to all appointments, and call your doctor if you are having problems. It's also a good idea to know your test results and keep a list of the medicines you take. How can you care for yourself at home? · Ask your family, friends, and coworkers for support. You have a better chance of quitting if you have help and support. · Join a support group, such as Nicotine Anonymous, for people who are trying to quit smoking. · Consider signing up for a smoking cessation program, such as the American Lung Association's Freedom from Smoking program. 
· Get text messaging support. Go to the website at www.smokefree. gov to sign up for the [facility] program. 
· Set a quit date. Pick your date carefully so that it is not right in the middle of a big deadline or stressful time. Once you quit, do not even take a puff. Get rid of all ashtrays and lighters after your last cigarette. Clean your house and your clothes so that they do not smell of smoke. · Learn how to be a nonsmoker. Think about ways you can avoid those things that make you reach for a cigarette. ? Avoid situations that put you at greatest risk for smoking. For some people, it is hard to have a drink with friends without smoking. For others, they might skip a coffee break with coworkers who smoke. ? Change your daily routine. Take a different route to work or eat a meal in a different place. · Cut down on stress. Calm yourself or release tension by doing an activity you enjoy, such as reading a book, taking a hot bath, or gardening. · Talk to your doctor or pharmacist about nicotine replacement therapy, which replaces the nicotine in your body. You still get nicotine but you do not use tobacco. Nicotine replacement products help you slowly reduce the amount of nicotine you need. These products come in several forms, many of them available over-the-counter: ? Nicotine patches ? Nicotine gum and lozenges ? Nicotine inhaler · Ask your doctor about bupropion (Wellbutrin) or varenicline (Chantix), which are prescription medicines. They do not contain nicotine. They help you by reducing withdrawal symptoms, such as stress and anxiety. · Some people find hypnosis, acupuncture, and massage helpful for ending the smoking habit. · Eat a healthy diet and get regular exercise. Having healthy habits will help your body move past its craving for nicotine. · Be prepared to keep trying. Most people are not successful the first few times they try to quit. Do not get mad at yourself if you smoke again. Make a list of things you learned and think about when you want to try again, such as next week, next month, or next year. Where can you learn more? Go to http://www.joy.com/ Enter N995 in the search box to learn more about \"Stopping Smoking: Care Instructions. \" Current as of: March 12, 2020               Content Version: 12.6 © 1380-8326 [employer]. Care instructions adapted under license by [employer] (which disclaims liability or warranty for this information). If you have questions about a medical condition or this instruction, always ask your healthcare professional. [place] [address] any warranty or liability for your use of this information. Learning About Peripheral Arterial Disease of the Legs What is peripheral arterial disease? Peripheral arterial disease (PAD) is narrowing or blockage of arteries in your arms and legs. The most common cause of PAD is the buildup of plaque on the inside of arteries. Plaque is made of extra cholesterol, calcium, and other material in your blood. Over time, plaque builds up in the walls of the arteries, including those that supply blood to your legs. This buildup leads to poor blood flow. When you have PAD, you have a risk of having plaque in other arteries in your body. This raises your risk of a heart attack and stroke. This information focuses on peripheral arterial disease of the legs, the area where it is most common. When you have PAD of the legs and you walk or exercise, your leg muscles may not get enough blood. This may cause symptoms, such as leg pain during exercise. Peripheral arterial disease is also called peripheral vascular disease. What are the symptoms? Many people who have PAD do not have any symptoms. But if you have symptoms, you may have weak or tired legs, difficulty walking or balancing, or pain. If you have pain, you might feel a tight, aching, or squeezing pain in the calf, thigh, or buttock. This pain usually happens after you have walked a certain distance. The pain goes away if you stop walking. This pain is called intermittent claudication. If PAD gets worse, you may have other symptoms that are caused by poor blood flow to your legs and feet. You may have: · Cold or numb feet or toes. · Sores that are slow to heal. 
· Leg or foot pain while you are at rest. 
· Feet and toes that become pale from exercise or when elevated. · Feet that turn red when dangled. · Blue or purple marks on your legs, feet, or toes. How can you prevent PAD? · Quit smoking. Quitting smoking is one of the best things you can do to help prevent PAD. If you need help quitting, talk to your doctor about stop-smoking programs and medicines. These can increase your chances of quitting for good. · Stay at a healthy weight. · Manage other health problems, including diabetes, high blood pressure, and high cholesterol. · Be physically active. Get at least 30 minutes of exercise on most days of the week. Walking is a good choice. You also may want to do other activities, such as running, swimming, cycling, or playing tennis or team sports. · Eat a variety of heart-healthy foods. ? Eat fruits, vegetables, whole grains (like oatmeal), dried beans and peas, nuts and seeds, soy products (like tofu), and fat-free or low-fat dairy products. ? Replace butter, margarine, and hydrogenated or partially hydrogenated oils with olive and canola oils. (Canola oil margarine without trans fat is fine.) ? Replace red meat with fish, poultry, and soy protein (like tofu). ? Limit processed and packaged foods like chips, crackers, and cookies. How is PAD treated? Your doctor may suggest ways to relieve symptoms and lower your risk of heart attack and stroke. These may include: · Quitting smoking. It's one of the most important things you can do. If you need help quitting, talk to your doctor about stop-smoking programs and medicines. These can increase your chances of quitting for good. · Eating heart-healthy foods. · Staying at a healthy weight. Lose weight if you need to. · Regular exercise (if your doctor says it's safe). Try walking, swimming, or biking for at least 30 minutes on most, if not all, days of the week. If you have leg symptoms when you exercise, your doctor might recommend a specialized exercise program that may relieve symptoms. The goal is to be able to walk farther without pain. · Medicines that help manage other problems such as high blood pressure and high cholesterol. · Medicine, such as aspirin, that prevents blood clots which could cause a heart attack or stroke. · Procedures, such as opening narrowed or blocked arteries (angioplasty) or using healthy blood vessels to create detours around narrowed or blocked arteries (bypass surgery). Follow-up care is a key part of your treatment and safety. Be sure to make and go to all appointments, and call your doctor if you are having problems. It's also a good idea to know your test results and keep a list of the medicines you take. Where can you learn more? Go to http://www.[employer].com/ Enter T848 in the search box to learn more about \"Learning About Peripheral Arterial Disease of the Legs. \" Current as of: December 16, 2019               Content Version: 12.6 © 9823-8991 [employer]. Care instructions adapted under license by [employer] (which disclaims liability or warranty for this information). If you have questions about a medical condition or this instruction, always ask your healthcare professional. [address] any warranty or liability for your use of this information.

## 2021-02-27 PROBLEM — E53.8 B12 DEFICIENCY: Status: ACTIVE | Noted: 2021-02-27

## 2021-02-28 NOTE — PROGRESS NOTES
Subjective: Luz Zendejas is a 68 y.o. female who presents for follow up of hypertension, hyperlipidemia, vit b12 deficiency, and burning pain in right foot. Diet and Lifestyle: generally follows a low fat low cholesterol diet, generally follows a low sodium diet, sedentary, smoker current every day  Home BP Monitoring: is not measured at home    Cardiovascular risk analysis - LDL goal is under 80  hypertension  hyperlipidemia  smoker  obese  sedentary lifestyle. Compliance with treatment thus far has been good. ROS: taking medications as instructed, no medication side effects noted, no TIA's, no chest pain on exertion, no dyspnea on exertion, no swelling of ankles, no orthostatic dizziness or lightheadedness, no orthopnea or paroxysmal nocturnal dyspnea, no palpitations, no intermittent claudication symptoms. Cardiovascular ROS: taking medications as instructed, no medication side effects noted, no TIA's, no chest pain on exertion, no dyspnea on exertion, no swelling of ankles, no orthostatic dizziness or lightheadedness, no orthopnea or paroxysmal nocturnal dyspnea, no palpitations, no intermittent claudication symptoms. New concerns: c/o burning pain in feet bilaterally, ongoing for weeks or longer. Has noticed some red discoloration in feet bilaterally. Symptoms worse on the left side. Denies skin breakdown or ulceration.      Patient Active Problem List   Diagnosis Code    Essential hypertension I10    Hyperlipidemia LDL goal <70 E78.5    Pelvic mass R19.00    H/O ascites Z87.898    Cellulitis of right lower extremity L03.115    Cerebrovascular accident (CVA) ([address] I63.9    Weakness generalized R53.1    Severe obesity ([address] E66.01     No Known Allergies  Past Medical History:   Diagnosis Date    Cellulitis     R LEG    CVA (cerebral vascular accident) ([address]     Hyperlipidemia     Hypertension      Past Surgical History:   Procedure Laterality Date    HX CHOLECYSTECTOMY      HX OTHER SURGICAL  10/2020    PARACENTESIS     Family History   Problem Relation Age of Onset    Anesth Problems Neg Hx      Social History     Tobacco Use    Smoking status: Current Every Day Smoker     Packs/day: 1.50     Years: 72.00     Pack years: 108.00    Smokeless tobacco: Never Used   Substance Use Topics    Alcohol use: Not Currently        Lab Results   Component Value Date/Time    WBC 7.6 01/13/2021 12:39 AM    HGB 10.0 (L) 01/13/2021 12:39 AM    HCT 31.8 (L) 01/13/2021 12:39 AM    PLATELET 514 58/06/9180 12:39 AM    MCV 91.1 01/13/2021 12:39 AM     Lab Results   Component Value Date/Time    Glucose 136 (H) 01/13/2021 12:39 AM    Creatinine 0.70 01/13/2021 12:39 AM      No results found for: CHOL, CHOLPOCT, HDL, LDL, LDLC, LDLCPOC, LDLCEXT, TRIGL, TGLPOCT, CHHD, CHHDX  Lab Results   Component Value Date/Time    ALT (SGPT) 26 12/30/2020 02:14 PM    Alk. phosphatase 118 (H) 12/30/2020 02:14 PM    Bilirubin, total 0.3 12/30/2020 02:14 PM    Albumin 3.4 (L) 12/30/2020 02:14 PM    Protein, total 8.0 12/30/2020 02:14 PM    PLATELET 272 15/27/4405 12:39 AM     Lab Results   Component Value Date/Time    GFR est non-AA >60 01/13/2021 12:39 AM    GFR est AA >60 01/13/2021 12:39 AM    Creatinine 0.70 01/13/2021 12:39 AM    BUN 12 01/13/2021 12:39 AM    Sodium 135 (L) 01/13/2021 12:39 AM    Potassium 3.3 (L) 01/13/2021 12:39 AM    Chloride 105 01/13/2021 12:39 AM    CO2 25 01/13/2021 12:39 AM     Lab Results   Component Value Date/Time    TSH 6.540 (H) 12/09/2020 12:05 PM         Review of Systems, additional:  A comprehensive review of systems was negative except for that written in the HPI.     Objective:     Visit Vitals  BP (!) 144/60 (BP 1 Location: Left upper arm, BP Patient Position: Sitting)   Pulse 94   Temp 98.4 °F (36.9 °C) (Oral)   Resp 18   Ht 5' 5\" (1.651 m)   Wt 201 lb (91.2 kg)   SpO2 95%   BMI 33.45 kg/m²     Physical Examination: General appearance - alert, well appearing, and in no distress, oriented to person, place, and time, normal appearing weight and well hydrated  Mental status - normal mood, behavior, speech, dress, motor activity, and thought processes  Eyes - sclera anicteric  Neck - supple, no significant adenopathy, thyroid exam: thyroid is normal in size without nodules or tenderness  Chest - clear to auscultation, no wheezes, rales or rhonchi, symmetric air entry  Heart - normal rate, regular rhythm, normal S1, S2, no murmurs, rubs, clicks or gallops, normal bilateral carotid upstroke without bruits, no JVD  Abdomen - soft, nontender, nondistended, no masses or organomegaly  bowel sounds normal  Neurological - alert, oriented, normal speech, no focal findings or movement disorder noted  Extremities - no pedal edema noted, peripheral pulses abnormal DP nonpalpable, no ulcers, gangrene or atrophic changes, reddish/purple discoloration of dorsum of foot and toes, R > L, delayed cap refill R toes  Skin - normal coloration and turgor, no rashes, no suspicious skin lesions noted      Assessment/Plan:     .  reviewed diet, exercise and weight control  very strongly urged to quit smoking to reduce cardiovascular risk  cardiovascular risk and specific lipid/LDL goals reviewed  reviewed medications and side effects in detail  reviewed potential future medication changes and side effects. Diagnoses and all orders for this visit:    1. Discoloration of skin of foot  2. Absence of right dorsalis pedis artery pulse   Strongly suspect PAD  Refer to vascular surgery for further eval and treatment  Smoking cessation strongly recommended  -     REFERRAL TO VASCULAR SURGERY    3. B12 deficiency  -     VITAMIN B12 INJECTION  -     THER/PROPH/DIAG INJECTION, SUBCUT/IM  -     cyanocobalamin (Vitamin B-12) 1,000 mcg/mL injection; 1 mL by IntraMUSCular route once for 1 dose. 4. Cigarette nicotine dependence without complication  The patient was counseled on the dangers of tobacco use, and was advised to quit. Reviewed strategies to maximize success, including removing cigarettes and smoking materials from environment, stress management, substitution of other forms of reinforcement, support of family/friends and written materials. precontemplation  -     REFERRAL TO VASCULAR SURGERY    5. Essential hypertension  Above goal but trending toward goal  Continue amlodipine  Reassess in 4 weeks    6. Hyperlipidemia  Control unknown  Fasting lipids at f/u  Continue simvastatin at current dose  Heart healthy diet    Other orders  -     folic acid (FOLVITE) 225 mcg tablet; Take 1 Tab by mouth daily. Indications: inadequate folic acid  -     donepeziL (Aricept) 5 mg tablet; Take 1 Tab by mouth daily. -     amLODIPine (NORVASC) 5 mg tablet; Take 1 Tab by mouth daily. -     aspirin delayed-release 81 mg tablet; Take 1 Tab by mouth daily. -     simvastatin (ZOCOR) 20 mg tablet; Take 1 Tab by mouth nightly. Follow-up and Dispositions    · Return in about 4 weeks (around 3/24/2021) for b12 injection. I have discussed the diagnosis with the patient and the intended plan as seen in the above orders. The patient has received an after-visit summary and questions were answered concerning future plans. Patient conveyed understanding of the plan at the time of the visit.     Brittney Gomez NP

## 2021-03-24 ENCOUNTER — OFFICE VISIT (OUTPATIENT)
Dept: FAMILY MEDICINE CLINIC | Age: 81
End: 2021-03-24
Payer: MEDICARE

## 2021-03-24 DIAGNOSIS — E53.8 B12 DEFICIENCY: Primary | ICD-10-CM

## 2021-03-24 RX ORDER — CYANOCOBALAMIN 1000 UG/ML
1000 INJECTION, SOLUTION INTRAMUSCULAR; SUBCUTANEOUS ONCE
Qty: 1 ML | Refills: 0
Start: 2021-03-24 | End: 2021-03-24

## 2021-03-24 NOTE — PROGRESS NOTES
Ronald Maki is a 80 y.o. female    Chief Complaint   Patient presents with    Immunization/Injection     B12     1. Have you been to the ER, urgent care clinic since your last visit? Hospitalized since your last visit? No    2. Have you seen or consulted any other health care providers outside of the [address] [place] [address] since your last visit? Include any pap smears or colon screening.  No

## 2021-05-25 ENCOUNTER — OFFICE VISIT (OUTPATIENT)
Dept: FAMILY MEDICINE CLINIC | Age: 81
End: 2021-05-25
Payer: MEDICARE

## 2021-05-25 VITALS
SYSTOLIC BLOOD PRESSURE: 150 MMHG | HEART RATE: 97 BPM | TEMPERATURE: 98.8 F | WEIGHT: 200 LBS | OXYGEN SATURATION: 90 % | DIASTOLIC BLOOD PRESSURE: 80 MMHG | BODY MASS INDEX: 33.32 KG/M2 | HEIGHT: 65 IN

## 2021-05-25 DIAGNOSIS — F17.210 CIGARETTE NICOTINE DEPENDENCE WITHOUT COMPLICATION: ICD-10-CM

## 2021-05-25 DIAGNOSIS — R06.02 SHORTNESS OF BREATH: Primary | ICD-10-CM

## 2021-05-25 DIAGNOSIS — E53.8 B12 DEFICIENCY: ICD-10-CM

## 2021-05-25 DIAGNOSIS — R07.81 PLEURITIC CHEST PAIN: ICD-10-CM

## 2021-05-25 DIAGNOSIS — R19.00 PELVIC MASS: ICD-10-CM

## 2021-05-25 PROCEDURE — G8510 SCR DEP NEG, NO PLAN REQD: HCPCS | Performed by: STUDENT IN AN ORGANIZED HEALTH CARE EDUCATION/TRAINING PROGRAM

## 2021-05-25 PROCEDURE — 96372 THER/PROPH/DIAG INJ SC/IM: CPT | Performed by: STUDENT IN AN ORGANIZED HEALTH CARE EDUCATION/TRAINING PROGRAM

## 2021-05-25 PROCEDURE — G8536 NO DOC ELDER MAL SCRN: HCPCS | Performed by: STUDENT IN AN ORGANIZED HEALTH CARE EDUCATION/TRAINING PROGRAM

## 2021-05-25 PROCEDURE — G8400 PT W/DXA NO RESULTS DOC: HCPCS | Performed by: STUDENT IN AN ORGANIZED HEALTH CARE EDUCATION/TRAINING PROGRAM

## 2021-05-25 PROCEDURE — G8754 DIAS BP LESS 90: HCPCS | Performed by: STUDENT IN AN ORGANIZED HEALTH CARE EDUCATION/TRAINING PROGRAM

## 2021-05-25 PROCEDURE — G8417 CALC BMI ABV UP PARAM F/U: HCPCS | Performed by: STUDENT IN AN ORGANIZED HEALTH CARE EDUCATION/TRAINING PROGRAM

## 2021-05-25 PROCEDURE — 1090F PRES/ABSN URINE INCON ASSESS: CPT | Performed by: STUDENT IN AN ORGANIZED HEALTH CARE EDUCATION/TRAINING PROGRAM

## 2021-05-25 PROCEDURE — 99214 OFFICE O/P EST MOD 30 MIN: CPT | Performed by: STUDENT IN AN ORGANIZED HEALTH CARE EDUCATION/TRAINING PROGRAM

## 2021-05-25 PROCEDURE — G8753 SYS BP > OR = 140: HCPCS | Performed by: STUDENT IN AN ORGANIZED HEALTH CARE EDUCATION/TRAINING PROGRAM

## 2021-05-25 PROCEDURE — G8427 DOCREV CUR MEDS BY ELIG CLIN: HCPCS | Performed by: STUDENT IN AN ORGANIZED HEALTH CARE EDUCATION/TRAINING PROGRAM

## 2021-05-25 PROCEDURE — G0463 HOSPITAL OUTPT CLINIC VISIT: HCPCS | Performed by: STUDENT IN AN ORGANIZED HEALTH CARE EDUCATION/TRAINING PROGRAM

## 2021-05-25 PROCEDURE — 1101F PT FALLS ASSESS-DOCD LE1/YR: CPT | Performed by: STUDENT IN AN ORGANIZED HEALTH CARE EDUCATION/TRAINING PROGRAM

## 2021-05-25 RX ORDER — CYANOCOBALAMIN 1000 UG/ML
1000 INJECTION, SOLUTION INTRAMUSCULAR; SUBCUTANEOUS ONCE
Status: COMPLETED | OUTPATIENT
Start: 2021-05-25 | End: 2021-05-25

## 2021-05-25 RX ORDER — ALBUTEROL SULFATE 90 UG/1
2 AEROSOL, METERED RESPIRATORY (INHALATION)
Qty: 1 INHALER | Refills: 5 | Status: SHIPPED | OUTPATIENT
Start: 2021-05-25 | End: 2022-01-01

## 2021-05-25 RX ADMIN — CYANOCOBALAMIN 1000 MCG: 1000 INJECTION, SOLUTION INTRAMUSCULAR at 16:28

## 2021-05-25 NOTE — PATIENT INSTRUCTIONS
You should purchase a blood pressure cuff to check your blood pressure at home. Check first thing in the morning. You should be relaxed, seated with back supported, feet flat on the floor, arm with cuff resting at heart height. You should check your blood pressure 1-3 times. Please write down your blood pressures and bring this record and your blood pressure cuff/machine to your follow-up visit. I would like for your blood pressure to be less than 140 for the top number. Please follow-up sooner for consistently high blood pressure readings at home.

## 2021-05-25 NOTE — PROGRESS NOTES
Gabriele Hassan is a 80 y.o. female , id x 2(name and ). Reviewed record, history, and  medications. Chief Complaint   Patient presents with    Injection B12    Follow-up     went to ER for left side pain, ER wanted to do additional testing, pt didn't want to wait so she left, no paperwork brought to this visit, per to side hurts with coughing or movement        Vitals:    21 1545   BP: (!) 150/80   Pulse: 97   Temp: 98.8 °F (37.1 °C)   TempSrc: Oral   SpO2: 90%   Weight: 200 lb (90.7 kg)   Height: 5' 5\" (1.651 m)     B12 injection given in left deltoid, pt tolerated well. Coordination of Care Questionnaire:   1) Have you been to an emergency room, urgent care, or hospitalized since your last visit? yes   [place]-left side pain    2. Have seen or consulted any other health care provider since your last visit? yes      3 most recent PHQ Screens 2021   Little interest or pleasure in doing things Not at all   Feeling down, depressed, irritable, or hopeless Not at all   Total Score PHQ 2 0       Patient is accompanied by daughter I have received verbal consent from Gabriele Hassan to discuss any/all medical information while they are present in the room.

## 2021-05-25 NOTE — PROGRESS NOTES
Progress Note    she is a 80y.o. year old female who presents for evalution. Chief Complaint   Patient presents with    Injection B12    Follow-up     went to ER for left side pain, ER wanted to do additional testing, pt didn't want to wait so she left, no paperwork brought to this visit, per to side hurts with coughing or movement          Assessment/ Plan:   Diagnoses and all orders for this visit:    1. Shortness of breath  Assessment & Plan:  X-ray, PFTs  Trial of albuterol  Concern for cardiac versus pulmonary cause. Discussed with patient and daughter that without records we cannot know what was done at outside hospital.  Request records today. Orders:  -     albuterol (PROVENTIL HFA, VENTOLIN HFA, PROAIR HFA) 90 mcg/actuation inhaler; Take 2 Puffs by inhalation every four (4) hours as needed for Wheezing for up to 360 days.  -     PFT DLCO; Future  -     XR CHEST PA LAT; Future  -     PULMONARY FUNCTION TEST; Future    2. Pleuritic chest pain  -     XR CHEST PA LAT; Future  -     XR RIBS LT UNI 2 V; Future    3. Cigarette nicotine dependence without complication  Assessment & Plan:  Encouraged tobacco cessation. Pt is precontemplative      4. Pelvic mass  Assessment & Plan:  S/p bilateral oophorectomy salpingectomy 1/2021, fibroma according to pathology      5. B12 deficiency  -     cyanocobalamin (VITAMIN B12) injection 1,000 mcg     Follow-up and Dispositions    · Return in about 4 weeks (around 6/22/2021) for 3-4 weeks follow-up breathing and blood pressure. I have discussed the diagnosis with the patient and the intended plan as seen in the above orders. The patient has received an after-visit summary and questions were answered concerning future plans. Pt conveyed understanding of plan.     Medication Side Effects and Warnings were discussed with patient        Subjective:     Chief Complaint   Patient presents with    Injection B12    Follow-up     went to ER for left side pain, ER wanted to do additional testing, pt didn't want to wait so she left, no paperwork brought to this visit, per to side hurts with coughing or movement        Per daughter, pt has been complaining of a pain on her left side. New, no injury or fall reported. Went to [facility] ER where she had bloodwork and xrays with nothing showing up. Were going to do another test, but she left AMA first.  Was told that oxygen level low. Has done ibuprofen and heating pad. Went away after that. Pain returned this morning. Took ibuprofen today, helped some. Pain whenever standing and coughing. No increased coughing. Occasionally productive of sputum, one episode of blood tinged a couple weeks ago. Issues with getting comfortable. Sometimes interferes with sleep. Asking about COVID vaccine. Scheduled for first of two doses at [facility] tomorrow. Unsure of which. Reports that pt is a heavy smoker and has heavy breathing    Reviewed PmHx, RxHx, FmHx, SocHx, AllgHx and updated and dated in the chart. Review of Systems - negative except as listed above in the HPI    Objective:     Vitals:    05/25/21 1545   BP: (!) 150/80   Pulse: 97   Temp: 98.8 °F (37.1 °C)   TempSrc: Oral   SpO2: 90%   Weight: 200 lb (90.7 kg)   Height: 5' 5\" (1.651 m)       Current Outpatient Medications   Medication Sig    albuterol (PROVENTIL HFA, VENTOLIN HFA, PROAIR HFA) 90 mcg/actuation inhaler Take 2 Puffs by inhalation every four (4) hours as needed for Wheezing for up to 360 days.  folic acid (FOLVITE) 859 mcg tablet Take 1 Tab by mouth daily. Indications: inadequate folic acid    donepeziL (Aricept) 5 mg tablet Take 1 Tab by mouth daily.  amLODIPine (NORVASC) 5 mg tablet Take 1 Tab by mouth daily.  aspirin delayed-release 81 mg tablet Take 1 Tab by mouth daily.  simvastatin (ZOCOR) 20 mg tablet Take 1 Tab by mouth nightly.     acetaminophen (TYLENOL) 325 mg tablet Take 2 Tabs by mouth every six (6) hours as needed for Pain.    menthol (GOLD BOND PAIN RELIEVING EX) Apply 1 Applicator to affected area as needed. thin layer lower extremities    naproxen sodium (Aleve) 220 mg cap Take 1 Tab by mouth daily as needed for Pain. No current facility-administered medications for this visit. Physical Exam  Vitals and nursing note reviewed. Constitutional:       General: She is not in acute distress. Appearance: Normal appearance. She is not ill-appearing, toxic-appearing or diaphoretic. HENT:      Head: Normocephalic and atraumatic. Eyes:      General: No scleral icterus. Right eye: No discharge. Left eye: No discharge. Conjunctiva/sclera: Conjunctivae normal.   Cardiovascular:      Rate and Rhythm: Normal rate and regular rhythm. Pulses: Normal pulses. Heart sounds: Normal heart sounds. Pulmonary:      Effort: Pulmonary effort is normal. No respiratory distress. Breath sounds: Rhonchi (diffuse rhonchorous breath sounds) present. Chest:      Chest wall: No tenderness. Abdominal:      Palpations: Abdomen is soft. Musculoskeletal:      Cervical back: No rigidity. Skin:     General: Skin is warm and dry. Neurological:      General: No focal deficit present. Mental Status: She is alert.               Praveen Ragland MD

## 2021-05-26 ENCOUNTER — HOSPITAL ENCOUNTER (OUTPATIENT)
Dept: GENERAL RADIOLOGY | Age: 81
Discharge: HOME | End: 2021-05-26
Attending: STUDENT IN AN ORGANIZED HEALTH CARE EDUCATION/TRAINING PROGRAM
Payer: MEDICARE

## 2021-05-26 ENCOUNTER — TRANSCRIBE ORDER (OUTPATIENT)
Dept: SCHEDULING | Age: 81
End: 2021-05-26

## 2021-05-26 DIAGNOSIS — R07.81 PLEURITIC CHEST PAIN: ICD-10-CM

## 2021-05-26 DIAGNOSIS — R06.02 SHORTNESS OF BREATH: ICD-10-CM

## 2021-05-26 PROBLEM — L03.115 CELLULITIS OF RIGHT LOWER EXTREMITY: Status: RESOLVED | Noted: 2020-11-18 | Resolved: 2021-05-26

## 2021-05-26 PROBLEM — R19.00 PELVIC MASS: Status: RESOLVED | Noted: 2020-11-18 | Resolved: 2021-05-26

## 2021-05-26 PROBLEM — F17.210 CIGARETTE NICOTINE DEPENDENCE WITHOUT COMPLICATION: Status: ACTIVE | Noted: 2021-05-26

## 2021-05-26 PROCEDURE — 71100 X-RAY EXAM RIBS UNI 2 VIEWS: CPT

## 2021-05-26 PROCEDURE — 71046 X-RAY EXAM CHEST 2 VIEWS: CPT

## 2021-05-26 NOTE — ASSESSMENT & PLAN NOTE
X-ray, PFTs  Trial of albuterol  Concern for cardiac versus pulmonary cause. Discussed with patient and daughter that without records we cannot know what was done at outside hospital.  Request records today.

## 2021-05-27 ENCOUNTER — TELEPHONE (OUTPATIENT)
Dept: FAMILY MEDICINE CLINIC | Age: 81
End: 2021-05-27

## 2021-05-28 NOTE — PROGRESS NOTES
Stable left hilar prominence consistent with lymphadenopathy, and  obscuring medial left lower lobe. Non-visualization of known left lower and  right upper lobe nodules.

## 2021-05-28 NOTE — PROGRESS NOTES
Xray shows fractures of multiple ribs, not a brand new thing, hard to say how old they are. This could be the source of your pain, I recommend continuing to use Tylenol and Aleve or ibuprofen for pain as needed. One risk with rib fractures is people not breathing deep and getting pneumonia because of the shallow breathing.   I do not expect that to be a problem at this point, but just make sure you are taking deep breaths at least every hour

## 2021-06-01 ENCOUNTER — TELEPHONE (OUTPATIENT)
Dept: FAMILY MEDICINE CLINIC | Age: 81
End: 2021-06-01

## 2021-06-01 DIAGNOSIS — M79.89 LEFT LEG SWELLING: Primary | ICD-10-CM

## 2021-06-01 NOTE — TELEPHONE ENCOUNTER
----- Message from Karina Batista sent at 6/1/2021  2:35 PM EDT -----  Regarding: Dr. Dawson Santos Message/Vendor Calls    Caller's first and last name: Johanna Sandoval, daughter      Reason for call: Needs to discuss xray results.        Callback required yes/no and why: yes, to discuss       Best contact number(s): [phone]      Details to clarify the request: N/A      Karina Batista

## 2021-06-04 NOTE — TELEPHONE ENCOUNTER
Reviewed results of Xray and reasons to follow-up. Try ice packs as well as continuing NSAIDs for pain. Reports persistence of left leg swelling, recommended follow-up soon with vascular surgery given recent procedure. Ultrasound to rule out DVT ordered  Reports issues with incontinence. Recommended follow-up in office soon to discuss further.

## 2021-06-08 ENCOUNTER — HOSPITAL ENCOUNTER (OUTPATIENT)
Dept: ULTRASOUND IMAGING | Age: 81
Discharge: HOME | End: 2021-06-08
Attending: STUDENT IN AN ORGANIZED HEALTH CARE EDUCATION/TRAINING PROGRAM
Payer: MEDICARE

## 2021-06-08 DIAGNOSIS — M79.89 LEFT LEG SWELLING: ICD-10-CM

## 2021-06-08 PROCEDURE — 93971 EXTREMITY STUDY: CPT

## 2021-06-09 ENCOUNTER — OFFICE VISIT (OUTPATIENT)
Dept: FAMILY MEDICINE CLINIC | Age: 81
End: 2021-06-09
Payer: MEDICARE

## 2021-06-09 VITALS
HEIGHT: 65 IN | BODY MASS INDEX: 33.66 KG/M2 | SYSTOLIC BLOOD PRESSURE: 137 MMHG | OXYGEN SATURATION: 92 % | WEIGHT: 202 LBS | HEART RATE: 91 BPM | TEMPERATURE: 98.4 F | DIASTOLIC BLOOD PRESSURE: 81 MMHG

## 2021-06-09 DIAGNOSIS — N31.9 NEUROMUSCULAR DYSFUNCTION OF BLADDER, UNSPECIFIED: ICD-10-CM

## 2021-06-09 DIAGNOSIS — N39.41 URGE INCONTINENCE OF URINE: Primary | ICD-10-CM

## 2021-06-09 LAB
BILIRUB UR QL STRIP: NEGATIVE
GLUCOSE UR-MCNC: NEGATIVE MG/DL
KETONES P FAST UR STRIP-MCNC: NEGATIVE MG/DL
PH UR STRIP: 6.5 [PH] (ref 4.6–8)
PROT UR QL STRIP: NORMAL
SP GR UR STRIP: 1.02 (ref 1–1.03)
UA UROBILINOGEN AMB POC: NORMAL (ref 0.2–1)
URINALYSIS CLARITY POC: CLEAR
URINALYSIS COLOR POC: YELLOW
URINE BLOOD POC: NEGATIVE
URINE LEUKOCYTES POC: NEGATIVE
URINE NITRITES POC: NEGATIVE

## 2021-06-09 PROCEDURE — G0463 HOSPITAL OUTPT CLINIC VISIT: HCPCS | Performed by: STUDENT IN AN ORGANIZED HEALTH CARE EDUCATION/TRAINING PROGRAM

## 2021-06-09 PROCEDURE — G8400 PT W/DXA NO RESULTS DOC: HCPCS | Performed by: STUDENT IN AN ORGANIZED HEALTH CARE EDUCATION/TRAINING PROGRAM

## 2021-06-09 PROCEDURE — 1090F PRES/ABSN URINE INCON ASSESS: CPT | Performed by: STUDENT IN AN ORGANIZED HEALTH CARE EDUCATION/TRAINING PROGRAM

## 2021-06-09 PROCEDURE — G8417 CALC BMI ABV UP PARAM F/U: HCPCS | Performed by: STUDENT IN AN ORGANIZED HEALTH CARE EDUCATION/TRAINING PROGRAM

## 2021-06-09 PROCEDURE — 81003 URINALYSIS AUTO W/O SCOPE: CPT | Performed by: STUDENT IN AN ORGANIZED HEALTH CARE EDUCATION/TRAINING PROGRAM

## 2021-06-09 PROCEDURE — G8536 NO DOC ELDER MAL SCRN: HCPCS | Performed by: STUDENT IN AN ORGANIZED HEALTH CARE EDUCATION/TRAINING PROGRAM

## 2021-06-09 PROCEDURE — G8752 SYS BP LESS 140: HCPCS | Performed by: STUDENT IN AN ORGANIZED HEALTH CARE EDUCATION/TRAINING PROGRAM

## 2021-06-09 PROCEDURE — 99214 OFFICE O/P EST MOD 30 MIN: CPT | Performed by: STUDENT IN AN ORGANIZED HEALTH CARE EDUCATION/TRAINING PROGRAM

## 2021-06-09 PROCEDURE — G8427 DOCREV CUR MEDS BY ELIG CLIN: HCPCS | Performed by: STUDENT IN AN ORGANIZED HEALTH CARE EDUCATION/TRAINING PROGRAM

## 2021-06-09 PROCEDURE — G8754 DIAS BP LESS 90: HCPCS | Performed by: STUDENT IN AN ORGANIZED HEALTH CARE EDUCATION/TRAINING PROGRAM

## 2021-06-09 PROCEDURE — G8510 SCR DEP NEG, NO PLAN REQD: HCPCS | Performed by: STUDENT IN AN ORGANIZED HEALTH CARE EDUCATION/TRAINING PROGRAM

## 2021-06-09 PROCEDURE — 1101F PT FALLS ASSESS-DOCD LE1/YR: CPT | Performed by: STUDENT IN AN ORGANIZED HEALTH CARE EDUCATION/TRAINING PROGRAM

## 2021-06-09 NOTE — PATIENT INSTRUCTIONS
Monitor for constipation. Set a timer to go to the bathroom on a schedule. Consider follow-up with Dr Rose Macdonald or a uro-gynecologist or seeing pelvic floor PT. Kegel Exercises: Care Instructions Overview Kegel exercises strengthen muscles around the bladder. These muscles control the flow of urine. Kegel exercises are sometime called \"pelvic floor\" exercises. They can help prevent urine leakage and keep the pelvic organs in place. Kegel exercises can strengthen pelvic muscles that have been weakened by age, pregnancy, childbirth, and surgery. They may help prevent or treat urine leakage. You do Kegel exercises by tightening the muscles you use when you urinate. You will likely need to do these exercises for several weeks to get better. Follow-up care is a key part of your treatment and safety. Be sure to make and go to all appointments, and call your doctor if you are having problems. It's also a good idea to know your test results and keep a list of the medicines you take. How can you care for yourself at home? · Do Kegel exercises. ? Find the muscles you need to strengthen. To do this, tighten the muscles that stop your urine while you are going to the bathroom. These are the same muscles you squeeze during Kegel exercises. ? Squeeze the muscles as hard as you can. Your belly and thighs should not move. ? Hold the squeeze for 3 seconds. Then relax for 3 seconds. ? Start with 3 seconds, and then add 1 second each week until you are able to squeeze for 10 seconds. ? Repeat the exercise 10 to 15 times for each session. Do three or more sessions each day. · You can check to see if you are using the right muscles. ? Tighten the muscles that help you stop passing gas or keep you from urinating. Make sure you aren't using your stomach, leg, or buttock muscles. ? Place a finger in your vagina and squeeze around it. You are doing them right when you feel pressure around your finger.  Your doctor may also suggest that you put special weights in your vagina while you do the exercises. · Check with your doctor if you don't notice a difference after trying these exercises for several weeks. Your doctor may suggest getting help from a physical therapist or recommend other treatment. Where can you learn more? Go to http://www.gray.com/ Enter M343 in the search box to learn more about \"Kegel Exercises: Care Instructions. \" Current as of: June 29, 2020               Content Version: 12.8 © 2006-2021 [employer]. Care instructions adapted under license by [employer] (which disclaims liability or warranty for this information). If you have questions about a medical condition or this instruction, always ask your healthcare professional. [address] any warranty or liability for your use of this information. Learning About Venous Insufficiency What is it? Venous insufficiency is a problem with the flow of blood from the veins of the legs back to the heart. It's also called chronic venous insufficiency or chronic venous stasis. Your veins bring blood back to the heart after it flows through your body. Veins have valves that keep the blood moving in one directiontoward the heart. But with venous insufficiency, the veins of the legs might not work as they should. This can allow blood to leak backward. Fluid can pool in the legs. This can lead to problems that include varicose veins. What causes it? Venous insufficiency is sometimes caused by deep vein thrombosis and high blood pressure inside leg veins. You are more likely to have venous insufficiency if you: · Are older. · Are female. · Are overweight. · Don't get enough physical activity. · Smoke. · Have a family history of varicose veins. What are the symptoms? Symptoms of venous insufficiency affect the legs. They may include: · Swelling, often in the ankles.  
· A rash. 
· Varicose veins. · Itching. · Cramping. · Skin sores (ulcers). · Aching or a feeling of heaviness. · Changes in skin color. How is it diagnosed? Your doctor can diagnose venous insufficiency by examining your legs and by using a type of ultrasound test (duplex Doppler) to find out how well blood is flowing in your legs. How is it treated? To reduce swelling and relieve pain caused by venous insufficiency, you can wear compression stockings. They are tighter at the ankles than at the top of the legs. They also can help venous skin ulcers heal. But there are different types of stockings, and they need to fit right. So your doctor will recommend what you need. You also can try to: · Get more exercise, especially walking. It can increase blood flow. · Avoid standing still or sitting for a long time, which can make the fluid pool in your legs. · Try not to sit with your legs crossed at the knee. · Keep your legs raised above your heart when you're lying down. This reduces swelling. If these treatments don't work, you may need medicine or a procedure to help relieve symptoms. Procedures might be done to close the vein, to remove the vein, or to improve blood flow. Follow-up care is a key part of your treatment and safety. Be sure to make and go to all appointments, and call your doctor if you are having problems. It's also a good idea to know your test results and keep a list of the medicines you take. Current as of: March 4, 2020               Content Version: 12.8 © 2006-2021 [employer]. Care instructions adapted under license by [employer] (which disclaims liability or warranty for this information). If you have questions about a medical condition or this instruction, always ask your healthcare professional. [address] any warranty or liability for your use of this information.

## 2021-06-09 NOTE — PROGRESS NOTES
Milind Haas is a 80 y.o. female , id x 2(name and ). Reviewed record, history, and  medications. Chief Complaint   Patient presents with    Follow-up     ultra sound results on left leg     Incontinence     bladder uncontrolled        Vitals:    21 1424   BP: 137/81   Pulse: 91   Temp: 98.4 °F (36.9 °C)   TempSrc: Oral   SpO2: 92%   Weight: 202 lb (91.6 kg)   Height: 5' 5\" (1.651 m)       Coordination of Care Questionnaire:   1) Have you been to an emergency room, urgent care, or hospitalized since your last visit?   no       2. Have seen or consulted any other health care provider since your last visit? NO      3 most recent PHQ Screens 2021   Little interest or pleasure in doing things Not at all   Feeling down, depressed, irritable, or hopeless Not at all   Total Score PHQ 2 0       Patient is accompanied by daughter I have received verbal consent from Milind Haas to discuss any/all medical information while they are present in the room.

## 2021-06-09 NOTE — PROGRESS NOTES
Progress Note    she is a 80y.o. year old female who presents for evalution. Chief Complaint   Patient presents with    Follow-up     ultra sound results on left leg     Incontinence     bladder uncontrolled          Assessment/ Plan:   Diagnoses and all orders for this visit:    1. Urge incontinence of urine  -     AMB POC URINALYSIS DIP STICK AUTO W/O MICRO  -     US RETROPERITONEUM COMP; Future    2. Neuromuscular dysfunction of bladder, unspecified   -     US RETROPERITONEUM COMP; Future    Rule out retention. History more consistent with voluntary holding till overflow/urge incontinence. Discussed focus on bladder emptying habits. Consider eval/tx by pelvic floor PT or urogyn, declined at this time. I have discussed the diagnosis with the patient and the intended plan as seen in the above orders. The patient has received an after-visit summary and questions were answered concerning future plans. Pt conveyed understanding of plan. Medication Side Effects and Warnings were discussed with patient        Subjective:     Chief Complaint   Patient presents with    Follow-up     ultra sound results on left leg     Incontinence     bladder uncontrolled      Persistent LE swelling    Changing pants multiple times in the day. Daughter notes lots of wet pants with dirty laundry. Pt and daughter don't think she notices that she is incontinent. About 2 weeks since first noticed. No urge reported. Reports that she goes when she needs to go but then also states that she waits long until she loses control. No dysuria, frequency, fever, constipation noted  Does experience urinary leakage with coughing. Had bladder sling with Dr. Harpreet Real 1/2021  Not previously done pelvic PT. Reviewed PmHx, RxHx, FmHx, SocHx, AllgHx and updated and dated in the chart.     Review of Systems - negative except as listed above in the HPI    Objective:     Vitals:    06/09/21 1424   BP: 137/81   Pulse: 91   Temp: 98.4 °F (36.9 °C)   TempSrc: Oral   SpO2: 92%   Weight: 202 lb (91.6 kg)   Height: 5' 5\" (1.651 m)       Current Outpatient Medications   Medication Sig    albuterol (PROVENTIL HFA, VENTOLIN HFA, PROAIR HFA) 90 mcg/actuation inhaler Take 2 Puffs by inhalation every four (4) hours as needed for Wheezing for up to 360 days.  folic acid (FOLVITE) 972 mcg tablet Take 1 Tab by mouth daily. Indications: inadequate folic acid    donepeziL (Aricept) 5 mg tablet Take 1 Tab by mouth daily.  amLODIPine (NORVASC) 5 mg tablet Take 1 Tab by mouth daily.  aspirin delayed-release 81 mg tablet Take 1 Tab by mouth daily.  simvastatin (ZOCOR) 20 mg tablet Take 1 Tab by mouth nightly.  acetaminophen (TYLENOL) 325 mg tablet Take 2 Tabs by mouth every six (6) hours as needed for Pain.  menthol (GOLD BOND PAIN RELIEVING EX) Apply 1 Applicator to affected area as needed. thin layer lower extremities    naproxen sodium (Aleve) 220 mg cap Take 1 Tab by mouth daily as needed for Pain. No current facility-administered medications for this visit. Physical Exam  Vitals and nursing note reviewed. Constitutional:       General: She is not in acute distress. Appearance: Normal appearance. She is not ill-appearing, toxic-appearing or diaphoretic. HENT:      Head: Normocephalic and atraumatic. Eyes:      General: No scleral icterus. Right eye: No discharge. Left eye: No discharge. Conjunctiva/sclera: Conjunctivae normal.   Cardiovascular:      Rate and Rhythm: Normal rate and regular rhythm. Pulses: Normal pulses. Heart sounds: Normal heart sounds. Pulmonary:      Effort: Pulmonary effort is normal. No respiratory distress. Breath sounds: Normal breath sounds. Abdominal:      General: There is no distension. Palpations: Abdomen is soft. There is no mass. Tenderness: There is no abdominal tenderness. There is no guarding or rebound.    Musculoskeletal: General: No tenderness. Cervical back: No rigidity. Right lower leg: No edema. Left lower leg: No edema. Skin:     General: Skin is warm and dry. Neurological:      Mental Status: She is alert.    Psychiatric:         Mood and Affect: Mood normal.         Behavior: Behavior normal.              Lucia Bach MD

## 2021-06-11 ENCOUNTER — TRANSCRIBE ORDER (OUTPATIENT)
Dept: REGISTRATION | Age: 81
End: 2021-06-11

## 2021-06-11 ENCOUNTER — HOSPITAL ENCOUNTER (OUTPATIENT)
Dept: PREADMISSION TESTING | Age: 81
Discharge: HOME | End: 2021-06-11
Payer: MEDICARE

## 2021-06-11 DIAGNOSIS — Z01.812 PRE-PROCEDURE LAB EXAM: ICD-10-CM

## 2021-06-11 DIAGNOSIS — Z01.812 PRE-PROCEDURE LAB EXAM: Primary | ICD-10-CM

## 2021-06-11 PROCEDURE — U0005 INFEC AGEN DETEC AMPLI PROBE: HCPCS

## 2021-06-14 LAB
SARS-COV-2, XPLCVT: NOT DETECTED
SOURCE, COVRS: NORMAL

## 2021-06-16 ENCOUNTER — HOSPITAL ENCOUNTER (OUTPATIENT)
Dept: PULMONOLOGY | Age: 81
Discharge: HOME | End: 2021-06-16
Attending: STUDENT IN AN ORGANIZED HEALTH CARE EDUCATION/TRAINING PROGRAM
Payer: MEDICARE

## 2021-06-16 ENCOUNTER — HOSPITAL ENCOUNTER (OUTPATIENT)
Dept: ULTRASOUND IMAGING | Age: 81
Discharge: HOME | End: 2021-06-16
Attending: STUDENT IN AN ORGANIZED HEALTH CARE EDUCATION/TRAINING PROGRAM
Payer: MEDICARE

## 2021-06-16 DIAGNOSIS — N39.41 URGE INCONTINENCE OF URINE: ICD-10-CM

## 2021-06-16 DIAGNOSIS — N31.9 NEUROMUSCULAR DYSFUNCTION OF BLADDER, UNSPECIFIED: ICD-10-CM

## 2021-06-16 DIAGNOSIS — R06.02 SHORTNESS OF BREATH: ICD-10-CM

## 2021-06-16 PROCEDURE — 76770 US EXAM ABDO BACK WALL COMP: CPT

## 2021-06-16 PROCEDURE — 94727 GAS DIL/WSHOT DETER LNG VOL: CPT

## 2021-06-16 PROCEDURE — 94729 DIFFUSING CAPACITY: CPT

## 2021-06-16 PROCEDURE — 94010 BREATHING CAPACITY TEST: CPT

## 2021-07-26 PROBLEM — F03.90 DEMENTIA WITHOUT BEHAVIORAL DISTURBANCE (HCC): Status: ACTIVE | Noted: 2021-01-01

## 2021-07-26 PROBLEM — R04.2 COUGH WITH HEMOPTYSIS: Status: ACTIVE | Noted: 2021-01-01

## 2021-07-27 NOTE — PROGRESS NOTES
Magalis Maurice is a 80 y.o. female who was seen by synchronous (real-time) audio-video technology on 7/26/2021 for Other (Pt's daughter stated that pt is having trouble walking. )        Assessment & Plan:   Diagnoses and all orders for this visit:    1. Cough with hemoptysis  Concerning for malignancy in setting of long term heavy smoking  Will get CT- daughter given number to schedule test  -     CT CHEST WO CONT; Future    2. Dementia without behavioral disturbance, unspecified dementia type ([address]  Stable  Continue donepezil  Daughter providing assistance and supervision    3. Gastroenteritis  Improving  [place] diet, advance as tolerated    4. Weakness generalized  Worsening while sick for the past week, now seems to be improving  Monitor symptoms, they will call if not improving so that home PT can be ordered    5. Dyspnea on exertion  Suspect COPD  Will attempt to track down PFT results      Follow-up and Dispositions    · Return in about 4 weeks (around 8/23/2021), or if symptoms worsen or fail to improve. I have discussed the diagnosis with the patient and the intended plan as seen in the above orders, and questions were answered concerning future plans. Patient conveyed understanding of the plan at the time of the visit. 712  Subjective:     HPI:    Presents for evaluation of weakness, diarrhea, and cough. Seen with daughter today. Patient's daughter reports patient sleeping more than usual over the past week, not eating much, having diarrhea and loose stools. Reports today she has been feeling better, ate breakfast and dinner and has gotten up to walk around the house 3 times already today. No fever or chills. Patient denies abdominal pain or nausea. Daughter reports frequent cough, productive for clear to tan sputum, sometimes blood-tinged, as well as dyspnea on exertion. Current every day smoker.  Daughter reports she took her mom for PFT's at the hospital ordered by Dr. Marley Lu on 6/16 but has not received her results. Prior to Admission medications    Medication Sig Start Date End Date Taking? Authorizing Provider   albuterol (PROVENTIL HFA, VENTOLIN HFA, PROAIR HFA) 90 mcg/actuation inhaler Take 2 Puffs by inhalation every four (4) hours as needed for Wheezing for up to 360 days. 5/25/21 5/20/22 Yes Emma Badillo MD   folic acid (FOLVITE) 732 mcg tablet Take 1 Tab by mouth daily. Indications: inadequate folic acid 1/06/41  Yes David Hernandez, NP   donepeziL (Aricept) 5 mg tablet Take 1 Tab by mouth daily. 2/24/21  Yes David Hernandez, NP   amLODIPine (NORVASC) 5 mg tablet Take 1 Tab by mouth daily. 2/24/21  Yes Alina Hernandez Q, NP   aspirin delayed-release 81 mg tablet Take 1 Tab by mouth daily. 2/24/21  Yes David Hernandez, FOREST   simvastatin (ZOCOR) 20 mg tablet Take 1 Tab by mouth nightly. 2/24/21  Yes Jonathan Hernandez NP   acetaminophen (TYLENOL) 325 mg tablet Take 2 Tabs by mouth every six (6) hours as needed for Pain. 1/15/21  Yes Jax Noland PA-C   menthol (GOLD BOND PAIN RELIEVING EX) Apply 1 Applicator to affected area as needed. thin layer lower extremities   Yes Provider, Historical   naproxen sodium (Aleve) 220 mg cap Take 1 Tab by mouth daily as needed for Pain.    Yes Provider, Historical     Patient Active Problem List   Diagnosis Code    Essential hypertension I10    Hyperlipidemia LDL goal <70 E78.5    H/O ascites Z87.898    Cerebrovascular accident (CVA) ([address] I63.9    Weakness generalized R53.1    Severe obesity ([address] E66.01    B12 deficiency E53.8    Cigarette nicotine dependence without complication Z92.087    Shortness of breath R06.02    Pleuritic chest pain R07.81     No Known Allergies  Past Medical History:   Diagnosis Date    Cellulitis     R LEG    CVA (cerebral vascular accident) ([address]     Hyperlipidemia     Hypertension      Past Surgical History:   Procedure Laterality Date    HX BLADDER SUSPENSION  01/2021    HX CHOLECYSTECTOMY      HX OTHER SURGICAL  10/2020    PARACENTESIS     Family History   Problem Relation Age of Onset    Anesth Problems Neg Hx      Social History     Tobacco Use    Smoking status: Current Every Day Smoker     Packs/day: 1.50     Years: 72.00     Pack years: 108.00    Smokeless tobacco: Never Used   Substance Use Topics    Alcohol use: Not Currently       Review of Systems   Constitutional: Positive for malaise/fatigue. Negative for chills, fever and weight loss. HENT: Negative. Eyes: Negative. Respiratory: Positive for cough, hemoptysis, sputum production and shortness of breath. Negative for wheezing. Cardiovascular: Negative. Gastrointestinal: Positive for diarrhea. Negative for abdominal pain, blood in stool, constipation, heartburn, melena, nausea and vomiting. Genitourinary: Negative. Musculoskeletal: Negative. Skin: Negative. Neurological: Negative. Endo/Heme/Allergies: Negative. Psychiatric/Behavioral: Negative. Objective:   No flowsheet data found. General: alert, cooperative, no distress   Mental  status: normal mood, behavior, speech, dress, motor activity, and thought processes, able to follow commands   HENT: NCAT   Neck: no visualized mass   Resp: no respiratory distress   Neuro: no gross deficits   Skin: no discoloration or lesions of concern on visible areas   Psychiatric: normal affect, consistent with stated mood, no evidence of hallucinations     Additional exam findings:   none    We discussed the expected course, resolution and complications of the diagnosis(es) in detail. Medication risks, benefits, costs, interactions, and alternatives were discussed as indicated. I advised her to contact the office if her condition worsens, changes or fails to improve as anticipated. She expressed understanding with the diagnosis(es) and plan. Shana Barrios, was evaluated through a synchronous (real-time) audio-video encounter.  The patient (or guardian if applicable) is aware that this is a billable service. Verbal consent to proceed has been obtained within the past 12 months. The visit was conducted pursuant to the emergency declaration under the [address] [place] [address] authority and the [employer] General Act. Patient identification was verified, and a caregiver was present when appropriate. The patient was located in a state where the provider was credentialed to provide care.     Leonid Joseph NP  7/26/2021

## 2021-07-29 PROBLEM — I48.91 ATRIAL FIBRILLATION WITH RAPID VENTRICULAR RESPONSE (HCC): Status: ACTIVE | Noted: 2021-01-01

## 2021-07-29 NOTE — ED NOTES
TRANSFER - OUT REPORT:    Verbal report given to Cee Atkinson RN(name) on Shira Madrid  being transferred to [facility] ED(unit) for urgent transfer       Report consisted of patients Situation, Background, Assessment and   Recommendations(SBAR). Information from the following report(s) SBAR, ED Summary, MAR, Recent Results, Med Rec Status and Cardiac Rhythm A-fib RVR; has had long runs of Vtach was reviewed with the receiving nurse. Lines:   Peripheral IV 07/29/21 Right Antecubital (Active)   Site Assessment Clean, dry, & intact 07/29/21 1730   Phlebitis Assessment 0 07/29/21 1730   Infiltration Assessment 0 07/29/21 1730   Dressing Status Clean, dry, & intact 07/29/21 1730   Dressing Type Transparent;Tape 07/29/21 1730   Hub Color/Line Status Pink;Patent; Flushed 07/29/21 1730   Action Taken Blood drawn 07/29/21 1730        Opportunity for questions and clarification was provided.       Patient transported with:   Monitor  O2 @ 15 liters    Amiodarone drip   Heparin Drip

## 2021-07-29 NOTE — ED NOTES
Patient having long run of [address]. [facility] at bedside. Amiodarone infusing. Patient remains alert and oriented throughout the run of [address].

## 2021-07-29 NOTE — ED NOTES
Patient to [facility]. Patient on amiodarone and heparin. Antibiotics sent with patient.   Transfer reassessment performed; mild improvement in patient condition since presentation to ED>

## 2021-07-29 NOTE — ED TRIAGE NOTES
Pt was scheduled for a CT scan and during the procedure she was sent to [facility] ED to be treated for SOB.

## 2021-07-29 NOTE — ED PROVIDER NOTES
47-year-old female with a history of CVA, hyperlipidemia and hypertension presents with a chief complaint of shortness of breath. The patient is a heavy smoker. Her last cigarette was on Monday. For the last week she has had worsening shortness of breath and one episode of hemoptysis. She went to see a primary care physician and was referred for outpatient CT scan. The patient went to [facility] for the CT without contrast.  The CT demonstrated total collapse of the left lung with a large left pleural effusion and mediastinal lymphadenopathy. Patient does not have any history of cancer. She was referred to the ED for further work-up and management. She has no history of atrial fibrillation and is not on any anticoagulation.            Past Medical History:   Diagnosis Date    Cellulitis     R LEG    [facility]cere[facility]     Hyperlipidemia     Hypertension        Past Surgical History:   Procedure Laterality Date    HX BLADDER SUSPENSION  01/2021    HX CHOLECYSTECTOMY      HX OTHER SURGICAL  10/2020    PARACENTESIS         Family History:   Problem Relation Age of Onset    Anesth Problems Neg Hx        Social History     Socioeconomic History    Marital status:      Spouse name: Not on file    Number of children: Not on file    Years of education: Not on file    Highest education level: Not on file   Occupational History    Not on file   Tobacco Use    Smoking status: Current Every Day Smoker     Packs/day: 1.50     Years: 72.00     Pack years: 108.00    Smokeless tobacco: Never Used   Vaping Use    Vaping Use: Never used   Substance and Sexual Activity    Alcohol use: Not Currently    Drug use: Not Currently    Sexual activity: Not on file   Other Topics Concern    Not on file   Social History Narrative    Not on file     Social Determinants of Health     Financial Resource Strain:     Difficulty of Paying Living Expenses:    Food Insecurity:     Worried About [facility] in the Last Year:    [address] in the Last Year:    Transportation Needs:     Lack of Transportation (Medical):  Lack of Transportation (Non-Medical):    Physical Activity:     Days of Exercise per Week:     Minutes of Exercise per Session:    Stress:     Feeling of Stress :    Social Connections:     Frequency of Communication with Friends and Family:     Frequency of Social Gatherings with Friends and Family:     Attends [employer] Services:     Active Member of Clubs or Organizations:     Attends Club or Organization Meetings:     Marital Status:    Intimate Partner Violence:     Fear of Current or Ex-Partner:     Emotionally Abused:     Physically Abused:     Sexually Abused: ALLERGIES: Patient has no known allergies. Review of Systems   Constitutional: Negative for fever. HENT: Negative for rhinorrhea. Respiratory: Positive for shortness of breath. Cardiovascular: Negative for chest pain. Gastrointestinal: Negative for abdominal pain. Genitourinary: Negative for dysuria. Musculoskeletal: Negative for back pain. Skin: Negative for wound. Neurological: Negative for headaches. Psychiatric/Behavioral: Negative for confusion. There were no vitals filed for this visit. Physical Exam  Vitals and nursing note reviewed. Constitutional:       General: She is not in acute distress. Appearance: Normal appearance. She is well-developed. She is obese. She is not ill-appearing, toxic-appearing or diaphoretic. HENT:      Head: Normocephalic and atraumatic. Mouth/Throat:      Mouth: Mucous membranes are moist.   Eyes:      Extraocular Movements: Extraocular movements intact. Cardiovascular:      Rate and Rhythm: Tachycardia present. Rhythm irregular. Pulses: Normal pulses. Pulmonary:      Effort: Pulmonary effort is normal. No respiratory distress.       Breath sounds: Examination of the left-upper field reveals decreased breath sounds. Examination of the left-middle field reveals decreased breath sounds. Examination of the left-lower field reveals decreased breath sounds. Decreased breath sounds and rhonchi present. Abdominal:      General: There is no distension. Palpations: Abdomen is soft. Tenderness: There is no abdominal tenderness. Musculoskeletal:         General: Normal range of motion. Cervical back: Normal range of motion. Right lower leg: Edema present. Left lower leg: Edema present. Skin:     General: Skin is warm and dry. Capillary Refill: Capillary refill takes less than 2 seconds. Neurological:      General: No focal deficit present. Mental Status: She is alert and oriented to person, place, and time. Psychiatric:         Mood and Affect: Mood normal.          MDM  Number of Diagnoses or Management Options  Acute respiratory failure with hypoxia (HCC)  Atrial fibrillation with rapid ventricular response (HCC)  Nonsustained ventricular tachycardia (HCC)  Pleural effusion  Sepsis, due to unspecified organism, unspecified whether acute organ dysfunction present [facility])  Diagnosis management comments: 53-year-old female presents with shortness of breath. I reviewed the CT imaging from the outpatient study which demonstrates a large left-sided pleural effusion with complete collapse of the left lung and mediastinal lymphadenopathy. Upon my initial evaluation of the patient I found her to be significantly tachycardic with heart rates in the 200s. She was placed on a cardiac monitor and was noted to be in atrial fibrillation with rapid ventricular response. She had intermittent runs of ventricular tachycardia. Patient denied any chest pain during these episodes. She was given a bolus of amiodarone and started on an amiodarone drip.   She has not had any hemoptysis in the last few days and I do feel that anticoagulation is indicated given her atrial fibrillation. She was given a bolus and started on drip of heparin. Her work of breathing is somewhat increased but she improved after oxygen therapy. Her initial saturations on room air were in the 80s. She is hemodynamically stable and oxygenating well on a nonrebreather. I do not feel that an emergent chest tube is indicated as it is possible that this is a malignant process and it would be beneficial to have this done by interventional radiology for fluid analysis. I spoke with the hospitalist at [place] and he recommended transfer to [facility] due to inconsistent and pulmonology coverage and the possible need for bronchoscopy. Patient was also found to have a rectal temperature of 100 °F.  I did opt to obtain blood cultures and start her on broad-spectrum antibiotics given my concern for sepsis. The patient will be transferred to [facility] for admission to the ICU she is comfortable and agreeable with the plan of care although somewhat reluctant about being admitted. Prior to the patient's departure from [facility] she had a run of narrow complex tachycardia at a rate of 175. I was not able to capture this on EKG; however, it did appear irregular on the monitor and could represent SVT versus atrial flutter with 2:1 conduction. Patient converted back to atrial fibrillation with rapid ventricular response after a brief episode of this. Patient was maintained on amiodarone drip and her rate improved to 120 prior to departure with a stable blood pressure.     IMPRESSION:  Ventricular tachycardia (HCC)  (primary encounter diagnosis)  Atrial fibrillation with rapid ventricular response (HCC)  Pleural effusion  Hypoxia  Sepsis, due to unspecified organism, unspecified whether acute organ dysfunction present ([facility]    - Broad Spectrum Antibiotics ordered: See orders  - Repeat lactic acid ordered for time see orders  - Re-assessment performed at time to be performed at [facility] and clinical condition applicable. - Hypotension or Lactic Acidosis present (SBP<90, MAP<65, Lactate >4): Transferred to prior to result IVF: Low volume given concern for fluid overload  - Persistent Hypotension despite IVF resuscitation: No vasopressors: Not indicated    Plan:  Transfer to [facility] for ICU admission    Total critical care time spent exclusive of procedures:  76 minutes    Gisele Chávez MD    EKG shows atrial fibrillation with rapid ventricular response at a rate of 130, otherwise normal intervals, normal axis, no ischemic changes.            Amount and/or Complexity of Data Reviewed  Clinical lab tests: ordered and reviewed  Tests in the radiology section of CPT®: ordered and reviewed           Procedures

## 2021-07-29 NOTE — ED NOTES
Pt with runs of VTach, placed on pads, code cart at bedside. Pt reports no chest pain or discomfort at this time. Daughter remains at bedside.  Dr. Anamaria Shell at bedside

## 2021-07-29 NOTE — ED NOTES
Olga Bernstein at [address] [place] [address] updated regarding patient drips and lines, as well as antibiotics being transported with patient.

## 2021-07-29 NOTE — ED NOTES
Dr. Trey Potter having extensive conversation about plan of care and potential admission to ICU. Pt declines admission at this time, prefers to go home, aware of possibility of death if she decides to go home.

## 2021-07-29 NOTE — ED NOTES
MALIKA Armstrongud notified patient converted to Vtach lasting 10 seconds then converted back to Afib.  No orders received patient already on amio gtt and heparin gtt

## 2021-07-29 NOTE — ED PROVIDER NOTES
Patient arrived from [facility] emergency department with A. fib and RVR, ventricular tachycardia, left-sided pleural effusion, hypoxia. Patient was scheduled for an outpatient CT scan that showed near complete collapse of the left lung with a large left pleural effusion. She was referred to the emergency department there where she was found to be in atrial fibrillation with RVR, hypoxic. She had runs of ventricular tachycardia and was started on amiodarone infusion along with heparin. She was transferred here for ICU admission. Per EMS, no change in status in route. Patient voices no complaints at this time. She denies any chest pain or shortness of breath.            Past Medical History:   Diagnosis Date    Cellulitis     R LEG    [facility]A [facility]cerebral vascular [facility]) [facility]     Hyperlipidemia     Hypertension        Past Surgical History:   Procedure Laterality Date    HX BLADDER SUSPENSION  01/2021    HX CHOLECYSTECTOMY      HX OTHER SURGICAL  10/2020    PARACENTESIS         Family History:   Problem Relation Age of Onset    Anesth Problems Neg Hx        Social History     Socioeconomic History    Marital status:      Spouse name: Not on file    Number of children: Not on file    Years of education: Not on file    Highest education level: Not on file   Occupational History    Not on file   Tobacco Use    Smoking status: Current Every Day Smoker     Packs/day: 1.50     Years: 72.00     Pack years: 108.00    Smokeless tobacco: Never Used   Vaping Use    Vaping Use: Never used   Substance and Sexual Activity    Alcohol use: Not Currently    Drug use: Not Currently    Sexual activity: Not on file   Other Topics Concern    Not on file   Social History Narrative    Not on file     Social Determinants of Health     Financial Resource Strain:     Difficulty of Paying Living Expenses:    Food Insecurity:     Worried About Running Out of Food in the Last Year:     [address] in the Last Year:    Transportation Needs:     Lack of Transportation (Medical):  Lack of Transportation (Non-Medical):    Physical Activity:     Days of Exercise per Week:     Minutes of Exercise per Session:    Stress:     Feeling of Stress :    Social Connections:     Frequency of Communication with Friends and Family:     Frequency of Social Gatherings with Friends and Family:     Attends Ten[employer] Services:     Active Member of Clubs or Organizations:     Attends Club or Organization Meetings:     Marital Status:    Intimate Partner Violence:     Fear of Current or Ex-Partner:     Emotionally Abused:     Physically Abused:     Sexually Abused: ALLERGIES: Patient has no known allergies. Review of Systems   Constitutional: Negative for fever. HENT: Negative for facial swelling. Eyes: Negative for visual disturbance. Respiratory: Positive for shortness of breath. Negative for chest tightness. Cardiovascular: Negative for chest pain. Gastrointestinal: Negative for abdominal pain. Genitourinary: Negative for difficulty urinating and dysuria. Musculoskeletal: Negative for arthralgias. Skin: Negative for rash. Neurological: Negative for headaches. Hematological: Negative for adenopathy. Psychiatric/Behavioral: Negative for suicidal ideas. Vitals:    07/29/21 1913 07/29/21 1926   BP: (!) 149/79    Pulse: (!) 111    Resp: 20    SpO2: 97%    Weight:  90.7 kg (199 lb 15.3 oz)   Height:  5' 7\" (1.702 m)            Physical Exam  Vitals and nursing note reviewed. Constitutional:       General: She is not in acute distress. Appearance: She is well-developed. She is obese. She is ill-appearing. HENT:      Head: Normocephalic and atraumatic. Eyes:      General: No scleral icterus. Conjunctiva/sclera: Conjunctivae normal.      Pupils: Pupils are equal, round, and reactive to light. Cardiovascular:      Rate and Rhythm: Tachycardia present. Rhythm irregular. Heart sounds: No murmur heard. Pulmonary:      Comments: Decreased breath sounds on the left. Slightly tachypneic. No increased work of breathing. Abdominal:      General: There is no distension. Musculoskeletal:         General: Normal range of motion. Cervical back: Normal range of motion and neck supple. Right lower leg: Edema present. Left lower leg: Edema present. Comments: Bilateral lower extremity edema with some mild erythema. Skin:     General: Skin is warm and dry. Findings: No rash. Neurological:      Mental Status: She is alert and oriented to person, place, and time. MDM  Number of Diagnoses or Management Options    ED Course as of Jul 29 2306   u Jul 29, 2021 2004 8:05 PM  Discussed with ICU nurse practitioner, Joni Sparks. They will eval patient for admission. [JM]   2044 ED EKG interpretation:  Rhythm: normal sinus rhythm. Rate (approx.): 113. Axis: normal.  ST segment:  No concerning ST elevations or depressions. This EKG was interpreted by Geni Mansfield MD,ED Provider. EKG 12 LEAD INITIAL [JM]      ED Course User Index  [JM] Shay Alanis MD     Perfect Serve Consult for Admission  7:34 PM    ED Room Number: ER10/10  Patient Name and age: Burgess Wakefield 80 y.o.  female  Working Diagnosis:   1. Pleural effusion on left    2. Acute respiratory failure with hypoxia (HCC)    3. Atrial fibrillation with rapid ventricular response ([address]    4. Nonsustained ventricular tachycardia ([address]    5. Sepsis, due to unspecified organism, unspecified whether acute organ dysfunction present ([address]        COVID-19 Suspicion:  no  Sepsis present:  yes  Reassessment needed: no  Code Status:  Full Code  Readmission: no  Isolation Requirements:  no  Recommended Level of Care:  ICU  Department:  [facility] Adult ED - 21     Other:  Transferred from [place]. Likely new diagnosis of lung ca with large L pleural effusion. Found to be in a.  Fib with RVR and runs of Vtach. Started on Amiodarone and heparin prior to transfer. -120, BP stable. Pt awake and alert. Total critical care time spent exclusive of procedures:  0 minutes.     Procedures

## 2021-07-29 NOTE — ED TRIAGE NOTES
BIBA from stand alone ER has had runs of VTACH, AFIB c/ RVR, SVT and was hypoxic. Given amio bolus and heparin bolos prior to transfer. collapsed left lung and right trachea diviation. Patient currently denies any pain, SOB, denies complaints states \"I feel ok\".

## 2021-07-29 NOTE — PROGRESS NOTES
[facility] Dosing Services: Antimicrobial Stewardship Progress Note    Consult for antibiotic dosing of vancomycin by Dr. Matthew Oneil  Pharmacist reviewed antibiotic appropriateness for 80year old , female  for indication of bacteremia  Day of Therapy 1    Plan:  Vancomycin therapy:  Start Vancomycin therapy, with an initial dose of vancomycin 1750 mg IV x 1 now (to be given while at [facility])  Pharmacy to dose by level d/t apparent GABINO (SCr on admission is 0.93 - baseline SCr appears to be closer to ~0.7)  SCr 0.93, WBC 11.8, TMax 100  Dose calculated to approximate a therapeutic trough of 15-20 mcg/mL. Last trough level / Plan for level: Consider level ~24 hours after initial dose (not yet ordered). If renal function improves, consider scheduled dosing. Daily SCr ordered per protocol  Pharmacy to follow daily and will make changes to dose and/or frequency based on clinical status. Other Antimicrobial  (not dosed by pharmacist)   Zosyn 3.375 gm IV Q8H   Cultures     pending   Serum Creatinine     Lab Results   Component Value Date/Time    Creatinine 0.93 07/29/2021 05:27 PM       Creatinine Clearance Estimated Creatinine Clearance: 56.7 mL/min (based on SCr of 0.93 mg/dL).      Procalcitonin  No results found for: PCT     Temp   100 °F (37.8 °C)    WBC   Lab Results   Component Value Date/Time    WBC 11.8 (H) 07/29/2021 05:27 PM       H/H   Lab Results   Component Value Date/Time    HGB 12.6 07/29/2021 05:27 PM      Platelets Lab Results   Component Value Date/Time    PLATELET 338 (H) 55/79/5858 05:27 PM            Pharmacist: [facility] Benson Crawford, PHARMD Contact information: [phone]

## 2021-07-30 NOTE — ED NOTES
Intensivist NP at bedside. 10:15 PM  Oxygen weaned down to 4L     12:17 AM  Patient tolerating 4L without difficulty. Patient's oxygen turned to 3L.

## 2021-07-30 NOTE — PROGRESS NOTES
[facility]    ICU TEAM Progress Note    Name: Yohan Kessler   : 1940   MRN: [id number]   Date: 2021      Assessment/Plan:   1. Acute Hypoxic Respiratory Failure  a. Due to large left pleural effusion and mucus plugging in left mainstem bronchus. Concern for possible post obstructive pneumonia. Given smoking history, patient with likely history of COPD  b. Place heparin on hold  c. Plan for thoracentesis today. Lab orders placed including: LDH, protein, cell count, culture, protein, glucose, fungal culture, and cytology. Follow up chest xray  d. Patient with worsening mediastinal lymphadenopathy concern for possible underlying malignancy  e. Continue vancomycin and zosyn for possible post obstructive pneumonia. f. Patient may need bronchoscopy if persistent lobe collapse following thoracentesis   2. Large left pleural effusion  a. Underlying etiology unclear- parapneumonic vs malignant vs heart faulire  b. thoracentesis and labs ordered  c. Hold heparin until after thoracentesis  d. Continue diuresis  3. Atrial fibrillation with RVR  a. No reported history of atrial fibrillation  b. Continue amiodarone  c. Heparin gtt started for afib but will hold until after thoracentesis  d. Cardiology consulted  e. Follow up TTE  4. Cellulitis  a. On bilateral lower extremities. Patient with recent history of worsening swelling. Recent DVT ultrasound negative. b. Continue Zosyn and vancomycin  5. Hypertension  a. Continue home amlodipine  6. Tobacco abuse  a.  Nicotine patch ordered      F - Feeding:  Yes clear liquids  A - Analgesia: None  S - Sedation: None  T - DVT Prophylaxis: Heparin   H - Head of Bed: > 30 Degrees  U - Ulcer Prophylaxis: Pepcid (famotidine)   G - Glycemic Control: Insulin  S - Spontaneous Breathing Trial: N/A  B - Bowel Regimen: None needed at this time  I - Indwelling Catheter:   Tubes: None  Lines: Peripheral IV  Drains: None  D - De-escalation of Antibiotics: Zosyn    Subjective:   Progress Note: 2021      Reason for ICU Admission: acute hypoxic respiratory failure and a fib with RVR     HPI: 37-year-old female with a history of heavy tobacco use at least 2 packs a day since 13to 12years of age, hyperlipidemia, hypertension presented to the ED from [facility] with reports of worsening shortness of breath over the last several days and 1 episode of sputum with scant blood-tinged. She denies any ongoing hemoptysis. She denies any fever or chills. Denies any wheezing or chest tightness. Denies any chest pain. Chest x-ray was done and revealed left lung opacification. CT of the chest was done revealed mediastinal lymphadenopathy, complete collapse of the left lung with large left pleural effusion and likely obstructing mucus in the left mainstem bronchus. Vital signs were also notable for elevated heart rate as high as 180, mild elevation in temperature as high as 100.0, tachypnea with breathing in the 30s, and decreased SPO2 as low as 86 on room air. She was placed on supplemental O2 with improvement in SPO2. EKG was done was consistent with atrial fibrillation with RVR but was unremarkable for acute ischemic change. Patient was initiated on amiodarone infusion and heparin as well. She was also initiated on empiric antibiotics with Zosyn and vancomycin, and admitted for further evaluation and treatment.     Overnight Events:   2021  - Patient admitted to ICU overnight  - Amiodarone started for a fib with RVR      Objective:   Vital Signs:  Visit Vitals  /72   Pulse (!) 103   Temp 98.9 °F (37.2 °C)   Resp 23   Ht 5' 7\" (1.702 m)   Wt 90.7 kg (199 lb 15.3 oz)   SpO2 99%   BMI 31.32 kg/m²    O2 Flow Rate (L/min): 3 l/min O2 Device: Nasal cannula Temp (24hrs), Av.5 °F (37.5 °C), Min:98.9 °F (37.2 °C), Max:100 °F (37.8 °C)           Intake/Output:   No intake or output data in the 24 hours ending 21 0703    Physical Exam:    General:  Alert, cooperative, appears stated age, NAD   Eyes:  Sclera anicteric. Pupils equally round and reactive to light. Mouth/Throat: Mucous membranes normal, oral pharynx clear   Neck: Supple   Lungs:   Diminished to auscultation bilaterally, good effort   CV:  Regular rate and rhythm,no murmur, click, rub or gallop   Abdomen:   Soft, non-tender. bowel sounds normal. non-distended   Extremities: Edema of BLE with erythema. Nontender to palpation. Chronic skin changes in BLE   Skin: Skin color, texture, turgor normal. no acute rash or lesions   Lymph nodes: Cervical and supraclavicular normal   Musculoskeletal: No swelling or deformity   Lines/Devices:  Intact, no erythema, drainage or tenderness   Psych: Alert and oriented, moves all extremities equally, normal mood affect given the setting       LABS AND  DATA: Personally reviewed  Recent Labs     07/30/21  0350 07/29/21  2259   WBC 7.6 10.4   HGB 9.5* 11.1*   HCT 30.7* 35.7    395     Recent Labs     07/30/21  0350 07/29/21  1727    140   K 2.9* 3.6   CL 98 99   CO2 36* 33*   BUN 15 19   CREA 0.72 0.93   * 142*   CA 8.5 9.2   MG 1.7 1.8   PHOS 3.6  --      Recent Labs     07/29/21  1727      TP 7.8   ALB 2.3*   GLOB 5.5*     Recent Labs     07/30/21  0053 07/29/21  1727   INR  --  1.1   PTP  --  10.9   APTT 29.1 26.1      No results for input(s): PHI, PCO2I, PO2I, FIO2I in the last 72 hours. Recent Labs     07/30/21  0350 07/29/21  2300 07/29/21  2258   CPK  --  61  --    TROIQ <0.05  --  <0.05         MEDS: Reviewed    Chest X-Ray:  CXR Results  (Last 48 hours)               07/29/21 1954  XR CHEST PORT Final result    Impression:  Complete opacification of the left hemithorax, consistent with CT findings   earlier same day. .  . Narrative:  INDICATION:  sob        EXAM: Chest single view. COMPARISON: Chest x-ray 5/26/2021, chest CT earlier same day.        FINDINGS: A single frontal view of the chest at 1632 hours shows complete   opacification of the left hemithorax consistent with CT findings earlier same   day. The right lung is relatively normally aerated with minimal basilar   atelectasis. .  The heart, mediastinum and pulmonary vasculature cannot be   evaluated. .  The bony thorax is difficult to evaluate. Calcification in   bilateral breast implants incidentally noted. . . CT Results  (Last 48 hours)               07/29/21 1640  CT CHEST WO CONT Final result    Impression:  1. Significant worsening of mediastinal lymphadenopathy with complete collapse   of the left lung, large left pleural effusion, and mucus in the left mainstem   bronchus. 2.  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 Findings were discussed with the patient's daughter at the time of exam and the   patient was sent to the ER for further evaluation. Narrative:  INDICATION: Chronic cough with blood in sputum       COMPARISON: December 2020       CONTRAST: None. TECHNIQUE:  5 mm axial images were obtained through the thorax. Coronal and   sagittal reformats were generated. CT dose reduction was achieved through use   of a standardized protocol tailored for this examination and automatic exposure   control for dose modulation. The absence of intravenous contrast reduces the sensitivity for evaluation of   the mediastinum, jay jay, vasculature, and upper abdominal organs. FINDINGS:       CHEST WALL: Bilateral breast implants. Rupture of the right breast implant. THYROID: No nodule. MEDIASTINUM: 3 cm left para-aortic lymph node. 2.9 cm lymph node between the   trachea and aorta and the middle mediastinum. 3.4 cm subcarinal lymph node. Smaller lymph nodes adjacent to the aortic arch. JAY JAY: Probable hilar lymphadenopathy   THORACIC AORTA: No aneurysm. MAIN PULMONARY ARTERY: Normal in caliber. TRACHEA/BRONCHI: Complete obstruction of the left mainstem bronchus with   internal mucus. Displacement of the trachea to the right. ESOPHAGUS: No wall thickening or dilatation. HEART: Normal in size. PLEURA: Large left pleural effusion. LUNGS: Complete collapse of the left lower lobe. Mild atelectasis in the right   lower lobe. INCIDENTALLY IMAGED UPPER ABDOMEN: Cholecystectomy. BONES: Thoracic hemangioma. ECHO:  pending    Multidisciplinary Rounds Completed: Yes    ABCDEF Bundle/Checklist Completed:  Yes    SPECIAL EQUIPMENT  None    DISPOSITION  Stay in ICU    CRITICAL CARE CONSULTANT NOTE  I had a face to face encounter with the patient, reviewed and interpreted patient data including clinical events, labs, images, vital signs, I/O's, and examined patient. I have discussed the case and the plan and management of the patient's care with the consulting services, the bedside nurses and the respiratory therapist.      NOTE OF PERSONAL INVOLVEMENT IN CARE   This patient has a high probability of imminent, clinically significant deterioration, which requires the highest level of preparedness to intervene urgently. I participated in the decision-making and personally managed or directed the management of the following life and organ supporting interventions that required my frequent assessment to treat or prevent imminent deterioration. I personally spent 30 minutes of critical care time. This is time spent at this critically ill patient's bedside actively involved in patient care as well as the coordination of care and discussions with the patient's family. This does not include any procedural time which has been billed separately.     FERNANDO [facility]  7/30/2021

## 2021-07-30 NOTE — PROGRESS NOTES
[facility] Adult  Hospitalist Group     ICU Transfer/Accept Summary     This patient is being transferred [place] [address] ICU  DATE OF TRANSFER: 7/30/2021       PATIENT ID: Shyam Ayala  MRN: [id number]   YOB: 1940    PRIMARY CARE PROVIDER: Teresa Domínguez NP   DATE OF ADMISSION: 7/29/2021  7:11 PM    ATTENDING PHYSICIAN: Nesha May MD  CONSULTATIONS:   IP CONSULT TO INTENSIVIST  IP CONSULT TO CARDIOLOGY    PROCEDURES/SURGERIES:   * No surgery found *    REASON FOR ADMISSION: <principal problem not specified>     HOSPITAL PROBLEM LIST:  Patient Active Problem List   Diagnosis Code    Essential hypertension I10    Hyperlipidemia LDL goal <70 E78.5    H/O ascites Z87.898    Cerebrovascular accident (CVA) ([address] I63.9    Weakness generalized R53.1    Severe obesity ([address] E66.01    B12 deficiency E53.8    Cigarette nicotine dependence without complication N97.966    Shortness of breath R06.02    Pleuritic chest pain R07.81    Cough with hemoptysis R04.2    Dementia without behavioral disturbance ([address] F03.90    Atrial fibrillation with rapid ventricular response (HCC) I48.91         Brief HPI and Hospital Course:      79-year-old female with a history of heavy tobacco use at least 2 packs a day since 13to 12years of age, hyperlipidemia, hypertension presented to the ED from [facility]7/ 29  with reports of worsening shortness of breath over the last several days and 1 episode of sputum with scant blood-tinged.  She denies any ongoing hemoptysis.  She denies any fever or chills.  Denies any wheezing or chest tightness.  Denies any chest pain.  Chest x-ray was done and revealed left lung opacification.  CT of the chest was done revealed mediastinal lymphadenopathy, complete collapse of the left lung with large left pleural effusion and likely obstructing mucus in the left mainstem bronchus.  Vital signs were also notable for elevated heart rate as high as 180, mild elevation in temperature as high as 100.0, tachypnea with breathing in the 30s, and decreased SPO2 as low as 86 on room air.  She was placed on supplemental O2 with improvement in SPO2.  EKG was done was consistent with atrial fibrillation with RVR but was unremarkable for acute ischemic change.  Patient was initiated on amiodarone infusion and heparin as well.  She was also initiated on empiric antibiotics with Zosyn and vancomycin, and admitted to ICU for further evaluation and treatment. 7/30 improved after thoracetesis   Transfer out ICU       1. Acute Hypoxic Respiratory Failure  a. Due to large left pleural effusion and mucus plugging in left mainstem bronchus. Concern for possible post obstructive pneumonia, and malignancy. Given smoking history, patient with likely history of COPD  b. Patient with worsening mediastinal lymphadenopathy concern for possible underlying malignancy  c. Continue vancomycin and zosyn for possible post obstructive pneumonia. d. Patient may need bronchoscopy if persistent lobe collapse following thoracentesis : consult pulmonologist   2. Large left pleural effusion  a. Underlying etiology unclear- parapneumonic vs malignant vs heart faulire  b. S/p  thoracentesis 7/30, removed 1.5L effusion . Will follow lab  LDH, protein, cell count, culture, protein, glucose, fungal culture, and cytology of effusion. c. Hold heparin until after thoracentesis  d. Continue diuresis  3. Atrial fibrillation with RVR  a. No reported history of atrial fibrillation  b. Continue amiodarone  c. Heparin gtt restarted after thoracentesis  d. Cardiology consulted  e. Follow up TTE  4. Cellulitis  a. On bilateral lower extremities. Patient with recent history of worsening swelling. Recent DVT ultrasound negative. b. Continue Zosyn and vancomycin  5. Hypertension  a. Continue home amlodipine  6. Hypokalemia: kcl 40meg in am. Follow k   7. Tobacco abuse  a.  Nicotine patch ordered            PHYSICAL EXAMINATION:  Visit Vitals  BP 93/83 (BP 1 Location: Left upper arm, BP Patient Position: Sitting)   Pulse 84   Temp 98.1 °F (36.7 °C)   Resp 28   Ht 5' 7\" (1.702 m)   Wt 90.7 kg (199 lb 15.3 oz)   SpO2 98%   BMI 31.32 kg/m²       General:          Alert, cooperative, no distress, answer questions appropriately   HEENT:           Atraumatic, MMM            Neck:               Supple, symmetrical,  thyroid: non tender  Lungs:            decrease BS bilat   Heart:              Irregular  rhythm,  No  murmur   + edema  Abdomen:       Soft, non-tender. Not distended. Bowel sounds normal  Extremities:     No cyanosis. No clubbing,  +2 distal pulses, ++ edema   Skin:                Not pale. Not Jaundiced  No rashes   Psych:             Not anxious or agitated. Neurologic:      Alert, moves all extremities, oriented X 3. Labs:     Recent Labs     07/30/21  0350 07/29/21  2259   WBC 7.6 10.4   HGB 9.5* 11.1*   HCT 30.7* 35.7    395     Recent Labs     07/30/21  0350 07/29/21  1727    140   K 2.9* 3.6   CL 98 99   CO2 36* 33*   BUN 15 19   CREA 0.72 0.93   * 142*   CA 8.5 9.2   MG 1.7 1.8   PHOS 3.6  --      Recent Labs     07/29/21  1727   ALT 28      TBILI 0.5   TP 7.8   ALB 2.3*   GLOB 5.5*     Recent Labs     07/30/21  0053 07/29/21  1727   INR  --  1.1   PTP  --  10.9   APTT 29.1 26.1      No results for input(s): FE, TIBC, PSAT, FERR in the last 72 hours. Lab Results   Component Value Date/Time    Folate 2.3 (L) 12/09/2020 12:05 PM      No results for input(s): PH, PCO2, PO2 in the last 72 hours.   Recent Labs     07/30/21  0350 07/29/21  2300 07/29/21  2258 07/29/21  1727   CPK  --  61  --   --    TROIQ <0.05  --  <0.05 <0.05     No results found for: CHOL, CHOLX, CHLST, CHOLV, HDL, HDLP, LDL, LDLC, DLDLP, TGLX, TRIGL, TRIGP, CHHD, CHHDX  No results found for: [facility]  Lab Results   Component Value Date/Time    Color YELLOW/STRAW 07/30/2021 05:29 AM    Appearance CLEAR 07/30/2021 05:29 AM    Specific gravity 1.009 07/30/2021 05:29 AM    pH (UA) 5.0 07/30/2021 05:29 AM    Protein Negative 07/30/2021 05:29 AM    Glucose Negative 07/30/2021 05:29 AM    Ketone Negative 07/30/2021 05:29 AM    Bilirubin Negative 07/30/2021 05:29 AM    Urobilinogen 0.2 07/30/2021 05:29 AM    Nitrites Negative 07/30/2021 05:29 AM    Leukocyte Esterase Negative 07/30/2021 05:29 AM    Epithelial cells FEW 07/30/2021 05:29 AM    Bacteria Negative 07/30/2021 05:29 AM    WBC 0-4 07/30/2021 05:29 AM    RBC 0-5 07/30/2021 05:29 AM         CODE STATUS:  X  Full Code    DNR    Partial    Comfort Care       Signed:   Jose Lopez MD  Date of Service:  7/30/2021  5:07 PM

## 2021-07-30 NOTE — ED NOTES
Bedside and Verbal shift change report given to Ralph Mcmahon RN (oncoming nurse) by Jen Geiger RN (offgoing nurse). Report included the following information SBAR, ED Summary, MAR and Recent Results.

## 2021-07-30 NOTE — ED NOTES
Bedside and Verbal shift change report given to Mandi Song RN (oncoming nurse) by Sachin Freed RN (offgoing nurse). Report included the following information SBAR, ED Summary, MAR and Recent Results.

## 2021-07-30 NOTE — H&P
[facility]    ICU TEAM History and Physical    Name: Franky Marquez   : 1940   MRN: [id number]   Date: 2021      Assessment/Plan of Care:     Acute hypoxic respiratory failure, multifactorial, likely related to large left pleural effusion and atelectasis in the setting of CHF and likely COPD  Continue supplemental O2 as required. Currently on 5 L nasal cannula. Wean FiO2 to maintain SPO2 greater than 90%. Duo nebs every 6 hours. Large left pleural effusion with associated atelectasis, possible mucous plugging obstruction of left main bronchus on CT, etiology uncertain, suspect malignancy versus CHF  Lasix added. Will need thoracentesis and fluid analysis. Consider bronchoscopy with possible obstruction/mucous plug noted on CT scan of left main bronchus  Nebulizers and chest PT ordered  Continued on Zosyn while unable to exclude postobstructive pneumonia component pending further workup  --Lactic acid and procalcitonin ordered  Atrial fibrillation with RVR, rate improved  Continue on amiodarone infusion. Continued on heparin infusion. [facility] consult Cardiology   Suspect Acute on Likely chronic CHF, diastolic versus systolic, no prior echo available for review. Echo ordered and pending  Check BNP  Lasix IV twice daily added  Cellulitis, bilateral lower extremity  On Zosyn as above  Blood cultures done and pending as above. Recent bilateral lower extremity ultrasound negative for DVT last month  Chronic hypertension  Continued on home antihypertensives. Tobacco dependence, up to 2 PPD x70+years  Nicotine transdermal patch. Nicotine cravings.   Counseled on cessation    Cardiac Gtts: Amiodarone  SBP Goal of: > 90 mmHg  MAP Goal of: > 65 mmHg  Transfusion Trigger (Hgb): <7 g/dL    Respiratory Goals: Head of bed > 30 degrees  Incentive spirometry  SPO2 Goal: > 92%  Pulmonary toilet: Duo-Nebs   DVT Prophylaxis (if no, list reason): Heparin     GI Prophylaxis: Pepcid (famotidine)   Nutrition: Pending   IVFs:  NA  Bowel Movement: Yes  Bowel Regimen: Docusate (Colace)    Caldwell Catheter Present: Yes  Glycemic Control - Insulin: N/A  Antibiotics:Vancomycin  Zosyn    Pain Medications: Oxycodone and Acetaminophen  Target RASS: 0 - Alert & Calm - Spontaneously pays attention to caregiver  Sedation Medications: None  Mobility: Fair  PT/OT: NA   Restraints: None needed at this time  Discussed Plan of Care/Code Status: Full Code    T/L/D  Tubes: None  Lines: Peripheral IV  Drains: None    Subjective:   Progress Note: 7/29/2021      History of Present Illness/Reason for ICU Admission: 68-year-old female with a history of heavy tobacco use at least 2 packs a day since 11to 12years of age, hyperlipidemia, hypertension presented to the ED from [facility] today with reports of worsening shortness of breath over the last several days and 1 episode of sputum with scant blood-tinged. She denies any ongoing hemoptysis. She denies any fever or chills. Denies any wheezing or chest tightness. Denies any chest pain. Chest x-ray was done and revealed left lung opacification. CT of the chest was done revealed mediastinal lymphadenopathy, complete collapse of the left lung with large left pleural effusion and likely obstructing mucus in the left mainstem bronchus. Vital signs were also notable for elevated heart rate as high as 180, mild elevation in temperature as high as 100.0, tachypnea with breathing in the 30s, and decreased SPO2 as low as 86 on room air. She was placed on supplemental O2 with improvement in SPO2. EKG was done was consistent with atrial fibrillation with RVR but was unremarkable for acute ischemic change. Patient was initiated on amiodarone infusion and heparin as well. She was also initiated on empiric antibiotics with Zosyn and vancomycin, and admitted for further evaluation and treatment.     Active Problem List:     Problem List  Date Reviewed: 7/26/2021 Codes Class    Atrial fibrillation with rapid ventricular response (HCC) ICD-10-CM: I48.91  ICD-9-CM: 427.31         Cough with hemoptysis ICD-10-CM: R04.2  ICD-9-CM: 786.39         Dementia without behavioral disturbance (HCC) ICD-10-CM: F03.90  ICD-9-CM: 294.20         Cigarette nicotine dependence without complication [id number]: W69.139  ICD-9-CM: 305.1         Shortness of breath ICD-10-CM: R06.02  ICD-9-CM: 786.05         Pleuritic chest pain ICD-10-CM: R07.81  ICD-9-CM: 786.52         B12 deficiency ICD-10-CM: E53.8  ICD-9-CM: 266.2         Severe obesity (HCC) ICD-10-CM: E66.01  ICD-9-CM: 278.01         Essential hypertension ICD-10-CM: I10  ICD-9-CM: 401.9         Hyperlipidemia LDL goal <70 ICD-10-CM: E78.5  ICD-9-CM: 272.4         H/O ascites ICD-10-CM: Z87.898  ICD-9-CM: V13.89         Cerebrovascular accident (CVA) ([facility] [address] ICD-10-CM: I63.9  ICD-9-CM: 434.91         Weakness generalized ICD-10-CM: R53.1  ICD-9-CM: 780.79               Past Medical History:      has a past medical history of Cellulitis, CVA (cerebral vascular accident) ([facility] [address] Hyperlipidemia, and Hypertension. Past Surgical History:      has a past surgical history that includes hx other surgical (10/2020); hx cholecystectomy; and hx bladder suspension (01/2021). Home Medications:     Prior to Admission medications    Medication Sig Start Date End Date Taking? Authorizing Provider   albuterol (PROVENTIL HFA, VENTOLIN HFA, PROAIR HFA) 90 mcg/actuation inhaler Take 2 Puffs by inhalation every four (4) hours as needed for Wheezing for up to 360 days. 5/25/21 5/20/22  Feliberto Chopra MD   folic acid (FOLVITE) 842 mcg tablet Take 1 Tab by mouth daily. Indications: inadequate folic acid 1/77/58   David Hernandez Q, NP   donepeziL (Aricept) 5 mg tablet Take 1 Tab by mouth daily. 2/24/21   Stevie Hernandez, NP   amLODIPine (NORVASC) 5 mg tablet Take 1 Tab by mouth daily.  2/24/21   Franco Quezada, FOREST   aspirin delayed-release 81 mg tablet Take 1 Tab by mouth daily. 21   Phyllis Cadet NP   simvastatin (ZOCOR) 20 mg tablet Take 1 Tab by mouth nightly. 21   Phyllis Cadet NP   acetaminophen (TYLENOL) 325 mg tablet Take 2 Tabs by mouth every six (6) hours as needed for Pain. 1/15/21   Javi Noland PA-C   menthol (GOLD BOND PAIN RELIEVING EX) Apply 1 Applicator to affected area as needed. thin layer lower extremities    Provider, Historical   naproxen sodium (Aleve) 220 mg cap Take 1 Tab by mouth daily as needed for Pain. Provider, Historical       Allergies/Social/Family History:     No Known Allergies   Social History     Tobacco Use    Smoking status: Current Every Day Smoker     Packs/day: 1.50     Years: 72.00     Pack years: 108.00    Smokeless tobacco: Never Used   Substance Use Topics    Alcohol use: Not Currently      Family History   Problem Relation Age of Onset    Anesth Problems Neg Hx        Review of Systems:     A comprehensive review of systems was negative except for: Respiratory: positive for cough, sputum, pleurisy/chest pain or dyspnea on exertion  Cardiovascular: positive for palpitations, lower extremity edema    Objective:   Vital Signs:  Visit Vitals  BP (!) 123/93   Pulse 99   Resp 19   Ht 5' 7\" (1.702 m)   Wt 90.7 kg (199 lb 15.3 oz)   SpO2 96%   BMI 31.32 kg/m²    O2 Flow Rate (L/min): 15 l/min O2 Device: Non-rebreather mask Temp (24hrs), Av.9 °F (37.7 °C), Min:99.7 °F (37.6 °C), Max:100 °F (37.8 °C)           Intake/Output:   No intake or output data in the 24 hours ending 21 1431    Physical Exam:    General:  Alert, cooperative, well noursished, well developed, appears stated age   Eyes:  Sclera anicteric. Pupils equally round and reactive to light. Mouth/Throat: Mucous membranes normal, oral pharynx clear   Neck: Supple   Lungs:    Absent on left side. Clear on the right. No wheezing, rales,or rhonchi.    CV:   Irregular, tachycardic,no murmur, click, rub or gallop   Abdomen:   Soft, non-tender. bowel sounds normal. non-distended   Extremities:  3+ bilateral lower extremity edema   Skin: Skin color, texture, turgor normal. no acute rash or lesions   Lymph nodes: Cervical and supraclavicular normal   Musculoskeletal:  3+ bilateral lower extremity edema and erythema. Lines/Devices:  Intact, no erythema, drainage or tenderness   Neuro/psych: Alert and oriented, poor memory, no focal deficits. Normal mood affect given the setting       LABS AND  DATA: Personally reviewed  Recent Labs     07/29/21 1727   WBC 11.8*   HGB 12.6   HCT 39.8   *     Recent Labs     07/29/21 1727      K 3.6   CL 99   CO2 33*   BUN 19   CREA 0.93   *   CA 9.2   MG 1.8     Recent Labs     07/29/21 1727      TP 7.8   ALB 2.3*   GLOB 5.5*     Recent Labs     07/29/21 1727   INR 1.1   PTP 10.9   APTT 26.1      No results for input(s): PHI, PCO2I, PO2I, FIO2I in the last 72 hours. Recent Labs     07/29/21 1727   TROIQ <0.05       MEDS: Reviewed    Chest X-Ray:  CXR Results  (Last 48 hours)               07/29/21 1954  XR CHEST PORT Final result    Impression:  Complete opacification of the left hemithorax, consistent with CT findings   earlier same day. .  . Narrative:  INDICATION:  sob        EXAM: Chest single view. COMPARISON: Chest x-ray 5/26/2021, chest CT earlier same day. FINDINGS: A single frontal view of the chest at 1632 hours shows complete   opacification of the left hemithorax consistent with CT findings earlier same   day. The right lung is relatively normally aerated with minimal basilar   atelectasis. .  The heart, mediastinum and pulmonary vasculature cannot be   evaluated. .  The bony thorax is difficult to evaluate. Calcification in   bilateral breast implants incidentally noted. . .                  ECHO:  Pending    Multidisciplinary Rounds Completed:  Pending    ABCDEF Bundle/Checklist Completed:  Yes    SPECIAL EQUIPMENT  None    DISPOSITION  Stay in ICU    CRITICAL CARE CONSULTANT NOTE  I had a face to face encounter with the patient, reviewed and interpreted patient data including clinical events, labs, images, vital signs, I/O's, and examined patient. I have discussed the case and the plan and management of the patient's care with the consulting services, the bedside nurses and the respiratory therapist.      NOTE OF PERSONAL INVOLVEMENT IN CARE   This patient has a high probability of imminent, clinically significant deterioration, which requires the highest level of preparedness to intervene urgently. I participated in the decision-making and personally managed or directed the management of the following life and organ supporting interventions that required my frequent assessment to treat or prevent imminent deterioration. I personally spent 60 minutes of critical care time. This is time spent at this critically ill patient's bedside actively involved in patient care as well as the coordination of care and discussions with the patient's family. This does not include any procedural time which has been billed separately.

## 2021-07-30 NOTE — CONSULTS
[facility] [address] [place] [address], [address] [place] [address] [address]   [facility], [facility]   [phone]                                                                              Cardiology Care Note                           Initial visit      Patient Name: Nenita Beckford - PNN:[id number] - XA  Primary Cardiologist: none  Consulting Cardiologist: Darshan Martinez MD  Reason for Consult: afib with RVR                     CARDIOLOGY ATTENDING ATTESTATION    CONSULT/PROGRESS NOTE      Patient seen on the day of progress note and examined  and agree with Advance Practice Provider (ISAAK, NP,PA)  assessment and plans. Brief HPI: Very pleasant 62-year-old female with past medical history markable for hypertension hyperlipidemia tobacco abuse peripheral vascular disease CVA and recently diagnosed dementia who has been admitted for worsening shortness of breath episode hemoptysis. She was further admitted on account of abnormal chest CT demonstrating total collapse of left lung and large pleural effusion with mediastinal lymphadenopathy. Cardiology was consulted for atrial fibrillation. A/P: 1. Fibrillation: She remains in atrial fibrillation the rate is now better controlled on IV amiodarone. Previous EKG revealed multifocal atrial tachycardia which is also not uncommon in pulmonary disease. Continue amiodarone for now. Regarding anticoagulation the patient is planning to undergo a battery of tests and particularly thoracentesis eventually bronchoscopy. For now anticoagulation on hold. Thyroid function test is normal.    Echocardiogram currently pending.                 BP Readings from Last 3 Encounters:   21 113/64   21 (!) 148/91   21 137/81       Pulse Readings from Last 3 Encounters:   21 83   21 (!) 121   21 91       Neck: no JVD  Heart: irregularly irregular rhythm  Lungs: diminished breath sounds L apex, L anterior, L base  Abdomen: soft, non-tender. Bowel sounds normal. No masses,  no organomegaly  Extremities: edema 1+            Lab Results   Component Value Date/Time    CK 61 07/29/2021 11:00 PM    Troponin-I, Qt. <0.05 07/30/2021 03:50 AM       Lab Results   Component Value Date/Time    Creatinine 0.72 07/30/2021 03:50 AM       Lab Results   Component Value Date/Time    HGB 9.5 (L) 07/30/2021 03:50 AM                            Assessment and Plan     1. Afib with RVR,    -New diagnosis   -Paroxysmal but currently afib, rate controlled   -Continue amiodarone gtt for now   -TSH 3.12   -Echocardiogram pending. -[id number]= at least 6. Anticoagulation on hold for now for thoracentesis.   -Suspect HR driven by pulmonary process. 2.  History of HTN   -bp controlled   -On amlodipine 5 mg BID     3. Large left pleural effusion and mucous plugging Left lung with lymphadenopathy   -Thoracentesis today with pleural studies and possible bronchoscopy   -IV lasix    4. Hypokalemia   -K=2.9, repleted by primary team Mg=1.7, repleted   -Keep K=4, mg=2    5. Anemia, normocytic   -Hgb 9.5   -Denies hx of GIB, blood in stool/black stools.   -Will add on iron studies. 6. BLE edema   -pro BNP low for age (cutoff is 1800)   -check echo   -on Lasix 20 IV BID  7. BLE cellulitis   -antibiotics per primary team.    80 y.o. female who presents to ER after large left pleural effusion and left lung collapse noted on CT scan. Found to have afib with RVR which is not surprising given lung issues. Heart Rate is now controlled on amiodarone, will continue for now. Will follow up echocardiogram. Will further evaluate for anticoagulation after invasive procedures completed. Anemia is of concern. HPI:  Ms. Ct Funes is a 51-year-old female with past medical history of hypertension hyperlipidemia, tobacco abuse,peripheral vascular disease,  CVA, left lower extremity clot, dementia. (History mostly obtained from daughter at bedside).   Saw her primary care physician last week for worsening shortness of breath and episode of hemoptysis. PCP referred her to outpatient CT scan which demonstrated total collapse of left lung and large pleural effusion with mediastinal lymphadenopathy. [place] [facility] ER for further evaluation. Patient was found to be in A. fib with RVR. Has no history of A. fib. No prior history of cardiac disease or cardiac testing. In ER, she was started on amiodarone. She was placed on heparin for stroke prophylaxis but this has been stopped for planned thoracentesis today. Patient denies any history of chest pain, no history of palpitations. .  She has had longstanding dyspnea on exertion. Reports chronic BLE edema. Chronic cough. Has had covid vaccines. SH: Smokes 2PPD for many years. No ETOH USe. Moved from [place] to [facility] to live with daughter in November 2020  FH: denies any FH of CD. Patient Active Problem List   Diagnosis Code    Essential hypertension I10    Hyperlipidemia LDL goal <70 E78.5    H/O ascites Z87.898    Cerebrovascular accident (CVA) ([address] I63.9    Weakness generalized R53.1    Severe obesity ([address] E66.01    B12 deficiency E53.8    Cigarette nicotine dependence without complication O60.771    Shortness of breath R06.02    Pleuritic chest pain R07.81    Cough with hemoptysis R04.2    Dementia without behavioral disturbance (HCC) F03.90    Atrial fibrillation with rapid ventricular response ([address] I48.91     No specialty comments available.       Review of Systems:    [] Patient unable to provide secondary to condition    CONSTITUTIONAL: negative     ENT/MOUTH: negative     EYES: negative    CARDIOVASCULAR: VOSS, edema      RESPIRATORY: cough  , SOB, hemoptysis      GASTROINTESTINAL: diarrhea last week     GENITOURINARY: negative     MUSCULOSKELETAL: left leg pain      SKIN: negative    NEUROLOGICAL: negative     PSYCHOLOGICAL: negative      HEME/LYMPH: negative    ENDOCRINE: negative       Past Medical History:   Diagnosis Date    Cellulitis     R LEG    [facility]) [facility]     Hyperlipidemia     Hypertension      Past Surgical History:   Procedure Laterality Date    HX BLADDER SUSPENSION  01/2021    HX CHOLECYSTECTOMY      HX OTHER SURGICAL  10/2020    PARACENTESIS     Current Facility-Administered Medications   Medication Dose Route Frequency    piperacillin-tazobactam (ZOSYN) 3.375 g in 0.9% sodium chloride (MBP/ADV) 100 mL MBP  3.375 g IntraVENous Q8H    albuterol-ipratropium (DUO-NEB) 2.5 MG-0.5 MG/3 ML  3 mL Nebulization Q6H RT    amLODIPine (NORVASC) tablet 5 mg  5 mg Oral DAILY    aspirin delayed-release tablet 81 mg  81 mg Oral DAILY    atorvastatin (LIPITOR) tablet 10 mg  10 mg Oral QHS    furosemide (LASIX) injection 20 mg  20 mg IntraVENous BID    oxyCODONE IR (ROXICODONE) tablet 5 mg  5 mg Oral Q6H PRN    vancomycin (VANCOCIN) 2250 mg in  mL infusion  2,250 mg IntraVENous ONCE    vancomycin (VANCOCIN) 750 mg in 0.9% sodium chloride 250 mL (VIAL-MATE)  750 mg IntraVENous Q12H    vancomycin - pharmacy to dose    Other Rx Dosing/Monitoring    [Held by provider] heparin 25,000 units in D5W 250 ml infusion  11-25 Units/kg/hr IntraVENous TITRATE    amiodarone (CORDARONE) 375 mg/250 mL D5W infusion  0.5-1 mg/min IntraVENous TITRATE    sodium chloride (NS) flush 5-40 mL  5-40 mL IntraVENous Q8H    sodium chloride (NS) flush 5-40 mL  5-40 mL IntraVENous PRN    acetaminophen (TYLENOL) tablet 650 mg  650 mg Oral Q6H PRN    Or    acetaminophen (TYLENOL) suppository 650 mg  650 mg Rectal Q6H PRN    polyethylene glycol (MIRALAX) packet 17 g  17 g Oral DAILY PRN    ondansetron (ZOFRAN ODT) tablet 4 mg  4 mg Oral Q8H PRN    Or    ondansetron (ZOFRAN) injection 4 mg  4 mg IntraVENous Q6H PRN    famotidine (PF) (PEPCID) 20 mg in 0.9% sodium chloride 10 mL injection  20 mg IntraVENous BID    nicotine (NICODERM CQ) 21 mg/24 hr patch 1 Patch  1 Patch TransDERmal DAILY PRN     Current Outpatient Medications   Medication Sig    albuterol (PROVENTIL HFA, VENTOLIN HFA, PROAIR HFA) 90 mcg/actuation inhaler Take 2 Puffs by inhalation every four (4) hours as needed for Wheezing for up to 360 days.  folic acid (FOLVITE) 418 mcg tablet Take 1 Tab by mouth daily. Indications: inadequate folic acid    donepeziL (Aricept) 5 mg tablet Take 1 Tab by mouth daily.  amLODIPine (NORVASC) 5 mg tablet Take 1 Tab by mouth daily.  aspirin delayed-release 81 mg tablet Take 1 Tab by mouth daily.  simvastatin (ZOCOR) 20 mg tablet Take 1 Tab by mouth nightly.  acetaminophen (TYLENOL) 325 mg tablet Take 2 Tabs by mouth every six (6) hours as needed for Pain.  menthol (GOLD BOND PAIN RELIEVING EX) Apply 1 Applicator to affected area as needed. thin layer lower extremities    naproxen sodium (Aleve) 220 mg cap Take 1 Tab by mouth daily as needed for Pain. No Known Allergies   Family History   Problem Relation Age of Onset    Anesth Problems Neg Hx       Social History     Socioeconomic History    Marital status:      Spouse name: Not on file    Number of children: Not on file    Years of education: Not on file    Highest education level: Not on file   Tobacco Use    Smoking status: Current Every Day Smoker     Packs/day: 1.50     Years: 72.00     Pack years: 108.00    Smokeless tobacco: Never Used   Vaping Use    Vaping Use: Never used   Substance and Sexual Activity    Alcohol use: Not Currently    Drug use: Not Currently     Social Determinants of Health     Financial Resource Strain:     Difficulty of Paying Living Expenses:    Food Insecurity:     Worried About Running Out of Food in the Last Year:     Ran Out of Food in the Last Year:    Transportation Needs:     Lack of Transportation (Medical):      Lack of Transportation (Non-Medical):    Physical Activity:     Days of Exercise per Week:     Minutes of Exercise per Session:    Stress:     Feeling of Stress :    Social Connections:     Frequency of Communication with Friends and Family:     Frequency of Social Gatherings with Friends and Family:     Attends Sabianism Services:     Active Member of Clubs or Organizations:     Attends Club or Organization Meetings:     Marital Status:        Objective:    Physical Exam    Vitals:   Vitals:    07/30/21 0800 07/30/21 0900 07/30/21 1000 07/30/21 1100   BP: 114/65 115/65 (!) 117/59 135/62   Pulse: 71 67 90 75   Resp: 20 12 18 28   Temp: 98.1 °F (36.7 °C)      SpO2: 97% 98% 98% 96%   Weight:       Height:           General:    Alert, cooperative, no distress, appears stated age. Neck:   Supple,   Back:     Symmetric,  curvature. Lungs:     Absent breath sound Left lung. Heart[de-identified]    Irregularly irregular rate and rhythm. 2+ systolic murmur best at LUSB. , no murmur, click, rub or gallop. Abdomen:     Soft, non-tender. Bowel sounds normal.    Extremities:   2+ BLE edema, BLE erythema noted. Vascular:   Pulses - Left DP 2+, unable to palpate right DP, BLE warm. Skin:   Skin color normal. No rashes or lesions on visible areas   Neurologic:   CN II-XII grossly intact.         Telemetry: PAF    ECG:   EKG Results     Procedure 720 Value Units Date/Time    EKG 12 LEAD INITIAL [[id number]] Collected: 07/29/21 2040    Order Status: Completed Updated: 07/30/21 0456     Ventricular Rate 109 BPM      Atrial Rate 107 BPM      QRS Duration 74 ms      Q-T Interval 336 ms      QTC Calculation (Bezet) 452 ms      Calculated R Axis 55 degrees      Calculated T Axis 13 degrees      Diagnosis --     Atrial fibrillation with premature ventricular or aberrantly conducted   complexes  Low voltage QRS  When compared with ECG of 29-JUL-2021 17:22,  MANUAL COMPARISON REQUIRED, DATA IS UNCONFIRMED            Data Review:     Radiology:   XR Results (most recent):  Results from Hospital Encounter encounter on 07/29/21    XR CHEST PORT    Narrative  INDICATION:  sob    EXAM: Chest single view. COMPARISON: Chest x-ray 5/26/2021, chest CT earlier same day. FINDINGS: A single frontal view of the chest at 1632 hours shows complete  opacification of the left hemithorax consistent with CT findings earlier same  day. The right lung is relatively normally aerated with minimal basilar  atelectasis. .  The heart, mediastinum and pulmonary vasculature cannot be  evaluated. .  The bony thorax is difficult to evaluate. Calcification in  bilateral breast implants incidentally noted. . . Impression  Complete opacification of the left hemithorax, consistent with CT findings  earlier same day. .  . Recent Labs     07/30/21  0350 07/29/21  2300 07/29/21  2258 07/29/21  1727   CPK  --  61  --   --    TROIQ <0.05  --  <0.05 <0.05     Recent Labs     07/30/21  0350 07/29/21  1727    140   K 2.9* 3.6   CL 98 99   CO2 36* 33*   BUN 15 19   CREA 0.72 0.93   * 142*   PHOS 3.6  --    CA 8.5 9.2     Recent Labs     07/30/21  0350 07/29/21  2259   WBC 7.6 10.4   HGB 9.5* 11.1*   HCT 30.7* 35.7    395     Recent Labs     07/29/21  1727   PTP 10.9   INR 1.1        No results for input(s): CHOL, LDLC in the last 72 hours. No lab exists for component: TGL, HDLC,  HBA1C  Recent Labs     07/29/21  2300   TSH 3.12       MARTINA Costa-BC  Lidia Bush MD      [facility], [address] [facility] [address]  [address]  [phone]      CC: Roopa Castillo NP

## 2021-07-30 NOTE — PROGRESS NOTES
Admission Medication Reconciliation:    Information obtained from:  Family member   RxQuery data available¹:  YES    Comments/Recommendations: Updated PTA meds/reviewed patient's allergies. 1)  Medication list reviewed with patient's daughter     2)  Medication changes (since last review):         -No changes   ¹RxQuery pharmacy benefit data reflects medications filled and processed through the patient's insurance, however   this data does NOT capture whether the medication was picked up or is currently being taken by the patient. Allergies:  Patient has no known allergies. Significant PMH/Disease States:   Past Medical History:   Diagnosis Date    Cellulitis     R LEG    [facility]A (cerebral vascular [address]) [facility]     Hyperlipidemia     Hypertension      Chief Complaint for this Admission:    Chief Complaint   Patient presents with    Irregular Heart Beat     Prior to Admission Medications:   Prior to Admission Medications   Prescriptions Last Dose Informant Taking?   acetaminophen (TYLENOL) 325 mg tablet   Yes   Sig: Take 2 Tabs by mouth every six (6) hours as needed for Pain. albuterol (PROVENTIL HFA, VENTOLIN HFA, PROAIR HFA) 90 mcg/actuation inhaler   Yes   Sig: Take 2 Puffs by inhalation every four (4) hours as needed for Wheezing for up to 360 days. amLODIPine (NORVASC) 5 mg tablet 7/29/2021 at Unknown time  Yes   Sig: Take 1 Tab by mouth daily. aspirin delayed-release 81 mg tablet 7/29/2021 at Unknown time  Yes   Sig: Take 1 Tab by mouth daily. donepeziL (Aricept) 5 mg tablet 7/29/2021 at Unknown time  Yes   Sig: Take 1 Tab by mouth daily. folic acid (FOLVITE) 363 mcg tablet 7/29/2021 at Unknown time  Yes   Sig: Take 1 Tab by mouth daily. Indications: inadequate folic acid   menthol (GOLD BOND PAIN RELIEVING EX)   Yes   Sig: Apply 1 Applicator to affected area as needed. thin layer lower extremities   naproxen sodium (Aleve) 220 mg cap   Yes   Sig: Take 1 Tab by mouth daily as needed for Pain. simvastatin (ZOCOR) 20 mg tablet 7/28/2021 at Unknown time  Yes   Sig: Take 1 Tab by mouth nightly. Facility-Administered Medications: None     Please contact the main inpatient pharmacy with any questions or concerns at [phone] and we will direct you to the clinical pharmacist covering this patient's care while in-house.    Sonya López, CHETAND

## 2021-07-30 NOTE — PROGRESS NOTES
Patient evaluated in ED after significant abnormal findings were noted and has been admitted at [address] [place] [address] for further evaluation and mangement

## 2021-07-30 NOTE — PROGRESS NOTES
Pharmacist Note - Vancomycin Dosing    Consult provided for this 80 y.o. female for indication of post-obstructive pneumonia and bilateral lower extremity cellulitis. Antibiotic regimen(s): vancomycin + Zosyn   Patient on vancomycin PTA? NO > It appears that vancomycin was ordered at [facility] and not given. Per RN documentation antibiotics were unable to be started because of incompatibility with other fluids running at the time. Recent Labs     21  0350 21  2259 21  1727   WBC 7.6 10.4 11.8*   CREA 0.72  --  0.93   BUN 15  --  19     Frequency of BMP: daily through    Height: 170.2 cm  Weight: 90.7 kg  Est CrCl: 73 ml/min  Temp (24hrs), Av.2 °F (37.3 °C), Min:98.1 °F (36.7 °C), Max:100 °F (37.8 °C)    MRSA Swab ordered (if applicable)? YES    The plan below is expected to result in a target range of AUC/KADIE 400-600    Therapy will be initiated with a loading dose of 2250 mg IV x 1 to be followed by a maintenance dose of 750 mg IV every 12 hours. Pharmacy to follow patient daily and order levels / make dose adjustments as appropriate. *Vancomycin has been dosed used Bayesian kinetics software to target an AUC/KADIE of 400-600, which provides adequate exposure for an assumed infection due to MRSA with an KADIE of 1 or less while reducing the risk of nephrotoxicity as seen with traditional trough based dosing goals.

## 2021-07-31 NOTE — PROGRESS NOTES
07/30/21 2303   Oxygen Therapy   O2 Sat (%) 100 %   Pulse via Oximetry 64 beats per minute   O2 Device Nasal cannula   O2 Flow Rate (L/min) 3 l/min  (weaned to 2lpm per protocol)   Pre-Treatment   Breath Sounds Bilateral Clear;Middle; Lower;Diminished   Post-Treatment   Treatment tolerance   (Pt is unable to perform)

## 2021-07-31 NOTE — PROGRESS NOTES
[address] [place] [address]               Division of Cardiology  [phone]                       Progress note              Jose Green M.D., F.A.CANKUSH. NAME:  Enrike Kamara   :   1940   MRN:   [id number]         ICD-10-CM ICD-9-CM    1. Pleural effusion on left  J90 511.9    2. Acute respiratory failure with hypoxia (HCC)  J96.01 518.81    3. Atrial fibrillation with rapid ventricular response (HCC)  I48.91 427.31    4. Nonsustained ventricular tachycardia (HCC)  I47.2 427.1    5. Sepsis, due to unspecified organism, unspecified whether acute organ dysfunction present ([facility]  A41.9 038.9      995.91                  HPI  80years old female with a past medical history remarkable for hypertension, hyperlipidemia, tobacco abuse, peripheral vascular disease, CVA, dementia who presented on account of worsening shortness of breath and hemoptysis. Chest CT obtained as an outpatient revealed a total collapse of the left lung and a large pleural effusion and mediastinal lymphadenopathy. Cardiology was consulted on account of atrial fibrillation with rapid ventricular response. She still smokes 2 packs cigarettes a day. She moved from [place] to [place] to live with her daughter. This morning she feels okay no major complains that she still has a productive cough. Shortness of breath improved after thoracentesis. Assessment/Plan: 1. Atrial fibrillation: The patient is currently back in normal sinus rhythm. For now we will continue IV amiodarone and IV heparin with no changes. Proceed with echocardiogram.    2.  Left pleural effusion: Status post thoracentesis yesterday with removal of 1.5 L. Concern about malignancy versus parapneumonic event. And or mucous plug. Primary team closely following.            CARDIAC STUDIES                   @LASTEKG      IMAGING      CT Results (most recent):  Results from Hospital Encounter encounter on 07/29/21    CT CHEST WO CONT    Narrative  INDICATION: Chronic cough with blood in sputum    COMPARISON: December 2020    CONTRAST: None. TECHNIQUE:  5 mm axial images were obtained through the thorax. Coronal and  sagittal reformats were generated. CT dose reduction was achieved through use  of a standardized protocol tailored for this examination and automatic exposure  control for dose modulation. The absence of intravenous contrast reduces the sensitivity for evaluation of  the mediastinum, jay jay, vasculature, and upper abdominal organs. FINDINGS:    CHEST WALL: Bilateral breast implants. Rupture of the right breast implant. THYROID: No nodule. MEDIASTINUM: 3 cm left para-aortic lymph node. 2.9 cm lymph node between the  trachea and aorta and the middle mediastinum. 3.4 cm subcarinal lymph node. Smaller lymph nodes adjacent to the aortic arch. JAY JAY: Probable hilar lymphadenopathy  THORACIC AORTA: No aneurysm. MAIN PULMONARY ARTERY: Normal in caliber. TRACHEA/BRONCHI: Complete obstruction of the left mainstem bronchus with  internal mucus. Displacement of the trachea to the right. ESOPHAGUS: No wall thickening or dilatation. HEART: Normal in size. PLEURA: Large left pleural effusion. LUNGS: Complete collapse of the left lower lobe. Mild atelectasis in the right  lower lobe. INCIDENTALLY IMAGED UPPER ABDOMEN: Cholecystectomy. BONES: Thoracic hemangioma. Impression  1. Significant worsening of mediastinal lymphadenopathy with complete collapse  of the left lung, large left pleural effusion, and mucus in the left mainstem  bronchus. 2.  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 Findings were discussed with the patient's daughter at the time of exam and the  patient was sent to the ER for further evaluation.       XR Results (most recent):  Results from Hospital Encounter encounter on 07/29/21    XR CHEST PORT    Narrative  INDICATION: Post left thoracentesis    COMPARISON: July 29, 2020    FINDINGS: AP portable imaging of the chest performed at 3:08 PM demonstrates a  stable cardiomediastinal silhouette. There is persistent opacification of the  left hemithorax, but there previously seen left to right midline shift has  resolved. There is no pneumothorax. Abrupt cut off of the left mainstem bronchus  is noted. The right lung is clear. No significant osseous abnormalities are  seen. Impression  Persistent opacification of the left hemithorax, with diminished  left to right midline shift. Abrupt cut off of the left mainstem bronchus, as  seen on yesterday's chest CT, compatible with bronchial obstruction and/or  mucous plugging. MRI Results (most recent):  No results found for this or any previous visit. Past Medical History:   Diagnosis Date    Cellulitis     R LEG    CVA (cerebral vascular accident) ([facility]     Hyperlipidemia     Hypertension            Past Surgical History:   Procedure Laterality Date    HX BLADDER SUSPENSION  01/2021    HX CHOLECYSTECTOMY      HX OTHER SURGICAL  10/2020    PARACENTESIS               Visit Vitals  /66 (BP 1 Location: Left upper arm, BP Patient Position: At rest)   Pulse 73   Temp 98.5 °F (36.9 °C)   Resp 22   Ht 5' 7\" (1.702 m)   Wt 199 lb 15.3 oz (90.7 kg)   SpO2 100%   BMI 31.32 kg/m²         Wt Readings from Last 3 Encounters:   07/29/21 199 lb 15.3 oz (90.7 kg)   07/29/21 199 lb 15.3 oz (90.7 kg)   06/09/21 202 lb (91.6 kg)         Review of Systems:   Pertinent items are noted in the History of Present Illness. No intake/output data recorded. 07/29 1901 - 07/31 0700  In: 1963.1 [I.V.:1963.1]  Out: 600 [Urine:600]      Telemetry: normal sinus rhythm    Physical Exam:    Neck: no JVD  Heart: regular rate and rhythm  Lungs: rhonchi R anterior, L anterior  Abdomen: soft, non-tender.  Bowel sounds normal. No masses,  no organomegaly  Extremities: edema 1+    Current Facility-Administered Medications   Medication Dose Route Frequency    potassium chloride SR (KLOR-CON 10) tablet 40 mEq  40 mEq Oral Q4H    miconazole (MICOTIN) 2 % powder   Topical BID    piperacillin-tazobactam (ZOSYN) 3.375 g in 0.9% sodium chloride (MBP/ADV) 100 mL MBP  3.375 g IntraVENous Q8H    albuterol-ipratropium (DUO-NEB) 2.5 MG-0.5 MG/3 ML  3 mL Nebulization Q6H RT    amLODIPine (NORVASC) tablet 5 mg  5 mg Oral DAILY    aspirin delayed-release tablet 81 mg  81 mg Oral DAILY    atorvastatin (LIPITOR) tablet 10 mg  10 mg Oral QHS    furosemide (LASIX) injection 20 mg  20 mg IntraVENous BID    oxyCODONE IR (ROXICODONE) tablet 5 mg  5 mg Oral Q6H PRN    vancomycin (VANCOCIN) 750 mg in 0.9% sodium chloride 250 mL (VIAL-MATE)  750 mg IntraVENous Q12H    vancomycin - pharmacy to dose    Other Rx Dosing/Monitoring    heparin 25,000 units in D5W 250 ml infusion  11-25 Units/kg/hr IntraVENous TITRATE    amiodarone (CORDARONE) 375 mg/250 mL D5W infusion  0.5-1 mg/min IntraVENous TITRATE    sodium chloride (NS) flush 5-40 mL  5-40 mL IntraVENous Q8H    sodium chloride (NS) flush 5-40 mL  5-40 mL IntraVENous PRN    acetaminophen (TYLENOL) tablet 650 mg  650 mg Oral Q6H PRN    Or    acetaminophen (TYLENOL) suppository 650 mg  650 mg Rectal Q6H PRN    polyethylene glycol (MIRALAX) packet 17 g  17 g Oral DAILY PRN    ondansetron (ZOFRAN ODT) tablet 4 mg  4 mg Oral Q8H PRN    Or    ondansetron (ZOFRAN) injection 4 mg  4 mg IntraVENous Q6H PRN    famotidine (PF) (PEPCID) 20 mg in 0.9% sodium chloride 10 mL injection  20 mg IntraVENous BID    nicotine (NICODERM CQ) 21 mg/24 hr patch 1 Patch  1 Patch TransDERmal DAILY PRN         All Cardiac Markers in the last 24 hours:  No results found for: CPK, CK, CKMMB, CKMB, RCK3, CKMBT, CKMBPOC, CKNDX, CKND1, ASHER, TROPT, TROIQ, JAKE, TROPT, TNIPOC, BNP, BNPP, BNPNT     Lab Results   Component Value Date/Time    Creatinine 0.67 07/31/2021 02:31 AM          Lab Results   Component Value Date/Time    CK 61 07/29/2021 11:00 PM    Troponin-I, Qt. <0.05 07/30/2021 03:50 AM         Lab Results   Component Value Date/Time    WBC 8.0 07/31/2021 02:31 AM    HGB 10.4 (L) 07/31/2021 02:31 AM    HCT 33.5 (L) 07/31/2021 02:31 AM    PLATELET 588 09/63/9088 02:31 AM    MCV 92.8 07/31/2021 02:31 AM         Lab Results   Component Value Date/Time    Sodium 138 07/31/2021 02:31 AM    Potassium 3.2 (L) 07/31/2021 02:31 AM    Chloride 96 (L) 07/31/2021 02:31 AM    CO2 38 (H) 07/31/2021 02:31 AM    Anion gap 4 (L) 07/31/2021 02:31 AM    Glucose 105 (H) 07/31/2021 02:31 AM    BUN 12 07/31/2021 02:31 AM    Creatinine 0.67 07/31/2021 02:31 AM    BUN/Creatinine ratio 18 07/31/2021 02:31 AM    GFR est AA >60 07/31/2021 02:31 AM    GFR est non-AA >60 07/31/2021 02:31 AM    Calcium 8.2 (L) 07/31/2021 02:31 AM         Lab Results   Component Value Date/Time    NT pro-BNP 1,466 (H) 07/29/2021 10:58 PM         No results found for: CHOL, CHOLPOCT, CHOLX, CHLST, CHOLV, HDL, HDLPOC, HDLP, LDL, LDLCPOC, LDLC, DLDLP, VLDLC, VLDL, TGLX, TRIGL, TRIGP, TGLPOCT, CHHD, CHHDX      Lab Results   Component Value Date/Time    ALT (SGPT) 28 07/29/2021 05:27 PM    Alk.  phosphatase 107 07/29/2021 05:27 PM    Bilirubin, total 0.5 07/29/2021 05:27 PM

## 2021-07-31 NOTE — ED NOTES
TRANSFER - OUT REPORT:    Verbal report given to [address] (name) dinesh Wakefield  being transferred to [facility] (unit) for routine progression of care       Report consisted of patients Situation, Background, Assessment and   Recommendations(SBAR). Information from the following report(s) SBAR, Kardex, ED Summary, Intake/Output, MAR and Recent Results was reviewed with the receiving nurse. Lines:   Peripheral IV 07/29/21 Right Antecubital (Active)   Site Assessment Clean, dry, & intact 07/29/21 1922   Phlebitis Assessment 0 07/29/21 1922   Infiltration Assessment 0 07/29/21 1730   Dressing Status Clean, dry, & intact 07/29/21 1922   Dressing Type Transparent 07/29/21 1922   Hub Color/Line Status Pink;Patent; Flushed 07/29/21 1730   Action Taken Blood drawn 07/29/21 1730       Peripheral IV 07/29/21 Anterior;Right Hand (Active)   Site Assessment Clean, dry, & intact 07/29/21 1922   Phlebitis Assessment 0 07/29/21 1922   Dressing Status Clean, dry, & intact 07/29/21 1922   Dressing Type Transparent 07/29/21 1922       Peripheral IV 07/29/21 Left Antecubital (Active)        Opportunity for questions and clarification was provided.       Patient transported with:   Monitor  O2 @ 3 liters  Registered Nurse

## 2021-07-31 NOTE — PROGRESS NOTES
[facility]    Hospitalist Progress Note  Jerald Hawthorne MD  Answering service: [phone] OR   [phone] from in house phone     Date of service: 2021   Name: Hai Voss  : 1940  MRN: [id number]  PCP: Dr. Matt Song NP     Brief HPI and Hospital Course:       70-year-old female with a history of heavy tobacco use at least 2 packs a day since 13to 12years of age, hyperlipidemia, hypertension presented to the ED from [facility]  with reports of worsening shortness of breath over the last several days and 1 episode of sputum with scant blood-tinged.  She denies any ongoing hemoptysis.  She denies any fever or chills.  Denies any wheezing or chest tightness.  Denies any chest pain.  Chest x-ray was done and revealed left lung opacification.  CT of the chest was done revealed mediastinal lymphadenopathy, complete collapse of the left lung with large left pleural effusion and likely obstructing mucus in the left mainstem bronchus.  Vital signs were also notable for elevated heart rate as high as 180, mild elevation in temperature as high as 100.0, tachypnea with breathing in the 30s, and decreased SPO2 as low as 86 on room air.  She was placed on supplemental O2 with improvement in SPO2.  EKG was done was consistent with atrial fibrillation with RVR but was unremarkable for acute ischemic change.  Patient was initiated on amiodarone infusion and heparin as well.  She was also initiated on empiric antibiotics with Zosyn and vancomycin, and admitted to ICU for further evaluation and treatment.      improved after thoracentesis   Transferred out ICU on       Assessment/Plan     1. Acute Hypoxic Respiratory Failure  a. Due to large left pleural effusion and mucus plugging in left mainstem bronchus. Concern for possible post obstructive pneumonia, and malignancy. Given smoking history, patient with likely history of COPD  b.  Patient with worsening mediastinal lymphadenopathy concern for possible underlying malignancy  c. Continue vancomycin and zosyn for possible post obstructive pneumonia. d. Patient may need bronchoscopy if persistent lobe collapse following thoracentesis : consulted pulmonologist   2. Large left pleural effusion  a. Underlying etiology unclear- parapneumonic vs malignant vs heart faulire  e. S/p  thoracentesis , removed 1.5L effusion . Will follow lab  LDH, protein, cell count, culture, protein, glucose, fungal culture, and cytology of effusion. b. Hold heparin until after thoracentesis  c. Continue diuresis; repeat CT scan   d. lymphocytic exudate; cytology pending  3. Atrial fibrillation with RVR  a. No reported history of atrial fibrillation  b. Continue amiodarone  c. Heparin gtt restarted after thoracentesis  d. Cardiology consulted  e. Follow up TTE  4. Cellulitis  a. On bilateral lower extremities. Patient with recent history of worsening swelling. Recent DVT ultrasound negative. b. Continue Zosyn and vancomycin  5. Hypertension  a. Continue home amlodipine  6. Hypokalemia: kcl 40meg in am. Follow k   7. Tobacco abuse  a. Nicotine patch ordered       See orders for other care plans.   CODE status: full  VTE prophylaxis: heparin  Discussed plan of care with Patient/Family and Nurse   Pre-admission lived at [address] [place] [address]   Discharge planning: home when improved     Subjective   Dyspnea  Tired  No cp   Reviewed interval history    Physical examination     Visit Vitals  BP (!) 137/57 (BP 1 Location: Left arm, BP Patient Position: At rest)   Pulse 81   Temp 98.4 °F (36.9 °C)   Resp 17   Ht 5' 7\" (1.702 m)   Wt 90.7 kg (199 lb 15.3 oz)   SpO2 99%   BMI 31.32 kg/m²       Temp (24hrs), Av.2 °F (36.8 °C), Min:97.9 °F (36.6 °C), Max:98.5 °F (36.9 °C)      Intake/Output Summary (Last 24 hours) at 2021 8187  Last data filed at 2021 0329  Gross per 24 hour   Intake 1963.09 ml   Output 600 ml   Net 1363.09 ml       General: Alert, cooperative, no acute distress   Head:  Atraumatic   Eyes:  Conjunctivae clear   ENMT: Oral mucosa normal   Neck: Supple  No JVD   Back:   No CVA tenderness    Chest wall:   No tenderness or deformities    Lungs:  Decreased breath sounds     Heart:  Regular rhythm, no murmur   Abdomen:   Soft, non-tender  No masses or organomegaly   Extremities: No edema or DVT signs   Pulses: Symmetric all extremities   Skin: Warm and dry   No rashes or lesions   Neurologic: Oriented  No focal deficits     Data Reviewed:   Reviewed imagin  Telemetry noted  I have reviewed the flow sheet and recent notes  New labs and data below personally reviewed. Recent Labs     07/31/21  0231 07/30/21  0350 07/29/21  2259   WBC 8.0 7.6 10.4   HGB 10.4* 9.5* 11.1*   HCT 33.5* 30.7* 35.7    339 395     Recent Labs     07/31/21  0231 07/30/21  1729 07/30/21  0350 07/29/21  1727 07/29/21  1727    133* 137   < > 140   K 3.2* 3.1* 2.9*   < > 3.6   CL 96* 93* 98   < > 99   CO2 38* 38* 36*   < > 33*   * 268* 125*   < > 142*   BUN 12 14 15   < > 19   CREA 0.67 0.80 0.72   < > 0.93   CA 8.2* 8.1* 8.5   < > 9.2   MG 1.9  --  1.7  --  1.8   PHOS 3.3 3.0 3.6  --   --    ALB  --  2.0*  --   --  2.3*   TBILI  --   --   --   --  0.5   ALT  --   --   --   --  28   INR  --   --   --   --  1.1    < > = values in this interval not displayed.        Medications reviewed  Current Facility-Administered Medications   Medication Dose Route Frequency    miconazole (MICOTIN) 2 % powder   Topical BID    [START ON 8/1/2021] Vancomycin level 8/1 0400 w/ AM labs (prior to dose)   Other ONCE    heparin (porcine) injection 4,000 Units  4,000 Units IntraVENous ONCE    piperacillin-tazobactam (ZOSYN) 3.375 g in 0.9% sodium chloride (MBP/ADV) 100 mL MBP  3.375 g IntraVENous Q8H    albuterol-ipratropium (DUO-NEB) 2.5 MG-0.5 MG/3 ML  3 mL Nebulization Q6H RT    amLODIPine (NORVASC) tablet 5 mg  5 mg Oral DAILY    aspirin delayed-release tablet 81 mg  81 mg Oral DAILY    atorvastatin (LIPITOR) tablet 10 mg  10 mg Oral QHS    furosemide (LASIX) injection 20 mg  20 mg IntraVENous BID    oxyCODONE IR (ROXICODONE) tablet 5 mg  5 mg Oral Q6H PRN    vancomycin (VANCOCIN) 750 mg in 0.9% sodium chloride 250 mL (VIAL-MATE)  750 mg IntraVENous Q12H    vancomycin - pharmacy to dose    Other Rx Dosing/Monitoring    heparin 25,000 units in D5W 250 ml infusion  11-25 Units/kg/hr IntraVENous TITRATE    amiodarone (CORDARONE) 375 mg/250 mL D5W infusion  0.5-1 mg/min IntraVENous TITRATE    sodium chloride (NS) flush 5-40 mL  5-40 mL IntraVENous Q8H    sodium chloride (NS) flush 5-40 mL  5-40 mL IntraVENous PRN    acetaminophen (TYLENOL) tablet 650 mg  650 mg Oral Q6H PRN    Or    acetaminophen (TYLENOL) suppository 650 mg  650 mg Rectal Q6H PRN    polyethylene glycol (MIRALAX) packet 17 g  17 g Oral DAILY PRN    ondansetron (ZOFRAN ODT) tablet 4 mg  4 mg Oral Q8H PRN    Or    ondansetron (ZOFRAN) injection 4 mg  4 mg IntraVENous Q6H PRN    famotidine (PF) (PEPCID) 20 mg in 0.9% sodium chloride 10 mL injection  20 mg IntraVENous BID    nicotine (NICODERM CQ) 21 mg/24 hr patch 1 Patch  1 Patch TransDERmal DAILY PRN         Andrea Palumbo MD  Internal Medicine  7/31/2021

## 2021-07-31 NOTE — PROGRESS NOTES
2015: TRANSFER - IN REPORT:  Verbal report received from Kenneth Cantu RN(name) on Macarena Sexton  being received from ER #10(unit) for routine progression of care  Report consisted of patients Situation, Background, Assessment and Recommendations(SBAR). Information from the following report(s) SBAR, Kardex, ED Summary, Intake/Output, MAR, Recent Results and Med Rec Status was reviewed with the receiving nurse. Opportunity for questions and clarification was provided. Assessment completed upon patients arrival to unit and care assumed. 2110: Vitals obtained, tele connected. 3811: Oriented to self and place [facility]). Pt thought the president was antonietta until reasking, she thought it was fall. She had no idea why she came into the hospital.    0356: Bolus Heparin 4,000units ordered per protocal for PTT  0730: Bedside shift change report given to Juan Pablo Paul (oncoming nurse) by Garrett Hinds RN (offgoing nurse). Report included the following information SBAR, Kardex, Intake/Output, MAR, Recent Results, Med Rec Status and Cardiac Rhythm  .

## 2021-07-31 NOTE — PROGRESS NOTES
Bedside shift change report given to Anamaria Renteria (oncoming nurse) by Jeanette Lao (offgoing nurse). Report included the following information SBAR, Kardex, Intake/Output, MAR and Cardiac Rhythm NSR.

## 2021-07-31 NOTE — CONSULTS
Pulmonary, Critical Care, and Sleep Medicine    Initial Patient Consult    Name: Francy Charles MRN: [id number]   : 1940 Hospital: [place] [address]   Date: 2021        IMPRESSION:   · Large left exudative effusion- worrisome for malignant effusion- awaiting cyto  · Mediastinal LAD- suspect malignant- if tap negative will need EBUS/bronch  · Acute hypoxic resp failure- Left Lung atx  · PAF/RVR  · HTN  · Tobacco use      RECOMMENDATIONS:   · Overall picture worrisome for lung cancer with med LAD and malignant effusion  · Will repeat CT chest now that left effusion smaller look for underlying mass. · Pt is acutely ill  · Agree with abx for possible post obst PNA  · nebs     Subjective: This patient has been seen and evaluated at the request of Dr. He Shah for Pleural effusion. Patient is a 80 y.o. female admitted with SOB and left effusion- tap revealed a lymphocytic exudate cyto pending. Pt less SOB and out of ICU. She is confused and doesn't dive much hx other than her SOB is better. Past Medical History:   Diagnosis Date    Cellulitis     R LEG    CVA (cerebral vascular accident) ([facility] [address]     Hyperlipidemia     Hypertension       Past Surgical History:   Procedure Laterality Date    HX BLADDER SUSPENSION  2021    HX CHOLECYSTECTOMY      HX OTHER SURGICAL  10/2020    PARACENTESIS      Prior to Admission medications    Medication Sig Start Date End Date Taking? Authorizing Provider   albuterol (PROVENTIL HFA, VENTOLIN HFA, PROAIR HFA) 90 mcg/actuation inhaler Take 2 Puffs by inhalation every four (4) hours as needed for Wheezing for up to 360 days. 21 Yes Naima Del Toro MD   folic acid (FOLVITE) 491 mcg tablet Take 1 Tab by mouth daily. Indications: inadequate folic acid   Yes David Hernandez, NP   donepeziL (Aricept) 5 mg tablet Take 1 Tab by mouth daily. 21  Yes David Hernandez, NP   amLODIPine (NORVASC) 5 mg tablet Take 1 Tab by mouth daily.  21  Yes Crystal Gomez Q, NP   aspirin delayed-release 81 mg tablet Take 1 Tab by mouth daily. 2/24/21  Yes David Hernandez Q, NP   simvastatin (ZOCOR) 20 mg tablet Take 1 Tab by mouth nightly. 2/24/21  Yes Betty Hernandez Silence, NP   acetaminophen (TYLENOL) 325 mg tablet Take 2 Tabs by mouth every six (6) hours as needed for Pain. 1/15/21  Yes Jesusita Noland PA-C   menthol (GOLD BOND PAIN RELIEVING EX) Apply 1 Applicator to affected area as needed. thin layer lower extremities   Yes Provider, Historical   naproxen sodium (Aleve) 220 mg cap Take 1 Tab by mouth daily as needed for Pain.    Yes Provider, Historical     No Known Allergies   Social History     Tobacco Use    Smoking status: Current Every Day Smoker     Packs/day: 1.50     Years: 72.00     Pack years: 108.00    Smokeless tobacco: Never Used   Substance Use Topics    Alcohol use: Not Currently      Family History   Problem Relation Age of Onset    Anesth Problems Neg Hx         Current Facility-Administered Medications   Medication Dose Route Frequency    potassium chloride SR (KLOR-CON 10) tablet 40 mEq  40 mEq Oral Q4H    miconazole (MICOTIN) 2 % powder   Topical BID    piperacillin-tazobactam (ZOSYN) 3.375 g in 0.9% sodium chloride (MBP/ADV) 100 mL MBP  3.375 g IntraVENous Q8H    albuterol-ipratropium (DUO-NEB) 2.5 MG-0.5 MG/3 ML  3 mL Nebulization Q6H RT    amLODIPine (NORVASC) tablet 5 mg  5 mg Oral DAILY    aspirin delayed-release tablet 81 mg  81 mg Oral DAILY    atorvastatin (LIPITOR) tablet 10 mg  10 mg Oral QHS    furosemide (LASIX) injection 20 mg  20 mg IntraVENous BID    vancomycin (VANCOCIN) 750 mg in 0.9% sodium chloride 250 mL (VIAL-MATE)  750 mg IntraVENous Q12H    vancomycin - pharmacy to dose    Other Rx Dosing/Monitoring    heparin 25,000 units in D5W 250 ml infusion  11-25 Units/kg/hr IntraVENous TITRATE    amiodarone (CORDARONE) 375 mg/250 mL D5W infusion  0.5-1 mg/min IntraVENous TITRATE    sodium chloride (NS) flush 5-40 mL 5-40 mL IntraVENous Q8H    famotidine (PF) (PEPCID) 20 mg in 0.9% sodium chloride 10 mL injection  20 mg IntraVENous BID       Review of Systems:  Review of systems not obtained due to patient factors. Objective:   Vital Signs:    Visit Vitals  /66 (BP 1 Location: Left upper arm, BP Patient Position: At rest)   Pulse 73   Temp 98.5 °F (36.9 °C)   Resp 22   Ht 5' 7\" (1.702 m)   Wt 90.7 kg (199 lb 15.3 oz)   SpO2 97%   BMI 31.32 kg/m²       O2 Device: Nasal cannula   O2 Flow Rate (L/min): 2 l/min   Temp (24hrs), Av.2 °F (36.8 °C), Min:97.9 °F (36.6 °C), Max:98.5 °F (36.9 °C)       Intake/Output:   Last shift:      No intake/output data recorded. Last 3 shifts:  1901 -  0700  In: 1963.1 [I.V.:1963.1]  Out: 600 [Urine:600]    Intake/Output Summary (Last 24 hours) at 2021 1044  Last data filed at 2021 0329  Gross per 24 hour   Intake 1963.09 ml   Output 600 ml   Net 1363.09 ml      Physical Exam:   General:  Alert, cooperative, no distress, appears stated age. Head:  Normocephalic, without obvious abnormality, atraumatic. NCAT no WOB,OnNC 02 no accessory use no abd paradox no cyanosis  Data review:     Recent Results (from the past 24 hour(s))   CELL COUNT, BODY FLUID    Collection Time: 21  3:00 PM   Result Value Ref Range    BODY FLUID TYPE PLEURAL FLUID      FLUID COLOR YELLOW      FLUID APPEARANCE CLEAR      FLUID RBC CT. >100 (H) 0 /cu mm    FLUID NUCLEATED CELLS 1,389 /cu mm    FLD NEUTROPHILS 20 (A) NRRE %    FLD LYMPHS 56 (A) NRRE %    FLD MONO/MACROPHAGES 23 (A) NRRE %    FLUID MESOTHELIAL 1 (A) NRRE %   LDH, BODY FLUID    Collection Time: 21  3:00 PM   Result Value Ref Range    Fluid Type: PLEURAL FLUID      LD, body fld. 172 U/L   CULTURE, BODY FLUID Oscar Mckusick STAIN    Collection Time: 21  3:00 PM    Specimen: Pleural Fluid;  Body Fluid   Result Value Ref Range    Special Requests: NO SPECIAL REQUESTS      GRAM STAIN 2+ WBCS SEEN      GRAM STAIN NO ORGANISMS SEEN      Culture result: PENDING    PROTEIN TOTAL, FLUID    Collection Time: 07/30/21  3:00 PM   Result Value Ref Range    Fluid Type: PLEURAL FLUID      Protein total, body fld. 3.7 g/dL   GLUCOSE, FLUID    Collection Time: 07/30/21  3:00 PM   Result Value Ref Range    Fluid Type: PLEURAL FLUID      Glucose, body fld.  129 MG/DL   LD    Collection Time: 07/30/21  5:29 PM   Result Value Ref Range     81 - 246 U/L   PROTEIN, TOTAL    Collection Time: 07/30/21  5:29 PM   Result Value Ref Range    Protein, total 6.4 6.4 - 8.2 g/dL   PTT    Collection Time: 07/30/21  5:29 PM   Result Value Ref Range    aPTT 26.5 22.1 - 31.0 sec    aPTT, therapeutic range     58.0 - 77.0 SECS   RENAL FUNCTION PANEL    Collection Time: 07/30/21  5:29 PM   Result Value Ref Range    Sodium 133 (L) 136 - 145 mmol/L    Potassium 3.1 (L) 3.5 - 5.1 mmol/L    Chloride 93 (L) 97 - 108 mmol/L    CO2 38 (H) 21 - 32 mmol/L    Anion gap 2 (L) 5 - 15 mmol/L    Glucose 268 (H) 65 - 100 mg/dL    BUN 14 6 - 20 MG/DL    Creatinine 0.80 0.55 - 1.02 MG/DL    BUN/Creatinine ratio 18 12 - 20      GFR est AA >60 >60 ml/min/1.73m2    GFR est non-AA >60 >60 ml/min/1.73m2    Calcium 8.1 (L) 8.5 - 10.1 MG/DL    Phosphorus 3.0 2.6 - 4.7 MG/DL    Albumin 2.0 (L) 3.5 - 5.0 g/dL   PTT    Collection Time: 07/30/21  8:45 PM   Result Value Ref Range    aPTT 27.2 22.1 - 31.0 sec    aPTT, therapeutic range     58.0 - 77.0 SECS   PTT    Collection Time: 07/31/21  2:31 AM   Result Value Ref Range    aPTT 30.8 22.1 - 31.0 sec    aPTT, therapeutic range     58.0 - 85.7 SECS   METABOLIC PANEL, BASIC    Collection Time: 07/31/21  2:31 AM   Result Value Ref Range    Sodium 138 136 - 145 mmol/L    Potassium 3.2 (L) 3.5 - 5.1 mmol/L    Chloride 96 (L) 97 - 108 mmol/L    CO2 38 (H) 21 - 32 mmol/L    Anion gap 4 (L) 5 - 15 mmol/L    Glucose 105 (H) 65 - 100 mg/dL    BUN 12 6 - 20 MG/DL    Creatinine 0.67 0.55 - 1.02 MG/DL    BUN/Creatinine ratio 18 12 - 20      GFR est AA >60 >60 ml/min/1.73m2    GFR est non-AA >60 >60 ml/min/1.73m2    Calcium 8.2 (L) 8.5 - 10.1 MG/DL   MAGNESIUM    Collection Time: 07/31/21  2:31 AM   Result Value Ref Range    Magnesium 1.9 1.6 - 2.4 mg/dL   PHOSPHORUS    Collection Time: 07/31/21  2:31 AM   Result Value Ref Range    Phosphorus 3.3 2.6 - 4.7 MG/DL   CBC WITH AUTOMATED DIFF    Collection Time: 07/31/21  2:31 AM   Result Value Ref Range    WBC 8.0 3.6 - 11.0 K/uL    RBC 3.61 (L) 3.80 - 5.20 M/uL    HGB 10.4 (L) 11.5 - 16.0 g/dL    HCT 33.5 (L) 35.0 - 47.0 %    MCV 92.8 80.0 - 99.0 FL    MCH 28.8 26.0 - 34.0 PG    MCHC 31.0 30.0 - 36.5 g/dL    RDW 13.5 11.5 - 14.5 %    PLATELET 761 860 - 618 K/uL    MPV 9.6 8.9 - 12.9 FL    NRBC 0.0 0  WBC    ABSOLUTE NRBC 0.00 0.00 - 0.01 K/uL    NEUTROPHILS 75 32 - 75 %    LYMPHOCYTES 11 (L) 12 - 49 %    MONOCYTES 10 5 - 13 %    EOSINOPHILS 3 0 - 7 %    BASOPHILS 0 0 - 1 %    IMMATURE GRANULOCYTES 1 (H) 0.0 - 0.5 %    ABS. NEUTROPHILS 6.0 1.8 - 8.0 K/UL    ABS. LYMPHOCYTES 0.8 0.8 - 3.5 K/UL    ABS. MONOCYTES 0.8 0.0 - 1.0 K/UL    ABS. EOSINOPHILS 0.3 0.0 - 0.4 K/UL    ABS. BASOPHILS 0.0 0.0 - 0.1 K/UL    ABS. IMM.  GRANS. 0.0 0.00 - 0.04 K/UL    DF AUTOMATED     PTT    Collection Time: 07/31/21  9:48 AM   Result Value Ref Range    aPTT 64.8 (H) 22.1 - 31.0 sec    aPTT, therapeutic range     58.0 - 77.0 SECS       Imaging:  I have personally reviewed the patients radiographs and have reviewed the reports:  [facility]chest with [facility]        Sukumar Rodas MD

## 2021-07-31 NOTE — PROGRESS NOTES
Problem: Breathing Pattern - Ineffective  Goal: *Absence of hypoxia  Outcome: Progressing Towards Goal  Goal: *Use of effective breathing techniques  Outcome: Progressing Towards Goal     Problem: Patient Education: Go to Patient Education Activity  Goal: Patient/Family Education  Outcome: Progressing Towards Goal     Problem: Pressure Injury - Risk of  Goal: *Prevention of pressure injury  Description: Document Ferdinand Scale and appropriate interventions in the flowsheet. Outcome: Progressing Towards Goal  Note: Pressure Injury Interventions:  Sensory Interventions: Assess changes in LOC, Avoid rigorous massage over bony prominences, Check visual cues for pain, Discuss PT/OT consult with provider, Minimize linen layers    Moisture Interventions: Absorbent underpads, Apply protective barrier, creams and emollients, Check for incontinence Q2 hours and as needed, Internal/External urinary devices, Minimize layers    Activity Interventions: Chair cushion, Increase time out of bed    Mobility Interventions: Chair cushion, HOB 30 degrees or less, Turn and reposition approx. every two hours(pillow and wedges)    Nutrition Interventions: Document food/fluid/supplement intake, Discuss nutritional consult with provider, Offer support with meals,snacks and hydration    Friction and Shear Interventions: Apply protective barrier, creams and emollients, Minimize layers                Problem: Patient Education: Go to Patient Education Activity  Goal: Patient/Family Education  Outcome: Progressing Towards Goal     Problem: Falls - Risk of  Goal: *Absence of Falls  Description: Document Leafy Fatima Fall Risk and appropriate interventions in the flowsheet.   Outcome: Progressing Towards Goal  Note: Fall Risk Interventions:  Mobility Interventions: Bed/chair exit alarm, Communicate number of staff needed for ambulation/transfer, Patient to call before getting OOB    Mentation Interventions: Bed/chair exit alarm, Adequate sleep, hydration, pain control, Door open when patient unattended, More frequent rounding, Reorient patient, Room close to nurse's station, Toileting rounds, Update white board    Medication Interventions: Bed/chair exit alarm, Evaluate medications/consider consulting pharmacy, Patient to call before getting OOB, Teach patient to arise slowly    Elimination Interventions: Bed/chair exit alarm, Call light in reach, Toileting schedule/hourly rounds              Problem: Patient Education: Go to Patient Education Activity  Goal: Patient/Family Education  Outcome: Progressing Towards Goal

## 2021-08-01 NOTE — PROGRESS NOTES
07/31/21 2148   Oxygen Therapy   O2 Sat (%) 95 %   Pulse via Oximetry 78 beats per minute   O2 Device Nasal cannula   O2 Flow Rate (L/min) 2 l/min   Pre-Treatment   Breath Sounds Bilateral Clear;Middle; Lower;Diminished   Chest Physiotherapy Tx   CPT Subsequent Treatment   (unable to perform)     Pt is not able to follow commands or perform correctly. Please D/C the order.     Thank you  RT

## 2021-08-01 NOTE — PROGRESS NOTES
0430: PTT & Vanc trough sent to lab  0500: Repositioned and MRSA Cx swab collected. 2580: Nontherapeutic heparin. Ordered 4000 units Heparin bolus to adm. 0730: Bedside shift change report given to April (oncoming nurse) by Garrett Hinds (offgoing nurse). Report included the following information SBAR, Kardex, Intake/Output, MAR, Recent Results, Med Rec Status and Cardiac Rhythm  .

## 2021-08-01 NOTE — PROGRESS NOTES
Pharmacist Note - Vancomycin Dosing  Therapy day 3  Indication: post-obstructive pneumonia and bilateral lower extremity cellulitis  Current regimen: 750 mg IV every 12 hours    Recent Labs     08/01/21  0433 07/31/21  0231 07/30/21  1729 07/30/21  0350 07/30/21  0350   WBC 7.4 8.0  --   --  7.6   CREA 0.61 0.67 0.80   < > 0.72   BUN 8 12 14   < > 15    < > = values in this interval not displayed. A random vancomycin level of 15.5 mcg/mL was obtained and from this level, the patient's AUC24 is calculated to be 391 with the current regimen. Goal target range AUC/KADIE 400-600      Plan: Change to vancomycin 1000 mg IV every 12 hours  for predicted AUC of 516 mg/L.hr. Pharmacy will continue to monitor this patient daily for changes in clinical status and renal function. *Random vancomycin levels are used to calculate AUC/KADIE, this level should not be interpreted as a trough. Vancomycin has been dosed using Bayesian kinetics software to target an AUC24:KADIE of 400-600, which provides adequate exposure for as assumed infection due to MRSA with an KADIE of 1 or less while reducing the risk of nephrotoxicity as seen with traditional trough based dosing goals.

## 2021-08-01 NOTE — PROGRESS NOTES
[facility]    Hospitalist Progress Note  Casandra Prince MD  Answering service: [phone] OR   [phone] from in house phone     Date of service: 2021   Name: Nabil Joshi  : 1940  MRN: [id number]  PCP: Dr. Victorino Schwartz NP     Brief HPI and Hospital Course:       33-year-old female with a history of heavy tobacco use at least 2 packs a day since 13to 12years of age, hyperlipidemia, hypertension presented to the ED from [facility]  with reports of worsening shortness of breath over the last several days and 1 episode of sputum with scant blood-tinged.  She denies any ongoing hemoptysis.  She denies any fever or chills.  Denies any wheezing or chest tightness.  Denies any chest pain.  Chest x-ray was done and revealed left lung opacification.  CT of the chest was done revealed mediastinal lymphadenopathy, complete collapse of the left lung with large left pleural effusion and likely obstructing mucus in the left mainstem bronchus.  Vital signs were also notable for elevated heart rate as high as 180, mild elevation in temperature as high as 100.0, tachypnea with breathing in the 30s, and decreased SPO2 as low as 86 on room air.  She was placed on supplemental O2 with improvement in SPO2.  EKG was done was consistent with atrial fibrillation with RVR but was unremarkable for acute ischemic change.  Patient was initiated on amiodarone infusion and heparin as well.  She was also initiated on empiric antibiotics with Zosyn and vancomycin, and admitted to ICU for further evaluation and treatment.      improved after thoracentesis   Transferred out ICU on       Assessment/Plan     1. Acute Hypoxic Respiratory Failure  a. Due to large left pleural effusion and mucus plugging in left mainstem bronchus. Concern for possible post obstructive pneumonia, and malignancy. Given smoking history, patient with likely history of COPD  b.  Patient with worsening mediastinal lymphadenopathy concern for possible underlying malignancy  c. Continued vancomycin and zosyn for possible post obstructive pneumonia. d. Patient may need bronchoscopy if persistent lobe collapse following thoracentesis: consulted pulmonologist.   2. Large left pleural effusion  a. Underlying etiology unclear- parapneumonic vs malignant vs heart faulire  e. S/p  thoracentesis , removed 1.5L effusion . Will follow lab  LDH, protein, cell count, culture, protein, glucose, fungal culture, and cytology of effusion. b. Hold heparin until after thoracentesis  c. Continue diuresis; repeat CT scan   d. lymphocytic exudate; cytology pending  3. Atrial fibrillation with RVR  a. No reported history of atrial fibrillation  b. Continue amiodarone  c. Heparin gtt restarted after thoracentesis  d. Cardiology consulted  e. Follow up TTE  4. Cellulitis  a. On bilateral lower extremities. Patient with recent history of worsening swelling. Recent DVT ultrasound negative. b. Continue Zosyn and vancomycin  5. Hypertension  a. Continue home amlodipine  6. Hypokalemia: kcl 40meg in am. Follow k   7. Tobacco abuse  a. Nicotine patch ordered     See orders for other care plans.   CODE status: full  VTE prophylaxis: heparin  Discussed plan of care with Patient/Family and Nurse   Pre-admission lived at [address] [place] [address]   Discharge planning: home when improved     Subjective   Dyspnea  No new complaints  No cp   Reviewed interval history    Physical examination     Visit Vitals  BP (!) 113/46 (BP 1 Location: Right upper arm, BP Patient Position: At rest)   Pulse 92   Temp 98.2 °F (36.8 °C)   Resp 18   Ht 5' 7\" (1.702 m)   Wt 92 kg (202 lb 13.2 oz)   SpO2 98%   BMI 31.77 kg/m²       Temp (24hrs), Av.1 °F (36.7 °C), Min:97.9 °F (36.6 °C), Max:98.4 °F (36.9 °C)      Intake/Output Summary (Last 24 hours) at 2021 1810  Last data filed at 2021 1140  Gross per 24 hour   Intake 1010 ml   Output 2150 ml   Net -1140 ml       General: Alert, cooperative   Head:  Atraumatic   Eyes:  Conjunctivae clear   ENMT: Oral mucosa normal   Neck: Supple  No JVD   Back:   No CVA tenderness    Chest wall:   No tenderness or deformities    Lungs:  Decreased breath sounds b/l   Heart:  Regular rhythm, no murmur   Abdomen:   Soft, non-tender  No masses or organomegaly   Extremities: No edema or DVT signs   Pulses: Symmetric all extremities   Skin: Warm and dry   No rashes or lesions   Neurologic: Oriented to person  No focal deficits     Data Reviewed:   Reviewed imagin  Telemetry noted  I have reviewed the flow sheet and recent notes  New labs and data below personally reviewed. Recent Labs     08/01/21  0433 07/31/21  0231 07/30/21  0350   WBC 7.4 8.0 7.6   HGB 10.7* 10.4* 9.5*   HCT 35.2 33.5* 30.7*    352 339     Recent Labs     08/01/21  0433 07/31/21  0231 07/30/21  1729 07/30/21  0350 07/30/21  0350   * 138 133*   < > 137   K 3.7 3.2* 3.1*   < > 2.9*   CL 96* 96* 93*   < > 98   CO2 37* 38* 38*   < > 36*   * 105* 268*   < > 125*   BUN 8 12 14   < > 15   CREA 0.61 0.67 0.80   < > 0.72   CA 8.2* 8.2* 8.1*   < > 8.5   MG 1.7 1.9  --   --  1.7   PHOS 2.8 3.3 3.0   < > 3.6   ALB  --   --  2.0*  --   --     < > = values in this interval not displayed.        Medications reviewed  Current Facility-Administered Medications   Medication Dose Route Frequency    vancomycin (VANCOCIN) 1,000 mg in 0.9% sodium chloride 250 mL (VIAL-MATE)  1,000 mg IntraVENous Q12H    miconazole (MICOTIN) 2 % powder   Topical BID    piperacillin-tazobactam (ZOSYN) 3.375 g in 0.9% sodium chloride (MBP/ADV) 100 mL MBP  3.375 g IntraVENous Q8H    albuterol-ipratropium (DUO-NEB) 2.5 MG-0.5 MG/3 ML  3 mL Nebulization Q6H RT    amLODIPine (NORVASC) tablet 5 mg  5 mg Oral DAILY    aspirin delayed-release tablet 81 mg  81 mg Oral DAILY    atorvastatin (LIPITOR) tablet 10 mg  10 mg Oral QHS    furosemide (LASIX) injection 20 mg  20 mg IntraVENous BID    oxyCODONE IR (ROXICODONE) tablet 5 mg  5 mg Oral Q6H PRN    vancomycin - pharmacy to dose    Other Rx Dosing/Monitoring    heparin 25,000 units in D5W 250 ml infusion  11-25 Units/kg/hr IntraVENous TITRATE    amiodarone (CORDARONE) 375 mg/250 mL D5W infusion  0.5-1 mg/min IntraVENous TITRATE    sodium chloride (NS) flush 5-40 mL  5-40 mL IntraVENous Q8H    sodium chloride (NS) flush 5-40 mL  5-40 mL IntraVENous PRN    acetaminophen (TYLENOL) tablet 650 mg  650 mg Oral Q6H PRN    Or    acetaminophen (TYLENOL) suppository 650 mg  650 mg Rectal Q6H PRN    polyethylene glycol (MIRALAX) packet 17 g  17 g Oral DAILY PRN    ondansetron (ZOFRAN ODT) tablet 4 mg  4 mg Oral Q8H PRN    Or    ondansetron (ZOFRAN) injection 4 mg  4 mg IntraVENous Q6H PRN    famotidine (PF) (PEPCID) 20 mg in 0.9% sodium chloride 10 mL injection  20 mg IntraVENous BID    nicotine (NICODERM CQ) 21 mg/24 hr patch 1 Patch  1 Patch TransDERmal DAILY PRN         Angella Kessler MD  Internal Medicine  8/1/2021

## 2021-08-01 NOTE — PROGRESS NOTES
1423: Notified Dr. Jean Ayoub of supratherapeutic PTT. Heparin gtt put on hold (see MAR). 1430: Notified primary RN, April, of above. RN verbalized understanding.

## 2021-08-01 NOTE — PROGRESS NOTES
[address] [place] [address]               Division of Cardiology  [phone]                       Progress note              Amanda Noguera M.D., MARIAELENA. NAME:  Nabil GarciaB:   1940   MRN:   [id number]         ICD-10-CM ICD-9-CM    1. Pleural effusion on left  J90 511.9    2. Acute respiratory failure with hypoxia (HCC)  J96.01 518.81    3. Atrial fibrillation with rapid ventricular response (HCC)  I48.91 427.31    4. Nonsustained ventricular tachycardia (HCC)  I47.2 427.1    5. Sepsis, due to unspecified organism, unspecified whether acute organ dysfunction present ([facility]  A41.9 038.9      995.91                  HPI  80years old female with a past medical history remarkable for hypertension, hyperlipidemia, tobacco abuse, peripheral vascular disease, CVA, dementia who presented on account of worsening shortness of breath and hemoptysis.     Chest CT obtained as an outpatient revealed a total collapse of the left lung and a large pleural effusion and mediastinal lymphadenopathy.     Cardiology was consulted on account of atrial fibrillation with rapid ventricular response.     She still smokes 2 packs cigarettes a day. She moved from [place] to [place] to live with her daughter    This morning she seems a little more drowsy but mostly she tells me she is tired she has not slept much. No chest pain. Shortness of breath has not worsened thus far. No palpitations reported. Assessment/Plan: 1. Atrial fibrillation: The patient remains at this time in normal sinus rhythm. I am not sure about the reliability of p.o. intake for now. Would like to continue IV amiodarone IV heparin with no changes for the moment. Echocardiogram remains pending at this time. Atrial fibrillation likely brought on by underlying significant pulmonary issues.     2.  Left pleural effusion: Status post thoracentesis on Friday with removal 1.5 L but follow-up chest CT reveals a reaccumulation of the effusion. There is a clear concern about malignancy and primary team and pulmonologist are closely following. CARDIAC STUDIES                   @[email or web address]      IMAGING      CT Results (most recent):  Results from Hospital Encounter encounter on 07/29/21    CT CHEST WO CONT    Narrative  INDICATION: Status post left thoracentesis. Evaluate for underlying malignancy. COMPARISON: 7/29/2021    CONTRAST: None. TECHNIQUE:  5 mm axial images were obtained through the chest. Coronal and  sagittal reformats were generated. CT dose reduction was achieved through use  of a standardized protocol tailored for this examination and automatic exposure  control for dose modulation. The absence of intravenous contrast reduces the sensitivity for evaluation of  the mediastinum, jay jay, vasculature, and upper abdominal organs. FINDINGS:    CHEST WALL: No mass or axillary lymphadenopathy. THYROID: No nodule. MEDIASTINUM: There is an unchanged 3.1 cm left periaortic lymph node. The left  hilum and mediastinum appear confluently consolidated likely related to  confluent adenopathy or mediastinal/hilar invasion  JAY JAY: No mass or lymphadenopathy. THORACIC AORTA: No aneurysm. MAIN PULMONARY ARTERY: Normal in caliber. TRACHEA/BRONCHI: There is an abrupt cut off of the left main stem bronchus. ESOPHAGUS: No wall thickening or dilatation. HEART: Normal in size. PLEURA: There is a large left pleural effusion. This is relatively unchanged. There is a trace right pleural effusion which is new. LUNGS: There is complete consolidation/collapse of the left lung. There is no  aeration of the left lung or bronchi. INCIDENTALLY IMAGED UPPER ABDOMEN: No significant abnormality in the  incidentally imaged upper abdomen. BONES: No destructive bone lesion. 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   There are bilateral breast implants. The right breast implant appears smaller  than the left with areas of nodular soft tissue density surrounding it. This is  likely related to chronic rupture. Impression  1. Unchanged large left pleural effusion which has likely reaccumulated since  the prior thoracentesis. 2. Unchanged dense consolidation and collapse of the left lung with an abrupt  cut off of the left mainstem bronchus. The right hilum and right side of the  mediastinum appears consolidated which may be related to invasion and/or  confluent adenopathy. A large lymph node is seen in the left para-aortic space  that is unchanged. Findings are highly suspicious for malignancy. 3. New trace right pleural effusion. XR Results (most recent):  Results from Hospital Encounter encounter on 07/29/21    XR CHEST PORT    Narrative  INDICATION: Post left thoracentesis    COMPARISON: July 29, 2020    FINDINGS: AP portable imaging of the chest performed at 3:08 PM demonstrates a  stable cardiomediastinal silhouette. There is persistent opacification of the  left hemithorax, but there previously seen left to right midline shift has  resolved. There is no pneumothorax. Abrupt cut off of the left mainstem bronchus  is noted. The right lung is clear. No significant osseous abnormalities are  seen. Impression  Persistent opacification of the left hemithorax, with diminished  left to right midline shift. Abrupt cut off of the left mainstem bronchus, as  seen on yesterday's chest CT, compatible with bronchial obstruction and/or  mucous plugging. MRI Results (most recent):  No results found for this or any previous visit.           Past Medical History:   Diagnosis Date    Cellulitis     R LEG    [facility]A [facility]cerebral vascular accident) [facility]     Hyperlipidemia     Hypertension            Past Surgical History:   Procedure Laterality Date    HX BLADDER SUSPENSION  01/2021    HX CHOLECYSTECTOMY      HX OTHER SURGICAL  10/2020 PARACENTESIS               Visit Vitals  BP (!) 117/42 (BP 1 Location: Right upper arm, BP Patient Position: At rest)   Pulse 100   Temp 97.9 °F (36.6 °C)   Resp 20   Ht 5' 7\" (1.702 m)   Wt 202 lb 13.2 oz (92 kg)   SpO2 93%   BMI 31.77 kg/m²         Wt Readings from Last 3 Encounters:   08/01/21 202 lb 13.2 oz (92 kg)   07/29/21 199 lb 15.3 oz (90.7 kg)   06/09/21 202 lb (91.6 kg)         Review of Systems:   Pertinent items are noted in the History of Present Illness. No intake/output data recorded. 07/30 1901 - 08/01 0700  In: 2973.1 [P.O.:60; I.V.:2913.1]  Out: 4536 [Urine:1750]      Telemetry: normal sinus rhythm    Physical Exam:    Neck: no JVD  Heart: regular rate and rhythm  Lungs: diminished breath sounds R anterior, L anterior  Abdomen: soft, non-tender.  Bowel sounds normal. No masses,  no organomegaly  Extremities: edema noted    Current Facility-Administered Medications   Medication Dose Route Frequency    vancomycin (VANCOCIN) 1,000 mg in 0.9% sodium chloride 250 mL (VIAL-MATE)  1,000 mg IntraVENous Q12H    miconazole (MICOTIN) 2 % powder   Topical BID    Vancomycin level 8/1 0400 w/ AM labs (prior to dose)   Other ONCE    piperacillin-tazobactam (ZOSYN) 3.375 g in 0.9% sodium chloride (MBP/ADV) 100 mL MBP  3.375 g IntraVENous Q8H    albuterol-ipratropium (DUO-NEB) 2.5 MG-0.5 MG/3 ML  3 mL Nebulization Q6H RT    amLODIPine (NORVASC) tablet 5 mg  5 mg Oral DAILY    aspirin delayed-release tablet 81 mg  81 mg Oral DAILY    atorvastatin (LIPITOR) tablet 10 mg  10 mg Oral QHS    furosemide (LASIX) injection 20 mg  20 mg IntraVENous BID    oxyCODONE IR (ROXICODONE) tablet 5 mg  5 mg Oral Q6H PRN    vancomycin - pharmacy to dose    Other Rx Dosing/Monitoring    heparin 25,000 units in D5W 250 ml infusion  11-25 Units/kg/hr IntraVENous TITRATE    amiodarone (CORDARONE) 375 mg/250 mL D5W infusion  0.5-1 mg/min IntraVENous TITRATE    sodium chloride (NS) flush 5-40 mL  5-40 mL IntraVENous Q8H    sodium chloride (NS) flush 5-40 mL  5-40 mL IntraVENous PRN    acetaminophen (TYLENOL) tablet 650 mg  650 mg Oral Q6H PRN    Or    acetaminophen (TYLENOL) suppository 650 mg  650 mg Rectal Q6H PRN    polyethylene glycol (MIRALAX) packet 17 g  17 g Oral DAILY PRN    ondansetron (ZOFRAN ODT) tablet 4 mg  4 mg Oral Q8H PRN    Or    ondansetron (ZOFRAN) injection 4 mg  4 mg IntraVENous Q6H PRN    famotidine (PF) (PEPCID) 20 mg in 0.9% sodium chloride 10 mL injection  20 mg IntraVENous BID    nicotine (NICODERM CQ) 21 mg/24 hr patch 1 Patch  1 Patch TransDERmal DAILY PRN         All Cardiac Markers in the last 24 hours:  No results found for: CPK, CK, CKMMB, CKMB, RCK3, CKMBT, CKMBPOC, CKNDX, CKND1, ASHER, TROPT, TROIQ, JAKE, TROPT, TNIPOC, BNP, BNPP, BNPNT     Lab Results   Component Value Date/Time    Creatinine 0.61 08/01/2021 04:33 AM          Lab Results   Component Value Date/Time    CK 61 07/29/2021 11:00 PM    Troponin-I, Qt. <0.05 07/30/2021 03:50 AM         Lab Results   Component Value Date/Time    WBC 7.4 08/01/2021 04:33 AM    HGB 10.7 (L) 08/01/2021 04:33 AM    HCT 35.2 08/01/2021 04:33 AM    PLATELET 814 32/19/4015 04:33 AM    MCV 96.2 08/01/2021 04:33 AM         Lab Results   Component Value Date/Time    Sodium 135 (L) 08/01/2021 04:33 AM    Potassium 3.7 08/01/2021 04:33 AM    Chloride 96 (L) 08/01/2021 04:33 AM    CO2 37 (H) 08/01/2021 04:33 AM    Anion gap 2 (L) 08/01/2021 04:33 AM    Glucose 104 (H) 08/01/2021 04:33 AM    BUN 8 08/01/2021 04:33 AM    Creatinine 0.61 08/01/2021 04:33 AM    BUN/Creatinine ratio 13 08/01/2021 04:33 AM    GFR est AA >60 08/01/2021 04:33 AM    GFR est non-AA >60 08/01/2021 04:33 AM    Calcium 8.2 (L) 08/01/2021 04:33 AM         Lab Results   Component Value Date/Time    NT pro-BNP 1,466 (H) 07/29/2021 10:58 PM         No results found for: CHOL, CHOLPOCT, CHOLX, CHLST, CHOLV, HDL, HDLPOC, HDLP, LDL, LDLCPOC, LDLC, DLDLP, VLDLC, VLDL, TGLX, TRIGL, [facility], [facility]      Lab Results   Component Value Date/Time    ALT (SGPT) 28 07/29/2021 05:27 PM    Alk.  phosphatase 107 07/29/2021 05:27 PM    Bilirubin, total 0.5 07/29/2021 05:27 PM

## 2021-08-02 NOTE — PROGRESS NOTES
[address] [place] [address]               Division of Cardiology  [phone]                       Progress note              Kwadwo Tyler M.D., F.A.CHiramC. NAME:  Delmy Neri   :   1940   MRN:   [id number]         ICD-10-CM ICD-9-CM    1. Pleural effusion on left  J90 511.9    2. Acute respiratory failure with hypoxia (HCC)  J96.01 518.81    3. Atrial fibrillation with rapid ventricular response (HCC)  I48.91 427.31    4. Nonsustained ventricular tachycardia (HCC)  I47.2 427.1    5. Sepsis, due to unspecified organism, unspecified whether acute organ dysfunction present ([facility]  A41.9 038.9      995.91                  HPI  80years old female with a past medical history remarkable for hypertension, hyperlipidemia, tobacco abuse, peripheral vascular disease, CVA, dementia who presented on account of worsening shortness of breath and hemoptysis.     Chest CT obtained as an outpatient revealed a total collapse of the left lung and a large pleural effusion and mediastinal lymphadenopathy.     Cardiology was consulted on account of atrial fibrillation with rapid ventricular response.     She still smokes 2 packs cigarettes a day. She moved from [place] to [place] to live with her daughter    This morning she is awake alert and in NAD. NO chest pain or dyspnea. She fels well. Wants to go back to sleep and tke a nap(9am)      Assessment/Plan: 1. Atrial fibrillation: The patient remains at this time in normal sinus rhythm. I am not sure about the reliability of p.o. intake for now. Echo pending. IV amio/heaparin until taking po    Atrial fibrillation likely brought on by underlying significant pulmonary issues. 2.  Left pleural effusion: Status post thoracentesis on Friday with removal 1.5 L but follow-up chest CT reveals a reaccumulation of the effusion.   There is a clear concern about malignancy and primary team and pulmonologist are closely following. CARDIAC STUDIES                   @[email or web address]      IMAGING      CT Results (most recent):  Results from Hospital Encounter encounter on 07/29/21    CT CHEST WO CONT    Narrative  INDICATION: Status post left thoracentesis. Evaluate for underlying malignancy. COMPARISON: 7/29/2021    CONTRAST: None. TECHNIQUE:  5 mm axial images were obtained through the chest. Coronal and  sagittal reformats were generated. CT dose reduction was achieved through use  of a standardized protocol tailored for this examination and automatic exposure  control for dose modulation. The absence of intravenous contrast reduces the sensitivity for evaluation of  the mediastinum, jay jay, vasculature, and upper abdominal organs. FINDINGS:    CHEST WALL: No mass or axillary lymphadenopathy. THYROID: No nodule. MEDIASTINUM: There is an unchanged 3.1 cm left periaortic lymph node. The left  hilum and mediastinum appear confluently consolidated likely related to  confluent adenopathy or mediastinal/hilar invasion  JAY JAY: No mass or lymphadenopathy. THORACIC AORTA: No aneurysm. MAIN PULMONARY ARTERY: Normal in caliber. TRACHEA/BRONCHI: There is an abrupt cut off of the left main stem bronchus. ESOPHAGUS: No wall thickening or dilatation. HEART: Normal in size. PLEURA: There is a large left pleural effusion. This is relatively unchanged. There is a trace right pleural effusion which is new. LUNGS: There is complete consolidation/collapse of the left lung. There is no  aeration of the left lung or bronchi. INCIDENTALLY IMAGED UPPER ABDOMEN: No significant abnormality in the  incidentally imaged upper abdomen. BONES: No destructive bone lesion. 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 There are bilateral breast implants. The right breast implant appears smaller  than the left with areas of nodular soft tissue density surrounding it.  This is  likely related to chronic rupture. Impression  1. Unchanged large left pleural effusion which has likely reaccumulated since  the prior thoracentesis. 2. Unchanged dense consolidation and collapse of the left lung with an abrupt  cut off of the left mainstem bronchus. The right hilum and right side of the  mediastinum appears consolidated which may be related to invasion and/or  confluent adenopathy. A large lymph node is seen in the left para-aortic space  that is unchanged. Findings are highly suspicious for malignancy. 3. New trace right pleural effusion. XR Results (most recent):  Results from Hospital Encounter encounter on 07/29/21    XR CHEST PORT    Narrative  INDICATION: Post left thoracentesis    COMPARISON: July 29, 2020    FINDINGS: AP portable imaging of the chest performed at 3:08 PM demonstrates a  stable cardiomediastinal silhouette. There is persistent opacification of the  left hemithorax, but there previously seen left to right midline shift has  resolved. There is no pneumothorax. Abrupt cut off of the left mainstem bronchus  is noted. The right lung is clear. No significant osseous abnormalities are  seen. Impression  Persistent opacification of the left hemithorax, with diminished  left to right midline shift. Abrupt cut off of the left mainstem bronchus, as  seen on yesterday's chest CT, compatible with bronchial obstruction and/or  mucous plugging. MRI Results (most recent):  No results found for this or any previous visit.           Past Medical History:   Diagnosis Date    Cellulitis     R LEG    [facility]cerebral vascular accident) ([facility]     Hyperlipidemia     Hypertension            Past Surgical History:   Procedure Laterality Date    HX BLADDER SUSPENSION  01/2021    HX CHOLECYSTECTOMY      HX OTHER SURGICAL  10/2020    PARACENTESIS               Visit Vitals  BP (!) 141/62 (BP 1 Location: Right arm, BP Patient Position: At rest)   Pulse 72   Temp 97.9 °F (36.6 °C)   Resp 20   Ht 5' 7\" (1.702 m)   Wt 201 lb (91.2 kg)   SpO2 93%   BMI 31.48 kg/m²         Wt Readings from Last 3 Encounters:   08/02/21 201 lb (91.2 kg)   07/29/21 199 lb 15.3 oz (90.7 kg)   06/09/21 202 lb (91.6 kg)         Review of Systems:   Pertinent items are noted in the History of Present Illness. No intake/output data recorded. 07/31 1901 - 08/02 0700  In: 3448 [P.O.:260; I.V.:3188]  Out: 4800 [Urine:4800]      Telemetry: normal sinus rhythm    Physical Exam:    Neck: no JVD  Heart: regular rate and rhythm  Lungs: diminished breath sounds R anterior, L anterior  Abdomen: soft, non-tender.  Bowel sounds normal. No masses,  no organomegaly  Extremities: edema noted    Current Facility-Administered Medications   Medication Dose Route Frequency    albuterol-ipratropium (DUO-NEB) 2.5 MG-0.5 MG/3 ML  3 mL Nebulization BID RT    magnesium sulfate 2 g/50 ml IVPB (premix or compounded)  2 g IntraVENous ONCE    potassium chloride SR (KLOR-CON 10) tablet 40 mEq  40 mEq Oral NOW    [START ON 8/3/2021] Vancomycin random level @ 0200   Other ONCE    balsam peru-castor oiL (VENELEX) ointment   Topical BID    zinc oxide-cod liver oil (DESITIN) 40 % paste   Topical BID    vancomycin (VANCOCIN) 1,000 mg in 0.9% sodium chloride 250 mL (VIAL-MATE)  1,000 mg IntraVENous Q12H    miconazole (MICOTIN) 2 % powder   Topical BID    piperacillin-tazobactam (ZOSYN) 3.375 g in 0.9% sodium chloride (MBP/ADV) 100 mL MBP  3.375 g IntraVENous Q8H    amLODIPine (NORVASC) tablet 5 mg  5 mg Oral DAILY    aspirin delayed-release tablet 81 mg  81 mg Oral DAILY    atorvastatin (LIPITOR) tablet 10 mg  10 mg Oral QHS    furosemide (LASIX) injection 20 mg  20 mg IntraVENous BID    oxyCODONE IR (ROXICODONE) tablet 5 mg  5 mg Oral Q6H PRN    vancomycin - pharmacy to dose    Other Rx Dosing/Monitoring    heparin 25,000 units in D5W 250 ml infusion  11-25 Units/kg/hr IntraVENous TITRATE    amiodarone (CORDARONE) 375 mg/250 mL D5W infusion  0.5-1 mg/min IntraVENous TITRATE    sodium chloride (NS) flush 5-40 mL  5-40 mL IntraVENous Q8H    sodium chloride (NS) flush 5-40 mL  5-40 mL IntraVENous PRN    acetaminophen (TYLENOL) tablet 650 mg  650 mg Oral Q6H PRN    Or    acetaminophen (TYLENOL) suppository 650 mg  650 mg Rectal Q6H PRN    polyethylene glycol (MIRALAX) packet 17 g  17 g Oral DAILY PRN    ondansetron (ZOFRAN ODT) tablet 4 mg  4 mg Oral Q8H PRN    Or    ondansetron (ZOFRAN) injection 4 mg  4 mg IntraVENous Q6H PRN    famotidine (PF) (PEPCID) 20 mg in 0.9% sodium chloride 10 mL injection  20 mg IntraVENous BID    nicotine (NICODERM CQ) 21 mg/24 hr patch 1 Patch  1 Patch TransDERmal DAILY PRN         All Cardiac Markers in the last 24 hours:  No results found for: CPK, CK, CKMMB, CKMB, RCK3, CKMBT, CKMBPOC, CKNDX, CKND1, ASHER, TROPT, TROIQ, JAKE, TROPT, TNIPOC, BNP, BNPP, BNPNT     Lab Results   Component Value Date/Time    Creatinine 0.64 08/02/2021 05:56 AM          Lab Results   Component Value Date/Time    CK 61 07/29/2021 11:00 PM    Troponin-I, Qt. <0.05 07/30/2021 03:50 AM         Lab Results   Component Value Date/Time    WBC 7.4 08/01/2021 04:33 AM    HGB 10.7 (L) 08/01/2021 04:33 AM    HCT 35.2 08/01/2021 04:33 AM    PLATELET 227 77/34/0456 04:33 AM    MCV 96.2 08/01/2021 04:33 AM         Lab Results   Component Value Date/Time    Sodium 132 (L) 08/02/2021 05:56 AM    Potassium 3.2 (L) 08/02/2021 05:56 AM    Chloride 92 (L) 08/02/2021 05:56 AM    CO2 38 (H) 08/02/2021 05:56 AM    Anion gap 2 (L) 08/02/2021 05:56 AM    Glucose 118 (H) 08/02/2021 05:56 AM    BUN 7 08/02/2021 05:56 AM    Creatinine 0.64 08/02/2021 05:56 AM    BUN/Creatinine ratio 11 (L) 08/02/2021 05:56 AM    GFR est AA >60 08/02/2021 05:56 AM    GFR est non-AA >60 08/02/2021 05:56 AM    Calcium 8.5 08/02/2021 05:56 AM         Lab Results   Component Value Date/Time    NT pro-BNP 1,466 (H) 07/29/2021 10:58 PM         No results found for: CHOL, CHOLPOCT, CHOLX, CHLST, CHOLV, HDL, HDLPOC, HDLP, LDL, LDLCPOC, LDLC, DLDLP, VLDLC, VLDL, TGLX, TRIGL, TRIGP, TGLPOCT, CHHD, CHHDX      Lab Results   Component Value Date/Time    ALT (SGPT) 28 07/29/2021 05:27 PM    Alk.  phosphatase 107 07/29/2021 05:27 PM    Bilirubin, total 0.5 07/29/2021 05:27 PM

## 2021-08-02 NOTE — PROGRESS NOTES
Pt arrives via bed to angio department accompanied by transport for left chest tube procedure. All assessments completed and consent was reviewed. Education given was regarding procedure, no sedation, post-procedure care and  management/follow-up. Opportunity for questions was provided and all questions and concerns were addressed. Patients heparin held since 1227 per request for 2 hours prior to procedure. 1611: F/U portable chest xray performed at this time. TRANSFER - OUT REPORT:    Verbal report given to DARCIE Hoskins(name) on Berlin Simmons  being transferred to [facility](unit) for routine post - op       Report consisted of patients Situation, Background, Assessment and   Recommendations(SBAR). Information from the following report(s) SBAR, ED Summary and Procedure Summary was reviewed with the receiving nurse. Lines:   Peripheral IV 07/29/21 Left Antecubital (Active)   Site Assessment Clean, dry, & intact 08/01/21 2044   Phlebitis Assessment 0 08/01/21 2044   Infiltration Assessment 0 08/01/21 2044   Dressing Status Clean, dry, & intact 08/01/21 2044   Dressing Type Transparent;Tape 08/01/21 2044   Hub Color/Line Status Pink; Infusing 08/01/21 2044   Action Taken Open ports on tubing capped 08/01/21 0859   Alcohol Cap Used Yes 08/01/21 2044       Peripheral IV 07/31/21 Anterior;Proximal;Right Forearm (Active)   Site Assessment Clean, dry, & intact 08/01/21 2044   Phlebitis Assessment 0 08/01/21 2044   Infiltration Assessment 0 08/01/21 2044   Dressing Status Clean, dry, & intact 08/01/21 2044   Dressing Type Transparent;Tape 08/01/21 2044   Hub Color/Line Status Blue; Infusing 08/01/21 2044   Action Taken Open ports on tubing capped 08/01/21 0859   Alcohol Cap Used Yes 08/01/21 2044       Peripheral IV 08/01/21 Left Hand (Active)   Site Assessment Clean, dry, & intact 08/01/21 2340   Phlebitis Assessment 0 08/01/21 2340   Infiltration Assessment 0 08/01/21 2340   Dressing Status New 08/01/21 2340 Dressing Type Transparent 08/01/21 2340   Hub Color/Line Status Blue;Flushed 08/01/21 2340   Alcohol Cap Used Yes 08/01/21 2340        Opportunity for questions and clarification was provided.       Patient transported with:   Monitor  O2 @ 2 liters  Registered Nurse

## 2021-08-02 NOTE — PROGRESS NOTES
[address] [facility] Hospitalist [facility]    Hospitalist Progress Note  Mil Gonzalez MD  Answering service: [phone] OR   [phone] from in house phone     Date of service: 2021   Name: Serge Rasmussen  : 1940  MRN: [id number]  PCP: Dr. Max Zavala NP     Brief HPI and Hospital Course:       71-year-old female with a history of heavy tobacco use at least 2 packs a day since 13to 12years of age, hyperlipidemia, hypertension presented to the ED from [facility]  with reports of worsening shortness of breath over the last several days and 1 episode of sputum with scant blood-tinged.  She denies any ongoing hemoptysis.  She denies any fever or chills.  Denies any wheezing or chest tightness.  Denies any chest pain.  Chest x-ray was done and revealed left lung opacification.  CT of the chest was done revealed mediastinal lymphadenopathy, complete collapse of the left lung with large left pleural effusion and likely obstructing mucus in the left mainstem bronchus.  Vital signs were also notable for elevated heart rate as high as 180, mild elevation in temperature as high as 100.0, tachypnea with breathing in the 30s, and decreased SPO2 as low as 86 on room air.  She was placed on supplemental O2 with improvement in SPO2.  EKG was done was consistent with atrial fibrillation with RVR but was unremarkable for acute ischemic change.  Patient was initiated on amiodarone infusion and heparin as well.  She was also initiated on empiric antibiotics with Zosyn and vancomycin, and admitted to ICU for further evaluation and treatment.      improved after thoracentesis   Transferred out ICU on       Assessment/Plan     1. Acute Hypoxic Respiratory Failure  From large L pleural effusion and mucus plugging in left mainstem bronchus. Concern for possible post obstructive pneumonia, and malignancy. Given smoking history, patient with likely history of COPD.  Patient with worsening mediastinal lymphadenopathy concern for possible underlying malignancy  Continued vancomycin and zosyn for possible post obstructive pneumonia.    likely rosalind EBUS as persistent lobe collapse following thoracentesis: defer to [facility] team  2. Large left pleural effusion/Mediastinal Lymphadenopathy  parapneumonic vs malignant  S/p  thoracentesis , removed 1.5L effusion . f/u  fungal culture, and cytology. Cont on diuresis; repeat CT scan  fluid re-accumulated. CTS to see    3. Atrial fibrillation with RVR now NSR-  New Dx, Continue amiodarone IV, Heparin gtt , Cardiology  On case f/u TTE    4. Cellulitis Jason LE  Recent DVT ultrasound negative, improved cont Zosyn and vancomycin  5. Hypertension Continue home amlodipine  6. Electrolyte imbalance : replete   k  and Mag  7. Tobacco abuse -Nicotine patch ordered    CODE status: full  VTE prophylaxis: heparin  Discussed plan of care with Patient/Family and Nurse   Pre-admission lived at [address] [place] [address].   Discharge planning: home when w/u complete and medically stable     Subjective   Patient seen and examined chart reviewed. Case d/w daughter at bedside and staff. She remains sick and Pleural fluid came back.  CTS consulted per pulm team.    Physical examination     Visit Vitals  BP (!) 141/62 (BP 1 Location: Right arm, BP Patient Position: At rest)   Pulse 72   Temp 97.9 °F (36.6 °C)   Resp 20   Ht 5' 7\" (1.702 m)   Wt 91.2 kg (201 lb)   SpO2 93%   BMI 31.48 kg/m²       Temp (24hrs), Av.2 °F (36.8 °C), Min:97.9 °F (36.6 °C), Max:98.6 °F (37 °C)      Intake/Output Summary (Last 24 hours) at 2021 1037  Last data filed at 2021 0343  Gross per 24 hour   Intake 2317.97 ml   Output 3400 ml   Net -1082.03 ml       General:  Alert, cooperative   Head:  Atraumatic           Neck: Supple  No JVD           Lungs:  Decreased breath sounds b/l   Heart:  Regular rhythm, no murmur   Abdomen:   Soft, non-tender     Extremities: No edema or DVT signs   Pulses: Symmetric all extremities   Skin: Warm and dry   No rashes or lesions   Neurologic: Oriented to person  No focal deficits     Data Reviewed:       Recent Labs     08/01/21  0433 07/31/21  0231   WBC 7.4 8.0   HGB 10.7* 10.4*   HCT 35.2 33.5*    352     Recent Labs     08/02/21  0556 08/01/21  0433 07/31/21  0231 07/30/21  1729 07/30/21  1729   * 135* 138   < > 133*   K 3.2* 3.7 3.2*   < > 3.1*   CL 92* 96* 96*   < > 93*   CO2 38* 37* 38*   < > 38*   * 104* 105*   < > 268*   BUN 7 8 12   < > 14   CREA 0.64 0.61 0.67   < > 0.80   CA 8.5 8.2* 8.2*   < > 8.1*   MG 1.5* 1.7 1.9  --   --    PHOS 3.4 2.8 3.3   < > 3.0   ALB  --   --   --   --  2.0*    < > = values in this interval not displayed.        Medications reviewed  Current Facility-Administered Medications   Medication Dose Route Frequency    albuterol-ipratropium (DUO-NEB) 2.5 MG-0.5 MG/3 ML  3 mL Nebulization BID RT    magnesium sulfate 2 g/50 ml IVPB (premix or compounded)  2 g IntraVENous ONCE    [START ON 8/3/2021] Vancomycin random level @ 0200   Other ONCE    balsam peru-castor oiL (VENELEX) ointment   Topical BID    zinc oxide-cod liver oil (DESITIN) 40 % paste   Topical BID    vancomycin (VANCOCIN) 1,000 mg in 0.9% sodium chloride 250 mL (VIAL-MATE)  1,000 mg IntraVENous Q12H    miconazole (MICOTIN) 2 % powder   Topical BID    piperacillin-tazobactam (ZOSYN) 3.375 g in 0.9% sodium chloride (MBP/ADV) 100 mL MBP  3.375 g IntraVENous Q8H    amLODIPine (NORVASC) tablet 5 mg  5 mg Oral DAILY    aspirin delayed-release tablet 81 mg  81 mg Oral DAILY    atorvastatin (LIPITOR) tablet 10 mg  10 mg Oral QHS    furosemide (LASIX) injection 20 mg  20 mg IntraVENous BID    oxyCODONE IR (ROXICODONE) tablet 5 mg  5 mg Oral Q6H PRN    vancomycin - pharmacy to dose    Other Rx Dosing/Monitoring    heparin 25,000 units in D5W 250 ml infusion  11-25 Units/kg/hr IntraVENous TITRATE    amiodarone (CORDARONE) 375 mg/250 mL D5W infusion  0.5-1 mg/min IntraVENous TITRATE    sodium chloride (NS) flush 5-40 mL  5-40 mL IntraVENous Q8H    sodium chloride (NS) flush 5-40 mL  5-40 mL IntraVENous PRN    acetaminophen (TYLENOL) tablet 650 mg  650 mg Oral Q6H PRN    Or    acetaminophen (TYLENOL) suppository 650 mg  650 mg Rectal Q6H PRN    polyethylene glycol (MIRALAX) packet 17 g  17 g Oral DAILY PRN    ondansetron (ZOFRAN ODT) tablet 4 mg  4 mg Oral Q8H PRN    Or    ondansetron (ZOFRAN) injection 4 mg  4 mg IntraVENous Q6H PRN    famotidine (PF) (PEPCID) 20 mg in 0.9% sodium chloride 10 mL injection  20 mg IntraVENous BID    nicotine (NICODERM CQ) 21 mg/24 hr patch 1 Patch  1 Patch TransDERmal DAILY PRN         Mil Marcial MD  Internal Medicine  8/2/2021

## 2021-08-02 NOTE — PROGRESS NOTES
1930: Bedside shift change report given to Magaly Tarry[place]mariam Amador (oncoming nurse) by Fauzia SPRAGUE (offgoing nurse). Report included the following information SBAR, Kardex, Intake/Output, MAR and Cardiac Rhythm NSR.     0730: Bedside shift change report given to Vincent Barber 31 (oncoming nurse) by Marilia Rascon RN (offgoing nurse). Report included the following information SBAR, Kardex, Intake/Output, MAR and Cardiac Rhythm NSR.

## 2021-08-02 NOTE — PROGRESS NOTES
ADULT PROTOCOL: JET AEROSOL ASSESSMENT    Patient  Luz Zendejas     80 y.o.   female     8/2/2021  1:31 AM    Breath Sounds Pre Procedure: Right Breath Sounds: Diminished                               Left Breath Sounds: Diminished    Breath Sounds Post Procedure:  Unchanged                                      Breathing pattern: Pre procedure Breathing Pattern: Regular          Post procedure  Regular    Heart Rate: Pre procedure Pulse: 80           Post procedure  Pulse: 78    Resp Rate: Pre procedure Respirations: 20           Post procedure  Respirations: 20    Cough: Pre procedure Cough: Non-productive, strong    Oxygen: O2 Device: Nasal cannula   2L     Changed: NO    SpO2: Pre procedure SpO2: 96 %   with oxygen    Nebulizer Therapy: Current medications Aerosolized Medications: DuoNeb      Changed: YES   Changed to BID    Problem List:   Patient Active Problem List   Diagnosis Code    Essential hypertension I10    Hyperlipidemia LDL goal <70 E78.5    H/O ascites Z87.898    Cerebrovascular accident (CVA) ([address] I63.9    Weakness generalized R53.1    Severe obesity ([address] E66.01    B12 deficiency E53.8    Cigarette nicotine dependence without complication Z33.221    Shortness of breath R06.02    Pleuritic chest pain R07.81    Cough with hemoptysis R04.2    Dementia without behavioral disturbance (HCC) F03.90    Atrial fibrillation with rapid ventricular response ([address] I48.91       Respiratory Therapist: Kun ELLIS

## 2021-08-02 NOTE — CONSULTS
Thoracic Surgery Consultation    Admit Date: 7/29/2021  Reason for Consultation: Recurrent Left pleural effusion, Left VATS evalutiaon    HPI:  Francy Charles is a 80 y.o. female with extensive PMH including recurrent left pleural effusion, mediastinal lymphadenopathy and as noted below who we are asked to see in Thoracic Surgery consultation by LATIA Melendez for possible Left VATS. Patient with history of heavy tobacco use at least 2 packs a day since 13to 12years of age, HLD, HTN who was transfered to [facility] ED from [facility] on 7/29/2021 with reports of worsening shortness of breath over the last several days and 1 episode of sputum with scant blood-tinged.  She denies any ongoing hemoptysis.  She denies any fever or chills.  Denies any wheezing or chest tightness.  Denies any chest pain.    Chest x-ray was done and revealed left lung opacification.  CT of the chest was done revealed mediastinal lymphadenopathy, complete collapse of the left lung with large left pleural effusion and likely obstructing mucus in the left mainstem bronchus. Left Thoracentesis (7/30/2021): IMPRESSION  Successful ultrasound guided left thoracentesis yielding approximately 1500 ml  of fluid. Chest CT (7/312021): IMPRESSION  1. Unchanged large left pleural effusion which has likely reaccumulated since  the prior thoracentesis. 2. Unchanged dense consolidation and collapse of the left lung with an abrupt  cut off of the left mainstem bronchus. The right hilum and right side of the  mediastinum appears consolidated which may be related to invasion and/or  confluent adenopathy. A large lymph node is seen in the left para-aortic space  that is unchanged. Findings are highly suspicious for malignancy. 3. New trace right pleural effusion.         Patient Active Problem List    Diagnosis Date Noted    Atrial fibrillation with rapid ventricular response ([address] 07/29/2021    Cough with hemoptysis 07/26/2021    Dementia without behavioral disturbance ([facility] 07/26/2021    Cigarette nicotine dependence without complication 12/00/8765    Shortness of breath 05/26/2021    Pleuritic chest pain 05/26/2021    B12 deficiency 02/27/2021    Severe obesity ([facility] 11/25/2020    Essential hypertension 11/18/2020    Hyperlipidemia LDL goal <70 11/18/2020    H/O ascites 11/18/2020    Cerebrovascular accident (CVA) ([facility] 11/18/2020    Weakness generalized 11/18/2020     Past Medical History:   Diagnosis Date    Cellulitis     R LEG    CVA (cerebral vascular accident) ([facility]     Hyperlipidemia     Hypertension       Past Surgical History:   Procedure Laterality Date    HX BLADDER SUSPENSION  01/2021    HX CHOLECYSTECTOMY      HX OTHER SURGICAL  10/2020    PARACENTESIS      Social History     Tobacco Use    Smoking status: Current Every Day Smoker     Packs/day: 1.50     Years: 72.00     Pack years: 108.00    Smokeless tobacco: Never Used   Substance Use Topics    Alcohol use: Not Currently      Family History   Problem Relation Age of Onset    Anesth Problems Neg Hx       Prior to Admission medications    Medication Sig Start Date End Date Taking? Authorizing Provider   albuterol (PROVENTIL HFA, VENTOLIN HFA, PROAIR HFA) 90 mcg/actuation inhaler Take 2 Puffs by inhalation every four (4) hours as needed for Wheezing for up to 360 days. 5/25/21 5/20/22 Yes Kia Cancino MD   folic acid (FOLVITE) 608 mcg tablet Take 1 Tab by mouth daily. Indications: inadequate folic acid 3/99/25  Yes David Hernandez, NP   donepeziL (Aricept) 5 mg tablet Take 1 Tab by mouth daily. 2/24/21  Yes David Hernandez, NP   amLODIPine (NORVASC) 5 mg tablet Take 1 Tab by mouth daily. 2/24/21  Yes Dominic Hernandez, NP   aspirin delayed-release 81 mg tablet Take 1 Tab by mouth daily. 2/24/21  Yes David Hernandez, NP   simvastatin (ZOCOR) 20 mg tablet Take 1 Tab by mouth nightly.  2/24/21  Yes Gina Hernandez NP   acetaminophen (TYLENOL) 325 mg tablet Take 2 Tabs by mouth every six (6) hours as needed for Pain. 1/15/21  Yes Kailyn Noland PA-C   menthol (GOLD BOND PAIN RELIEVING EX) Apply 1 Applicator to affected area as needed. thin layer lower extremities   Yes Provider, Historical   naproxen sodium (Aleve) 220 mg cap Take 1 Tab by mouth daily as needed for Pain. Yes Provider, Historical     No Known Allergies       Subjective:     Review of Systems:    A comprehensive review of systems was negative except for that written in the History of Present Illness. Objective:     Blood pressure (!) 141/62, pulse 72, temperature 97.9 °F (36.6 °C), resp. rate 20, height 5' 7\" (1.702 m), weight 201 lb (91.2 kg), SpO2 93 %.   Recent Results (from the past 24 hour(s))   PTT    Collection Time: 08/01/21  1:13 PM   Result Value Ref Range    aPTT 102.2 (HH) 22.1 - 31.0 sec    aPTT, therapeutic range     58.0 - 77.0 SECS   PTT    Collection Time: 08/01/21  4:18 PM   Result Value Ref Range    aPTT 36.3 (H) 22.1 - 31.0 sec    aPTT, therapeutic range     58.0 - 77.0 SECS   PTT    Collection Time: 08/01/21 11:52 PM   Result Value Ref Range    aPTT 46.6 (H) 22.1 - 31.0 sec    aPTT, therapeutic range     58.0 - 77.0 SECS   PTT    Collection Time: 08/02/21  5:56 AM   Result Value Ref Range    aPTT 67.4 (H) 22.1 - 31.0 sec    aPTT, therapeutic range     58.0 - 77.0 SECS   MAGNESIUM    Collection Time: 08/02/21  5:56 AM   Result Value Ref Range    Magnesium 1.5 (L) 1.6 - 2.4 mg/dL   PHOSPHORUS    Collection Time: 08/02/21  5:56 AM   Result Value Ref Range    Phosphorus 3.4 2.6 - 4.7 MG/DL   METABOLIC PANEL, BASIC    Collection Time: 08/02/21  5:56 AM   Result Value Ref Range    Sodium 132 (L) 136 - 145 mmol/L    Potassium 3.2 (L) 3.5 - 5.1 mmol/L    Chloride 92 (L) 97 - 108 mmol/L    CO2 38 (H) 21 - 32 mmol/L    Anion gap 2 (L) 5 - 15 mmol/L    Glucose 118 (H) 65 - 100 mg/dL    BUN 7 6 - 20 MG/DL    Creatinine 0.64 0.55 - 1.02 MG/DL    BUN/Creatinine ratio 11 (L) 12 - 20      GFR est AA >60 >60 ml/min/1.73m2    GFR est non-AA >60 >60 ml/min/1.73m2    Calcium 8.5 8.5 - 10.1 MG/DL     _____________________  Physical Exam:     General:  Drowsy, cooperative, no distress, appears stated age. Eyes:   Sclera clear. Throat: Lips, mucosa, and tongue normal.   Neck: Supple, symmetrical, trachea midline. Lungs:   Dec BS left. O2 sats 93% on 2L ia NC   Heart:  Regular rate and rhythm. Abdomen:   Soft, non-tender. Extremities: Extremities normal, atraumatic, no cyanosis or edema. Skin: Skin w/d. Assessment:   Active Problems:    Atrial fibrillation with rapid ventricular response ([address] (7/29/2021)            Plan:     CT placement by IR with CT to suction & record output  Agree with pleural fluid studies  O2 therapy as ordered  No immediate Thoracic Surgery intervention at this time  Will await cytology results    Thank you for including us in the care of your patient.     Signed By: Donelda Goldberg, PA     August 2, 2021

## 2021-08-02 NOTE — PROGRESS NOTES
Physical Therapy  8/2/2021    Order received, chart reviewed. Patient in angio for chest tube placement. Hold PT and f/u tomorrow for PT evaluation. Thank you.     Nathalia Villareal, PT, DPT

## 2021-08-02 NOTE — PROGRESS NOTES
Problem: Breathing Pattern - Ineffective  Goal: *Absence of hypoxia  Outcome: Progressing Towards Goal  Goal: *Use of effective breathing techniques  Outcome: Progressing Towards Goal  Goal: *PALLIATIVE CARE:  Alleviation of Dyspnea  Outcome: Progressing Towards Goal     Problem: Patient Education: Go to Patient Education Activity  Goal: Patient/Family Education  Outcome: Progressing Towards Goal     Problem: Pressure Injury - Risk of  Goal: *Prevention of pressure injury  Description: Document Ferdinand Scale and appropriate interventions in the flowsheet. Outcome: Progressing Towards Goal  Note: Pressure Injury Interventions:  Sensory Interventions: Assess changes in LOC    Moisture Interventions: Absorbent underpads, Limit adult briefs    Activity Interventions: Pressure redistribution bed/mattress(bed type), Increase time out of bed    Mobility Interventions: Pressure redistribution bed/mattress (bed type), HOB 30 degrees or less    Nutrition Interventions: Document food/fluid/supplement intake    Friction and Shear Interventions: Lift sheet, HOB 30 degrees or less                Problem: Patient Education: Go to Patient Education Activity  Goal: Patient/Family Education  Outcome: Progressing Towards Goal     Problem: Falls - Risk of  Goal: *Absence of Falls  Description: Document Norma Fall Risk and appropriate interventions in the flowsheet.   Outcome: Progressing Towards Goal  Note: Fall Risk Interventions:  Mobility Interventions: Bed/chair exit alarm, Communicate number of staff needed for ambulation/transfer, Patient to call before getting OOB    Mentation Interventions: Door open when patient unattended, More frequent rounding, Reorient patient, Room close to nurse's station    Medication Interventions: Teach patient to arise slowly, Patient to call before getting OOB    Elimination Interventions: Call light in reach              Problem: Patient Education: Go to Patient Education Activity  Goal: Patient/Family Education  Outcome: Progressing Towards Goal

## 2021-08-02 NOTE — PROGRESS NOTES
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 Order is in. And labs are in  · Decision for EBUS is per attending; NPO past midnight   · Consult thoracic   · Pt is acutely ill  · On vanc/zosyn  · Ongoing diuretics  · O2 titration above 90%   · Nebs  · Discussed with attending      Subjective:       Following CT scan it showed a left mass compressing the left mainstem; increase in pleural effusion       This patient has been seen and evaluated at the request of Dr. Betty Durán for Pleural effusion. Patient is a 80 y.o. female admitted with SOB and left effusion- tap revealed a lymphocytic exudate cyto pending. Pt less SOB and out of ICU. She is confused and doesn't dive much hx other than her SOB is better. Past Medical History:   Diagnosis Date    Cellulitis     R LEG    CVA (cerebral vascular accident) ([facility] [address]     Hyperlipidemia     Hypertension       Past Surgical History:   Procedure Laterality Date    HX BLADDER SUSPENSION  2021    HX CHOLECYSTECTOMY      HX OTHER SURGICAL  10/2020    PARACENTESIS      Prior to Admission medications    Medication Sig Start Date End Date Taking? Authorizing Provider   albuterol (PROVENTIL HFA, VENTOLIN HFA, PROAIR HFA) 90 mcg/actuation inhaler Take 2 Puffs by inhalation every four (4) hours as needed for Wheezing for up to 360 days. 21 Yes Bruno Armstrong MD   folic acid (FOLVITE) 762 mcg tablet Take 1 Tab by mouth daily.  Indications: inadequate folic acid 49  Yes David Hernandez, NP   donepeziL (Aricept) 5 mg tablet Take 1 Tab by mouth daily. 2/24/21  Yes David Hernandez Q, NP   amLODIPine (NORVASC) 5 mg tablet Take 1 Tab by mouth daily. 2/24/21  Yes Roman Hernandez Q, NP   aspirin delayed-release 81 mg tablet Take 1 Tab by mouth daily. 2/24/21  Yes David Hernandez, NP   simvastatin (ZOCOR) 20 mg tablet Take 1 Tab by mouth nightly. 2/24/21  Yes Janiya Hernandez, NP   acetaminophen (TYLENOL) 325 mg tablet Take 2 Tabs by mouth every six (6) hours as needed for Pain. 1/15/21  Yes Shakeel Noland PA-C   menthol (GOLD BOND PAIN RELIEVING EX) Apply 1 Applicator to affected area as needed. thin layer lower extremities   Yes Provider, Historical   naproxen sodium (Aleve) 220 mg cap Take 1 Tab by mouth daily as needed for Pain.    Yes Provider, Historical     No Known Allergies   Social History     Tobacco Use    Smoking status: Current Every Day Smoker     Packs/day: 1.50     Years: 72.00     Pack years: 108.00    Smokeless tobacco: Never Used   Substance Use Topics    Alcohol use: Not Currently      Family History   Problem Relation Age of Onset    Anesth Problems Neg Hx         Current Facility-Administered Medications   Medication Dose Route Frequency    albuterol-ipratropium (DUO-NEB) 2.5 MG-0.5 MG/3 ML  3 mL Nebulization BID RT    magnesium sulfate 2 g/50 ml IVPB (premix or compounded)  2 g IntraVENous ONCE    potassium chloride SR (KLOR-CON 10) tablet 40 mEq  40 mEq Oral NOW    [START ON 8/3/2021] Vancomycin random level @ 0200   Other ONCE    balsam peru-castor oiL (VENELEX) ointment   Topical BID    zinc oxide-cod liver oil (DESITIN) 40 % paste   Topical BID    vancomycin (VANCOCIN) 1,000 mg in 0.9% sodium chloride 250 mL (VIAL-MATE)  1,000 mg IntraVENous Q12H    miconazole (MICOTIN) 2 % powder   Topical BID    piperacillin-tazobactam (ZOSYN) 3.375 g in 0.9% sodium chloride (MBP/ADV) 100 mL MBP  3.375 g IntraVENous Q8H    amLODIPine (NORVASC) tablet 5 mg  5 mg Oral DAILY    aspirin delayed-release tablet 81 mg  81 mg Oral DAILY    atorvastatin (LIPITOR) tablet 10 mg  10 mg Oral QHS    furosemide (LASIX) injection 20 mg  20 mg IntraVENous BID    vancomycin - pharmacy to dose    Other Rx Dosing/Monitoring    heparin 25,000 units in D5W 250 ml infusion  11-25 Units/kg/hr IntraVENous TITRATE    amiodarone (CORDARONE) 375 mg/250 mL D5W infusion  0.5-1 mg/min IntraVENous TITRATE    sodium chloride (NS) flush 5-40 mL  5-40 mL IntraVENous Q8H    famotidine (PF) (PEPCID) 20 mg in 0.9% sodium chloride 10 mL injection  20 mg IntraVENous BID       Review of Systems:  Review of systems not obtained due to patient factors. Objective:   Vital Signs:    Visit Vitals  BP (!) 141/62 (BP 1 Location: Right arm, BP Patient Position: At rest)   Pulse 72   Temp 97.9 °F (36.6 °C)   Resp 20   Ht 5' 7\" (1.702 m)   Wt 91.2 kg (201 lb)   SpO2 93%   BMI 31.48 kg/m²       O2 Device: Nasal cannula   O2 Flow Rate (L/min): 2 l/min   Temp (24hrs), Av.2 °F (36.8 °C), Min:97.9 °F (36.6 °C), Max:98.6 °F (37 °C)       Intake/Output:   Last shift:      No intake/output data recorded. Last 3 shifts:  1901 -  0700  In: 3448 [P.O.:260; I.V.:3188]  Out: 4800 [Urine:4800]    Intake/Output Summary (Last 24 hours) at 2021 1024  Last data filed at 2021 0343  Gross per 24 hour   Intake 2317.97 ml   Output 3400 ml   Net -1082.03 ml      Physical Exam:   General:  Alert, cooperative, no distress, appears stated age. Head:  Normocephalic, without obvious abnormality, atraumatic.    NCAT no WOB,OnNC 02 no accessory use no abd paradox no cyanosis  Data review:     Recent Results (from the past 24 hour(s))   PTT    Collection Time: 21  1:13 PM   Result Value Ref Range    aPTT 102.2 (HH) 22.1 - 31.0 sec    aPTT, therapeutic range     58.0 - 77.0 SECS   PTT    Collection Time: 21  4:18 PM   Result Value Ref Range    aPTT 36.3 (H) 22.1 - 31.0 sec    aPTT, therapeutic range     58.0 - 77.0 SECS   PTT    Collection Time: 21 11:52 PM   Result Value Ref Range    aPTT 46.6 (H) 22.1 - 31.0 sec    aPTT, therapeutic range     58.0 - 77.0 SECS   PTT    Collection Time: 08/02/21  5:56 AM   Result Value Ref Range    aPTT 67.4 (H) 22.1 - 31.0 sec    aPTT, therapeutic range     58.0 - 77.0 SECS   MAGNESIUM    Collection Time: 08/02/21  5:56 AM   Result Value Ref Range    Magnesium 1.5 (L) 1.6 - 2.4 mg/dL   PHOSPHORUS    Collection Time: 08/02/21  5:56 AM   Result Value Ref Range    Phosphorus 3.4 2.6 - 4.7 MG/DL   METABOLIC PANEL, BASIC    Collection Time: 08/02/21  5:56 AM   Result Value Ref Range    Sodium 132 (L) 136 - 145 mmol/L    Potassium 3.2 (L) 3.5 - 5.1 mmol/L    Chloride 92 (L) 97 - 108 mmol/L    CO2 38 (H) 21 - 32 mmol/L    Anion gap 2 (L) 5 - 15 mmol/L    Glucose 118 (H) 65 - 100 mg/dL    BUN 7 6 - 20 MG/DL    Creatinine 0.64 0.55 - 1.02 MG/DL    BUN/Creatinine ratio 11 (L) 12 - 20      GFR est AA >60 >60 ml/min/1.73m2    GFR est non-AA >60 >60 ml/min/1.73m2    Calcium 8.5 8.5 - 10.1 MG/DL       Imaging:  I have personally reviewed the patients radiographs and have reviewed the reports:  CTchest with MEd LADand Left marge effusion        Cali Hurt PA-C

## 2021-08-02 NOTE — PROGRESS NOTES
Bedside shift change report given to Crystal Haro RN (oncoming nurse) by DARCIE Cage (offgoing nurse). Report included the following information SBAR, Kardex, Intake/Output, MAR, Accordion and Cardiac Rhythm NSR.

## 2021-08-02 NOTE — WOUND CARE
WOCN Note:     New consult placed by RN for:POA:  Right buttock Stage 2 : groin and abdominal fold excoriation. Chart shows:  Admitted for : Afibb with RVT  PMH: SOB, chest pain, cough with hemoptysis, HTN, elevated lipids, H/O ascites, CVA, weak, obese, smoker. WBC = 7.4  On = 21  Admitted from : home. Assessment:   Patient is alert but question orientation at times with continence. Communicative, incontinent ( Pure [place]) and not mobile at present. Turns independently but needs 2 to lift up in bed. Patient wearing briefs for incontinence, no farnsworth, Pure [place]. Bed: [facility]  Diet: Clear Liquids  Patient reports no pain at present. Bilateral heels are pink and blanching slowly. Right lateral heel with scab, not opened. Fungating toe nails: bilaterally. Buttocks: Stage 2 / POA /  Right buttock. 2.0cm x 1.5cm x 0.1cm / 100% pink  Sacrum: dark pink in color: blanchin.0cm x 12.0cm x 0.0cm. surrounding skin with Stage 2 on right buttock. Panus and groins with mild excoriation. Recommendations:    - Vasolex ointment: twice daily to bilateral heels, sacrum and right buttock. - Pannus and groins:  Wash and dry twice daily and apply desitin ointment and folded ABD pads to absorb moisture and decrease shear and friction. Minimize layers of linen/pads under patient to optimize support surface. Turn/reposition approximately every 2 hours and offload heels. Manage incontinence / promote continence; Desitin Ointment to buttocks and sacrum daily and as needed with incontinence care. Discussed above plan with and DARCIE Paz .     Transition of Care: Plan to follow weekly and as needed while admitted to hospital.     Epifanio CARR RN ET  In Patient Wound Care Department  Office: [phone]  Pager: [phone] or Perfect Serve

## 2021-08-03 PROBLEM — J90 PLEURAL EFFUSION, LEFT: Status: ACTIVE | Noted: 2021-01-01

## 2021-08-03 NOTE — PROGRESS NOTES
Bedside shift change report given to Pierre Marina RN (oncoming nurse) by Jesús Reese RN (offgoing nurse). Report included the following information SBAR, Kardex, ED Summary, Procedure Summary, Intake/Output, MAR, Accordion, Recent Results, Med Rec Status, Cardiac Rhythm NSR PVCS, PACS, Alarm Parameters  and Quality Measures. TRANSFER - OUT REPORT:    Verbal report given to Laurita Irwin RN(name) on Evonnie Lieu  being transferred to Angio IR(unit) for ordered procedure       Report consisted of patients Situation, Background, Assessment and   Recommendations(SBAR). Information from the following report(s) SBAR, Kardex, ED Summary, Procedure Summary, Intake/Output, MAR, Accordion, Recent Results, Med Rec Status, Cardiac Rhythm Sinus Arrhythmia, Alarm Parameters  and Quality Measures was reviewed with the receiving nurse. Lines:   Peripheral IV 07/29/21 Left Antecubital (Active)   Site Assessment Clean, dry, & intact 08/02/21 1600   Phlebitis Assessment 0 08/02/21 1600   Infiltration Assessment 0 08/02/21 1600   Dressing Status Clean, dry, & intact 08/02/21 1600   Dressing Type Transparent;Tape 08/02/21 1600   Hub Color/Line Status Pink; Infusing 08/02/21 1600   Action Taken Open ports on tubing capped 08/02/21 1600   Alcohol Cap Used Yes 08/02/21 1600       Peripheral IV 08/01/21 Left Hand (Active)   Site Assessment Clean, dry, & intact 08/02/21 1600   Phlebitis Assessment 0 08/02/21 1600   Infiltration Assessment 0 08/02/21 1600   Dressing Status Clean, dry, & intact 08/02/21 1600   Dressing Type Transparent 08/02/21 1600   Hub Color/Line Status Blue; Infusing 08/02/21 1600   Action Taken Open ports on tubing capped 08/02/21 1600   Alcohol Cap Used Yes 08/02/21 1600       Peripheral IV 08/02/21 Right Antecubital (Active)        Opportunity for questions and clarification was provided.       Patient transported with:   Monitor  O2 @ 2 liters   RN                          Problem: Breathing Pattern - Ineffective  Goal: *Absence of hypoxia  Outcome: Progressing Towards Goal     Problem: Pressure Injury - Risk of  Goal: *Prevention of pressure injury  Description: Document Ferdinand Scale and appropriate interventions in the flowsheet. Outcome: Progressing Towards Goal  Note: Pressure Injury Interventions:  Sensory Interventions: Assess changes in LOC, Assess need for specialty bed, Check visual cues for pain, Float heels, Keep linens dry and wrinkle-free    Moisture Interventions: Absorbent underpads    Activity Interventions: Assess need for specialty bed, Increase time out of bed, PT/OT evaluation, Pressure redistribution bed/mattress(bed type)    Mobility Interventions: Assess need for specialty bed, Float heels, Pressure redistribution bed/mattress (bed type), PT/OT evaluation    Nutrition Interventions: Document food/fluid/supplement intake    Friction and Shear Interventions: Apply protective barrier, creams and emollients, Lift sheet, Minimize layers    Problem: Falls - Risk of  Goal: *Absence of Falls  Description: Document Norma Fall Risk and appropriate interventions in the flowsheet.   Outcome: Progressing Towards Goal  Note: Fall Risk Interventions:  Mobility Interventions: Bed/chair exit alarm, Communicate number of staff needed for ambulation/transfer, Patient to call before getting OOB, OT consult for ADLs, PT Consult for mobility concerns, PT Consult for assist device competence    Mentation Interventions: Door open when patient unattended, Adequate sleep, hydration, pain control, Reorient patient    Medication Interventions: Teach patient to arise slowly, Patient to call before getting OOB, Evaluate medications/consider consulting pharmacy, Bed/chair exit alarm    Elimination Interventions: Stay With Me (per policy)

## 2021-08-03 NOTE — PROGRESS NOTES
[facility] Adult Hospitalist Group    Hospitalist Progress Note  Mil Tobin MD  Answering service: [phone] OR   [phone] from in house phone     Date of service: 8/3/2021   Name: Shannon Harris  : 1940  MRN: [id number]  PCP: Dr. Hannah Barriga, NP     Brief HPI and Hospital Course:       65-year-old female with a history of heavy tobacco use at least 2 packs a day since 13to 12years of age, hyperlipidemia, hypertension presented to the ED from [facility]  with reports of worsening shortness of breath over the last several days and 1 episode of sputum with scant blood-tinged.  She denies any ongoing hemoptysis.  She denies any fever or chills.  Denies any wheezing or chest tightness.  Denies any chest pain.  Chest x-ray was done and revealed left lung opacification.  CT of the chest was done revealed mediastinal lymphadenopathy, complete collapse of the left lung with large left pleural effusion and likely obstructing mucus in the left mainstem bronchus.  Vital signs were also notable for elevated heart rate as high as 180, mild elevation in temperature as high as 100.0, tachypnea with breathing in the 30s, and decreased SPO2 as low as 86 on room air.  She was placed on supplemental O2 with improvement in SPO2.  EKG was done was consistent with atrial fibrillation with RVR but was unremarkable for acute ischemic change.  Patient was initiated on amiodarone infusion and heparin as well.  She was also initiated on empiric antibiotics with Zosyn and vancomycin, and admitted to ICU for further evaluation and treatment.      improved after thoracentesis   Transferred out ICU on       Assessment/Plan     1. Acute Hypoxic Respiratory Failure   mucus plugging in left mainstem bronchus. Concern for possible post obstructive pneumonia, and malignancy. Given smoking history, patient with likely history of COPD.  Patient with worsening mediastinal lymphadenopathy concern for possible underlying malignancy  Continued vancomycin and zosyn for possible post obstructive pneumonia. EBUS/bronch tomorrow per Pulm    2. Large left pleural effusion/Mediastinal Lymphadenopathy  parapneumonic vs malignant  S/p  thoracentesis , removed 1.5L effusion . f/u  fungal culture, and cytology. Cont on diuresis; repeat CT scan  fluid re-accumulated. CTS on case no VATS planned. Chest tube with pig tail catheter placed 21 cont to drain pleural fluid. .     3. Atrial fibrillation with RVR now NSR-  New Dx, on amiodarone IV, may need to hold Heparin gtt if blood tinged sputum gets worse , Cardiology  On case f/u TTE ef 65%    4. Cellulitis Jason LE  Recent DVT ultrasound negative, improved cont Zosyn and vancomycin  5. Hypertension Continue home amlodipine  6. Electrolyte imbalance : repleted  k  and Mag  7. GABINO crt now 1.9, held lasix, Renal consult  8. Tobacco abuse -Nicotine patch ordered    CODE status: full  VTE prophylaxis: heparin  Discussed plan of care with Patient/Family and Nurse   Pre-admission lived at [address] [place] [address].   Discharge planning: home when w/u complete and medically stable     Subjective   Patient seen and examined chart reviewed. Case d/w RN at bedside  Labs noted form today crt 1.9, Difficult IV acces and needs central line per staff. Mild blood tinged sputum per staff.     Physical examination     Visit Vitals  BP (!) 146/66 (BP 1 Location: Right arm, BP Patient Position: At rest)   Pulse 71   Temp 97.8 °F (36.6 °C)   Resp 19   Ht 5' 7\" (1.702 m)   Wt 89.4 kg (197 lb)   SpO2 93%   BMI 30.85 kg/m²       Temp (24hrs), Av.1 °F (36.7 °C), Min:97.7 °F (36.5 °C), Max:98.5 °F (36.9 °C)      Intake/Output Summary (Last 24 hours) at 8/3/2021 0915  Last data filed at 8/3/2021 0756  Gross per 24 hour   Intake 1918.06 ml   Output 2960 ml   Net -1041.94 ml       General:  Alert, cooperative   Head:  Atraumatic           Neck: Supple             Lungs:  Decreased breath L side with Chest tube Heart:  Regular rhythm, no murmur   Abdomen:   Soft, non-tender     Extremities: No edema or DVT signs           Neurologic: Oriented to person  No focal deficits     Data Reviewed:       Recent Labs     08/01/21  0433   WBC 7.4   HGB 10.7*   HCT 35.2        Recent Labs     08/03/21  0514 08/02/21  0556 08/01/21  0433   NA  --  132* 135*   K  --  3.2* 3.7   CL  --  92* 96*   CO2  --  38* 37*   GLU  --  118* 104*   BUN  --  7 8   CREA  --  0.64 0.61   CA  --  8.5 8.2*   MG PLEASE DISREGARD RESULTS 1.5* 1.7   PHOS PLEASE DISREGARD RESULTS 3.4 2.8       Medications reviewed  Current Facility-Administered Medications   Medication Dose Route Frequency    vancomycin (VANCOCIN) 1,000 mg in 0.9% sodium chloride 250 mL (VIAL-MATE)  1,000 mg IntraVENous Q18H    balsam peru-castor oiL (VENELEX) ointment   Topical BID    zinc oxide-cod liver oil (DESITIN) 40 % paste   Topical BID    albuterol-ipratropium (DUO-NEB) 2.5 MG-0.5 MG/3 ML  3 mL Nebulization Q6H PRN    miconazole (MICOTIN) 2 % powder   Topical BID    piperacillin-tazobactam (ZOSYN) 3.375 g in 0.9% sodium chloride (MBP/ADV) 100 mL MBP  3.375 g IntraVENous Q8H    amLODIPine (NORVASC) tablet 5 mg  5 mg Oral DAILY    aspirin delayed-release tablet 81 mg  81 mg Oral DAILY    atorvastatin (LIPITOR) tablet 10 mg  10 mg Oral QHS    furosemide (LASIX) injection 20 mg  20 mg IntraVENous BID    oxyCODONE IR (ROXICODONE) tablet 5 mg  5 mg Oral Q6H PRN    vancomycin - pharmacy to dose    Other Rx Dosing/Monitoring    heparin 25,000 units in D5W 250 ml infusion  11-25 Units/kg/hr IntraVENous TITRATE    amiodarone (CORDARONE) 375 mg/250 mL D5W infusion  0.5-1 mg/min IntraVENous TITRATE    sodium chloride (NS) flush 5-40 mL  5-40 mL IntraVENous Q8H    sodium chloride (NS) flush 5-40 mL  5-40 mL IntraVENous PRN    acetaminophen (TYLENOL) tablet 650 mg  650 mg Oral Q6H PRN    Or    acetaminophen (TYLENOL) suppository 650 mg  650 mg Rectal Q6H PRN    polyethylene glycol (MIRALAX) packet 17 g  17 g Oral DAILY PRN    ondansetron (ZOFRAN ODT) tablet 4 mg  4 mg Oral Q8H PRN    Or    ondansetron (ZOFRAN) injection 4 mg  4 mg IntraVENous Q6H PRN    famotidine (PF) (PEPCID) 20 mg in 0.9% sodium chloride 10 mL injection  20 mg IntraVENous BID    nicotine (NICODERM CQ) 21 mg/24 hr patch 1 Patch  1 Patch TransDERmal DAILY PRN         Mil Camilo MD  Internal Medicine  8/3/2021

## 2021-08-03 NOTE — PROGRESS NOTES
Pulmonary    Due to lack of availability of anesthesia, unable to do bronch/EBUS today. Will schedule for tomorrow afternoon  Pt may eat.   Keep NPO after midnight tonight

## 2021-08-03 NOTE — PROGRESS NOTES
[address] [facility],4Th Floor            Heart and Vascular Advance               Division of Cardiology  [phone]                       Progress note                                                   NAME:  Ronald Maki   :   1940   MRN:   [id number]         ICD-10-CM ICD-9-CM    1. Pleural effusion on left  J90 511.9    2. Acute respiratory failure with hypoxia (HCC)  J96.01 518.81    3. Atrial fibrillation with rapid ventricular response (HCC)  I48.91 427.31    4. Nonsustained ventricular tachycardia (HCC)  I47.2 427.1    5. Sepsis, due to unspecified organism, unspecified whether acute organ dysfunction present ([facility]  A41.9 038.9      995.91                  Palpitations       80years old female with a past medical history remarkable for hypertension, hyperlipidemia, tobacco abuse, peripheral vascular disease, CVA, dementia who presented on account of worsening shortness of breath and hemoptysis.     Chest CT obtained as an outpatient revealed a total collapse of the left lung and a large pleural effusion and mediastinal lymphadenopathy.     Cardiology was consulted on account of atrial fibrillation with rapid ventricular response.     She still smokes 2 packs cigarettes a day. She moved from [place] to [place] to live with her daughter      Today, pt denies chest pain, SOB. She is requesting to eat. Ebus is postponed until tomorrow. Nurse reports pt had 2 episodes of hemoptytis with 2 quarter size dark blood, not kwesi blood this a.m. She says she feels better at this point time in no acute distress. Plan is for EBUS attempting to wean him you today but then had recurrent A. fib. Assessment/Plan:     1.   Atrial fibrillation: The patient remains at this time in normal sinus rhythm.  -No afib noted on tele review but ?1 limited/brief episode of possible PAFL  -Currently on po diet but will be NPO again tomorrow for EBUS  -Will stop IV amiodarone and start po amiodarone 400 mg daily po  -Noted reported hemopytsis x 2 this a.m (dark in color, not kwesi red). and heparin has been held. -Echo with preserved EF, mild TR        07/29/21    ECHO ADULT COMPLETE 08/02/2021 8/2/2021    Interpretation Summary  · LV: Estimated LVEF is 60 - 65%. Visually measured ejection fraction. Normal cavity size and systolic function (ejection fraction normal). Mild concentric hypertrophy. · Pericardium: Trivial pericardial effusion. · TV: Mild tricuspid valve regurgitation is present. Right Ventricular Arterial Pressure (RVSP) is 40 mmHg. Pulmonary hypertension not suggested by Doppler findings. · MV: Mitral annular calcification. Signed by: Senait Underwood MD on 8/2/2021  3:17 PM        -TSH 3.12  Atrial fibrillation likely brought on by underlying significant pulmonary issues. 2.  Left pleural effusion: Status post thoracentesis on Friday with removal 1.5 L but follow-up chest CT reveals a reaccumulation of the effusion. There is a clear concern about malignancy and primary team and pulmonologist are closely following. Pulmonary is planning bronchoscopy and EBUS tomorrow afternoon. 3. GABINO; creatinine 1.92.  -Hold IV lasix    4. HTN  -BP elevated  -On amiodarone. Further plan per Dr. Filomena Hammans. CARDIAC STUDIES      07/29/21    ECHO ADULT COMPLETE 08/02/2021 8/2/2021    Interpretation Summary  · LV: Estimated LVEF is 60 - 65%. Visually measured ejection fraction. Normal cavity size and systolic function (ejection fraction normal). Mild concentric hypertrophy. · Pericardium: Trivial pericardial effusion. · TV: Mild tricuspid valve regurgitation is present. Right Ventricular Arterial Pressure (RVSP) is 40 mmHg. Pulmonary hypertension not suggested by Doppler findings. · MV: Mitral annular calcification.     Signed by: Senait Underwood MD on 8/2/2021  3:17 PM                       IMAGING      CT Results (most recent):  [facility]    CT CHEST WO CONT    Narrative  INDICATION: Status post left thoracentesis. Evaluate for underlying malignancy. COMPARISON: 7/29/2021    CONTRAST: None. TECHNIQUE:  5 mm axial images were obtained through the chest. Coronal and  sagittal reformats were generated. CT dose reduction was achieved through use  of a standardized protocol tailored for this examination and automatic exposure  control for dose modulation. The absence of intravenous contrast reduces the sensitivity for evaluation of  the mediastinum, jay jay, vasculature, and upper abdominal organs. FINDINGS:    CHEST WALL: No mass or axillary lymphadenopathy. THYROID: No nodule. MEDIASTINUM: There is an unchanged 3.1 cm left periaortic lymph node. The left  hilum and mediastinum appear confluently consolidated likely related to  confluent adenopathy or mediastinal/hilar invasion  JAY JAY: No mass or lymphadenopathy. THORACIC AORTA: No aneurysm. MAIN PULMONARY ARTERY: Normal in caliber. TRACHEA/BRONCHI: There is an abrupt cut off of the left main stem bronchus. ESOPHAGUS: No wall thickening or dilatation. HEART: Normal in size. PLEURA: There is a large left pleural effusion. This is relatively unchanged. There is a trace right pleural effusion which is new. LUNGS: There is complete consolidation/collapse of the left lung. There is no  aeration of the left lung or bronchi. INCIDENTALLY IMAGED UPPER ABDOMEN: No significant abnormality in the  incidentally imaged upper abdomen. BONES: No destructive bone lesion. 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 There are bilateral breast implants. The right breast implant appears smaller  than the left with areas of nodular soft tissue density surrounding it. This is  likely related to chronic rupture. Impression  1. Unchanged large left pleural effusion which has likely reaccumulated since  the prior thoracentesis.   2. Unchanged dense consolidation and collapse of the left lung with an abrupt  cut off of the left mainstem bronchus. The right hilum and right side of the  mediastinum appears consolidated which may be related to invasion and/or  confluent adenopathy. A large lymph node is seen in the left para-aortic space  that is unchanged. Findings are highly suspicious for malignancy. 3. New trace right pleural effusion. XR Results (most recent):  Results from Hospital Encounter encounter on 07/29/21    XR CHEST PORT    Narrative  EXAM: XR CHEST PORT    INDICATION: post chest tube placement    COMPARISON: Chest x-ray July 30, 2021, CT chest 5/31/2021    FINDINGS: A portable AP radiograph of the chest was obtained at 1553 hours. Chest tube at the left lung base is only partially visualized, due to poor  penetration. Overall unchanged appearance compared to prior chest x-ray, with  unchanged complete opacification of the left hemithorax. Streaky opacification at the right lung base compatible with subsegmental  atelectasis. No pneumothorax. Impression  Overall unchanged appearance of the left hemithorax, which remains  completely opacified. The left chest tube is not well visualized due to the  degree of overlying opacification. No pneumothorax. MRI Results (most recent):  No results found for this or any previous visit.           Past Medical History:   Diagnosis Date    Cellulitis     R LEG    CVA (cerebral vascular accident) ([address]     Hyperlipidemia     Hypertension            Past Surgical History:   Procedure Laterality Date    HX BLADDER SUSPENSION  01/2021    HX CHOLECYSTECTOMY      HX OTHER SURGICAL  10/2020    PARACENTESIS               Visit Vitals  BP (!) 164/72   Pulse 70   Temp 97.8 °F (36.6 °C)   Resp 19   Ht 5' 7\" (1.702 m)   Wt 197 lb (89.4 kg)   SpO2 93%   BMI 30.85 kg/m²         Wt Readings from Last 3 Encounters:   08/03/21 197 lb (89.4 kg)   07/29/21 199 lb 15.3 oz (90.7 kg)   06/09/21 202 lb (91.6 kg)         Review of Systems:   Pertinent items are noted in the History of Present Illness. 08/03 0701 - 08/03 1900  In: -   Out: 20     08/01 1901 - 08/03 0700  In: 0434 [P.O.:240;  I.V.:4096]  Out: 3740 [Urine:1750]      Telemetry: normal sinus rhythm    Physical Exam:    Neck: supple, no JVD  Heart: RRR, grade II/VI systolic murmur best at LUSB  Lungs: diminished breath sound L  Abdomen: soft, nontender, bowel sounds present  Extremities: no edema      Current Facility-Administered Medications   Medication Dose Route Frequency    vancomycin (VANCOCIN) 1,000 mg in 0.9% sodium chloride 250 mL (VIAL-MATE)  1,000 mg IntraVENous Q18H    [START ON 8/4/2021] famotidine (PF) (PEPCID) 20 mg in 0.9% sodium chloride 10 mL injection  20 mg IntraVENous DAILY    balsam peru-castor oiL (VENELEX) ointment   Topical BID    zinc oxide-cod liver oil (DESITIN) 40 % paste   Topical BID    albuterol-ipratropium (DUO-NEB) 2.5 MG-0.5 MG/3 ML  3 mL Nebulization Q6H PRN    miconazole (MICOTIN) 2 % powder   Topical BID    piperacillin-tazobactam (ZOSYN) 3.375 g in 0.9% sodium chloride (MBP/ADV) 100 mL MBP  3.375 g IntraVENous Q8H    amLODIPine (NORVASC) tablet 5 mg  5 mg Oral DAILY    aspirin delayed-release tablet 81 mg  81 mg Oral DAILY    atorvastatin (LIPITOR) tablet 10 mg  10 mg Oral QHS    furosemide (LASIX) injection 20 mg  20 mg IntraVENous BID    oxyCODONE IR (ROXICODONE) tablet 5 mg  5 mg Oral Q6H PRN    vancomycin - pharmacy to dose    Other Rx Dosing/Monitoring    heparin 25,000 units in D5W 250 ml infusion  11-25 Units/kg/hr IntraVENous TITRATE    amiodarone (CORDARONE) 375 mg/250 mL D5W infusion  0.5-1 mg/min IntraVENous TITRATE    sodium chloride (NS) flush 5-40 mL  5-40 mL IntraVENous Q8H    sodium chloride (NS) flush 5-40 mL  5-40 mL IntraVENous PRN    acetaminophen (TYLENOL) tablet 650 mg  650 mg Oral Q6H PRN    Or    acetaminophen (TYLENOL) suppository 650 mg  650 mg Rectal Q6H PRN    polyethylene glycol (MIRALAX) packet 17 g  17 g Oral DAILY PRN    ondansetron (ZOFRAN ODT) tablet 4 mg  4 mg Oral Q8H PRN    Or    ondansetron (ZOFRAN) injection 4 mg  4 mg IntraVENous Q6H PRN    nicotine (NICODERM CQ) 21 mg/24 hr patch 1 Patch  1 Patch TransDERmal DAILY PRN         All Cardiac Markers in the last 24 hours:  No results found for: CPK, CK, CKMMB, CKMB, RCK3, CKMBT, CKMBPOC, CKNDX, CKND1, ASHER, TROPT, TROIQ, JAKE, TROPT, TNIPOC, BNP, BNPP, BNPNT     Lab Results   Component Value Date/Time    Creatinine 1.92 (H) 08/03/2021 09:56 AM          Lab Results   Component Value Date/Time    CK 61 07/29/2021 11:00 PM    Troponin-I, Qt. <0.05 07/30/2021 03:50 AM         Lab Results   Component Value Date/Time    WBC 10.2 08/03/2021 09:56 AM    HGB 11.7 08/03/2021 09:56 AM    HCT 36.9 08/03/2021 09:56 AM    PLATELET 479 94/12/9023 09:56 AM    MCV 90.4 08/03/2021 09:56 AM         Lab Results   Component Value Date/Time    Sodium 132 (L) 08/03/2021 09:56 AM    Potassium 3.8 08/03/2021 09:56 AM    Chloride 92 (L) 08/03/2021 09:56 AM    CO2 35 (H) 08/03/2021 09:56 AM    Anion gap 5 08/03/2021 09:56 AM    Glucose 103 (H) 08/03/2021 09:56 AM    BUN 15 08/03/2021 09:56 AM    Creatinine 1.92 (H) 08/03/2021 09:56 AM    BUN/Creatinine ratio 8 (L) 08/03/2021 09:56 AM    GFR est AA 31 (L) 08/03/2021 09:56 AM    GFR est non-AA 25 (L) 08/03/2021 09:56 AM    Calcium 9.0 08/03/2021 09:56 AM         Lab Results   Component Value Date/Time    NT pro-BNP 1,466 (H) 07/29/2021 10:58 PM         No results found for: CHOL, CHOLPOCT, CHOLX, CHLST, CHOLV, HDL, HDLPOC, HDLP, LDL, LDLCPOC, LDLC, DLDLP, VLDLC, VLDL, TGLX, TRIGL, TRIGP, TGLPOCT, CHHD, CHHDX      Lab Results   Component Value Date/Time    ALT (SGPT) 28 07/29/2021 05:27 PM    Alk.  phosphatase 107 07/29/2021 05:27 PM    Bilirubin, total 0.5 07/29/2021 05:27 PM

## 2021-08-03 NOTE — PROGRESS NOTES
Pharmacist Note - Vancomycin Dosing  Therapy day 5  Indication: post-obstructive pneumonia and bilateral lower extremity cellulitis  Current regimen:  1000 mg Q12    Recent Labs     08/02/21  0556 08/01/21  0433   WBC  --  7.4   CREA 0.64 0.61   BUN 7 8       A random vancomycin level of 25.9 mcg/mL was obtained and from this level, the patient's AUC24 is calculated to be 691 with the current regimen. Goal target range AUC/KADIE 400-600      Plan: Change to 1 gram Q18hr for an estimated AUC~ 475  . Pharmacy will continue to monitor this patient daily for changes in clinical status and renal function. *Random vancomycin levels are used to calculate AUC/KADIE, this level should not be interpreted as a trough. Vancomycin has been dosed using Bayesian kinetics software to target an AUC24:KADIE of 400-600, which provides adequate exposure for as assumed infection due to MRSA with an KADIE of 1 or less while reducing the risk of nephrotoxicity as seen with traditional trough based dosing goals.

## 2021-08-03 NOTE — CONSULTS
[facility]   [address] [facility], [address] [place] [address]  Phone: [phone]   [phone] NOTE     Patient: Kimo Santacruz MRN: [id number]  PCP: Inga Pagan NP   :     1940  Age:   80 y.o. Sex:  female      Referring physician: Michael Tillman MD  Reason for consultation: 80 y.o. female with Atrial fibrillation with rapid ventricular response ([facility] [J70.50] complicated by arf  Admission Date: 2021  7:11 PM  LOS: 5 days      ASSESSMENT and PLAN :   GABINO :  - Differential Diagnosis includes   A. Pre renal azotemia after aggresive diuresis and thoracentesis   B. Hemodynamic ATN from A fib RVR, Hypotension. ? Underlying RAFAEL    C. ATIN/ ATIN from Vanc + Zosyn combo   - ordered urine lytes, Urine Eos and random vacn levels   - dose vanc by levels   - start NS at 75 cc/ hr x 1L   - hold lasix   - Avoid all potential nephrotoxins   - Low threshold to place farnsworth   - Labs again in am     Care Plan discussed with: pt         Thank you for consulting [facility] in the care of your patient. Subjective:   HPI: Kimo Santacruz is a 80 y.o.  female who has been admitted to the hospital for LLL collapse w/ Left effusion and left main bronchus mucous plugging on 21. Since admission she has been treated from PNA w/ vanc + zosyn and she had developed A fib w/ RVR with labile BP. She is a current heavy smoker and had hx of HTN and CVA  During her hospital course she wss diuresed and underwent thoracentesis .  She has developed ARF for which we were asked to see her     Past Medical Hx:   Past Medical History:   Diagnosis Date    Cellulitis     R LEG    CVA (cerebral vascular accident) ([facility]     Hyperlipidemia     Hypertension         Past Surgical Hx:     Past Surgical History:   Procedure Laterality Date    HX BLADDER SUSPENSION  2021    HX CHOLECYSTECTOMY      HX OTHER SURGICAL  10/2020    PARACENTESIS         No Known Allergies    Social Hx:  reports that she has been smoking. She has a 108.00 pack-year smoking history. She has never used smokeless tobacco. She reports previous alcohol use. She reports previous drug use. Family History   Problem Relation Age of Onset    Anesth Problems Neg Hx        Review of Systems:  A thorough twelve point review of system was performed today. Pertinent positives and negatives are mentioned in the HPI. The reminder of the ROS is negative and noncontributory. Objective:    Vitals:    Vitals:    08/03/21 0946 08/03/21 1126 08/03/21 1417 08/03/21 1632   BP: (!) 164/72 (!) 128/50 127/79 (!) 134/55   Pulse: 70 71 75 77   Resp:  18  23   Temp:  98.1 °F (36.7 °C)  98 °F (36.7 °C)   SpO2:  93%  93%   Weight:       Height:         I&O's:  08/02 0701 - 08/03 0700  In: 2018.1 [P.O.:160; I.V.:1858.1]  Out: 2940 [Urine:950]  Visit Vitals  BP (!) 134/55 (BP 1 Location: Left upper arm, BP Patient Position: At rest)   Pulse 77   Temp 98 °F (36.7 °C)   Resp 23   Ht 5' 7\" (1.702 m)   Wt 89.4 kg (197 lb)   SpO2 93%   BMI 30.85 kg/m²       Physical Exam:  General:  Ill looking   HEENT: PERRL,+ Pallor , No Icterus  Neck: Supple,no mass palpable  Lungs : l > R rales +  CVS: IRREGULAR, S1 S2 normal, No murmur   Abdomen: Soft, NT, BS +  Extremities: TRACE Edema  Skin: No rash or lesions. MS: No joint swelling, erythema, warmth  Neurologic: non focal, AAO x 3  Psych: normal affect    Laboratory Results:    Recent Labs     08/03/21  0956 08/03/21  0514 08/02/21  0556 08/01/21  0433 08/01/21  0433   *  --  132*  --  135*   K 3.8  --  3.2*  --  3.7   CL 92*  --  92*  --  96*   CO2 35*  --  38*  --  37*   *  --  118*  --  104*   BUN 15  --  7  --  8   CREA 1.92*  --  0.64  --  0.61   CA 9.0  --  8.5  --  8.2*   MG 2.1 PLEASE DISREGARD RESULTS 1.5*   < > 1.7   PHOS  --  PLEASE DISREGARD RESULTS 3.4  --  2.8    < > = values in this interval not displayed.      Recent Labs     08/03/21  0754 08/01/21  0433   WBC 10.2 7.4   HGB 11.7 10.7*   HCT 36.9 35.2    330     No results found for: SDES  Lab Results   Component Value Date/Time    Culture result: Culture performed on Fluid swab specimen 08/02/2021 03:23 PM    Culture result: NO GROWTH THUS FAR 08/02/2021 03:23 PM    Culture result:  08/01/2021 05:06 AM     MRSA NOT PRESENT. Apparent Staphylococus aureus (not MRSA noted). Culture result:  08/01/2021 05:06 AM     Screening of patient nares for MRSA is for surveillance purposes and, if positive, to facilitate isolation considerations in high risk settings. It is not intended for automatic decolonization interventions per se as regimens are not sufficiently effective to warrant routine use.      Recent Results (from the past 24 hour(s))   PTT    Collection Time: 08/02/21 11:06 PM   Result Value Ref Range    aPTT 59.7 (H) 22.1 - 31.0 sec    aPTT, therapeutic range     58.0 - 77.0 SECS   PTT    Collection Time: 08/03/21  5:14 AM   Result Value Ref Range    aPTT PLEASE DISREGARD RESULTS 22.1 - 31.0 sec    aPTT, therapeutic range     58.0 - 77.0 SECS   MAGNESIUM    Collection Time: 08/03/21  5:14 AM   Result Value Ref Range    Magnesium PLEASE DISREGARD RESULTS 1.6 - 2.4 mg/dL   PHOSPHORUS    Collection Time: 08/03/21  5:14 AM   Result Value Ref Range    Phosphorus PLEASE DISREGARD RESULTS 2.6 - 4.7 MG/DL   VANCOMYCIN, RANDOM    Collection Time: 08/03/21  5:14 AM   Result Value Ref Range    Vancomycin, random PLEASE DISREGARD RESULTS UG/ML   PTT    Collection Time: 08/03/21  9:56 AM   Result Value Ref Range    aPTT 75.0 (H) 22.1 - 31.0 sec    aPTT, therapeutic range     58.0 - 77.0 SECS   CBC WITH AUTOMATED DIFF    Collection Time: 08/03/21  9:56 AM   Result Value Ref Range    WBC 10.2 3.6 - 11.0 K/uL    RBC 4.08 3.80 - 5.20 M/uL    HGB 11.7 11.5 - 16.0 g/dL    HCT 36.9 35.0 - 47.0 %    MCV 90.4 80.0 - 99.0 FL    MCH 28.7 26.0 - 34.0 PG    MCHC 31.7 30.0 - 36.5 g/dL    RDW 13.8 11.5 - 14.5 % PLATELET 963 867 - 226 K/uL    MPV 9.2 8.9 - 12.9 FL    NRBC 0.0 0  WBC    ABSOLUTE NRBC 0.00 0.00 - 0.01 K/uL    NEUTROPHILS 80 (H) 32 - 75 %    LYMPHOCYTES 7 (L) 12 - 49 %    MONOCYTES 11 5 - 13 %    EOSINOPHILS 1 0 - 7 %    BASOPHILS 0 0 - 1 %    IMMATURE GRANULOCYTES 1 (H) 0.0 - 0.5 %    ABS. NEUTROPHILS 8.2 (H) 1.8 - 8.0 K/UL    ABS. LYMPHOCYTES 0.7 (L) 0.8 - 3.5 K/UL    ABS. MONOCYTES 1.1 (H) 0.0 - 1.0 K/UL    ABS. EOSINOPHILS 0.1 0.0 - 0.4 K/UL    ABS. BASOPHILS 0.0 0.0 - 0.1 K/UL    ABS. IMM. GRANS. 0.1 (H) 0.00 - 0.04 K/UL    DF SMEAR SCANNED      RBC COMMENTS NORMOCYTIC, NORMOCHROMIC     METABOLIC PANEL, BASIC    Collection Time: 08/03/21  9:56 AM   Result Value Ref Range    Sodium 132 (L) 136 - 145 mmol/L    Potassium 3.8 3.5 - 5.1 mmol/L    Chloride 92 (L) 97 - 108 mmol/L    CO2 35 (H) 21 - 32 mmol/L    Anion gap 5 5 - 15 mmol/L    Glucose 103 (H) 65 - 100 mg/dL    BUN 15 6 - 20 MG/DL    Creatinine 1.92 (H) 0.55 - 1.02 MG/DL    BUN/Creatinine ratio 8 (L) 12 - 20      GFR est AA 31 (L) >60 ml/min/1.73m2    GFR est non-AA 25 (L) >60 ml/min/1.73m2    Calcium 9.0 8.5 - 10.1 MG/DL   MAGNESIUM    Collection Time: 08/03/21  9:56 AM   Result Value Ref Range    Magnesium 2.1 1.6 - 2.4 mg/dL         Urine dipstick:   Lab Results   Component Value Date/Time    Color YELLOW/STRAW 07/30/2021 05:29 AM    Appearance CLEAR 07/30/2021 05:29 AM    Specific gravity 1.009 07/30/2021 05:29 AM    pH (UA) 5.0 07/30/2021 05:29 AM    Protein Negative 07/30/2021 05:29 AM    Glucose Negative 07/30/2021 05:29 AM    Ketone Negative 07/30/2021 05:29 AM    Bilirubin Negative 07/30/2021 05:29 AM    Urobilinogen 0.2 07/30/2021 05:29 AM    Nitrites Negative 07/30/2021 05:29 AM    Leukocyte Esterase Negative 07/30/2021 05:29 AM    Epithelial cells FEW 07/30/2021 05:29 AM    Bacteria Negative 07/30/2021 05:29 AM    WBC 0-4 07/30/2021 05:29 AM    RBC 0-5 07/30/2021 05:29 AM       I have reviewed the following:    All pertinent labs, microbiology data, radiology imaging for my assessment     Medications list Personally Reviewed   [x]      Yes     []               No       Medications:  Prior to Admission medications    Medication Sig Start Date End Date Taking? Authorizing Provider   albuterol (PROVENTIL HFA, VENTOLIN HFA, PROAIR HFA) 90 mcg/actuation inhaler Take 2 Puffs by inhalation every four (4) hours as needed for Wheezing for up to 360 days. 5/25/21 5/20/22 Yes Andrew Turner MD   folic acid (FOLVITE) 177 mcg tablet Take 1 Tab by mouth daily. Indications: inadequate folic acid 7/42/12  Yes David Hernandez, NP   donepeziL (Aricept) 5 mg tablet Take 1 Tab by mouth daily. 2/24/21  Yes David Hernandez, FOREST   amLODIPine (NORVASC) 5 mg tablet Take 1 Tab by mouth daily. 2/24/21  Yes Justin Hernandez, NP   aspirin delayed-release 81 mg tablet Take 1 Tab by mouth daily. 2/24/21  Yes David Hernandez, FOREST   simvastatin (ZOCOR) 20 mg tablet Take 1 Tab by mouth nightly. 2/24/21  Yes Payal Hernandez NP   acetaminophen (TYLENOL) 325 mg tablet Take 2 Tabs by mouth every six (6) hours as needed for Pain. 1/15/21  Yes Kisha Noland PA-C   menthol (GOLD BOND PAIN RELIEVING EX) Apply 1 Applicator to affected area as needed. thin layer lower extremities   Yes Provider, Historical   naproxen sodium (Aleve) 220 mg cap Take 1 Tab by mouth daily as needed for Pain. Yes Provider, Historical        Thank you for allowing us to participate in the care of this patient. We will follow patient. Please dont hesitate to call with any questions    Satinder Munroe MD  [facility] Nephrology [facility]   [address] [address] [facility] [address]  Phone - [phone]   Fax - [phone]  www. [facility]

## 2021-08-03 NOTE — PROGRESS NOTES
Problem: Self Care Deficits Care Plan (Adult)  Goal: *Acute Goals and Plan of Care (Insert Text)  Description:   FUNCTIONAL STATUS PRIOR TO ADMISSION: The patient is a questionable historian at this time. Per patient, she has been living with her daughter for the past few months however is largely IND for ADL tasks. HOME SUPPORT: The patient lived with her daughter (per patient report). Occupational Therapy Goals  Initiated 8/3/2021  1. Patient will perform lower body dressing with minimal assistance/contact guard assist within 7 day(s). 2.  Patient will perform bathing with minimal assistance/contact guard assist within 7 day(s). 3.  Patient will perform grooming standing with supervision/set-up within 7 day(s). 4.  Patient will perform toilet transfers with supervision/set-up within 7 day(s). 5.  Patient will perform all aspects of toileting with minimal assistance/contact guard assist within 7 day(s). 6.  Patient will participate in upper extremity therapeutic exercise/activities with supervision/set-up for 5 minutes within 7 day(s). 7.  Patient will utilize energy conservation techniques during functional activities with verbal cues within 7 day(s). Outcome: Progressing Towards Goal   OCCUPATIONAL THERAPY EVALUATION  Patient: Macarena Sexton (83 y.o. female)  Date: 8/3/2021  Primary Diagnosis: Atrial fibrillation with rapid ventricular response ([facility] [address] [I48.91]  Procedure(s) (LRB):  BRONCHOSCOPY (N/A)  ENDOSCOPIC BRONCHOSCOPY ULTRASOUND (EBUS) (N/A)  TRANSBRONCHIAL BIOPSY (N/A)     Precautions: Fall    ASSESSMENT  Based on the objective data described below, the patient presents with generalized weakness, decreased endurance, decreased activity tolerance, impaired standing balance, and confusion (A&Ox2) s/p admission for SOB/Afib with RVR with L thoracentesis 7/30 and patient with CT remaining.  Patient is a questionable historian at this time however reported she has been living with her daughter for the past couple of months. Patient required up to MAX A for LB ADLs this session due to impaired LB access and VOSS. Hopeful for discharge home with [facility] and assistance from daughter post discharge; however if dtr cannot assist, patient may need rehab. Current Level of Function Impacting Discharge (ADLs/self-care): up to MAX A LB ADLs    Functional Outcome Measure: The patient scored 40/100 on the Barthel Index outcome measure which is indicative of 60% ADL impairment. Other factors to consider for discharge: 3L NC O2 this session; confusion      Patient will benefit from skilled therapy intervention to address the above noted impairments. PLAN :  Recommendations and Planned Interventions: self care training, functional mobility training, therapeutic exercise, balance training, therapeutic activities, endurance activities, patient education, home safety training and family training/education    Frequency/Duration: Patient will be followed by occupational therapy 5 times a week to address goals. Recommendation for discharge: (in order for the patient to meet his/her long term goals)  Occupational therapy at least 2 days/week in the home AND ensure assist and/or supervision for safety with ADL and IADL tasks versus rehab if assistance cannot be provided    This discharge recommendation:  Has not yet been discussed the attending provider and/or case management    IF patient discharges home will need the following DME: TBD       SUBJECTIVE:   Patient stated I don't know (in regards to how long she has been living with her daughter).     OBJECTIVE DATA SUMMARY:   HISTORY:   Past Medical History:   Diagnosis Date    Cellulitis     R LEG    CVA (cerebral vascular accident) ([facility] [address]     Hyperlipidemia     Hypertension      Past Surgical History:   Procedure Laterality Date    HX BLADDER SUSPENSION  01/2021    HX CHOLECYSTECTOMY      HX OTHER SURGICAL  10/2020    PARACENTESIS Expanded or extensive additional review of patient history:     Home Situation  Home Environment: Private residence  # Steps to Enter: 5  One/Two Story Residence: One story  Living Alone: No  Support Systems: Child(latisha) (lives with daughter)  Patient Expects to be Discharged to[de-identified] Unknown  Current DME Used/Available at Home: Shower chair, Grab bars  Tub or Shower Type: Shower    Hand dominance: Right    EXAMINATION OF PERFORMANCE DEFICITS:  Cognitive/Behavioral Status:  Neurologic State: Confused; Alert  Orientation Level: Oriented to person;Oriented to place;Oriented to situation;Disoriented to time   Cognition: Decreased attention/concentration  Perception: Appears intact  Perseveration: No perseveration noted  Safety/Judgement: Decreased insight into deficits; Decreased awareness of need for safety;Decreased awareness of need for assistance    Skin: exposed areas grossly intact; chest tube; lines & leads    Edema: slightly BLE    Hearing: Auditory  Auditory Impairment: Hard of hearing, bilateral    Vision/Perceptual:                                Corrective Lenses: Reading glasses (says she can go \"with or without\")    Range of Motion:  BUE  AROM: Generally decreased, functional  PROM: Generally decreased, functional                      Strength:  BUE  Strength: Generally decreased, functional                Coordination:  Coordination: Generally decreased, functional  Fine Motor Skills-Upper: Left Intact; Right Intact    Gross Motor Skills-Upper: Left Intact; Right Intact    Tone & Sensation:  BUE  Tone: Normal  Sensation: Intact                      Balance:  Sitting: Intact; Without support  Standing: Impaired; Without support  Standing - Static: Fair  Standing - Dynamic : Fair    Functional Mobility and Transfers for ADLs:  Bed Mobility:  Supine to Sit: Moderate assistance    Transfers:  Sit to Stand: Minimum assistance  Stand to Sit: Minimum assistance  Bed to Chair: Minimum assistance    ADL Assessment:  Feeding: Setup;Supervision    Oral Facial Hygiene/Grooming: Setup;Supervision (infer seated)    Bathing: Maximum assistance (infer A for BLE and posterior periarea)    Upper Body Dressing: Setup;Supervision (infer A for item retrieval)    Lower Body Dressing: Maximum assistance (A for socks/shoes)    Toileting: Maximum assistance (reliant on Purewick)                ADL Intervention and task modifications:  Feeding  Feeding Assistance: Set-up; Supervision                        Lower Body Dressing Assistance  Socks: Maximum assistance; Compensatory technique training         Cognitive Retraining  Safety/Judgement: Decreased insight into deficits; Decreased awareness of need for safety;Decreased awareness of need for assistance    Functional Measure:  Barthel Index:    Bathin  Bladder: 0  Bowels: 10  Groomin  Dressin  Feedin  Mobility: 0  Stairs: 0  Toilet Use: 5  Transfer (Bed to Chair and Back): 10  Total: 40/100        The Barthel ADL Index: Guidelines  1. The index should be used as a record of what a patient does, not as a record of what a patient could do. 2. The main aim is to establish degree of independence from any help, physical or verbal, however minor and for whatever reason. 3. The need for supervision renders the patient not independent. 4. A patient's performance should be established using the best available evidence. Asking the patient, friends/relatives and nurses are the usual sources, but direct observation and common sense are also important. However direct testing is not needed. 5. Usually the patient's performance over the preceding 24-48 hours is important, but occasionally longer periods will be relevant. 6. Middle categories imply that the patient supplies over 50 per cent of the effort. 7. Use of aids to be independent is allowed. Godfrey Paul., Barthel, D.W. (0913). Functional evaluation: the Barthel Index. [address] [facility] (14)2.   Chantal Rodriguez zachariah ROSENDA Hernandez, Tameka Gant., Thomas Hardin.Lilo (1999). Measuring the change indisability after inpatient rehabilitation; comparison of the responsiveness of the Barthel Index and Functional [place] Measure. Journal of Neurology, Neurosurgery, and Psychiatry, 66(4), 192-839. DUNCAN Forman, TRINITY Flores, & Carla Fowler M.A. (2004.) Assessment of post-stroke quality of life in cost-effectiveness studies: The usefulness of the Barthel Index and the EuroQoL-5D. Quality of Life Research, 15, 299-16       Occupational Therapy Evaluation Charge Determination   History Examination Decision-Making   LOW Complexity : Brief history review  MEDIUM Complexity : 3-5 performance deficits relating to physical, cognitive , or psychosocial skils that result in activity limitations and / or participation restrictions MEDIUM Complexity : Patient may present with comorbidities that affect occupational performnce. Miniml to moderate modification of tasks or assistance (eg, physical or verbal ) with assesment(s) is necessary to enable patient to complete evaluation       Based on the above components, the patient evaluation is determined to be of the following complexity level: LOW   Pain Rating:  No complaints    Activity Tolerance:   Fair, desaturates with exertion and requires oxygen and observed SOB with activity    After treatment patient left in no apparent distress:    Sitting in chair, Call bell within reach and Bed / chair alarm activated    COMMUNICATION/EDUCATION:   The patients plan of care was discussed with: Physical therapist and Registered nurse. Home safety education was provided and the patient/caregiver indicated understanding., Patient/family have participated as able in goal setting and plan of care. and Patient/family agree to work toward stated goals and plan of care. This patients plan of care is appropriate for delegation to [facility].     Thank you for this referral.  Usama Gutierrez Calculation: 21 mins

## 2021-08-03 NOTE — PROGRESS NOTES
Problem: Mobility Impaired (Adult and Pediatric)  Goal: *Acute Goals and Plan of Care (Insert Text)  Description: FUNCTIONAL STATUS PRIOR TO ADMISSION: Patient was independent without use of DME per her report although a questionable historian on eval.     HOME SUPPORT PRIOR TO ADMISSION: The patient lived with her daughter (reports this was a recent change in last few months). Daughter works from home. Physical Therapy Goals  Initiated 8/3/2021  1. Patient will move from supine to sit and sit to supine , scoot up and down, and roll side to side in bed with modified independence within 7 day(s). 2.  Patient will transfer from bed to chair and chair to bed with supervision/set-up using the least restrictive device within 7 day(s). 3.  Patient will perform sit to stand with supervision/set-up within 7 day(s). 4.  Patient will ambulate with supervision/set-up for 100 feet with the least restrictive device within 7 day(s). 5.  Patient will ascend/descend 5 stairs with right handrail(s) with minimal assistance/contact guard assist within 7 day(s). Outcome: Progressing Towards Goal   PHYSICAL THERAPY EVALUATION  Patient: Serge Rasmussen (74 y.o. female)  Date: 8/3/2021  Primary Diagnosis: Atrial fibrillation with rapid ventricular response ([address] [I48.91]  Procedure(s) (LRB):  BRONCHOSCOPY (N/A)  ENDOSCOPIC BRONCHOSCOPY ULTRASOUND (EBUS) (N/A)  TRANSBRONCHIAL BIOPSY (N/A)     Precautions:   Fall, Bed Alarm    ASSESSMENT  Based on the objective data described below, the patient presents with impaired balance, decreased endurance, confusion, generalized weakness, and overall decline from baseline following admission for afib with RVR and POD 1 from L chest tube insertion due to L lung collapse and pleural effusion. Resting on 3L/min with stable SpO2. Overall min-mod Ax1 for bed mobility and transfer to chair. Anticipate need for RW for further gait next session but will assess.  Transfer with flexed posture and reaching for outside support. Quick onset of VOSS and patient stated she is always a little SOB when questioned. Hopeful for progress towards home with HHPT and daughter support. Current Level of Function Impacting Discharge (mobility/balance): min-mod A    Functional Outcome Measure: The patient scored 2/28 on the Tinetti outcome measure which is indicative of high fall risk. Other factors to consider for discharge: on 3L/min, home O2?, baseline? Patient will benefit from skilled therapy intervention to address the above noted impairments. PLAN :  Recommendations and Planned Interventions: bed mobility training, transfer training, gait training, therapeutic exercises, neuromuscular re-education, patient and family training/education, and therapeutic activities      Frequency/Duration: Patient will be followed by physical therapy:  5 times a week to address goals. Recommendation for discharge: (in order for the patient to meet his/her long term goals)  Physical therapy at least 2 days/week in the home AND ensure assist and/or supervision for safety with household mobility pending improvement with acute PT     This discharge recommendation:  Has been made in collaboration with the attending provider and/or case management    IF patient discharges home will need the following DME: to be determined (TBD)         SUBJECTIVE:   Patient stated Why do I have to get up?     OBJECTIVE DATA SUMMARY:   HISTORY:    Past Medical History:   Diagnosis Date    Cellulitis     R LEG    CVA (cerebral vascular accident) ([facility] [address]     Hyperlipidemia     Hypertension      Past Surgical History:   Procedure Laterality Date    HX BLADDER SUSPENSION  01/2021    HX CHOLECYSTECTOMY      HX OTHER SURGICAL  10/2020    PARACENTESIS       Personal factors and/or comorbidities impacting plan of care: PMH    Home Situation  Home Environment: Private residence  # Steps to Enter: 5  One/Two Story Residence: One story  Living Alone: No  Support Systems: Child(latisha) (lives with daughter)  Patient Expects to be Discharged to[de-identified] Unknown  Current DME Used/Available at Home: Shower chair, Grab bars  Tub or Shower Type: Shower    EXAMINATION/PRESENTATION/DECISION MAKING:   Critical Behavior:  Neurologic State: Confused, Alert  Orientation Level: Oriented to person, Oriented to place, Oriented to situation, Disoriented to time  Cognition: Decreased attention/concentration  Safety/Judgement: Decreased insight into deficits, Decreased awareness of need for safety, Decreased awareness of need for assistance  Hearing: Auditory  Auditory Impairment: Hard of hearing, bilateral  Range Of Motion:  AROM: Generally decreased, functional  PROM: Generally decreased, functional  Strength:    Strength: Generally decreased, functional  Tone & Sensation:   Tone: Normal  Sensation: Intact  Coordination:  Coordination: Generally decreased, functional  Vision:   Corrective Lenses: Reading glasses (says she can go \"with or without\")  Functional Mobility:  Bed Mobility:  Supine to Sit: Moderate assistance  Transfers:  Sit to Stand: Minimum assistance  Stand to Sit: Minimum assistance  Bed to Chair: Minimum assistance  Balance:   Sitting: Intact; Without support  Standing: Impaired; Without support  Standing - Static: Fair  Standing - Dynamic : Fair    Functional Measure:  Tinetti test:    Sitting Balance: 1  Arises: 0  Attempts to Rise: 0  Immediate Standing Balance: 0  Standing Balance: 0  Nudged: 0  Eyes Closed: 0  Turn 360 Degrees - Continuous/Discontinuous: 0  Turn 360 Degrees - Steady/Unsteady: 0  Sitting Down: 1  Balance Score: 2 Balance total score  Indication of Gait: 0  R Step Length/Height: 0  L Step Length/Height: 0  R Foot Clearance: 0  L Foot Clearance: 0  Step Symmetry: 0  Step Continuity: 0  Path: 0  Trunk: 0  Walking Time: 0  Gait Score: 0 Gait total score  Total Score: 2/28 Overall total score         Tinetti Tool Score Risk of Falls  <19 = High Fall Risk  19-24 = Moderate Fall Risk  25-28 = Low Fall Risk  Lisandra GUSMAN. Performance-Oriented Assessment of Mobility Problems in Elderly Patients. [address]; [id number]. (Scoring Description: PT Bulletin Feb. 10, 1993)    Older adults: Analia Ngo et al, 2009; n = 1000 [facility] elderly evaluated with ABC, SOHAM, ADL, and IADL)  · Mean SOHAM score for males aged 69-68 years = 26.21(3.40)  · Mean SOHAM score for females age 69-68 years = 25.16(4.30)  · Mean SOHAM score for males over 80 years = 23.29(6.02)  · Mean SOHAM score for females over 80 years = 17.20(8.32)            Physical Therapy Evaluation Charge Determination   History Examination Presentation Decision-Making   HIGH Complexity :3+ comorbidities / personal factors will impact the outcome/ POC  HIGH Complexity : 4+ Standardized tests and measures addressing body structure, function, activity limitation and / or participation in recreation  LOW Complexity : Stable, uncomplicated  Other outcome measures Tinetti  HIGH       Based on the above components, the patient evaluation is determined to be of the following complexity level: LOW     Pain Rating:  Denied pain    Activity Tolerance:   Fair, requires rest breaks, and observed SOB with activity    After treatment patient left in no apparent distress:   Sitting in chair, Call bell within reach, and Bed / chair alarm activated    COMMUNICATION/EDUCATION:   The patients plan of care was discussed with: Occupational therapist and Registered nurse. Fall prevention education was provided and the patient/caregiver indicated understanding., Patient/family have participated as able in goal setting and plan of care. , and Patient/family agree to work toward stated goals and plan of care.     Thank you for this referral.  Tilden Alpers, PT, DPT   Time Calculation: 20 mins

## 2021-08-03 NOTE — PROGRESS NOTES
Physician Progress Note      Siria Delvalle  CSN #:                  [id number]  :                       1940  ADMIT DATE:       2021 7:11 PM  DISCH DATE:  RESPONDING  PROVIDER #:        Haresh Shah MD          QUERY TEXT:    Patient admitted with pleural effusion. Per wound care nurse, noted to also have pressure ulcer. If possible, please document in progress notes and discharge summary the location, present on admission status and stage of the pressure ulcer: The medical record reflects the following:  Risk Factors: incontinent    Clinical Indicators:    Garcia SPRAGUE -  Buttocks: Stage 2 / Quillian Puffer /  Right buttock. 2.0cm x 1.5cm x 0.1cm / 100% pink    Treatment: Vasolex ointment: twice daily to sacrum and right buttock; Turn/reposition approximately every 2 hours    Stage 1:  Non-blanchable erythema of intact skin  Stage 2:  Abrasion, Blister, Partial-thickness skin loss, with exposed dermis  Stage 3:  Full-thickness skin loss with damage or necrosis of subcutaneous tissue  Stage 4:  Full-thickness skin & soft tissue loss through to underlying muscle, tendon or bone  Unstageable: Obscured full-thickness skin & tissue loss    Thank you,  Sury Chowdary, RN, BSN, CCDS, [facility], [facility]  Clinical Documentation  [phone] or [phone]  Options provided:  -- Stage 2 Pressure Ulcer of right buttock present on admission  -- Stage 2 Pressure Ulcer of right buttock NOT present on admission  -- Other - I will add my own diagnosis  -- Disagree - Not applicable / Not valid  -- Disagree - Clinically unable to determine / Unknown  -- Refer to Clinical Documentation Reviewer    PROVIDER RESPONSE TEXT:    This patient has a stage 2 pressure ulcer of the right buttock which was present on admission.     Query created by: Yelitza Lees on 8/3/2021 8:04 AM      Electronically signed by:  Haresh Shah MD 8/3/2021 12:41 PM

## 2021-08-03 NOTE — PROGRESS NOTES
Thoracic Surgery Simple Progress Note    Admit Date: 2021  POD: * No surgery date entered *      Procedure:  Procedure(s):  BRONCHOSCOPY  ENDOSCOPIC BRONCHOSCOPY ULTRASOUND (EBUS)  TRANSBRONCHIAL BIOPSY      Subjective:     Patient has complaints: No significant medical complaints   Left Chest tube placed yesterday 2021  Scheduled for Bronch w Dr. Bigg Powell tomorrow 2021    Review of Systems:  CARDIAC: positive for Afib w RVR  RESP: positive for pleural effusion, +tobacco abuse  NEURO:  negative  INCISION: Clean, dry, and intact  EXT: Denies new swelling or pain in the legs or calves. Objective:   CXR (2021): IMPRESSION  Overall unchanged appearance of the left hemithorax, which remains  completely opacified. The left chest tube is not well visualized due to the  degree of overlying opacification. No pneumothorax. Blood pressure (!) 146/66, pulse 71, temperature 97.8 °F (36.6 °C), resp. rate 19, height 5' 7\" (1.702 m), weight 197 lb (89.4 kg), SpO2 93 %. Temp (24hrs), Av.1 °F (36.7 °C), Min:97.7 °F (36.5 °C), Max:98.5 °F (36.9 °C)        Hemodynamics    PAP    CO    CI    No intake/output data recorded.  1901 -  0700  In: 9042 [P.O.:240; I.V.:4096]  Out: 3740 [Urine:1750]    EXAM:  GENERAL: VSS, afrible, alert and cooperative  HEART:  regular rate and rhythm  LUNG: diminished breath sounds L base, O2 sats 93% on 3L via NC. Left chest tube w >2L/24 hrs output. CXR reviewed. NEURO:  normal without focal findings  mental status, speech normal, A&O x3  INCISION: Clean, dry, and intact  EXTREMITIES:Dec bilat LE edema  GI/: Abd soft, nontender with +BM.  Voiding    Labs:  Recent Results (from the past 24 hour(s))   ECHO ADULT COMPLETE    Collection Time: 21 10:58 AM   Result Value Ref Range    LVOT d 2.04 cm    LVOT Peak Gradient 3.91 mmHg    LVOT SV 84.9 mL    LVOT Peak Velocity 98.82 cm/s    LVOT VTI 25.99 cm    RVSP 43.43 mmHg    LA Volume 44.13 22.0 - 52.0 mL    LA Area 4C 17.41 cm2    LA Vol 2C 34.59 22.00 - 52.00 mL    LA Vol 4C 46.97 22.00 - 52.00 mL    Est. RA Pressure 3.00 mmHg    Aortic Valve Area by Continuity of Peak Velocity 1.56 cm2    Aortic Valve Area by Continuity of VTI 1.82 cm2    AoV PG 17.17 mmHg    Aortic Valve Systolic Mean Gradient 72.24 mmHg    Aortic Valve Systolic Peak Velocity 714.01 cm/s    AoV VTI 46.61 cm    MV A Schuyler 122.50 cm/s    Mitral Valve E Wave Deceleration Time 185.27 ms    MV E Schuyler 110.85 cm/s    E/E' lateral 19.52     E/E' septal 24.36     LV E' Lateral Velocity 5.68 cm/s    LV E' Septal Velocity 4.55 cm/s    Mitral Valve Pressure Half-time 53.73 ms    MVA (PHT) 4.09 cm2    Pulmonic Valve Systolic Peak Instantaneous Gradient 3.35 mmHg    Pulmonic Valve Max Velocity 91.53 cm/s    Tapse 1.96 1.50 - 2.00 cm    Triscuspid Valve Regurgitation Peak Gradient 40.43 mmHg    TR Max Velocity 317.91 cm/s    Ao Root D 3.13 cm    MV E/A 0.90     E/E' ratio (averaged) 21.94     LA Vol Index 21.85 16.00 - 28.00 ml/m2    LA Vol Index 17.12 16.00 - 28.00 ml/m2    LA Vol Index 23.25 16.00 - 28.00 ml/m2    TIFFANY/BSA Pk Schuyler 0.8 cm2/m2    TIFFANY/BSA VTI 0.9 cm2/m2   PTT    Collection Time: 08/02/21 12:39 PM   Result Value Ref Range    aPTT 37.2 (H) 22.1 - 31.0 sec    aPTT, therapeutic range     58.0 - 77.0 SECS   CELL COUNT, BODY FLUID    Collection Time: 08/02/21  3:23 PM   Result Value Ref Range    BODY FLUID TYPE PLEURAL FLUID      FLUID COLOR YELLOW/STRAW      FLUID APPEARANCE HAZY      FLUID RBC CT. >100 (H) 0 /cu mm    FLUID NUCLEATED CELLS 1,222 /cu mm    FLD NEUTROPHILS 13 (A) NRRE %    FLD LYMPHS 72 (A) NRRE %    FLD MONO/MACROPHAGES 15 (A) NRRE %   GLUCOSE, FLUID    Collection Time: 08/02/21  3:23 PM   Result Value Ref Range    Fluid Type: PLEURAL FLUID      Glucose, body fld. 115 MG/DL   LDH, BODY FLUID    Collection Time: 08/02/21  3:23 PM   Result Value Ref Range    Fluid Type: PLEURAL FLUID      LD, body fld.  153 U/L   PH, FLUID    Collection Time: 08/02/21 3:23 PM   Result Value Ref Range    FLUID TYPE(15) PLEURAL FLUID      FLUID PH 8.0     PROTEIN TOTAL, FLUID    Collection Time: 08/02/21  3:23 PM   Result Value Ref Range    Fluid Type: PLEURAL FLUID      Protein total, body fld. 2.8 g/dL   CULTURE, BODY FLUID W GRAM STAIN    Collection Time: 08/02/21  3:23 PM    Specimen: Pleural Fluid   Result Value Ref Range    Special Requests: NO SPECIAL REQUESTS      GRAM STAIN OCCASIONAL WBCS SEEN      GRAM STAIN NO ORGANISMS SEEN      Culture result: PENDING    SAMPLES BEING HELD    Collection Time: 08/02/21  3:23 PM   Result Value Ref Range    SAMPLES BEING HELD 1 ANAEROBIC VACUTAINER     COMMENT        Add-on orders for these samples will be processed based on acceptable specimen integrity and analyte stability, which may vary by analyte. PTT    Collection Time: 08/02/21 11:06 PM   Result Value Ref Range    aPTT 59.7 (H) 22.1 - 31.0 sec    aPTT, therapeutic range     58.0 - 77.0 SECS   PTT    Collection Time: 08/03/21  5:14 AM   Result Value Ref Range    aPTT PLEASE DISREGARD RESULTS 22.1 - 31.0 sec    aPTT, therapeutic range     58.0 - 77.0 SECS   MAGNESIUM    Collection Time: 08/03/21  5:14 AM   Result Value Ref Range    Magnesium PLEASE DISREGARD RESULTS 1.6 - 2.4 mg/dL   PHOSPHORUS    Collection Time: 08/03/21  5:14 AM   Result Value Ref Range    Phosphorus PLEASE DISREGARD RESULTS 2.6 - 4.7 MG/DL   VANCOMYCIN, RANDOM    Collection Time: 08/03/21  5:14 AM   Result Value Ref Range    Vancomycin, random PLEASE DISREGARD RESULTS UG/ML       Assessment:   No evidence of DVT.   Active Problems:    Atrial fibrillation with rapid ventricular response (HCC) (7/29/2021)      Pleural fluid Cystology--pending  Plan/Recommendations:   No Thoracic Surgery intervention indicated at this time  CT to suction & record output  Agree w Bronch  Daily CXR  Rest of care per Primary & Pulmonary Teams    Corie, Owen Panda  8/3/2021

## 2021-08-03 NOTE — PROGRESS NOTES
0930 Amiodarione gtt stopped per verbal order from Zoe, [address],[place] Pt experiencing nausea/vomiting with thick mucoid blood tinged sputum. Juvencio called into room. Pt c/o \"coughing up a lot of blood\". Two quarter size amounts of think mucoid secretions observed in tissue. Torie Meeks MD paged. Orders to hold heparin and contact cards and pulm. Tyrese Zhang MD paged. Amilcar Hardy MD thrice without callback. 1200 Verbal orders to pause heparin drip for 4 hours d/t bloody sputum. Will continue to monitor. 1400 Orders placed by FOREST Alvarez to stop amio gtt after giving PO amio. FOREST Alvarez informed that this RN was told to stop amio gtt this morning by MD Zoe. Per FOREST Alvarez OK not to restart amio gtt and give PO amio. Will continue to monitor. Everardo Dumont MD returned call about bloody sputum. RN to discontinue heparin drip as it is \"not essential\". Torie Meeks MD and FOREST Alvarez informed. 1700 Patient's daughter at bedside and updated on patient's status. MD Nerissa at bedside to speak with patient's daughter. Per daughter patient will live with her after discharge. Problem: Breathing Pattern - Ineffective  Goal: *Absence of hypoxia  Outcome: Progressing Towards Goal  Goal: *Use of effective breathing techniques  Outcome: Progressing Towards Goal  Goal: *PALLIATIVE CARE:  Alleviation of Dyspnea  Outcome: Progressing Towards Goal     Problem: Pressure Injury - Risk of  Goal: *Prevention of pressure injury  Description: Document Ferdinand Scale and appropriate interventions in the flowsheet.   Outcome: Progressing Towards Goal  Note: Pressure Injury Interventions:  Sensory Interventions: Assess changes in LOC, Assess need for specialty bed, Avoid rigorous massage over bony prominences    Moisture Interventions: Absorbent underpads, Apply protective barrier, creams and emollients, Assess need for specialty bed    Activity Interventions: Assess need for specialty bed, Chair cushion, Increase time out of bed    Mobility Interventions: Assess need for specialty bed, Chair cushion, Float heels    Nutrition Interventions: Document food/fluid/supplement intake, Discuss nutritional consult with provider, Offer support with meals,snacks and hydration    Friction and Shear Interventions: Apply protective barrier, creams and emollients, Feet elevated on foot rest, Foam dressings/transparent film/skin sealants

## 2021-08-03 NOTE — PROGRESS NOTES
Clinical Pharmacy Note: Stress Ulcer Prophylaxis Protocol (Non-ICU patients)    Please note that stress ulcer prophylaxis is not indicated for patients outside of the ICU. Burgess Wakefield has an order for famotidine that has been ordered with an indication of stress ulcer prophylaxis.   No other indication for this medication has been noted in the patients chart and therefore it has been discontinued per the [facility] Stress Ulcer Prophylaxis Protocol for eligible patients. Please call with any questions.

## 2021-08-03 NOTE — PROGRESS NOTES
1930: Bedside shift change report given to [id number] Kymberly Amador (oncoming nurse) by Eugenia Sánchez RN (offgoing nurse). Report included the following information SBAR, Kardex, [facility] and Cardiac Rhythm NSR.     2120: Pt still in pain after receiving pain medicine. Marina Kirk NP. Received orders for a one time dose of 5mg bob. 0730: Bedside shift change report given to 407 OSMANY Connell (oncoming nurse) by Akin Ndiaye RN (offgoing nurse). Report included the following information SBAR, Kardex, MAR and Cardiac Rhythm NSR.

## 2021-08-04 NOTE — PROGRESS NOTES
[facility]            Heart and Vascular [place]               Division of Cardiology  [phone]                       Progress note                                                   NAME:  Delmy Neri   :   1940   MRN:   [id number]         ICD-10-CM ICD-9-CM    1. Pleural effusion on left  J90 511.9    2. Acute respiratory failure with hypoxia (HCC)  J96.01 518.81    3. Atrial fibrillation with rapid ventricular response (HCC)  I48.91 427.31    4. Nonsustained ventricular tachycardia (HCC)  I47.2 427.1    5. Sepsis, due to unspecified organism, unspecified whether acute organ dysfunction present ([facility]  A41.9 038.9      995.91                  Palpitations       80years old female with a past medical history remarkable for hypertension, hyperlipidemia, tobacco abuse, peripheral vascular disease, CVA, dementia who presented on account of worsening shortness of breath and hemoptysis.     Chest CT obtained as an outpatient revealed a total collapse of the left lung and a large pleural effusion and mediastinal lymphadenopathy.     Cardiology was consulted on account of atrial fibrillation with rapid ventricular response.     She still smokes 2 packs cigarettes a day. She moved from [place] to [place] to live with her daughter      Today, laying flat resting comfortably in NAD  She says she feels better at this point time in no acute distress. NSR on tele and stable. Assessment/Plan:     1. Atrial fibrillation: The patient remains at this time in normal sinus rhythm.  -No afib noted on tele review but ?1 limited/brief episode of possible PAFL  -Currently on po diet but will be NPO again tomorrow for EBUS  - amiodarone 400 mg daily po  -holding heparin for bleeding  -Echo with preserved EF, mild TR        21    ECHO ADULT COMPLETE 2021    Interpretation Summary  · LV: Estimated LVEF is 60 - 65%. Visually measured ejection fraction.  Normal cavity size and systolic function (ejection fraction normal). Mild concentric hypertrophy. · Pericardium: Trivial pericardial effusion. · TV: Mild tricuspid valve regurgitation is present. Right Ventricular Arterial Pressure (RVSP) is 40 mmHg. Pulmonary hypertension not suggested by Doppler findings. · MV: Mitral annular calcification. Signed by: Florentin Carroll MD on 8/2/2021  3:17 PM        -TSH 3.12  Atrial fibrillation likely brought on by underlying significant pulmonary issues. 2.  Left pleural effusion: Status post thoracentesis on Friday with removal 1.5 L but follow-up chest CT reveals a reaccumulation of the effusion. There is a clear concern about malignancy and primary team and pulmonologist are closely following. Pulmonary is planning bronchoscopy and EBUS tomorrow afternoon. 3. GABINO; creatinine 1.92.  -Hold IV lasix    4. HTN  -BP borderline  -On amiodarone. CARDIAC STUDIES      07/29/21    ECHO ADULT COMPLETE 08/02/2021 8/2/2021    Interpretation Summary  · LV: Estimated LVEF is 60 - 65%. Visually measured ejection fraction. Normal cavity size and systolic function (ejection fraction normal). Mild concentric hypertrophy. · Pericardium: Trivial pericardial effusion. · TV: Mild tricuspid valve regurgitation is present. Right Ventricular Arterial Pressure (RVSP) is 40 mmHg. Pulmonary hypertension not suggested by Doppler findings. · MV: Mitral annular calcification. Signed by: Florentin Carroll MD on 8/2/2021  3:17 PM                       IMAGING      CT Results (most recent):  Results from [place] encounter on 07/29/21    CT CHEST WO CONT    Narrative  INDICATION: Status post left thoracentesis. Evaluate for underlying malignancy. COMPARISON: 7/29/2021    CONTRAST: None. TECHNIQUE:  5 mm axial images were obtained through the chest. Coronal and  sagittal reformats were generated.   CT dose reduction was achieved through use  of a standardized protocol tailored for this examination and automatic exposure  control for dose modulation. The absence of intravenous contrast reduces the sensitivity for evaluation of  the mediastinum, jay jay, vasculature, and upper abdominal organs. FINDINGS:    CHEST WALL: No mass or axillary lymphadenopathy. THYROID: No nodule. MEDIASTINUM: There is an unchanged 3.1 cm left periaortic lymph node. The left  hilum and mediastinum appear confluently consolidated likely related to  confluent adenopathy or mediastinal/hilar invasion  JAY JAY: No mass or lymphadenopathy. THORACIC AORTA: No aneurysm. MAIN PULMONARY ARTERY: Normal in caliber. TRACHEA/BRONCHI: There is an abrupt cut off of the left main stem bronchus. ESOPHAGUS: No wall thickening or dilatation. HEART: Normal in size. PLEURA: There is a large left pleural effusion. This is relatively unchanged. There is a trace right pleural effusion which is new. LUNGS: There is complete consolidation/collapse of the left lung. There is no  aeration of the left lung or bronchi. INCIDENTALLY IMAGED UPPER ABDOMEN: No significant abnormality in the  incidentally imaged upper abdomen. BONES: No destructive bone lesion. 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 There are bilateral breast implants. The right breast implant appears smaller  than the left with areas of nodular soft tissue density surrounding it. This is  likely related to chronic rupture. Impression  1. Unchanged large left pleural effusion which has likely reaccumulated since  the prior thoracentesis. 2. Unchanged dense consolidation and collapse of the left lung with an abrupt  cut off of the left mainstem bronchus. The right hilum and right side of the  mediastinum appears consolidated which may be related to invasion and/or  confluent adenopathy. A large lymph node is seen in the left para-aortic space  that is unchanged.  Findings are highly suspicious for malignancy. 3. New trace right pleural effusion. XR Results (most recent):  Results from Hospital Encounter encounter on 07/29/21    XR CHEST PORT    Narrative  EXAM: XR CHEST PORT    INDICATION: post chest tube placement    COMPARISON: Chest x-ray July 30, 2021, CT chest 5/31/2021    FINDINGS: A portable AP radiograph of the chest was obtained at 1553 hours. Chest tube at the left lung base is only partially visualized, due to poor  penetration. Overall unchanged appearance compared to prior chest x-ray, with  unchanged complete opacification of the left hemithorax. Streaky opacification at the right lung base compatible with subsegmental  atelectasis. No pneumothorax. Impression  Overall unchanged appearance of the left hemithorax, which remains  completely opacified. The left chest tube is not well visualized due to the  degree of overlying opacification. No pneumothorax. MRI Results (most recent):  No results found for this or any previous visit. Past Medical History:   Diagnosis Date    Cellulitis     R LEG    CVA (cerebral vascular accident) ([facility]     Hyperlipidemia     Hypertension            Past Surgical History:   Procedure Laterality Date    HX BLADDER SUSPENSION  01/2021    HX CHOLECYSTECTOMY      HX OTHER SURGICAL  10/2020    PARACENTESIS               Visit Vitals  BP (!) 145/51   Pulse 73   Temp 97.9 °F (36.6 °C)   Resp 22   Ht 5' 7\" (1.702 m)   Wt 216 lb 4.3 oz (98.1 kg)   SpO2 100%   BMI 33.87 kg/m²         Wt Readings from Last 3 Encounters:   08/04/21 216 lb 4.3 oz (98.1 kg)   07/29/21 199 lb 15.3 oz (90.7 kg)   06/09/21 202 lb (91.6 kg)         Review of Systems:   Pertinent items are noted in the History of Present Illness. No intake/output data recorded. 08/02 1901 - 08/04 0700  In: 2344 [P.O.:400;  I.V.:1944]  Out: 2915 [Urine:850]      Telemetry: normal sinus rhythm    Physical Exam:    Neck: supple, no JVD  Heart: RRR, grade II/VI systolic murmur best at LUSB  Lungs: diminished breath sound L  Abdomen: soft, nontender, bowel sounds present  Extremities: no edema      Current Facility-Administered Medications   Medication Dose Route Frequency    hydrALAZINE (APRESOLINE) 20 mg/mL injection 10 mg  10 mg IntraVENous Q6H PRN    amiodarone (CORDARONE) tablet 400 mg  400 mg Oral DAILY    0.9% sodium chloride infusion  75 mL/hr IntraVENous CONTINUOUS    balsam peru-castor oiL (VENELEX) ointment   Topical BID    zinc oxide-cod liver oil (DESITIN) 40 % paste   Topical BID    albuterol-ipratropium (DUO-NEB) 2.5 MG-0.5 MG/3 ML  3 mL Nebulization Q6H PRN    miconazole (MICOTIN) 2 % powder   Topical BID    amLODIPine (NORVASC) tablet 5 mg  5 mg Oral DAILY    aspirin delayed-release tablet 81 mg  81 mg Oral DAILY    atorvastatin (LIPITOR) tablet 10 mg  10 mg Oral QHS    [Held by provider] furosemide (LASIX) injection 20 mg  20 mg IntraVENous BID    oxyCODONE IR (ROXICODONE) tablet 5 mg  5 mg Oral Q6H PRN    sodium chloride (NS) flush 5-40 mL  5-40 mL IntraVENous Q8H    sodium chloride (NS) flush 5-40 mL  5-40 mL IntraVENous PRN    acetaminophen (TYLENOL) tablet 650 mg  650 mg Oral Q6H PRN    Or    acetaminophen (TYLENOL) suppository 650 mg  650 mg Rectal Q6H PRN    polyethylene glycol (MIRALAX) packet 17 g  17 g Oral DAILY PRN    ondansetron (ZOFRAN ODT) tablet 4 mg  4 mg Oral Q8H PRN    Or    ondansetron (ZOFRAN) injection 4 mg  4 mg IntraVENous Q6H PRN    nicotine (NICODERM CQ) 21 mg/24 hr patch 1 Patch  1 Patch TransDERmal DAILY PRN         All Cardiac Markers in the last 24 hours:  No results found for: CPK, CK, CKMMB, CKMB, RCK3, CKMBT, CKMBPOC, CKNDX, CKND1, ASHER, TROPT, TROIQ, JAKE, TROPT, TNIPOC, BNP, BNPP, BNPNT     Lab Results   Component Value Date/Time    Creatinine 2.45 (H) 08/04/2021 03:26 AM          Lab Results   Component Value Date/Time    CK 61 07/29/2021 11:00 PM    Troponin-I, Qt. <0.05 07/30/2021 03:50 AM         Lab Results Component Value Date/Time    WBC 7.9 08/04/2021 03:26 AM    HGB 10.7 (L) 08/04/2021 03:26 AM    HCT 34.2 (L) 08/04/2021 03:26 AM    PLATELET 193 11/72/1431 03:26 AM    MCV 91.9 08/04/2021 03:26 AM         Lab Results   Component Value Date/Time    Sodium 135 (L) 08/04/2021 03:26 AM    Potassium 3.7 08/04/2021 03:26 AM    Chloride 95 (L) 08/04/2021 03:26 AM    CO2 33 (H) 08/04/2021 03:26 AM    Anion gap 7 08/04/2021 03:26 AM    Glucose 87 08/04/2021 03:26 AM    BUN 21 (H) 08/04/2021 03:26 AM    Creatinine 2.45 (H) 08/04/2021 03:26 AM    BUN/Creatinine ratio 9 (L) 08/04/2021 03:26 AM    GFR est AA 23 (L) 08/04/2021 03:26 AM    GFR est non-AA 19 (L) 08/04/2021 03:26 AM    Calcium 8.8 08/04/2021 03:26 AM         Lab Results   Component Value Date/Time    NT pro-BNP 1,466 (H) 07/29/2021 10:58 PM         No results found for: CHOL, CHOLPOCT, CHOLX, CHLST, CHOLV, HDL, HDLPOC, HDLP, LDL, LDLCPOC, LDLC, DLDLP, VLDLC, VLDL, TGLX, TRIGL, TRIGP, TGLPOCT, CHHD, CHHDX      Lab Results   Component Value Date/Time    ALT (SGPT) 28 07/29/2021 05:27 PM    Alk.  phosphatase 107 07/29/2021 05:27 PM    Bilirubin, total 0.5 07/29/2021 05:27 PM

## 2021-08-04 NOTE — OP NOTES
[facility]  Bronchoscopy Report    Procedure: Diagnostic bronchoscopy. Indication: Abnormal chest imaging    Consent/Treatment: Informed consent was obtained from the  patient after risks, benefits and alternatives were explained. Timeout verified the correct patient and correct procedure. Anesthesia:   General anesthesia was performed by anesthesiology      Procedure Details:   -- The bronchoscope was introduced through an endotracheal tube. -- The vocal cords not seen. -- The trachea and bhargavi were completely inspected and were found to be normal.  -- The right-sided endobronchial anatomy was completely inspected and was found to be normal.  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 Mucosa was very friable and bled easily with scope trauma. 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      Estimated Blood Loss: less than 100     Marisel Juarez MD

## 2021-08-04 NOTE — PROGRESS NOTES
Physical Therapy 8/4/2021    Chart reviewed up to date. Pt ANA LILIA at [facility]. Will follow-up as appropriate. Recommend with nursing patient to complete as able in order to maintain strength, endurance and independence: OOB to chair 3x/day with 1 assist x RW. Thank you.   Rito Pepe, PT, DPT

## 2021-08-04 NOTE — PROGRESS NOTES
Thoracic Surgery Simple Progress Note    Admit Date: 2021  POD: * No surgery date entered *      Procedure:  Procedure(s):  ENDOSCOPIC BRONCHOSCOPY ULTRASOUND (EBUS)  TRANSBRONCHIAL BIOPSY  BRONCHOSCOPY    2021--Left Chest tube placed by IR  Subjective:     Patient has complaints: No significant medical complaints   Resting on AM rounds--did not awaken    Review of Systems:  CARDIAC: positive for Afib w RVR  RESP: positive for pleural effusion, +tobacco abuse  NEURO:  negative  INCISION: Clean, dry, and intact  EXT: Denies new swelling or pain in the legs or calves. Objective:     Blood pressure (!) 175/65, pulse 70, temperature 97.9 °F (36.6 °C), resp. rate 22, height 5' 7\" (1.702 m), weight 216 lb 4.3 oz (98.1 kg), SpO2 100 %. Temp (24hrs), Av.1 °F (36.7 °C), Min:97.9 °F (36.6 °C), Max:98.3 °F (36.8 °C)        Hemodynamics    PAP    CO    CI    No intake/output data recorded.  1901 -  0700  In: 2344 [P.O.:400; I.V.:1944]  Out: 2915 [Urine:850]    EXAM:  GENERAL: VSS, afrible, alert and cooperative  HEART:  regular rate and rhythm  LUNG: diminished breath sounds L base, O2 sats 94% on 3L via NC. Left chest tube w >2L output. NEURO:  normal without focal findings  mental status, speech normal, A&O x3  INCISION: Clean, dry, and intact  EXTREMITIES:Dec bilat LE edema  GI/: Abd soft, nontender with +BM.  Voiding    Labs:  Recent Results (from the past 24 hour(s))   PTT    Collection Time: 21  9:56 AM   Result Value Ref Range    aPTT 75.0 (H) 22.1 - 31.0 sec    aPTT, therapeutic range     58.0 - 77.0 SECS   CBC WITH AUTOMATED DIFF    Collection Time: 21  9:56 AM   Result Value Ref Range    WBC 10.2 3.6 - 11.0 K/uL    RBC 4.08 3.80 - 5.20 M/uL    HGB 11.7 11.5 - 16.0 g/dL    HCT 36.9 35.0 - 47.0 %    MCV 90.4 80.0 - 99.0 FL    MCH 28.7 26.0 - 34.0 PG    MCHC 31.7 30.0 - 36.5 g/dL    RDW 13.8 11.5 - 14.5 %    PLATELET 303 855 - 000 K/uL    MPV 9.2 8.9 - 12.9 FL    NRBC 0.0 0 PER 100 WBC    ABSOLUTE NRBC 0.00 0.00 - 0.01 K/uL    NEUTROPHILS 80 (H) 32 - 75 %    LYMPHOCYTES 7 (L) 12 - 49 %    MONOCYTES 11 5 - 13 %    EOSINOPHILS 1 0 - 7 %    BASOPHILS 0 0 - 1 %    IMMATURE GRANULOCYTES 1 (H) 0.0 - 0.5 %    ABS. NEUTROPHILS 8.2 (H) 1.8 - 8.0 K/UL    ABS. LYMPHOCYTES 0.7 (L) 0.8 - 3.5 K/UL    ABS. MONOCYTES 1.1 (H) 0.0 - 1.0 K/UL    ABS. EOSINOPHILS 0.1 0.0 - 0.4 K/UL    ABS. BASOPHILS 0.0 0.0 - 0.1 K/UL    ABS. IMM.  GRANS. 0.1 (H) 0.00 - 0.04 K/UL    DF SMEAR SCANNED      RBC COMMENTS NORMOCYTIC, NORMOCHROMIC     METABOLIC PANEL, BASIC    Collection Time: 08/03/21  9:56 AM   Result Value Ref Range    Sodium 132 (L) 136 - 145 mmol/L    Potassium 3.8 3.5 - 5.1 mmol/L    Chloride 92 (L) 97 - 108 mmol/L    CO2 35 (H) 21 - 32 mmol/L    Anion gap 5 5 - 15 mmol/L    Glucose 103 (H) 65 - 100 mg/dL    BUN 15 6 - 20 MG/DL    Creatinine 1.92 (H) 0.55 - 1.02 MG/DL    BUN/Creatinine ratio 8 (L) 12 - 20      GFR est AA 31 (L) >60 ml/min/1.73m2    GFR est non-AA 25 (L) >60 ml/min/1.73m2    Calcium 9.0 8.5 - 10.1 MG/DL   MAGNESIUM    Collection Time: 08/03/21  9:56 AM   Result Value Ref Range    Magnesium 2.1 1.6 - 2.4 mg/dL   COVID-19 RAPID TEST    Collection Time: 08/04/21  3:24 AM   Result Value Ref Range    Specimen source Nasopharyngeal      COVID-19 rapid test Not detected NOTD     PTT    Collection Time: 08/04/21  3:26 AM   Result Value Ref Range    aPTT 28.6 22.1 - 31.0 sec    aPTT, therapeutic range     58.0 - 77.0 SECS   MAGNESIUM    Collection Time: 08/04/21  3:26 AM   Result Value Ref Range    Magnesium 2.1 1.6 - 2.4 mg/dL   PHOSPHORUS    Collection Time: 08/04/21  3:26 AM   Result Value Ref Range    Phosphorus 5.6 (H) 2.6 - 4.7 MG/DL   METABOLIC PANEL, BASIC    Collection Time: 08/04/21  3:26 AM   Result Value Ref Range    Sodium 135 (L) 136 - 145 mmol/L    Potassium 3.7 3.5 - 5.1 mmol/L    Chloride 95 (L) 97 - 108 mmol/L    CO2 33 (H) 21 - 32 mmol/L    Anion gap 7 5 - 15 mmol/L    Glucose 87 65 - 100 mg/dL    BUN 21 (H) 6 - 20 MG/DL    Creatinine 2.45 (H) 0.55 - 1.02 MG/DL    BUN/Creatinine ratio 9 (L) 12 - 20      GFR est AA 23 (L) >60 ml/min/1.73m2    GFR est non-AA 19 (L) >60 ml/min/1.73m2    Calcium 8.8 8.5 - 10.1 MG/DL   CBC WITH AUTOMATED DIFF    Collection Time: 08/04/21  3:26 AM   Result Value Ref Range    WBC 7.9 3.6 - 11.0 K/uL    RBC 3.72 (L) 3.80 - 5.20 M/uL    HGB 10.7 (L) 11.5 - 16.0 g/dL    HCT 34.2 (L) 35.0 - 47.0 %    MCV 91.9 80.0 - 99.0 FL    MCH 28.8 26.0 - 34.0 PG    MCHC 31.3 30.0 - 36.5 g/dL    RDW 13.8 11.5 - 14.5 %    PLATELET 136 368 - 920 K/uL    MPV 9.4 8.9 - 12.9 FL    NRBC 0.0 0  WBC    ABSOLUTE NRBC 0.00 0.00 - 0.01 K/uL    NEUTROPHILS 76 (H) 32 - 75 %    LYMPHOCYTES 8 (L) 12 - 49 %    MONOCYTES 12 5 - 13 %    EOSINOPHILS 3 0 - 7 %    BASOPHILS 0 0 - 1 %    IMMATURE GRANULOCYTES 1 (H) 0.0 - 0.5 %    ABS. NEUTROPHILS 6.1 1.8 - 8.0 K/UL    ABS. LYMPHOCYTES 0.6 (L) 0.8 - 3.5 K/UL    ABS. MONOCYTES 0.9 0.0 - 1.0 K/UL    ABS. EOSINOPHILS 0.2 0.0 - 0.4 K/UL    ABS. BASOPHILS 0.0 0.0 - 0.1 K/UL    ABS. IMM. GRANS. 0.1 (H) 0.00 - 0.04 K/UL    DF SMEAR SCANNED      PLATELET COMMENTS CLUMPED PLATELETS      RBC COMMENTS NORMOCYTIC, NORMOCHROMIC     VANCOMYCIN, RANDOM    Collection Time: 08/04/21  3:26 AM   Result Value Ref Range    Vancomycin, random 28.5 UG/ML       Assessment:   No evidence of DVT. Active Problems:    Atrial fibrillation with rapid ventricular response (HCC) (7/29/2021)      Pleural effusion, left (8/3/2021)       Pleural fluid Cystology--    CYTOLOGIC INTERPRETATION:   Abundant mature lymphocytes. Immunophenotyping by flow cytometry pending. Plan/Recommendations:     Bronch/EBUS today.    No Thoracic Surgery intervention indicated at this time  CT to suction & record output  Rest of care per Primary & Pulmonary Teams  See orders    Enrique De Luna, [address]  8/4/2021

## 2021-08-04 NOTE — PROGRESS NOTES
[facility]   [address] [facility], [address] [place] [address], [facility]  Phone: [phone]   [phone]       Nephrology Progress Note  Shira Madrid     1940     [id number]  Date of Admission : 7/29/2021 08/04/21    CC: Follow up for ARF    Assessment and Plan   GABINO :  - possible ATN / ATIN--> from pre renal state + /- vanc/ zosyn toxicity   - Cr continues to rise, non oliguric. Voided overnight but unable to measure w/ purewick  - renal US : unremarkable   - continue to hold diuretics   - continue NS at 75 cc/ hr   - avoid all potential nephrotoxins and may need to hold vanc for now     Acute Resp failure   Large Left Effusion / transudative : s/p CT   Mediastinal adenopathy   PNA   Smoker   - On abx     HTN   - controlled     Hypo K/ Hypo Mg : better        Interval History:  Seen and examined. She is anxious to get OOB. Denies any cough/ SOB, has Chest tube and overnight voided but purewick unable to catch     Review of Systems: A comprehensive review of systems was negative except for that written in the HPI.     Current Medications:   Current Facility-Administered Medications   Medication Dose Route Frequency    amiodarone (CORDARONE) tablet 400 mg  400 mg Oral DAILY    0.9% sodium chloride infusion  75 mL/hr IntraVENous CONTINUOUS    balsam peru-castor oiL (VENELEX) ointment   Topical BID    zinc oxide-cod liver oil (DESITIN) 40 % paste   Topical BID    albuterol-ipratropium (DUO-NEB) 2.5 MG-0.5 MG/3 ML  3 mL Nebulization Q6H PRN    miconazole (MICOTIN) 2 % powder   Topical BID    piperacillin-tazobactam (ZOSYN) 3.375 g in 0.9% sodium chloride (MBP/ADV) 100 mL MBP  3.375 g IntraVENous Q8H    amLODIPine (NORVASC) tablet 5 mg  5 mg Oral DAILY    aspirin delayed-release tablet 81 mg  81 mg Oral DAILY    atorvastatin (LIPITOR) tablet 10 mg  10 mg Oral QHS    [Held by provider] furosemide (LASIX) injection 20 mg  20 mg IntraVENous BID    oxyCODONE IR (ROXICODONE) tablet 5 mg  5 mg Oral Q6H PRN    vancomycin - pharmacy to dose    Other Rx Dosing/Monitoring    sodium chloride (NS) flush 5-40 mL  5-40 mL IntraVENous Q8H    sodium chloride (NS) flush 5-40 mL  5-40 mL IntraVENous PRN    acetaminophen (TYLENOL) tablet 650 mg  650 mg Oral Q6H PRN    Or    acetaminophen (TYLENOL) suppository 650 mg  650 mg Rectal Q6H PRN    polyethylene glycol (MIRALAX) packet 17 g  17 g Oral DAILY PRN    ondansetron (ZOFRAN ODT) tablet 4 mg  4 mg Oral Q8H PRN    Or    ondansetron (ZOFRAN) injection 4 mg  4 mg IntraVENous Q6H PRN    nicotine (NICODERM CQ) 21 mg/24 hr patch 1 Patch  1 Patch TransDERmal DAILY PRN      No Known Allergies    Objective:  Vitals:    Vitals:    08/03/21 1926 08/03/21 2326 08/04/21 0316 08/04/21 0326   BP: (!) 141/57 (!) 158/57 (!) 125/50    Pulse: 77 78 67    Resp: 22 20 14    Temp: 97.9 °F (36.6 °C) 98.3 °F (36.8 °C) 98.1 °F (36.7 °C)    SpO2: 98% 96% 94%    Weight:    98.1 kg (216 lb 4.3 oz)   Height:         Intake and Output:  No intake/output data recorded. 08/02 1901 - 08/04 0700  In: 2344 [P.O.:400; I.V.:1944]  Out: 2915 [Urine:850]    Physical Examination:  General:  Ill looking   HEENT: PERRL,+ Pallor , No Icterus  Neck: Supple,no mass palpable  Lungs : l > R rales +  CVS: IRREGULAR, S1 S2 normal, No murmur   Abdomen: Soft, NT, BS +  Extremities: TRACE Edema  Skin: No rash or lesions. MS: No joint swelling, erythema, warmth  Neurologic: non focal, AAO x 3  Psych: normal affect       []    High complexity decision making was performed  []    Patient is at high-risk of decompensation with multiple organ involvement    Lab Data Personally Reviewed: I have reviewed all the pertinent labs, microbiology data and radiology studies during assessment.     Recent Labs     08/04/21  0326 08/03/21  0956 08/03/21  0514 08/02/21  0556   * 132*  --  132*   K 3.7 3.8  --  3.2*   CL 95* 92*  --  92*   CO2 33* 35*  --  38*   GLU 87 103*  --  118*   BUN 21* 15  --  7   CREA 2.45* 1.92*  --  0.64   CA 8.8 9.0  --  8.5   MG 2.1 2.1 PLEASE DISREGARD RESULTS 1.5*   PHOS 5.6*  --  PLEASE DISREGARD RESULTS 3.4     Recent Labs     08/04/21  0326 08/03/21  0956   WBC 7.9 10.2   HGB 10.7* 11.7   HCT 34.2* 36.9    376     No results found for: SDES  Lab Results   Component Value Date/Time    Culture result: Culture performed on Fluid swab specimen 08/02/2021 03:23 PM    Culture result: NO GROWTH THUS FAR 08/02/2021 03:23 PM    Culture result:  08/01/2021 05:06 AM     MRSA NOT PRESENT. Apparent Staphylococus aureus (not MRSA noted). Culture result:  08/01/2021 05:06 AM     Screening of patient nares for MRSA is for surveillance purposes and, if positive, to facilitate isolation considerations in high risk settings. It is not intended for automatic decolonization interventions per se as regimens are not sufficiently effective to warrant routine use. Culture result: NO GROWTH 4 DAYS 07/30/2021 03:00 PM    Culture result: NO FUNGUS ISOLATED 3 DAYS 07/30/2021 03:00 PM     Recent Results (from the past 24 hour(s))   PTT    Collection Time: 08/03/21  9:56 AM   Result Value Ref Range    aPTT 75.0 (H) 22.1 - 31.0 sec    aPTT, therapeutic range     58.0 - 77.0 SECS   CBC WITH AUTOMATED DIFF    Collection Time: 08/03/21  9:56 AM   Result Value Ref Range    WBC 10.2 3.6 - 11.0 K/uL    RBC 4.08 3.80 - 5.20 M/uL    HGB 11.7 11.5 - 16.0 g/dL    HCT 36.9 35.0 - 47.0 %    MCV 90.4 80.0 - 99.0 FL    MCH 28.7 26.0 - 34.0 PG    MCHC 31.7 30.0 - 36.5 g/dL    RDW 13.8 11.5 - 14.5 %    PLATELET 638 715 - 054 K/uL    MPV 9.2 8.9 - 12.9 FL    NRBC 0.0 0  WBC    ABSOLUTE NRBC 0.00 0.00 - 0.01 K/uL    NEUTROPHILS 80 (H) 32 - 75 %    LYMPHOCYTES 7 (L) 12 - 49 %    MONOCYTES 11 5 - 13 %    EOSINOPHILS 1 0 - 7 %    BASOPHILS 0 0 - 1 %    IMMATURE GRANULOCYTES 1 (H) 0.0 - 0.5 %    ABS. NEUTROPHILS 8.2 (H) 1.8 - 8.0 K/UL    ABS. LYMPHOCYTES 0.7 (L) 0.8 - 3.5 K/UL    ABS. MONOCYTES 1.1 (H) 0.0 - 1.0 K/UL    ABS. EOSINOPHILS 0.1 0.0 - 0.4 K/UL    ABS. BASOPHILS 0.0 0.0 - 0.1 K/UL    ABS. IMM.  GRANS. 0.1 (H) 0.00 - 0.04 K/UL    DF SMEAR SCANNED      RBC COMMENTS NORMOCYTIC, NORMOCHROMIC     METABOLIC PANEL, BASIC    Collection Time: 08/03/21  9:56 AM   Result Value Ref Range    Sodium 132 (L) 136 - 145 mmol/L    Potassium 3.8 3.5 - 5.1 mmol/L    Chloride 92 (L) 97 - 108 mmol/L    CO2 35 (H) 21 - 32 mmol/L    Anion gap 5 5 - 15 mmol/L    Glucose 103 (H) 65 - 100 mg/dL    BUN 15 6 - 20 MG/DL    Creatinine 1.92 (H) 0.55 - 1.02 MG/DL    BUN/Creatinine ratio 8 (L) 12 - 20      GFR est AA 31 (L) >60 ml/min/1.73m2    GFR est non-AA 25 (L) >60 ml/min/1.73m2    Calcium 9.0 8.5 - 10.1 MG/DL   MAGNESIUM    Collection Time: 08/03/21  9:56 AM   Result Value Ref Range    Magnesium 2.1 1.6 - 2.4 mg/dL   COVID-19 RAPID TEST    Collection Time: 08/04/21  3:24 AM   Result Value Ref Range    Specimen source Nasopharyngeal      COVID-19 rapid test Not detected NOTD     PTT    Collection Time: 08/04/21  3:26 AM   Result Value Ref Range    aPTT 28.6 22.1 - 31.0 sec    aPTT, therapeutic range     58.0 - 77.0 SECS   MAGNESIUM    Collection Time: 08/04/21  3:26 AM   Result Value Ref Range    Magnesium 2.1 1.6 - 2.4 mg/dL   PHOSPHORUS    Collection Time: 08/04/21  3:26 AM   Result Value Ref Range    Phosphorus 5.6 (H) 2.6 - 4.7 MG/DL   METABOLIC PANEL, BASIC    Collection Time: 08/04/21  3:26 AM   Result Value Ref Range    Sodium 135 (L) 136 - 145 mmol/L    Potassium 3.7 3.5 - 5.1 mmol/L    Chloride 95 (L) 97 - 108 mmol/L    CO2 33 (H) 21 - 32 mmol/L    Anion gap 7 5 - 15 mmol/L    Glucose 87 65 - 100 mg/dL    BUN 21 (H) 6 - 20 MG/DL    Creatinine 2.45 (H) 0.55 - 1.02 MG/DL    BUN/Creatinine ratio 9 (L) 12 - 20      GFR est AA 23 (L) >60 ml/min/1.73m2    GFR est non-AA 19 (L) >60 ml/min/1.73m2    Calcium 8.8 8.5 - 10.1 MG/DL   CBC WITH AUTOMATED DIFF    Collection Time: 08/04/21  3:26 AM   Result Value Ref Range    WBC 7.9 3.6 - 11.0 K/uL    RBC 3.72 (L) 3.80 - 5.20 M/uL    HGB 10.7 (L) 11.5 - 16.0 g/dL    HCT 34.2 (L) 35.0 - 47.0 %    MCV 91.9 80.0 - 99.0 FL    MCH 28.8 26.0 - 34.0 PG    MCHC 31.3 30.0 - 36.5 g/dL    RDW 13.8 11.5 - 14.5 %    PLATELET 876 352 - 110 K/uL    MPV 9.4 8.9 - 12.9 FL    NRBC 0.0 0  WBC    ABSOLUTE NRBC 0.00 0.00 - 0.01 K/uL    NEUTROPHILS 76 (H) 32 - 75 %    LYMPHOCYTES 8 (L) 12 - 49 %    MONOCYTES 12 5 - 13 %    EOSINOPHILS 3 0 - 7 %    BASOPHILS 0 0 - 1 %    IMMATURE GRANULOCYTES 1 (H) 0.0 - 0.5 %    ABS. NEUTROPHILS 6.1 1.8 - 8.0 K/UL    ABS. LYMPHOCYTES 0.6 (L) 0.8 - 3.5 K/UL    ABS. MONOCYTES 0.9 0.0 - 1.0 K/UL    ABS. EOSINOPHILS 0.2 0.0 - 0.4 K/UL    ABS. BASOPHILS 0.0 0.0 - 0.1 K/UL    ABS. IMM. GRANS. 0.1 (H) 0.00 - 0.04 K/UL    DF SMEAR SCANNED      PLATELET COMMENTS CLUMPED PLATELETS      RBC COMMENTS NORMOCYTIC, NORMOCHROMIC     VANCOMYCIN, RANDOM    Collection Time: 08/04/21  3:26 AM   Result Value Ref Range    Vancomycin, random 28.5 UG/ML           I have reviewed the flowsheets. Chart and Pertinent Notes have been reviewed. No change in PMH ,family and social history from Consult note.       Ildefonso Lazar MD

## 2021-08-04 NOTE — PROGRESS NOTES
Chart reviewed and noted patient ANA LILIA in ENDO-PL at this time. Will follow up for OT intervention as able. Thank you.

## 2021-08-04 NOTE — ANESTHESIA PREPROCEDURE EVALUATION
Relevant Problems   RESPIRATORY SYSTEM   (+) Shortness of breath      NEUROLOGY   (+) Cerebrovascular accident (CVA) ([address]   (+) Dementia without behavioral disturbance (HCC)      CARDIOVASCULAR   (+) Atrial fibrillation with rapid ventricular response (HCC)   (+) Essential hypertension      ENDOCRINE   (+) Severe obesity (HCC)       Anesthetic History   No history of anesthetic complications            Review of Systems / Medical History  Patient summary reviewed, nursing notes reviewed and pertinent labs reviewed    Pulmonary  Within defined limits                 Neuro/Psych       CVA       Cardiovascular    Hypertension              Exercise tolerance: <4 METS  Comments: LVEF is 60 - 65%   GI/Hepatic/Renal  Within defined limits             Comments: Ascites ovarian CA Endo/Other        Obesity and cancer     Other Findings            Physical Exam    Airway  Mallampati: II  TM Distance: > 6 cm  Neck ROM: normal range of motion   Mouth opening: Normal     Cardiovascular  Regular rate and rhythm,  S1 and S2 normal,  no murmur, click, rub, or gallop             Dental    Dentition: Full lower dentures and Full upper dentures     Pulmonary  Breath sounds clear to auscultation               Abdominal  GI exam deferred       Other Findings            Anesthetic Plan    ASA: 3  Anesthesia type: general          Induction: Intravenous  Anesthetic plan and risks discussed with: Patient

## 2021-08-04 NOTE — PROGRESS NOTES
Transitions of Care Plan:  RUR: 13%  Clinical Plan: bronch today with pulmonary; chest tube remains  Consults: Pulmonary; Thoracic Surgery; Therapy; Cardiology  Baseline: ambulates with rolling walker; resides at daughter's home  Disposition: anticipate home with home health    Reason for Admission:  Afib w RVR                   RUR Score:          13%           Plan for utilizing home health:     Recommended - daughter agreeable; no agency preference     PCP: First and Last name:  Mickie Dominguez NP     [facility] of [facility]   Are you a current patient: Yes/No: Yes   Approximate date of last visit: 2 weeks ago   Can you participate in a virtual visit with your PCP: Yes                    Current Advanced Directive/Advance Care Plan: Full Code      Healthcare Decision Maker:  Daughter - Rashida Negus - on file  Click here to complete [address] including selection of the [address] Relationship (ie \"Primary\")                      Transition of Care Plan:                      CM spoke with patient's daughter via phone re initial assessment and disposition plan:    Baseline Assessment:  1) ADLs, self-care, orientation: ambulates with rolling walker; independent with self care; mild dementia -  Pt gets confused at times  2) Social: recently moved from [place] to [place] (Nov 2020) to live with her daughter after frequent falls. Daughter is primary caretaker  3) Pharmacy: [facility] to address on file  4) PCP and Insurance: PCP Confirmed; daughter advised patient has new insurance policy effective 3/8/99:   [employer]  ID: [id number]    Disposition Plan:  Anticipate home with home health pending progress after today's bronchoscopy. Daughter is agreeable to home health - will make choice for agency closer to anticipated discharge. Orders will be needed. CM will continue to follow.     Aga Stevens, MPH  Care Manager [facility]  Available via iWarda or  [phone]    Care Management Interventions  PCP Verified by CM: Yes  Palliative Care Criteria Met (RRAT>21 & CHF Dx)?: No  Mode of Transport at Discharge:  Other (see comment) (Daughter)  Transition of Care Consult (CM Consult): Discharge Planning  MyChart Signup: Yes  Discharge Durable Medical Equipment: No  Health Maintenance Reviewed: Yes  Physical Therapy Consult: Yes  Occupational Therapy Consult: Yes  Speech Therapy Consult: No  Current Support Network: Relative's Home (Lives w daughter)  Confirm Follow Up Transport: Family  Discharge Location  Discharge Placement: Home with home health

## 2021-08-04 NOTE — PROGRESS NOTES
1930: Bedside and Verbal shift change report given to [facility], [address] [place] [address] (oncoming nurse) by Graeme uBrton RN (offgoing nurse). Report included the following information SBAR, Kardex, ED Summary, Procedure Summary, MAR, Recent Results and Cardiac Rhythm NSR;PACS;PVCS.     0730: Bedside and Verbal shift change report given to Boyd Dasilva RN (oncoming nurse) by Valerie Patel RN (offgoing nurse). Report included the following information SBAR, Kardex, ED Summary, Procedure Summary, MAR, Recent Results and Cardiac Rhythm NSR;PACS;PVCS.

## 2021-08-04 NOTE — PROGRESS NOTES
[facility] [facility]ist [facility]    Hospitalist Progress Note  Mil Valverde MD  Answering service: [phone] OR   [phone] from in house phone     Date of service: 2021   Name: Bob Houston  : 1940  MRN: [id number]  PCP: Dr. Micah Padilla NP     Brief HPI and Hospital Course:       65-year-old female with a history of heavy tobacco use at least 2 packs a day since 13to 12years of age, hyperlipidemia, hypertension presented to the ED from [facility]  with reports of worsening shortness of breath over the last several days and 1 episode of sputum with scant blood-tinged.  She denies any ongoing hemoptysis.  She denies any fever or chills.  Denies any wheezing or chest tightness.  Denies any chest pain.  Chest x-ray was done and revealed left lung opacification.  CT of the chest was done revealed mediastinal lymphadenopathy, complete collapse of the left lung with large left pleural effusion and likely obstructing mucus in the left mainstem bronchus.  Vital signs were also notable for elevated heart rate as high as 180, mild elevation in temperature as high as 100.0, tachypnea with breathing in the 30s, and decreased SPO2 as low as 86 on room air.  She was placed on supplemental O2 with improvement in SPO2.  EKG was done was consistent with atrial fibrillation with RVR but was unremarkable for acute ischemic change.  Patient was initiated on amiodarone infusion and heparin as well.  She was also initiated on empiric antibiotics with Zosyn and vancomycin, and admitted to ICU for further evaluation and treatment.      improved after thoracentesis   Transferred out ICU on       Assessment/Plan     1. Acute Hypoxic Respiratory Failure   mucus plugging in left mainstem bronchus. Concern for possible post obstructive pneumonia, vs  malignancy. Given smoking history, patient with likely history of COPD. Will stop vancomycin and zosyn today 21 Day 6 as GABINO  . EBUS/bronch today  per Pulm      2. Large left pleural effusion/Mediastinal Lymphadenopathy  parapneumonic vs malignant  S/p  thoracentesis , removed 1.5L effusion . f/u  fungal culture, and cytology. Cont on diuresis; repeat CT scan  fluid re-accumulated. CTS on case no VATS planned. Chest tube with pig tail catheter placed 21 cont to drain pleural fluid. Cytology epnding. Had some blood tinged sputum 8/3/21 held heparin drip    3. Atrial fibrillation with RVR now NSR-  New Dx, on amiodarone IV changed to PO 21,  hold Heparin gtt as blood tinged sputum 8/3/21 and EBUS today resume if cleared by pulm, Cardiology  On case f/u TTE ef 65%    4. Cellulitis Jason LE  Recent DVT ultrasound negative, resolved cont Zosyn and vancomycin  5. Hypertension Continue home amlodipine  6. Electrolyte imbalance : repleted  k  and Mag  7. GABINO crt now worse 2.5  held lasix, Renal consult noted U/S neg. Stop  vanc/Zosyn  8. Tobacco abuse -Nicotine patch ordered    CODE status: full  VTE prophylaxis: heparin  Discussed plan of care with Patient/Family and Nurse   Pre-admission lived at [address] [place] [address].   Discharge planning: home when w/u complete and medically stable >48Hrs     Subjective   Patient seen and examined chart reviewed. Case  D/w staff, crt worse today, for EBUS today, heparin held. Remains in NSR. BP elevated added prn hydralazine. D/w Pharmacy may d/c vanc/zosyn as GABINO.     Physical examination     Visit Vitals  BP (!) 175/65 (BP 1 Location: Left upper arm, BP Patient Position: At rest)   Pulse 70   Temp 97.9 °F (36.6 °C)   Resp 22   Ht 5' 7\" (1.702 m)   Wt 98.1 kg (216 lb 4.3 oz)   SpO2 100%   BMI 33.87 kg/m²       Temp (24hrs), Av.1 °F (36.7 °C), Min:97.9 °F (36.6 °C), Max:98.3 °F (36.8 °C)      Intake/Output Summary (Last 24 hours) at 2021 0905  Last data filed at 2021 0316  Gross per 24 hour   Intake 1360.08 ml   Output 455 ml   Net 905.08 ml       General:  Alert, cooperative   Head:  Atraumatic Neck: Supple             Lungs:  Decreased breath L side with Chest tube   Heart:  Regular rhythm, no murmur   Abdomen:   Soft, non-tender     Extremities: No edema or DVT signs           Neurologic: Oriented to person  No focal deficits     Data Reviewed:       Recent Labs     08/04/21 0326 08/03/21  0956   WBC 7.9 10.2   HGB 10.7* 11.7   HCT 34.2* 36.9    376     Recent Labs     08/04/21  0326 08/03/21  0956 08/03/21  0514 08/02/21  0556 08/02/21  0556   * 132*  --   --  132*   K 3.7 3.8  --   --  3.2*   CL 95* 92*  --   --  92*   CO2 33* 35*  --   --  38*   GLU 87 103*  --   --  118*   BUN 21* 15  --   --  7   CREA 2.45* 1.92*  --   --  0.64   CA 8.8 9.0  --   --  8.5   MG 2.1 2.1 PLEASE DISREGARD RESULTS   < > 1.5*   PHOS 5.6*  --  PLEASE DISREGARD RESULTS  --  3.4    < > = values in this interval not displayed.        Medications reviewed  Current Facility-Administered Medications   Medication Dose Route Frequency    hydrALAZINE (APRESOLINE) 20 mg/mL injection 10 mg  10 mg IntraVENous Q6H PRN    amiodarone (CORDARONE) tablet 400 mg  400 mg Oral DAILY    0.9% sodium chloride infusion  75 mL/hr IntraVENous CONTINUOUS    balsam peru-castor oiL (VENELEX) ointment   Topical BID    zinc oxide-cod liver oil (DESITIN) 40 % paste   Topical BID    albuterol-ipratropium (DUO-NEB) 2.5 MG-0.5 MG/3 ML  3 mL Nebulization Q6H PRN    miconazole (MICOTIN) 2 % powder   Topical BID    piperacillin-tazobactam (ZOSYN) 3.375 g in 0.9% sodium chloride (MBP/ADV) 100 mL MBP  3.375 g IntraVENous Q8H    amLODIPine (NORVASC) tablet 5 mg  5 mg Oral DAILY    aspirin delayed-release tablet 81 mg  81 mg Oral DAILY    atorvastatin (LIPITOR) tablet 10 mg  10 mg Oral QHS    [Held by provider] furosemide (LASIX) injection 20 mg  20 mg IntraVENous BID    oxyCODONE IR (ROXICODONE) tablet 5 mg  5 mg Oral Q6H PRN    vancomycin - pharmacy to dose    Other Rx Dosing/Monitoring    sodium chloride (NS) flush 5-40 mL  5-40 mL IntraVENous Q8H    sodium chloride (NS) flush 5-40 mL  5-40 mL IntraVENous PRN    acetaminophen (TYLENOL) tablet 650 mg  650 mg Oral Q6H PRN    Or    acetaminophen (TYLENOL) suppository 650 mg  650 mg Rectal Q6H PRN    polyethylene glycol (MIRALAX) packet 17 g  17 g Oral DAILY PRN    ondansetron (ZOFRAN ODT) tablet 4 mg  4 mg Oral Q8H PRN    Or    ondansetron (ZOFRAN) injection 4 mg  4 mg IntraVENous Q6H PRN    nicotine (NICODERM CQ) 21 mg/24 hr patch 1 Patch  1 Patch TransDERmal DAILY PRN         Mil Guzman MD  Internal Medicine  8/4/2021

## 2021-08-04 NOTE — PROGRESS NOTES
Spiritual Care Assessment/Progress Note  [facility]      NAME: Burgess Wakefield      MRN: [id number]  AGE: 80 y.o. SEX: female  B[employer] Affiliation: Non Muslim   Language: English     8/4/2021     Total Time (in minutes): 11     Spiritual Assessment begun in [address] [place] [address] CV SERVICES UNIT through conversation with:         [x]Patient        [] Family    [] Friend(s)        Reason for Consult: Initial/Spiritual assessment, patient floor     Spiritual beliefs: (Please include comment if needed)     [] Identifies with a stephan tradition:         [] Supported by a stephan community:            [] Claims no spiritual orientation:           [] Seeking spiritual identity:                [] Adheres to an individual form of spirituality:           [] Not able to assess:                           Identified resources for coping:      [] Prayer                               [] Music                  [] Guided Imagery     [x] Family/friends                 [] Pet visits     [] Devotional reading                         [] Unknown     [] Other:                                             Interventions offered during this visit: (See comments for more details)                Plan of Care:     [] Support spiritual and/or cultural needs    [] Support AMD and/or advance care planning process      [] Support grieving process   [] Coordinate Rites and/or Rituals    [] Coordination with community clergy   [] No spiritual needs identified at this time   [] Detailed Plan of Care below (See Comments)  [] Make referral to Music Therapy  [] Make referral to Pet Therapy     [] Make referral to Addiction services  [] Make referral to [facility]  [] Make referral to Spiritual Care Partner  [] No future visits requested        [x] Follow up upon further referrals     Comments:  visit for initial spiritual assessment. Patient resting in bed, appears comfortable.   Did not wish to disturb so she can continue to rest.  Please contact the  for further referral or contact. Rev.  Jad Larsen MDiv, [facility], [facility]   paging service: [phone])

## 2021-08-04 NOTE — PROGRESS NOTES
Pulmonary, Critical Care, and Sleep Medicine    Progress Note    Name: Delmy Neri MRN: [id number]   : 1940 Hospital: [facility]   Date: 2021        IMPRESSION:   · Large left exudative effusion- worrisome for malignant effusion- awaiting cyto  · Mediastinal LAD- suspect malignant-   · Acute hypoxic resp failure- Left Lung atx  · PAF/RVR  · HTN  · Tobacco use      RECOMMENDATIONS:   · Pigtail in place; query true output. · Decision for EBUS is per attending  · thoracic involved   · Pt is acutely ill  · Lasix on hold   · Chest x-ray   · O2 titration above 90%   · Nebs  · Discussed with attending      Subjective:        Breathing ok       Following CT scan it showed a left mass compressing the left mainstem; increase in pleural effusion       This patient has been seen and evaluated at the request of Dr. Varsha Flores for Pleural effusion. Patient is a 80 y.o. female admitted with SOB and left effusion- tap revealed a lymphocytic exudate cyto pending. Pt less SOB and out of ICU. She is confused and doesn't dive much hx other than her SOB is better. Past Medical History:   Diagnosis Date    Cellulitis     R LEG    CVA [facility]cerebral vascular accident) [facility] [address]     Hyperlipidemia     Hypertension       Past Surgical History:   Procedure Laterality Date    HX BLADDER SUSPENSION  2021    HX CHOLECYSTECTOMY      HX OTHER SURGICAL  10/2020    PARACENTESIS      Prior to Admission medications    Medication Sig Start Date End Date Taking? Authorizing Provider   albuterol (PROVENTIL HFA, VENTOLIN HFA, PROAIR HFA) 90 mcg/actuation inhaler Take 2 Puffs by inhalation every four (4) hours as needed for Wheezing for up to 360 days. 21 Yes Gabino Gunn MD   folic acid (FOLVITE) 892 mcg tablet Take 1 Tab by mouth daily. Indications: inadequate folic acid   Yes David Hernandez NP   donepeziL (Aricept) 5 mg tablet Take 1 Tab by mouth daily.  21  Yes Rudi Parisi NP amLODIPine (NORVASC) 5 mg tablet Take 1 Tab by mouth daily. 2/24/21  Yes Bethany Hernandez Q, NP   aspirin delayed-release 81 mg tablet Take 1 Tab by mouth daily. 2/24/21  Yes David Hernandez Q, NP   simvastatin (ZOCOR) 20 mg tablet Take 1 Tab by mouth nightly. 2/24/21  Yes Nancy Hernandez Seek, NP   acetaminophen (TYLENOL) 325 mg tablet Take 2 Tabs by mouth every six (6) hours as needed for Pain. 1/15/21  Yes Kailyn Noland PA-C   menthol (GOLD BOND PAIN RELIEVING EX) Apply 1 Applicator to affected area as needed. thin layer lower extremities   Yes Provider, Historical   naproxen sodium (Aleve) 220 mg cap Take 1 Tab by mouth daily as needed for Pain. Yes Provider, Historical     No Known Allergies   Social History     Tobacco Use    Smoking status: Current Every Day Smoker     Packs/day: 1.50     Years: 72.00     Pack years: 108.00    Smokeless tobacco: Never Used   Substance Use Topics    Alcohol use: Not Currently      Family History   Problem Relation Age of Onset    Anesth Problems Neg Hx         Current Facility-Administered Medications   Medication Dose Route Frequency    amiodarone (CORDARONE) tablet 400 mg  400 mg Oral DAILY    0.9% sodium chloride infusion  75 mL/hr IntraVENous CONTINUOUS    balsam peru-castor oiL (VENELEX) ointment   Topical BID    zinc oxide-cod liver oil (DESITIN) 40 % paste   Topical BID    miconazole (MICOTIN) 2 % powder   Topical BID    amLODIPine (NORVASC) tablet 5 mg  5 mg Oral DAILY    aspirin delayed-release tablet 81 mg  81 mg Oral DAILY    atorvastatin (LIPITOR) tablet 10 mg  10 mg Oral QHS    [Held by provider] furosemide (LASIX) injection 20 mg  20 mg IntraVENous BID    sodium chloride (NS) flush 5-40 mL  5-40 mL IntraVENous Q8H       Review of Systems:  Review of systems not obtained due to patient factors.     Objective:   Vital Signs:    Visit Vitals  BP (!) 145/51   Pulse 73   Temp 97.9 °F (36.6 °C)   Resp 22   Ht 5' 7\" (1.702 m)   Wt 98.1 kg (216 lb 4.3 oz) SpO2 100%   BMI 33.87 kg/m²       O2 Device: Nasal cannula   O2 Flow Rate (L/min): 3 l/min   Temp (24hrs), Av.1 °F (36.7 °C), Min:97.9 °F (36.6 °C), Max:98.3 °F (36.8 °C)       Intake/Output:   Last shift:      No intake/output data recorded. Last 3 shifts:  1901 -  0700  In: 2344 [P.O.:400; I.V.:1944]  Out: 2915 [Urine:850]    Intake/Output Summary (Last 24 hours) at 2021 1116  Last data filed at 2021 0316  Gross per 24 hour   Intake 1230.75 ml   Output 455 ml   Net 775.75 ml      Physical Exam:   General:  Alert, cooperative, no distress, appears stated age. Head:  Normocephalic, without obvious abnormality, atraumatic.    NCAT no WOB,OnNC 02 no accessory use no abd paradox no cyanosis  Data review:     Recent Results (from the past 24 hour(s))   COVID-19 RAPID TEST    Collection Time: 21  3:24 AM   Result Value Ref Range    Specimen source Nasopharyngeal      COVID-19 rapid test Not detected NOTD     PTT    Collection Time: 21  3:26 AM   Result Value Ref Range    aPTT 28.6 22.1 - 31.0 sec    aPTT, therapeutic range     58.0 - 77.0 SECS   MAGNESIUM    Collection Time: 21  3:26 AM   Result Value Ref Range    Magnesium 2.1 1.6 - 2.4 mg/dL   PHOSPHORUS    Collection Time: 21  3:26 AM   Result Value Ref Range    Phosphorus 5.6 (H) 2.6 - 4.7 MG/DL   METABOLIC PANEL, BASIC    Collection Time: 21  3:26 AM   Result Value Ref Range    Sodium 135 (L) 136 - 145 mmol/L    Potassium 3.7 3.5 - 5.1 mmol/L    Chloride 95 (L) 97 - 108 mmol/L    CO2 33 (H) 21 - 32 mmol/L    Anion gap 7 5 - 15 mmol/L    Glucose 87 65 - 100 mg/dL    BUN 21 (H) 6 - 20 MG/DL    Creatinine 2.45 (H) 0.55 - 1.02 MG/DL    BUN/Creatinine ratio 9 (L) 12 - 20      GFR est AA 23 (L) >60 ml/min/1.73m2    GFR est non-AA 19 (L) >60 ml/min/1.73m2    Calcium 8.8 8.5 - 10.1 MG/DL   CBC WITH AUTOMATED DIFF    Collection Time: 21  3:26 AM   Result Value Ref Range    WBC 7.9 3.6 - 11.0 K/uL    RBC 3.72 (L) 3.80 - 5.20 M/uL    HGB 10.7 (L) 11.5 - 16.0 g/dL    HCT 34.2 (L) 35.0 - 47.0 %    MCV 91.9 80.0 - 99.0 FL    MCH 28.8 26.0 - 34.0 PG    MCHC 31.3 30.0 - 36.5 g/dL    RDW 13.8 11.5 - 14.5 %    PLATELET 777 431 - 662 K/uL    MPV 9.4 8.9 - 12.9 FL    NRBC 0.0 0  WBC    ABSOLUTE NRBC 0.00 0.00 - 0.01 K/uL    NEUTROPHILS 76 (H) 32 - 75 %    LYMPHOCYTES 8 (L) 12 - 49 %    MONOCYTES 12 5 - 13 %    EOSINOPHILS 3 0 - 7 %    BASOPHILS 0 0 - 1 %    IMMATURE GRANULOCYTES 1 (H) 0.0 - 0.5 %    ABS. NEUTROPHILS 6.1 1.8 - 8.0 K/UL    ABS. LYMPHOCYTES 0.6 (L) 0.8 - 3.5 K/UL    ABS. MONOCYTES 0.9 0.0 - 1.0 K/UL    ABS. EOSINOPHILS 0.2 0.0 - 0.4 K/UL    ABS. BASOPHILS 0.0 0.0 - 0.1 K/UL    ABS. IMM.  GRANS. 0.1 (H) 0.00 - 0.04 K/UL    DF SMEAR SCANNED      PLATELET COMMENTS CLUMPED PLATELETS      RBC COMMENTS NORMOCYTIC, NORMOCHROMIC     VANCOMYCIN, RANDOM    Collection Time: 08/04/21  3:26 AM   Result Value Ref Range    Vancomycin, random 28.5 UG/ML       Imaging:  I have personally reviewed the patients radiographs and have reviewed the reports:  CTchest with MEd LADand Left marge effusion        Cali Herron PA-C

## 2021-08-04 NOTE — PROGRESS NOTES
Pharmacist Note - Vancomycin Dosing  Therapy day 6/7  Indication: post-obstructive pneumonia and bilateral lower extremity cellulitis  Current regimen: dosing by levels    Recent Labs     08/04/21  0326 08/03/21  0956 08/02/21  0556   WBC 7.9 10.2  --    CREA 2.45* 1.92* 0.64   BUN 21* 15 7       A random vancomycin level of 28.5 mcg/mL was obtained 22 hrs post dose  Goal target range Trough 10-15 mcg/mL      Plan: Continue to hold,  Pharmacy will continue to monitor this patient daily for changes in clinical status and renal function.     *

## 2021-08-04 NOTE — PERIOP NOTES
TRANSFER - IN REPORT:    Verbal report received from [address] [place] [address] at 1046 on Kimo Noam  being received from [phone] for ordered procedure      Report consisted of patients Situation, Background, Assessment and   Recommendations(SBAR). Information from the following report(s) SBAR, MAR, Recent Results, Cardiac Rhythm NSR, Pre Procedure Checklist and Procedure Verification was reviewed with the receiving nurse. Opportunity for questions and clarification was provided. Assessment completed upon patients arrival to unit and care assumed.   __________________________  Initial RN admission and assessment performed and documented in Endoscopy navigator. Patient evaluated by anesthesia in pre-procedure holding. All procedural vital signs, airway assessment, and level of consciousness information monitored and recorded by anesthesia staff on the anesthesia record. Report received from CRNA post procedure. Endoscope was pre-cleaned at bedside immediately following procedure by Paddy Pro. _________________________  TRANSFER - OUT REPORT:    Verbal report given to [address] [place] [address] at [phone] on Kimo Noam  being transferred to [facility] 213 for routine post - op       Report consisted of patients Situation, Background, Assessment,   Recommendations(SBAR), and Post-Procedure Instructions. Information from the following report(s) SBAR, Procedure Summary, MAR and Cardiac Rhythm NSR was reviewed with the receiving nurse.     Lines:   Peripheral IV 07/29/21 Left Antecubital (Active)   Site Assessment Clean, dry, & intact 08/03/21 2034   Phlebitis Assessment 0 08/03/21 2034   Infiltration Assessment 0 08/03/21 2034   Dressing Status Clean, dry, & intact 08/03/21 2034   Dressing Type Transparent;Tape 08/03/21 2034   Hub Color/Line Status Pink;Capped 08/03/21 2034   Action Taken Open ports on tubing capped 08/03/21 2034   Alcohol Cap Used Yes 08/03/21 2034       Peripheral IV 08/02/21 Right Antecubital (Active)   Site Assessment Clean, dry, & intact 08/04/21 1302   Phlebitis Assessment 0 08/04/21 1302   Infiltration Assessment 0 08/04/21 1302   Dressing Status Clean, dry, & intact 08/04/21 1302   Dressing Type Transparent 08/04/21 1302   Hub Color/Line Status Blue; Infusing 08/04/21 1302   Action Taken Open ports on tubing capped 08/04/21 1302   Alcohol Cap Used Yes 08/04/21 1302        Opportunity for questions and clarification was provided.       Patient transported with:   O2 @ 5L liters  Registered Nurse

## 2021-08-05 NOTE — ANESTHESIA POSTPROCEDURE EVALUATION
Post-Anesthesia Evaluation and Assessment    Patient: Anne-Marie Garcia MRN: [id number]  SSN: [id number]    YOB: 1940  Age: 80 y.o. Sex: female      I have evaluated the patient and they are stable and ready for discharge from the PACU. Cardiovascular Function/Vital Signs  Visit Vitals  BP (!) 167/61   Pulse 76   Temp 36.6 °C (97.9 °F)   Resp 18   Ht 5' 7\" (1.702 m)   Wt 89.7 kg (197 lb 12 oz)   SpO2 100%   BMI 30.97 kg/m²       Patient is status post General anesthesia for Procedure(s):  ENDOSCOPIC BRONCHOSCOPY ULTRASOUND (EBUS)  TRANSBRONCHIAL BIOPSY  BRONCHOSCOPY. Nausea/Vomiting: None    Postoperative hydration reviewed and adequate. Pain:  Pain Scale 1: Numeric (0 - 10) (08/05/21 0325)  Pain Intensity 1: 0 (08/05/21 0325)   Managed    Neurological Status:   Neuro  Neurologic State: Alert (08/04/21 1941)  Orientation Level: Oriented X4 (08/04/21 1941)  Cognition: Follows commands;Decreased attention/concentration (08/04/21 1941)  Speech: Clear (08/04/21 1941)  Assessment L Pupil: Brisk;Round (07/31/21 1200)  Size L Pupil (mm): 3 (07/31/21 1200)  Assessment R Pupil: Brisk;Round (07/31/21 1200)  Size R Pupil (mm): 3 (07/31/21 1200)   At baseline    Mental Status, Level of Consciousness: Alert and  oriented to person, place, and time    Pulmonary Status:   O2 Device: Nasal cannula (08/05/21 0325)   Adequate oxygenation and airway patent    Complications related to anesthesia: None    Post-anesthesia assessment completed. No concerns    Signed By: Mirta Stevenson MD     August 5, 2021              Procedure(s):  ENDOSCOPIC BRONCHOSCOPY ULTRASOUND (EBUS)  TRANSBRONCHIAL BIOPSY  BRONCHOSCOPY. general    <[email or web address]>    INITIAL Post-op Vital signs:   Vitals Value Taken Time   /61 08/05/21 0736   Temp 36.6 °C (97.9 °F) 08/05/21 0325   Pulse 84 08/05/21 0745   Resp 22 08/05/21 0745   SpO2 100 % 08/05/21 0735   Vitals shown include unvalidated device data.

## 2021-08-05 NOTE — PROGRESS NOTES
[facility]    Hospitalist Progress Note  Mery Baldwin MD  Answering service: [phone] OR   36 from in house phone     Date of service: 2021   Name: Bob Wood  : 1940  MRN: [id number]  PCP: Dr. Juan Calderón NP     Brief HPI and Hospital Course:       70-year-old female with a history of heavy tobacco use at least 2 packs a day since 13to 12years of age, hyperlipidemia, hypertension presented to the ED from [facility]  with reports of worsening shortness of breath over the last several days and 1 episode of sputum with scant blood-tinged.  She denies any ongoing hemoptysis.  She denies any fever or chills.  Denies any wheezing or chest tightness.  Denies any chest pain.  Chest x-ray was done and revealed left lung opacification.  CT of the chest was done revealed mediastinal lymphadenopathy, complete collapse of the left lung with large left pleural effusion and likely obstructing mucus in the left mainstem bronchus.  Vital signs were also notable for elevated heart rate as high as 180, mild elevation in temperature as high as 100.0, tachypnea with breathing in the 30s, and decreased SPO2 as low as 86 on room air.  She was placed on supplemental O2 with improvement in SPO2.  EKG was done was consistent with atrial fibrillation with RVR but was unremarkable for acute ischemic change.  Patient was initiated on amiodarone infusion and heparin as well.  She was also initiated on empiric antibiotics with Zosyn and vancomycin, and admitted to ICU for further evaluation and treatment.      improved after thoracentesis   Transferred out ICU on       Assessment/Plan     #Small cell lung cancer:  -FNA lymph node -Small cell carcinoma with tumor necrosis   -oncology following,plan for radiation therapy  -Will consider MRI brain based on clinical status-resp issues      Acute Hypoxic Respiratory Failure   mucus plugging in left mainstem bronchus. Concern for possible post obstructive pneumonia,malignancy . Received IV abx  On 5 lt today    Acute renal failure:  -suspected ATN/pre renal state-recent abx -vanc/zosyn  On NS  Nephrology following    Large left pleural effusion/Mediastinal Lymphadenopathy  Likely due ti malignant  S/p  thoracentesis , removed 1.5L effusion . Negativel culture so far. CTS following - Chest tube with pig tail catheter placed 21 cont to drain pleural fluid. Cytology epnding. Atrial fibrillation with RVR now NSR-  New Dx, on amiodarone IV changed to PO 21,  Discussed with , patient high risk for bleeding due to cancer,recent hemoptysis - recommended against further anticoagulation  -will start 81 mg aspirin    Cellulitis Jason LE  -resolved  Recent DVT ultrasound negative,  received Zosyn and vancomycin    Hypertension Continue home amlodipine      Electrolyte imbalance : repleted  k  and Mag      1. Tobacco abuse -Nicotine patch ordered    CODE status: full  VTE prophylaxis: heparin  Discussed plan of care with Patient/Family and Nurse   Pre-admission lived at [address] [place] [address]   Discharge planning: home when w/u complete and medically stable >48Hrs     Subjective   Patient seen and examined chart reviewed. She states that her breathing is about the same.   Daughter at bedside -discussed clinical course /plan of treatment       Physical examination     Visit Vitals  BP (!) 143/42 (BP 1 Location: Left upper arm, BP Patient Position: Sitting)   Pulse 78   Temp 97.9 °F (36.6 °C)   Resp 19   Ht 5' 7\" (1.702 m)   Wt 89.7 kg (197 lb 12 oz)   SpO2 90%   BMI 30.97 kg/m²       Temp (24hrs), Av.1 °F (36.7 °C), Min:97.9 °F (36.6 °C), Max:98.4 °F (36.9 °C)      Intake/Output Summary (Last 24 hours) at 2021 1420  Last data filed at 2021 1120  Gross per 24 hour   Intake 173.75 ml   Output 980 ml   Net -806.25 ml       General:  Alert, cooperative   HEENT Atraumatic,anicteric sclera,normal conjunctiva,EOMI Neck: Supple             Lungs:  Decreased breath L side with Chest tube   Heart:  Regular rhythm, no murmur   Abdomen:   Soft, non-tender     Extremities: No edema or DVT signs           Neurologic: Oriented to person,place  No focal deficits     Data Reviewed:       Recent Labs     08/05/21  0339 08/04/21  0326 08/03/21  0956   WBC 7.6 7.9 10.2   HGB 10.3* 10.7* 11.7   HCT 33.1* 34.2* 36.9    336 376     Recent Labs     08/05/21  0339 08/04/21  0326 08/03/21  0956 08/03/21  0514 08/03/21  0514    135* 132*  --   --    K 4.1 3.7 3.8  --   --    CL 98 95* 92*  --   --    CO2 31 33* 35*  --   --    GLU 81 87 103*  --   --    BUN 26* 21* 15  --   --    CREA 2.78* 2.45* 1.92*  --   --    CA 9.0 8.8 9.0  --   --    MG 2.2 2.1 2.1   < > PLEASE DISREGARD RESULTS   PHOS 7.4* 5.6*  --   --  PLEASE DISREGARD RESULTS    < > = values in this interval not displayed.        Medications reviewed  Current Facility-Administered Medications   Medication Dose Route Frequency    0.9% sodium chloride infusion  75 mL/hr IntraVENous CONTINUOUS    hydrALAZINE (APRESOLINE) 20 mg/mL injection 10 mg  10 mg IntraVENous Q6H PRN    lidocaine-EPINEPHrine (XYLOCAINE) 1 %-1:100,000 injection    PRN    amiodarone (CORDARONE) tablet 400 mg  400 mg Oral DAILY    balsam peru-castor oiL (VENELEX) ointment   Topical BID    zinc oxide-cod liver oil (DESITIN) 40 % paste   Topical BID    albuterol-ipratropium (DUO-NEB) 2.5 MG-0.5 MG/3 ML  3 mL Nebulization Q6H PRN    miconazole (MICOTIN) 2 % powder   Topical BID    amLODIPine (NORVASC) tablet 5 mg  5 mg Oral DAILY    aspirin delayed-release tablet 81 mg  81 mg Oral DAILY    atorvastatin (LIPITOR) tablet 10 mg  10 mg Oral QHS    [Held by provider] furosemide (LASIX) injection 20 mg  20 mg IntraVENous BID    oxyCODONE IR (ROXICODONE) tablet 5 mg  5 mg Oral Q6H PRN    sodium chloride (NS) flush 5-40 mL  5-40 mL IntraVENous Q8H    sodium chloride (NS) flush 5-40 mL  5-40 mL IntraVENous PRN    acetaminophen (TYLENOL) tablet 650 mg  650 mg Oral Q6H PRN    Or    acetaminophen (TYLENOL) suppository 650 mg  650 mg Rectal Q6H PRN    polyethylene glycol (MIRALAX) packet 17 g  17 g Oral DAILY PRN    ondansetron (ZOFRAN ODT) tablet 4 mg  4 mg Oral Q8H PRN    Or    ondansetron (ZOFRAN) injection 4 mg  4 mg IntraVENous Q6H PRN    nicotine (NICODERM CQ) 21 mg/24 hr patch 1 Patch  1 Patch TransDERmal DAILY PRN         Alpa Griffin MD  Internal Medicine  8/5/2021

## 2021-08-05 NOTE — CONSULTS
Full note to follow. Radiation oncology was consulted on Mrs. Laurie Lockwood who is a 80year old female who was admitted with worsening shortness of breath and new scant hemoptysis and was found on CT scan to have mediastinal lymph adenopathy, large left pleural effusion, and complete collapse of her left lower lung lobe. She has undergone thoracentesis as well as having a catheter placed in the left pleural. Cytology has not been revealing to this point for malignant pleural effusion. She has also now undergone endobronchial ultrasound and biopsy which has revealed small cell carcinoma. She has been unable to get PET scan as an inpatient but CT scans do not show any obvious evidence of distant metastatic disease. She is also unable to tolerate a brain MRI but CT head is pending. Due to her shortness of breath and now 5 liter supplemental oxygen requirement via nasal cannula, radiation oncology was consulted to discuss palliative radiotherapy. I have reviewed her imaging and pathology and ideally we would be able to complete full staging with a pet scan and brain MRI and make treatment decisions once we know if she is limited stage or extensive stage. However, unfortunately her performance status is poor and she is symptomatic at this time and we cannot complete these images. Given her symptoms I think that palliative treatment of some sort should be pursued. I did discuss with the patient and her daughter who has her power of  the prognosis associated with small cell lung cancer. We discussed standard of care treatment for limited stage disease would be concurrent chemotherapy and radiation however again, as above, I do not think she would be able to tolerate this and we are not sure she does have limited stage disease without appropriate staging.      Given her symptoms, Dr. Pradeep Mccauley and I have discussed potential palliative radiation versus palliative chemotherapy and our consensus recommendation was to try with palliative radiation first as this would be more likely to address the focal area of her symptoms which is the left mainstem bronchus collapse and occlusion from her primary disease. I am unsure about her ability to lay flat and tolerate radiation treatments, but after a long discussion with the patient and her daughter I think it is reasonable to attempt XRT to see how she does. I would plan for a short, five fraction, course to hopefully relieve the occlusion and improve her breathing, hemoptysis, and functional status. The patient's daughter who is her medical power of  agrees with attempting palliative radiation and so we will bring her down for a CT simulation for treatment planning tomorrow morning at 8:00 AM tentatively. We will then plan to treat to 2000cGy / 5 fractions with her first treatment starting tomorrow afternoon ideally. Written consent was obtained from the daughter. They have my contact information and I encouraged them to reach out anytime in the interim with any questions, issues, or concerns. Thank you for this kind consultation and I appreciate the opportunity to participate in the care of Mrs. Elke Osman.

## 2021-08-05 NOTE — PROGRESS NOTES
[facility]   [address] [address] [facility], [facility] [address], [facility]  Phone: [phone]   [phone]       Nephrology Progress Note  Hai Voss     1940     [id number]  Date of Admission : 7/29/2021 08/05/21    CC: Follow up for ARF    Assessment and Plan   GABINO :  - suspected ATN --> from pre renal state + vanc/ zosyn toxicity   - renal US : unremarkable   - Urine Eos -ve ( low sensitivity test)  - continue to hold diuretics   - continue NS at 75 cc/ hr   - avoid all potential nephrotoxins and may need to hold vanc for now   - expect Cr to plateau in next 58-09 hrs   - diuretic challenge tomorrow     Acute Resp failure   Large Left Effusion   Mediastinal adenopathy   Pneumothorax  Smoker   Suspected Small cell Lung Ca   - per Pulm/ Onc    HTN   - controlled     Hypo K/ Hypo Mg : better        Interval History:  Seen and examined. She is withdrawn and not interested in hearing about her health issues. Daughter at bedside and discussed renal issues including worsening renal function.  cc. Review of Systems: A comprehensive review of systems was negative except for that written in the HPI.     Current Medications:   Current Facility-Administered Medications   Medication Dose Route Frequency    0.9% sodium chloride infusion  75 mL/hr IntraVENous CONTINUOUS    hydrALAZINE (APRESOLINE) 20 mg/mL injection 10 mg  10 mg IntraVENous Q6H PRN    lidocaine-EPINEPHrine (XYLOCAINE) 1 %-1:100,000 injection    PRN    amiodarone (CORDARONE) tablet 400 mg  400 mg Oral DAILY    balsam peru-castor oiL (VENELEX) ointment   Topical BID    zinc oxide-cod liver oil (DESITIN) 40 % paste   Topical BID    albuterol-ipratropium (DUO-NEB) 2.5 MG-0.5 MG/3 ML  3 mL Nebulization Q6H PRN    miconazole (MICOTIN) 2 % powder   Topical BID    amLODIPine (NORVASC) tablet 5 mg  5 mg Oral DAILY    aspirin delayed-release tablet 81 mg  81 mg Oral DAILY    atorvastatin (LIPITOR) tablet 10 mg  10 mg Oral QHS  [Held by provider] furosemide (LASIX) injection 20 mg  20 mg IntraVENous BID    oxyCODONE IR (ROXICODONE) tablet 5 mg  5 mg Oral Q6H PRN    sodium chloride (NS) flush 5-40 mL  5-40 mL IntraVENous Q8H    sodium chloride (NS) flush 5-40 mL  5-40 mL IntraVENous PRN    acetaminophen (TYLENOL) tablet 650 mg  650 mg Oral Q6H PRN    Or    acetaminophen (TYLENOL) suppository 650 mg  650 mg Rectal Q6H PRN    polyethylene glycol (MIRALAX) packet 17 g  17 g Oral DAILY PRN    ondansetron (ZOFRAN ODT) tablet 4 mg  4 mg Oral Q8H PRN    Or    ondansetron (ZOFRAN) injection 4 mg  4 mg IntraVENous Q6H PRN    nicotine (NICODERM CQ) 21 mg/24 hr patch 1 Patch  1 Patch TransDERmal DAILY PRN      No Known Allergies    Objective:  Vitals:    Vitals:    08/05/21 0622 08/05/21 0629 08/05/21 0735 08/05/21 1120   BP:   (!) 167/61 (!) 143/42   Pulse: 72  76 78   Resp:   18 19   Temp:   97.9 °F (36.6 °C) 97.9 °F (36.6 °C)   SpO2:   100% 90%   Weight:  89.7 kg (197 lb 12 oz)     Height:         Intake and Output:  08/05 0701 - 08/05 1900  In: 173.8 [I.V.:173.8]  Out: 495 [Urine:400]  08/03 1901 - 08/05 0700  In: 1250 [I.V.:1250]  Out: 490 [Urine:400]    Physical Examination:  General:  Ill looking   HEENT: PERRL,+ Pallor , No Icterus  Neck: Supple,no mass palpable  Lungs : diminished   CVS: IRREGULAR, S1 S2 normal, No murmur   Abdomen: Soft, NT, BS +  Extremities: TRACE Edema         []    High complexity decision making was performed  []    Patient is at high-risk of decompensation with multiple organ involvement    Lab Data Personally Reviewed: I have reviewed all the pertinent labs, microbiology data and radiology studies during assessment.     Recent Labs     08/05/21  0339 08/04/21  0326 08/03/21  0956 08/03/21  0514    135* 132*  --    K 4.1 3.7 3.8  --    CL 98 95* 92*  --    CO2 31 33* 35*  --    GLU 81 87 103*  --    BUN 26* 21* 15  --    CREA 2.78* 2.45* 1.92*  --    CA 9.0 8.8 9.0  --    MG 2.2 2.1 2.1 PLEASE DISREGARD RESULTS   PHOS 7.4* 5.6*  --  PLEASE DISREGARD RESULTS     Recent Labs     08/05/21  0339 08/04/21  0326 08/03/21  0956   WBC 7.6 7.9 10.2   HGB 10.3* 10.7* 11.7   HCT 33.1* 34.2* 36.9    336 376     No results found for: SDES  Lab Results   Component Value Date/Time    Culture result: Culture performed on Fluid swab specimen 08/02/2021 03:23 PM    Culture result: NO GROWTH THUS FAR 08/02/2021 03:23 PM    Culture result:  08/01/2021 05:06 AM     MRSA NOT PRESENT. Apparent Staphylococus aureus (not MRSA noted). Culture result:  08/01/2021 05:06 AM     Screening of patient nares for MRSA is for surveillance purposes and, if positive, to facilitate isolation considerations in high risk settings. It is not intended for automatic decolonization interventions per se as regimens are not sufficiently effective to warrant routine use. Culture result: NO GROWTH 4 DAYS 07/30/2021 03:00 PM    Culture result: NO FUNGUS ISOLATED 3 DAYS 07/30/2021 03:00 PM     Recent Results (from the past 24 hour(s))   MAGNESIUM    Collection Time: 08/05/21  3:39 AM   Result Value Ref Range    Magnesium 2.2 1.6 - 2.4 mg/dL   PHOSPHORUS    Collection Time: 08/05/21  3:39 AM   Result Value Ref Range    Phosphorus 7.4 (H) 2.6 - 4.7 MG/DL   CBC WITH AUTOMATED DIFF    Collection Time: 08/05/21  3:39 AM   Result Value Ref Range    WBC 7.6 3.6 - 11.0 K/uL    RBC 3.58 (L) 3.80 - 5.20 M/uL    HGB 10.3 (L) 11.5 - 16.0 g/dL    HCT 33.1 (L) 35.0 - 47.0 %    MCV 92.5 80.0 - 99.0 FL    MCH 28.8 26.0 - 34.0 PG    MCHC 31.1 30.0 - 36.5 g/dL    RDW 13.8 11.5 - 14.5 %    PLATELET 694 326 - 966 K/uL    MPV 9.3 8.9 - 12.9 FL    NRBC 0.0 0  WBC    ABSOLUTE NRBC 0.00 0.00 - 0.01 K/uL    NEUTROPHILS 85 (H) 32 - 75 %    LYMPHOCYTES 6 (L) 12 - 49 %    MONOCYTES 8 5 - 13 %    EOSINOPHILS 0 0 - 7 %    BASOPHILS 0 0 - 1 %    IMMATURE GRANULOCYTES 1 (H) 0.0 - 0.5 %    ABS. NEUTROPHILS 6.4 1.8 - 8.0 K/UL    ABS.  LYMPHOCYTES 0.5 (L) 0.8 - 3.5 K/UL ABS. MONOCYTES 0.6 0.0 - 1.0 K/UL    ABS. EOSINOPHILS 0.0 0.0 - 0.4 K/UL    ABS. BASOPHILS 0.0 0.0 - 0.1 K/UL    ABS. IMM. GRANS. 0.1 (H) 0.00 - 0.04 K/UL    DF SMEAR SCANNED      PLATELET COMMENTS Large Platelets      RBC COMMENTS NORMOCYTIC, NORMOCHROMIC     METABOLIC PANEL, BASIC    Collection Time: 08/05/21  3:39 AM   Result Value Ref Range    Sodium 136 136 - 145 mmol/L    Potassium 4.1 3.5 - 5.1 mmol/L    Chloride 98 97 - 108 mmol/L    CO2 31 21 - 32 mmol/L    Anion gap 7 5 - 15 mmol/L    Glucose 81 65 - 100 mg/dL    BUN 26 (H) 6 - 20 MG/DL    Creatinine 2.78 (H) 0.55 - 1.02 MG/DL    BUN/Creatinine ratio 9 (L) 12 - 20      GFR est AA 20 (L) >60 ml/min/1.73m2    GFR est non-AA 16 (L) >60 ml/min/1.73m2    Calcium 9.0 8.5 - 10.1 MG/DL   CHLORIDE, URINE RANDOM    Collection Time: 08/05/21  3:39 AM   Result Value Ref Range    Chloride,urine random 71 MMOL/L   CREATININE, UR, RANDOM    Collection Time: 08/05/21  3:39 AM   Result Value Ref Range    Creatinine, urine 71.90 mg/dL   EOSINOPHILS, URINE    Collection Time: 08/05/21  3:39 AM   Result Value Ref Range    Eosinophils,urine Negative     SODIUM, UR, RANDOM    Collection Time: 08/05/21  3:39 AM   Result Value Ref Range    Sodium,urine random 81 MMOL/L           I have reviewed the flowsheets. Chart and Pertinent Notes have been reviewed. No change in PMH ,family and social history from Consult note.       Patito Fisher MD

## 2021-08-05 NOTE — PROGRESS NOTES
1930: Bedside and Verbal shift change report given to [place] (oncoming nurse) by Danitza Wilson RN (offgoing nurse). Report included the following information SBAR, Kardex, ED Summary, [facility], Recent Results and Cardiac Rhythm NSR;PACS;PVCS.     2108: Provider paged with chest xray results. Verbal orders received from Dr. Woo Barry for repeat xray in the morning. 0730: Bedside and Verbal shift change report given to [place], [place] (oncoming nurse) by Valerie Patel RN (offgoing nurse). Report included the following information SBAR, Kardex, ED Summary, MAR, Recent Results and Cardiac Rhythm NSR;PACS;PVCS.

## 2021-08-05 NOTE — PROGRESS NOTES
Bedside shift change report given to [place] (oncoming nurse) by Eugenia Sánchez RN (offgoing nurse). Report included the following information SBAR, Kardex, ED Summary, OR Summary, Procedure Summary, Intake/Output, MAR, Accordion, Recent Results, Med Rec Status, Cardiac Rhythm NSR, Alarm Parameters  and Quality Measures. 1840: Critical lab result received on Pt. From radiology. Right sided pneumothorax. MD. shabazz notified via perfect serve. 1930: Hospitalist notified about Pt. Right sided pneumothorax, orders received for stat CXR and to contact pulmonology. 2000: MD. Anthony De Dios from pulmonary team notified about Pt. Right sided pneumothorax, orders received to notify MD again with the CXR result. Unable to get the urine sample due to positional purewick on patient and Pt. incontinent    Problem: Pressure Injury - Risk of  Goal: *Prevention of pressure injury  Description: Document Ferdinand Scale and appropriate interventions in the flowsheet. Outcome: Progressing Towards Goal  Note: Pressure Injury Interventions:  Sensory Interventions: Assess changes in LOC    Moisture Interventions: Absorbent underpads    Activity Interventions: Assess need for specialty bed    Mobility Interventions: Pressure redistribution bed/mattress (bed type), PT/OT evaluation    Nutrition Interventions: Document food/fluid/supplement intake, Discuss nutritional consult with provider    Friction and Shear Interventions: Minimize layers, Apply protective barrier, creams and emollients    Problem: Falls - Risk of  Goal: *Absence of Falls  Description: Document Norma Fall Risk and appropriate interventions in the flowsheet.   Outcome: Progressing Towards Goal  Note: Fall Risk Interventions:  Mobility Interventions: Communicate number of staff needed for ambulation/transfer, PT Consult for mobility concerns    Mentation Interventions: Adequate sleep, hydration, pain control    Medication Interventions: Evaluate medications/consider consulting pharmacy    Elimination Interventions: Bed/chair exit alarm, Patient to call for help with toileting needs

## 2021-08-05 NOTE — PROGRESS NOTES
CXR reviewed, right small apical right hydropneumothorax  VSS, pulse ox 93-97% on 4L NC  Nursing staff reported to me that pt is stable, no distress, no new complaints  Will repeat cxr in am  Igor Hogue MD

## 2021-08-05 NOTE — PROGRESS NOTES
[facility] at [address], [address] [address], [place]: [phone]  F: [phone]    Reason for Visit:   Clarissa Osullivan is a 80 y.o. 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 Treatment History:   · No treatment thus far    History of Present Illness:     Ms. Luan Barksdale is a 72-year-old female with history of hyperlipidemia, hypertension, and significant tobacco use with worsening shortness of breath as well as hemoptysis x1 episode. Initial chest x-ray and CT obtained revealed mediastinal lymphadenopathy as well as complete collapse of the left lung with large left pleural effusion/mucous plug in left mainstem bronchus. She was seen by Dr. Madison Mcginnis with Pulmonology and started on IV antibiotics for postobstructive pneumonia. He was also evaluated by cardiothoracic surgery. Chest tube was placed on 8/2/2021. She developed atrial fibrillation with RVR on 8/2/2021 and was seen by Cardiology. She was started on both amiodarone and heparin. TTE completed with ejection fraction 65%. She underwent EBUS with bronchoscopy on 8/4/2021 with Dr. Madison Mcginnis. He obtained 1 biopsy from station 7 node and 4 samples from left hilar mass. Pathology has resulted with small cell carcinoma. Cytology from effusion is still pending. She is alert this morning. Somewhat difficult time obtaining history due to dementia. I have reached out to her daughter Iris Gregorio - unable to connect/went to . She appears slightly dyspneic at rest.  She is on 5 L nasal cannula. She reports some pain on her right side. Given kidney function and creatinine of 2.78, will go ahead and obtain head CT for staging (versus brain MRI). She will also need a PET scan outpatient to determine limited versus extensive stage small cell.       Past Medical History:   Diagnosis Date    Cellulitis     R LEG    CVA [facility]cerebral vascular accident) ([facility] [address]     Hyperlipidemia     Hypertension       Past Surgical History:   Procedure Laterality Date    HX BLADDER SUSPENSION  01/2021    HX CHOLECYSTECTOMY      HX OTHER SURGICAL  10/2020    PARACENTESIS      Social History     Tobacco Use    Smoking status: Current Every Day Smoker     Packs/day: 1.50     Years: 72.00     Pack years: 108.00    Smokeless tobacco: Never Used   Substance Use Topics    Alcohol use: Not Currently      Family History   Problem Relation Age of Onset    Anesth Problems Neg Hx      Current Facility-Administered Medications   Medication Dose Route Frequency    0.9% sodium chloride infusion  75 mL/hr IntraVENous CONTINUOUS    hydrALAZINE (APRESOLINE) 20 mg/mL injection 10 mg  10 mg IntraVENous Q6H PRN    lidocaine-EPINEPHrine (XYLOCAINE) 1 %-1:100,000 injection    PRN    amiodarone (CORDARONE) tablet 400 mg  400 mg Oral DAILY    balsam peru-castor oiL (VENELEX) ointment   Topical BID    zinc oxide-cod liver oil (DESITIN) 40 % paste   Topical BID    albuterol-ipratropium (DUO-NEB) 2.5 MG-0.5 MG/3 ML  3 mL Nebulization Q6H PRN    miconazole (MICOTIN) 2 % powder   Topical BID    amLODIPine (NORVASC) tablet 5 mg  5 mg Oral DAILY    aspirin delayed-release tablet 81 mg  81 mg Oral DAILY    atorvastatin (LIPITOR) tablet 10 mg  10 mg Oral QHS    [Held by provider] furosemide (LASIX) injection 20 mg  20 mg IntraVENous BID    oxyCODONE IR (ROXICODONE) tablet 5 mg  5 mg Oral Q6H PRN    sodium chloride (NS) flush 5-40 mL  5-40 mL IntraVENous Q8H    sodium chloride (NS) flush 5-40 mL  5-40 mL IntraVENous PRN    acetaminophen (TYLENOL) tablet 650 mg  650 mg Oral Q6H PRN    Or    acetaminophen (TYLENOL) suppository 650 mg  650 mg Rectal Q6H PRN    polyethylene glycol (MIRALAX) packet 17 g  17 g Oral DAILY PRN    ondansetron (ZOFRAN ODT) tablet 4 mg  4 mg Oral Q8H PRN    Or    ondansetron (ZOFRAN) injection 4 mg  4 mg IntraVENous Q6H PRN    nicotine (NICODERM CQ) 21 mg/24 hr patch 1 Patch  1 Patch TransDERmal DAILY PRN      No Known Allergies     Review of Systems: A complete review of systems was obtained, negative except as described above. Physical Exam:     Visit Vitals  BP (!) 143/42 (BP 1 Location: Left upper arm, BP Patient Position: Sitting)   Pulse 78   Temp 97.9 °F (36.6 °C)   Resp 19   Ht 5' 7\" (1.702 m)   Wt 197 lb 12 oz (89.7 kg)   SpO2 90%   BMI 30.97 kg/m²     ECOG PS: 2-3  General: No distress  Eyes: PERRLA, anicteric sclerae  HENT: Atraumatic, OP clear  Neck: Supple  Lymphatic: No peripheral lymphadenopathy  Respiratory: Increased effort at rest  CV: HRR monitor  GI: Soft, nondistended  Skin: Warm, dry  Psych: Alert, pleasant, difficult to obtain history    Results:     Lab Results   Component Value Date/Time    WBC 7.6 08/05/2021 03:39 AM    HGB 10.3 (L) 08/05/2021 03:39 AM    HCT 33.1 (L) 08/05/2021 03:39 AM    PLATELET 179 32/91/2654 03:39 AM    MCV 92.5 08/05/2021 03:39 AM    ABS. NEUTROPHILS 6.4 08/05/2021 03:39 AM     Lab Results   Component Value Date/Time    Sodium 136 08/05/2021 03:39 AM    Potassium 4.1 08/05/2021 03:39 AM    Chloride 98 08/05/2021 03:39 AM    CO2 31 08/05/2021 03:39 AM    Glucose 81 08/05/2021 03:39 AM    BUN 26 (H) 08/05/2021 03:39 AM    Creatinine 2.78 (H) 08/05/2021 03:39 AM    GFR est AA 20 (L) 08/05/2021 03:39 AM    GFR est non-AA 16 (L) 08/05/2021 03:39 AM    Calcium 9.0 08/05/2021 03:39 AM     Lab Results   Component Value Date/Time    Bilirubin, total 0.5 07/29/2021 05:27 PM    ALT (SGPT) 28 07/29/2021 05:27 PM    Alk. phosphatase 107 07/29/2021 05:27 PM    Protein, total 6.4 07/30/2021 05:29 PM    Albumin 2.0 (L) 07/30/2021 05:29 PM    Globulin 5.5 (H) 07/29/2021 05:27 PM     Records reviewed and summarized above. Radiology report(s) reviewed above. Pathology from 8/4/2021 left hilar node: Small cell carcinoma with necrosis    CT Abd WO 8/4/21:  IMPRESSION  1. Right hydropneumothorax.  Redemonstrated is near-complete left lung collapse  with large pleural effusion. Left pigtail drainage pleural catheter. CT Chest 7/31/21:  IMPRESSION  1. Unchanged large left pleural effusion which has likely reaccumulated since  the prior thoracentesis. 2. Unchanged dense consolidation and collapse of the left lung with an abrupt  cut off of the left mainstem bronchus. The right hilum and right side of the  mediastinum appears consolidated which may be related to invasion and/or  confluent adenopathy. A large lymph node is seen in the left para-aortic space  that is unchanged. Findings are highly suspicious for malignancy. 3. New trace right pleural effusion. Assessment:   1) Small cell carcinoma. Unstaged  Mediastinal adenopathy, L lung consolidation , L effusion  CT otherwise negative  Pleural fluid cytology is pending    PET and MRI are indicated for complete staging  She has a poor ECOG PS , largely due to SOB from the effusion and Left lung consolidation  We are not able to get an inpatient PET  We discussed with her daughter that this is an aggressive malignancy. If this is limited stage then likelihood of cure with chemoRT is 20%. If this is extensive stage then this is incurable. With a poor PS , and inability to lie flat she may not be able to receive palliatiev RT as discussed with Dr. Sarah Carrero  If she fails britney sim today we will consider starting reduced dose chemotherapy in the hopes of a quick response to allow for more definitive staging and treatments. If she does not tolerate treatments will discuss supportive measures    2) Respiratory failure. Secondary to above, effusion. Chest tube in place to suction. Pulmonology and Thoracic Surgery are following. Cytology from effusion remains pending. 3) Atrial Fibrillation with RVR. Now rate controlled on PO amiodarone. Cardiology following. 4) GABINO secondary to ATN. Nephrology is following. Creatinine today 2.78. 5) Dementia.       Plan:     · Consult to Rad/Onc placed- discussed with Dr. Jong Blum  · Outpatient PET scan to determine staging  · MRI brain if able to lay flat  · Consult palliative care  · Reassess tomorrow after RT sim    I appreciate the opportunity to participate in Ms. Faith Almazan's care. Primary contact: Royce Andrew (daughter) [phone]    I performed a history and physical examination of the patient and discussed his management with the NPP.  I reviewed the NPP note and agree with the documented findings and plan of care      Note was appropriate edited, patient has an ECOG of 3 largely due to SOB  Has R hand edema  Daughter at bedside    Signed By: Tresia Burkitt, MD

## 2021-08-05 NOTE — PROGRESS NOTES
0730: Bedside shift change report given to Kori Nieves (oncoming nurse) by Valerie Patel (offgoing nurse). Report included the following information SBAR, Kardex, MAR, Recent Results and Cardiac Rhythm SR; 1AVB; PVC; PAC.     1930: Bedside shift change report given to Real Diaz (oncoming nurse) by Kori Nieves (offgoing nurse). Report included the following information SBAR, Kardex, MAR, Recent Results and Cardiac Rhythm SR; 1AVB. Care plan:     Problem: Breathing Pattern - Ineffective  Goal: *Absence of hypoxia  Outcome: Progressing Towards Goal  Goal: *Use of effective breathing techniques  Outcome: Progressing Towards Goal  Goal: *PALLIATIVE CARE:  Alleviation of Dyspnea  Outcome: Progressing Towards Goal     Problem: Patient Education: Go to Patient Education Activity  Goal: Patient/Family Education  Outcome: Progressing Towards Goal     Problem: Pressure Injury - Risk of  Goal: *Prevention of pressure injury  Description: Document Ferdinand Scale and appropriate interventions in the flowsheet.   Outcome: Progressing Towards Goal  Note: Pressure Injury Interventions:  Sensory Interventions: Assess changes in LOC, Minimize linen layers, Keep linens dry and wrinkle-free    Moisture Interventions: Absorbent underpads, Apply protective barrier, creams and emollients    Activity Interventions: Increase time out of bed, Pressure redistribution bed/mattress(bed type)    Mobility Interventions: HOB 30 degrees or less, Pressure redistribution bed/mattress (bed type)    Nutrition Interventions: Document food/fluid/supplement intake, Discuss nutritional consult with provider    Friction and Shear Interventions: HOB 30 degrees or less, Apply protective barrier, creams and emollients                Problem: Patient Education: Go to Patient Education Activity  Goal: Patient/Family Education  Outcome: Progressing Towards Goal     Problem: Falls - Risk of  Goal: *Absence of Falls  Description: Document Norma Fall Risk and appropriate interventions in the flowsheet.   Outcome: Progressing Towards Goal  Note: Fall Risk Interventions:  Mobility Interventions: OT consult for ADLs, Patient to call before getting OOB, PT Consult for mobility concerns, PT Consult for assist device competence, Utilize walker, cane, or other assistive device    Mentation Interventions: Bed/chair exit alarm, Adequate sleep, hydration, pain control, Increase mobility, More frequent rounding, Reorient patient    Medication Interventions: Teach patient to arise slowly, Patient to call before getting OOB    Elimination Interventions: Bed/chair exit alarm, Call light in reach              Problem: Patient Education: Go to Patient Education Activity  Goal: Patient/Family Education  Outcome: Progressing Towards Goal     Problem: Patient Education: Go to Patient Education Activity  Goal: Patient/Family Education  Outcome: Progressing Towards Goal     Problem: Patient Education: Go to Patient Education Activity  Goal: Patient/Family Education  Outcome: Progressing Towards Goal

## 2021-08-05 NOTE — PROGRESS NOTES
Thoracic Surgery Simple Progress Note    Admit Date: 2021  POD: 1 Day Post-Op      Procedure:  Procedure(s):  ENDOSCOPIC BRONCHOSCOPY ULTRASOUND (EBUS)  TRANSBRONCHIAL BIOPSY  BRONCHOSCOPY      Subjective:     Patient has complaints: No significant medical complaints    Review of Systems:  CARDIAC: positive for Afib w RVR  RESP: positive for pleural effusion, +tobacco abuse  NEURO:  negative  INCISION: Clean, dry, and intact  EXT: Denies new swelling or pain in the legs or calves. Objective:     Blood pressure (!) 167/61, pulse 76, temperature 97.9 °F (36.6 °C), resp. rate 18, height 5' 7\" (1.702 m), weight 197 lb 12 oz (89.7 kg), SpO2 100 %. Temp (24hrs), Av.2 °F (36.8 °C), Min:97.9 °F (36.6 °C), Max:98.4 °F (36.9 °C)        Hemodynamics    PAP    CO    CI    No intake/output data recorded.  1901 -  0700  In: 1250 [I.V.:1250]  Out: 490 [Urine:400]    EXAM:  GENERAL: VSS, afrible, alert and cooperative  HEART:  regular rate and rhythm  LUNG: diminished breath sounds L base, O2 sats 100% on 6L via NC. Left chest tube w >2L output.    NEURO:  normal without focal findings  mental status, speech normal, A&O x3  INCISION: Clean, dry, and intact  EXTREMITIES:Dec bilat LE edema  GI/: Abd soft, nontender. Voiding    Labs:  Recent Results (from the past 24 hour(s))   MAGNESIUM    Collection Time: 21  3:39 AM   Result Value Ref Range    Magnesium 2.2 1.6 - 2.4 mg/dL   PHOSPHORUS    Collection Time: 21  3:39 AM   Result Value Ref Range    Phosphorus 7.4 (H) 2.6 - 4.7 MG/DL   CBC WITH AUTOMATED DIFF    Collection Time: 21  3:39 AM   Result Value Ref Range    WBC 7.6 3.6 - 11.0 K/uL    RBC 3.58 (L) 3.80 - 5.20 M/uL    HGB 10.3 (L) 11.5 - 16.0 g/dL    HCT 33.1 (L) 35.0 - 47.0 %    MCV 92.5 80.0 - 99.0 FL    MCH 28.8 26.0 - 34.0 PG    MCHC 31.1 30.0 - 36.5 g/dL    RDW 13.8 11.5 - 14.5 %    PLATELET 707 149 - 286 K/uL    MPV 9.3 8.9 - 12.9 FL    NRBC 0.0 0  WBC    ABSOLUTE NRBC 0.00 0.00 - 0.01 K/uL    NEUTROPHILS 85 (H) 32 - 75 %    LYMPHOCYTES 6 (L) 12 - 49 %    MONOCYTES 8 5 - 13 %    EOSINOPHILS 0 0 - 7 %    BASOPHILS 0 0 - 1 %    IMMATURE GRANULOCYTES 1 (H) 0.0 - 0.5 %    ABS. NEUTROPHILS 6.4 1.8 - 8.0 K/UL    ABS. LYMPHOCYTES 0.5 (L) 0.8 - 3.5 K/UL    ABS. MONOCYTES 0.6 0.0 - 1.0 K/UL    ABS. EOSINOPHILS 0.0 0.0 - 0.4 K/UL    ABS. BASOPHILS 0.0 0.0 - 0.1 K/UL    ABS. IMM. GRANS. 0.1 (H) 0.00 - 0.04 K/UL    DF SMEAR SCANNED      PLATELET COMMENTS Large Platelets      RBC COMMENTS NORMOCYTIC, NORMOCHROMIC     METABOLIC PANEL, BASIC    Collection Time: 08/05/21  3:39 AM   Result Value Ref Range    Sodium 136 136 - 145 mmol/L    Potassium 4.1 3.5 - 5.1 mmol/L    Chloride 98 97 - 108 mmol/L    CO2 31 21 - 32 mmol/L    Anion gap 7 5 - 15 mmol/L    Glucose 81 65 - 100 mg/dL    BUN 26 (H) 6 - 20 MG/DL    Creatinine 2.78 (H) 0.55 - 1.02 MG/DL    BUN/Creatinine ratio 9 (L) 12 - 20      GFR est AA 20 (L) >60 ml/min/1.73m2    GFR est non-AA 16 (L) >60 ml/min/1.73m2    Calcium 9.0 8.5 - 10.1 MG/DL   CHLORIDE, URINE RANDOM    Collection Time: 08/05/21  3:39 AM   Result Value Ref Range    Chloride,urine random 71 MMOL/L   CREATININE, UR, RANDOM    Collection Time: 08/05/21  3:39 AM   Result Value Ref Range    Creatinine, urine 71.90 mg/dL   EOSINOPHILS, URINE    Collection Time: 08/05/21  3:39 AM   Result Value Ref Range    Eosinophils,urine Negative     SODIUM, UR, RANDOM    Collection Time: 08/05/21  3:39 AM   Result Value Ref Range    Sodium,urine random 81 MMOL/L       Assessment:   No evidence of DVT.   Active Problems:    Atrial fibrillation with rapid ventricular response (HCC) (7/29/2021)      Pleural effusion, left (8/3/2021)      Cytology/ PATH:     pending  Plan/Recommendations:   Bronch completed yesterday by Dr. Stephen Farris  CT to suction  Rec repeat CXR this AM  No immediate TS intervention planned at this time  Will follow along  See orders    LATIA Miller  8/5/2021

## 2021-08-05 NOTE — PROGRESS NOTES
Pulmonary, Critical Care, and Sleep Medicine    Progress Note    Name: Letty King MRN: [id number]   : 1940 [facility]   Date: 2021        IMPRESSION:   · Large left exudative effusion- worrisome for malignant effusion- awaiting cyto  · Mediastinal LAD- s/p EBUS on . Prelim SCC  · Acute hypoxic resp failure- Left Lung atx  · PAF/RVR  · HTN  · Tobacco use      RECOMMENDATIONS:   · Pigtail in place; 145 ml output in 24 hours; per flow chart. May require pleural catheter placement before discharge   · thoracic involved   · Lasix on hold   · Chest x-ray   · O2 titration above 90%   · Nebs     Subjective:        Confusion noted  No dyspnea        Breathing ok       Following CT scan it showed a left mass compressing the left mainstem; increase in pleural effusion       This patient has been seen and evaluated at the request of Dr. Emeli Rodriguez for Pleural effusion. Patient is a 80 y.o. female admitted with SOB and left effusion- tap revealed a lymphocytic exudate cyto pending. Pt less SOB and out of ICU. She is confused and doesn't dive much hx other than her SOB is better. Past Medical History:   Diagnosis Date    Cellulitis     R LEG    [facility]A [facility]cere[facility] vascular accident) ([facility] [address]     Hyperlipidemia     Hypertension       Past Surgical History:   Procedure Laterality Date    HX BLADDER SUSPENSION  2021    HX CHOLECYSTECTOMY      HX OTHER SURGICAL  10/2020    PARACENTESIS      Prior to Admission medications    Medication Sig Start Date End Date Taking? Authorizing Provider   albuterol (PROVENTIL HFA, VENTOLIN HFA, PROAIR HFA) 90 mcg/actuation inhaler Take 2 Puffs by inhalation every four (4) hours as needed for Wheezing for up to 360 days. 21 Yes Jacquelyn Dewitt MD   folic acid (FOLVITE) 528 mcg tablet Take 1 Tab by mouth daily. Indications: inadequate folic acid 00  Yes David Hernandez, NP   donepeziL (Aricept) 5 mg tablet Take 1 Tab by mouth daily. 2/24/21  Yes David Hernandez Q, NP   amLODIPine (NORVASC) 5 mg tablet Take 1 Tab by mouth daily. 2/24/21  Yes Roman Hernandez, NP   aspirin delayed-release 81 mg tablet Take 1 Tab by mouth daily. 2/24/21  Yes David Hernandez, NP   simvastatin (ZOCOR) 20 mg tablet Take 1 Tab by mouth nightly. 2/24/21  Yes Janiya Hernandez NP   acetaminophen (TYLENOL) 325 mg tablet Take 2 Tabs by mouth every six (6) hours as needed for Pain. 1/15/21  Yes Shakeel Noland PA-C   menthol (GOLD BOND PAIN RELIEVING EX) Apply 1 Applicator to affected area as needed. thin layer lower extremities   Yes Provider, Historical   naproxen sodium (Aleve) 220 mg cap Take 1 Tab by mouth daily as needed for Pain. Yes Provider, Historical     No Known Allergies   Social History     Tobacco Use    Smoking status: Current Every Day Smoker     Packs/day: 1.50     Years: 72.00     Pack years: 108.00    Smokeless tobacco: Never Used   Substance Use Topics    Alcohol use: Not Currently      Family History   Problem Relation Age of Onset    Anesth Problems Neg Hx         Current Facility-Administered Medications   Medication Dose Route Frequency    0.9% sodium chloride infusion  75 mL/hr IntraVENous CONTINUOUS    amiodarone (CORDARONE) tablet 400 mg  400 mg Oral DAILY    balsam peru-castor oiL (VENELEX) ointment   Topical BID    zinc oxide-cod liver oil (DESITIN) 40 % paste   Topical BID    miconazole (MICOTIN) 2 % powder   Topical BID    amLODIPine (NORVASC) tablet 5 mg  5 mg Oral DAILY    aspirin delayed-release tablet 81 mg  81 mg Oral DAILY    atorvastatin (LIPITOR) tablet 10 mg  10 mg Oral QHS    [Held by provider] furosemide (LASIX) injection 20 mg  20 mg IntraVENous BID    sodium chloride (NS) flush 5-40 mL  5-40 mL IntraVENous Q8H       Review of Systems:  Review of systems not obtained due to patient factors.     Objective:   Vital Signs:    Visit Vitals  BP (!) 167/61 (BP 1 Location: Left upper arm, BP Patient Position: At rest) Pulse 76   Temp 97.9 °F (36.6 °C)   Resp 18   Ht 5' 7\" (1.702 m)   Wt 89.7 kg (197 lb 12 oz)   SpO2 100%   BMI 30.97 kg/m²       O2 Device: Nasal cannula   O2 Flow Rate (L/min): 5 l/min   Temp (24hrs), Av.2 °F (36.8 °C), Min:97.9 °F (36.6 °C), Max:98.4 °F (36.9 °C)       Intake/Output:   Last shift:      701 - 1900  In: -   Out: 450 [Urine:400]  Last 3 shifts: 1901 - 700  In: 1250 [I.V.:1250]  Out: 490 [Urine:400]    Intake/Output Summary (Last 24 hours) at 2021 8504  Last data filed at 2021 0735  Gross per 24 hour   Intake 400 ml   Output 935 ml   Net -535 ml      Physical Exam:   General:  Alert, cooperative, no distress, appears stated age. Head:  Normocephalic, without obvious abnormality, atraumatic. NCAT no WOB,OnNC 02 no accessory use no abd paradox no cyanosis  Data review:     Recent Results (from the past 24 hour(s))   MAGNESIUM    Collection Time: 21  3:39 AM   Result Value Ref Range    Magnesium 2.2 1.6 - 2.4 mg/dL   PHOSPHORUS    Collection Time: 21  3:39 AM   Result Value Ref Range    Phosphorus 7.4 (H) 2.6 - 4.7 MG/DL   CBC WITH AUTOMATED DIFF    Collection Time: 21  3:39 AM   Result Value Ref Range    WBC 7.6 3.6 - 11.0 K/uL    RBC 3.58 (L) 3.80 - 5.20 M/uL    HGB 10.3 (L) 11.5 - 16.0 g/dL    HCT 33.1 (L) 35.0 - 47.0 %    MCV 92.5 80.0 - 99.0 FL    MCH 28.8 26.0 - 34.0 PG    MCHC 31.1 30.0 - 36.5 g/dL    RDW 13.8 11.5 - 14.5 %    PLATELET 836 620 - 983 K/uL    MPV 9.3 8.9 - 12.9 FL    NRBC 0.0 0  WBC    ABSOLUTE NRBC 0.00 0.00 - 0.01 K/uL    NEUTROPHILS 85 (H) 32 - 75 %    LYMPHOCYTES 6 (L) 12 - 49 %    MONOCYTES 8 5 - 13 %    EOSINOPHILS 0 0 - 7 %    BASOPHILS 0 0 - 1 %    IMMATURE GRANULOCYTES 1 (H) 0.0 - 0.5 %    ABS. NEUTROPHILS 6.4 1.8 - 8.0 K/UL    ABS. LYMPHOCYTES 0.5 (L) 0.8 - 3.5 K/UL    ABS. MONOCYTES 0.6 0.0 - 1.0 K/UL    ABS. EOSINOPHILS 0.0 0.0 - 0.4 K/UL    ABS. BASOPHILS 0.0 0.0 - 0.1 K/UL    ABS. IMM.  GRANS. 0.1 (H) 0.00 - 0.04 K/UL    DF SMEAR SCANNED      PLATELET COMMENTS Large Platelets      RBC COMMENTS NORMOCYTIC, NORMOCHROMIC     METABOLIC PANEL, BASIC    Collection Time: 08/05/21  3:39 AM   Result Value Ref Range    Sodium 136 136 - 145 mmol/L    Potassium 4.1 3.5 - 5.1 mmol/L    Chloride 98 97 - 108 mmol/L    CO2 31 21 - 32 mmol/L    Anion gap 7 5 - 15 mmol/L    Glucose 81 65 - 100 mg/dL    BUN 26 (H) 6 - 20 MG/DL    Creatinine 2.78 (H) 0.55 - 1.02 MG/DL    BUN/Creatinine ratio 9 (L) 12 - 20      GFR est AA 20 (L) >60 ml/min/1.73m2    GFR est non-AA 16 (L) >60 ml/min/1.73m2    Calcium 9.0 8.5 - 10.1 MG/DL   CHLORIDE, URINE RANDOM    Collection Time: 08/05/21  3:39 AM   Result Value Ref Range    Chloride,urine random 71 MMOL/L   CREATININE, UR, RANDOM    Collection Time: 08/05/21  3:39 AM   Result Value Ref Range    Creatinine, urine 71.90 mg/dL   EOSINOPHILS, URINE    Collection Time: 08/05/21  3:39 AM   Result Value Ref Range    Eosinophils,urine Negative     SODIUM, UR, RANDOM    Collection Time: 08/05/21  3:39 AM   Result Value Ref Range    Sodium,urine random 81 MMOL/L       Imaging:  I have personally reviewed the patients radiographs and have reviewed the reports:  CTchest with MEd LADnathan Left marge effusion        Cali Jara PA-C

## 2021-08-06 PROBLEM — C34.90 SMALL CELL LUNG CANCER (HCC): Status: ACTIVE | Noted: 2021-01-01

## 2021-08-06 NOTE — PROGRESS NOTES
Physical Therapy - defer  Chart reviewed in prep for therapy session, RN cleared for activity. Arrived to patient nauseas, requesting emesis bag. PCT provided. Patient politely declined therapy activity. Will follow.

## 2021-08-06 NOTE — PROGRESS NOTES
Chart reviewed and noted MD recommendation for palliative with tentative inpatient chemotherapy for Monday 8/9/2021. Attempted to see patient for OT intervention however patient politely declining any OOB or in bed activity at this time. Will follow up for OT Monday as able. Thank you.

## 2021-08-06 NOTE — ACP (ADVANCE CARE PLANNING)
Advance Care Planning     Advance Care Planning (ACP) Physician/NP/PA Conversation      Date of Conversation: 7/29/2021  Conducted with: Healthcare Decision Maker: Named in Advance Directive or Healthcare Power of David . Patient was in the room but I do not think she is able to make complex decisions. Healthcare Decision Maker:     Primary Decision Maker: Alberto Atkins - Daughter - [phone]  Click here to complete [address] including selection of the Healthcare Decision Maker Relationship (ie \"Primary\")      Care Preferences:    Hospitalization: \"If your health worsens and it becomes clear that your chance of recovery is unlikely, what would be your preference regarding hospitalization? \"  The patient would prefer hospitalization. Ventilation: \"If you were unable to breathe on your own and your chance of recovery was unlikely, what would be your preference about the use of a ventilator (breathing machine) if it was available to you? \"   The patient would desire the use of a ventilator. Resuscitation: \"In the event your heart stopped as a result of an underlying serious health condition, would you want attempts to be made to restart your heart, or would you prefer a natural death? \"   Yes, attempt to resuscitate. Additional topics discussed: treatment goals, benefit/burden of treatment options, ventilation preferences and resuscitation preferences    Conversation Outcomes / Follow-Up Plan:   ACP complete - no further action today  Reviewed DNR/DNI and patient elects Full Code (Attempt Resuscitation)       I had a prolonged discussion with patient's daughter on 8/5 and 8/6 -we discussed about the new diagnosis of lung cancer, respiratory failure requiring oxygen,renal failure, atrial fibrillation- unable to anticoagulate with blood in cough.  Discussed about multiorgan dysfunction- explained what resuscitation means in case of cardiac or respiratory arrest - intubation/ventilator,chest compression,shock etc ,poor outcome as she is currently very ill and may not come off ventilator. Patient's daughter said she want her to be full code for now, see how she does with the current treatment plan for cancer. She is open for palliative care involvement as well.     Length of Voluntary ACP Conversation in minutes:  20 minutes    Sera Farr MD

## 2021-08-06 NOTE — PROGRESS NOTES
Pulmonary, Critical Care, and Sleep Medicine    Progress Note    Name: Macarena Sexton MRN: [id number]   : 1940 Hospital: [facility]   Date: 2021        IMPRESSION:   · Large left exudative effusion- worrisome for malignant effusion- awaiting cyto  · Mediastinal LAD- s/p EBUS on . Prelim SCC  · Acute hypoxic resp failure- Left Lung atx  · PAF/RVR  · HTN  · Tobacco use      RECOMMENDATIONS:   · Pigtail in place; 140 ml output in 24 hours; per flow chart. May require pleural catheter placement before discharge. · thoracic involved   · Lasix on hold   · Chest x-ray pending today to evaluate the PTX   · O2 titration above 90%   · Nebs  · PRN over the weekend      Subjective:     8.6  With a small apical ptx on previous        Confusion noted  No dyspnea        Breathing ok       Following CT scan it showed a left mass compressing the left mainstem; increase in pleural effusion       This patient has been seen and evaluated at the request of Dr. Mando Han for Pleural effusion. Patient is a 80 y.o. female admitted with SOB and left effusion- tap revealed a lymphocytic exudate cyto pending. Pt less SOB and out of ICU. She is confused and doesn't dive much hx other than her SOB is better. Past Medical History:   Diagnosis Date    Cellulitis     R LEG    CVA [facility]cere[facility] vascular accident[facility] [address]     Hyperlipidemia     Hypertension       Past Surgical History:   Procedure Laterality Date    HX BLADDER SUSPENSION  2021    HX CHOLECYSTECTOMY      HX OTHER SURGICAL  10/2020    PARACENTESIS      Prior to Admission medications    Medication Sig Start Date End Date Taking? Authorizing Provider   albuterol (PROVENTIL HFA, VENTOLIN HFA, PROAIR HFA) 90 mcg/actuation inhaler Take 2 Puffs by inhalation every four (4) hours as needed for Wheezing for up to 360 days. 21 Yes Subhash Alejandre MD   folic acid (FOLVITE) 680 mcg tablet Take 1 Tab by mouth daily.  Indications: inadequate folic acid 9/65/71  Yes David Hernandez Q, NP   donepeziL (Aricept) 5 mg tablet Take 1 Tab by mouth daily. 2/24/21  Yes David Hernandez, NP   amLODIPine (NORVASC) 5 mg tablet Take 1 Tab by mouth daily. 2/24/21  Yes Taisha Hernandez Q, NP   aspirin delayed-release 81 mg tablet Take 1 Tab by mouth daily. 2/24/21  Yes David Hernandez, NP   simvastatin (ZOCOR) 20 mg tablet Take 1 Tab by mouth nightly. 2/24/21  Yes Munira Hernandez, NP   acetaminophen (TYLENOL) 325 mg tablet Take 2 Tabs by mouth every six (6) hours as needed for Pain. 1/15/21  Yes Parker Noland PA-C   menthol (GOLD BOND PAIN RELIEVING EX) Apply 1 Applicator to affected area as needed. thin layer lower extremities   Yes Provider, Historical   naproxen sodium (Aleve) 220 mg cap Take 1 Tab by mouth daily as needed for Pain. Yes Provider, Historical     No Known Allergies   Social History     Tobacco Use    Smoking status: Current Every Day Smoker     Packs/day: 1.50     Years: 72.00     Pack years: 108.00    Smokeless tobacco: Never Used   Substance Use Topics    Alcohol use: Not Currently      Family History   Problem Relation Age of Onset    Anesth Problems Neg Hx         Current Facility-Administered Medications   Medication Dose Route Frequency    amiodarone (CORDARONE) tablet 400 mg  400 mg Oral DAILY    balsam peru-castor oiL (VENELEX) ointment   Topical BID    zinc oxide-cod liver oil (DESITIN) 40 % paste   Topical BID    miconazole (MICOTIN) 2 % powder   Topical BID    amLODIPine (NORVASC) tablet 5 mg  5 mg Oral DAILY    aspirin delayed-release tablet 81 mg  81 mg Oral DAILY    atorvastatin (LIPITOR) tablet 10 mg  10 mg Oral QHS    [Held by provider] furosemide (LASIX) injection 20 mg  20 mg IntraVENous BID    sodium chloride (NS) flush 5-40 mL  5-40 mL IntraVENous Q8H       Review of Systems:  Review of systems not obtained due to patient factors.     Objective:   Vital Signs:    Visit Vitals  BP (!) 150/59 (BP 1 Location: Right upper arm, BP Patient Position: At rest)   Pulse 70   Temp 98.1 °F (36.7 °C)   Resp 22   Ht 5' 7\" (1.702 m)   Wt 89.8 kg (197 lb 15.6 oz)   SpO2 92%   BMI 31.01 kg/m²       O2 Device: Nasal cannula   O2 Flow Rate (L/min): 5 l/min   Temp (24hrs), Av.3 °F (36.8 °C), Min:97.9 °F (36.6 °C), Max:98.9 °F (37.2 °C)       Intake/Output:   Last shift:      No intake/output data recorded. Last 3 shifts:  1901 -  0700  In: 1168.8 [P.O.:700; I.V.:468.8]  Out: 1324 [Urine:1150]    Intake/Output Summary (Last 24 hours) at 2021 1040  Last data filed at 2021 0020  Gross per 24 hour   Intake 1168.75 ml   Output 399 ml   Net 769.75 ml      Physical Exam:   General:  Alert, cooperative, no distress, appears stated age. Head:  Normocephalic, without obvious abnormality, atraumatic. NCAT no WOB,OnNC 02 no accessory use no abd paradox no cyanosis  Data review:     Recent Results (from the past 24 hour(s))   MAGNESIUM    Collection Time: 21 12:33 AM   Result Value Ref Range    Magnesium 2.1 1.6 - 2.4 mg/dL   PHOSPHORUS    Collection Time: 21 12:33 AM   Result Value Ref Range    Phosphorus 5.3 (H) 2.6 - 4.7 MG/DL   SAMPLES BEING HELD    Collection Time: 21 12:33 AM   Result Value Ref Range    SAMPLES BEING HELD 1LAV     COMMENT        Add-on orders for these samples will be processed based on acceptable specimen integrity and analyte stability, which may vary by analyte.    RENAL FUNCTION PANEL    Collection Time: 21 12:33 AM   Result Value Ref Range    Sodium 138 136 - 145 mmol/L    Potassium 4.0 3.5 - 5.1 mmol/L    Chloride 100 97 - 108 mmol/L    CO2 30 21 - 32 mmol/L    Anion gap 8 5 - 15 mmol/L    Glucose 96 65 - 100 mg/dL    BUN 42 (H) 6 - 20 MG/DL    Creatinine 3.44 (H) 0.55 - 1.02 MG/DL    BUN/Creatinine ratio 12 12 - 20      GFR est AA 16 (L) >60 ml/min/1.73m2    GFR est non-AA 13 (L) >60 ml/min/1.73m2    Calcium 8.1 (L) 8.5 - 10.1 MG/DL    Phosphorus 5.4 (H) 2.6 - 4.7 MG/DL Albumin 1.9 (L) 3.5 - 5.0 g/dL       Imaging:  I have personally reviewed the patients radiographs and have reviewed the reports:  CTchest with MEd LADand Left marge effusion        Cali Quigley PA-C

## 2021-08-06 NOTE — PROGRESS NOTES
[facility]            Heart and Vascular [place]               Division of Cardiology  [phone]                       Progress note                                                   NAME:  Rick Gage   :   1940   MRN:   [id number]         ICD-10-CM ICD-9-CM    1. Pleural effusion on left  J90 511.9    2. Acute respiratory failure with hypoxia (HCC)  J96.01 518.81    3. Atrial fibrillation with rapid ventricular response (HCC)  I48.91 427.31    4. Nonsustained ventricular tachycardia (HCC)  I47.2 427.1    5. Sepsis, due to unspecified organism, unspecified whether acute organ dysfunction present ([facility] [address]  A41.9 038.9      995.91    6. Pleural effusion, left  J90 511.9    7. Small cell lung cancer (HCC)  C34.90 162.9                  Palpitations       80years old female with a past medical history remarkable for hypertension, hyperlipidemia, tobacco abuse, peripheral vascular disease, CVA, dementia who presented on account of worsening shortness of breath and hemoptysis.     Chest CT obtained as an outpatient revealed a total collapse of the left lung and a large pleural effusion and mediastinal lymphadenopathy.     Cardiology was consulted on account of atrial fibrillation with rapid ventricular response.     She still smokes 2 packs cigarettes a day. She moved from [place] to [place] to live with her daughter      Today, laying flat resting comfortably in NAD  She says she feels better at this point time in no acute distress. NSR on tele and stable. --will go ahead and reduce amio to 200mg daily     We are available to see again as needed. Assessment/Plan:     1. Atrial fibrillation: The patient remains at this time in normal sinus rhythm. - amiodarone 200 mg daily po  -Echo with preserved EF, mild TR        21    ECHO ADULT COMPLETE 2021    Interpretation Summary  · LV: Estimated LVEF is 60 - 65%. Visually measured ejection fraction.  Normal cavity size and systolic function (ejection fraction normal). Mild concentric hypertrophy. · Pericardium: Trivial pericardial effusion. · TV: Mild tricuspid valve regurgitation is present. Right Ventricular Arterial Pressure (RVSP) is 40 mmHg. Pulmonary hypertension not suggested by Doppler findings. · MV: Mitral annular calcification. Signed by: Jeanne Neri MD on 8/2/2021  3:17 PM        -TSH 3.12  Atrial fibrillation likely brought on by underlying significant pulmonary issues. 4. HTN  -BP borderline  -On amiodarone. CARDIAC STUDIES      07/29/21    ECHO ADULT COMPLETE 08/02/2021 8/2/2021    Interpretation Summary  · LV: Estimated LVEF is 60 - 65%. Visually measured ejection fraction. Normal cavity size and systolic function (ejection fraction normal). Mild concentric hypertrophy. · Pericardium: Trivial pericardial effusion. · TV: Mild tricuspid valve regurgitation is present. Right Ventricular Arterial Pressure (RVSP) is 40 mmHg. Pulmonary hypertension not suggested by Doppler findings. · MV: Mitral annular calcification. Signed by: Jeanne Neri MD on 8/2/2021  3:17 PM                       IMAGING      CT Results (most recent):  Results from [place] encounter on 07/29/21    CT ABD WO CONT    Narrative  EXAM:  CT ABD WO CONT    INDICATION: lung cancer    COMPARISON: Chest radiograph dated 4/20/2021, OLD CT chest abdomen pelvis  12/1/2020. TECHNIQUE: Helical CT of the abdomen only without IV or oral contrast. Coronal  and sagittal reformats were performed. CT dose reduction was achieved through  the use of a standardized protocol tailored for this examination and automatic  exposure control for dose modulation. FINDINGS: In the lower thorax, partially visualized right hydropneumothorax. Patchy dependent atelectasis right lung base. Redemonstrated is near-complete  left lung collapse with large pleural effusion.  Partially visualized left  pleural pigtail drainage catheter The cardiac size is stably enlarged. No  pericardial effusion. In the abdomen, there is no free air, free fluid, or lymphadenopathy. Cholecystectomy. . The unenhanced liver, spleen, adrenal glands, and pancreas are  within normal limits. No hydronephrosis. The stomach is not well distended. The visualized small bowel is not dilated. Diverticulosis coli. Atherosclerosis. The bones are age appropriate. Several healing left-sided rib fractures  Partially visualized bilateral breast implants. Stable Lobulated soft tissue  density surrounding the right implant, nonspecific    Impression  1. Right hydropneumothorax. Redemonstrated is near-complete left lung collapse  with large pleural effusion. Left pigtail drainage pleural catheter. I discussed this impression with Eri(patient's nurse) at 1820 hours on  8/4/2021. XR Results (most recent):  Results from Hospital Encounter encounter on 07/29/21    XR CHEST PORT    Narrative  EXAM: Portable CXR. 0957 hours. COMPARISON: 8/5/2021. INDICATION: ptx and pleural effusion    FINDINGS:  Small right apical pneumothorax is stable. Left hemithorax remains opacified. Small right pleural effusion is likely. Right lung shows no airspace  disease/edema. Heart is obscured. Impression  Stable right pneumothorax and opacified left chest.       MRI Results (most recent):  No results found for this or any previous visit.           Past Medical History:   Diagnosis Date    Cellulitis     R LEG    [facility]cere[facility] vascular accident) ([facility] [address]     Hyperlipidemia     Hypertension            Past Surgical History:   Procedure Laterality Date    HX BLADDER SUSPENSION  01/2021    HX CHOLECYSTECTOMY      HX OTHER SURGICAL  10/2020    PARACENTESIS               Visit Vitals  BP (!) 136/47   Pulse 77   Temp 98.1 °F (36.7 °C)   Resp 19   Ht 5' 7\" (1.702 m)   Wt 197 lb 15.6 oz (89.8 kg)   SpO2 95%   BMI 31.01 kg/m²         Wt Readings from Last 3 Encounters:   08/06/21 197 lb 15.6 oz (89.8 kg)   07/29/21 199 lb 15.3 oz (90.7 kg)   06/09/21 202 lb (91.6 kg)         Review of Systems:   Pertinent items are noted in the History of Present Illness. No intake/output data recorded. 08/04 1901 - 08/06 0700  In: 1168.8 [P.O.:700;  I.V.:468.8]  Out: 1324 [Urine:1150]      Telemetry: normal sinus rhythm    Physical Exam:    Neck: supple, no JVD  Heart: RRR, grade II/VI systolic murmur best at LUSB  Lungs: diminished breath sound L  Abdomen: soft, nontender, bowel sounds present  Extremities: no edema      Current Facility-Administered Medications   Medication Dose Route Frequency    hydrALAZINE (APRESOLINE) 20 mg/mL injection 10 mg  10 mg IntraVENous Q6H PRN    lidocaine-EPINEPHrine (XYLOCAINE) 1 %-1:100,000 injection    PRN    amiodarone (CORDARONE) tablet 400 mg  400 mg Oral DAILY    balsam peru-castor oiL (VENELEX) ointment   Topical BID    zinc oxide-cod liver oil (DESITIN) 40 % paste   Topical BID    albuterol-ipratropium (DUO-NEB) 2.5 MG-0.5 MG/3 ML  3 mL Nebulization Q6H PRN    miconazole (MICOTIN) 2 % powder   Topical BID    amLODIPine (NORVASC) tablet 5 mg  5 mg Oral DAILY    aspirin delayed-release tablet 81 mg  81 mg Oral DAILY    atorvastatin (LIPITOR) tablet 10 mg  10 mg Oral QHS    [Held by provider] furosemide (LASIX) injection 20 mg  20 mg IntraVENous BID    oxyCODONE IR (ROXICODONE) tablet 5 mg  5 mg Oral Q6H PRN    sodium chloride (NS) flush 5-40 mL  5-40 mL IntraVENous Q8H    sodium chloride (NS) flush 5-40 mL  5-40 mL IntraVENous PRN    acetaminophen (TYLENOL) tablet 650 mg  650 mg Oral Q6H PRN    Or    acetaminophen (TYLENOL) suppository 650 mg  650 mg Rectal Q6H PRN    polyethylene glycol (MIRALAX) packet 17 g  17 g Oral DAILY PRN    ondansetron (ZOFRAN ODT) tablet 4 mg  4 mg Oral Q8H PRN    Or    ondansetron (ZOFRAN) injection 4 mg  4 mg IntraVENous Q6H PRN    nicotine (NICODERM CQ) 21 mg/24 hr patch 1 Patch  1 Patch TransDERmal DAILY PRN         All Cardiac Markers in the last 24 hours:  No results found for: CPK, CK, CKMMB, CKMB, RCK3, CKMBT, CKMBPOC, CKNDX, CKND1, ASHER, TROPT, TROIQ, JAKE, TROPT, TNIPOC, BNP, BNPP, BNPNT     Lab Results   Component Value Date/Time    Creatinine 3.44 (H) 08/06/2021 12:33 AM          Lab Results   Component Value Date/Time    CK 61 07/29/2021 11:00 PM    Troponin-I, Qt. <0.05 07/30/2021 03:50 AM         Lab Results   Component Value Date/Time    WBC 7.6 08/05/2021 03:39 AM    HGB 10.3 (L) 08/05/2021 03:39 AM    HCT 33.1 (L) 08/05/2021 03:39 AM    PLATELET 579 49/49/9976 03:39 AM    MCV 92.5 08/05/2021 03:39 AM         Lab Results   Component Value Date/Time    Sodium 138 08/06/2021 12:33 AM    Potassium 4.0 08/06/2021 12:33 AM    Chloride 100 08/06/2021 12:33 AM    CO2 30 08/06/2021 12:33 AM    Anion gap 8 08/06/2021 12:33 AM    Glucose 96 08/06/2021 12:33 AM    BUN 42 (H) 08/06/2021 12:33 AM    Creatinine 3.44 (H) 08/06/2021 12:33 AM    BUN/Creatinine ratio 12 08/06/2021 12:33 AM    GFR est AA 16 (L) 08/06/2021 12:33 AM    GFR est non-AA 13 (L) 08/06/2021 12:33 AM    Calcium 8.1 (L) 08/06/2021 12:33 AM         Lab Results   Component Value Date/Time    NT pro-BNP 1,466 (H) 07/29/2021 10:58 PM         No results found for: CHOL, CHOLPOCT, CHOLX, CHLST, CHOLV, HDL, HDLPOC, HDLP, LDL, LDLCPOC, LDLC, DLDLP, VLDLC, VLDL, TGLX, TRIGL, TRIGP, TGLPOCT, CHHD, CHHDX      Lab Results   Component Value Date/Time    ALT (SGPT) 28 07/29/2021 05:27 PM    Alk.  phosphatase 107 07/29/2021 05:27 PM    Bilirubin, total 0.5 07/29/2021 05:27 PM

## 2021-08-06 NOTE — PROGRESS NOTES
0730: Bedside shift change report given to Kori Nieves (oncoming nurse) by Javier Carcamo (offgoing nurse). Report included the following information SBAR, Kardex, MAR, Recent Results and Cardiac Rhythm SR; BBB; 1AVB. Around 1000: Pt traveled to CT with RN Yun/ While in CT, pt went into bigeminy. Pt appeared uncomfortable and restless. Upon arrival to unit, pt desaturated into low 80s. Pt increased to 6 L NC humidified. MD Jesus Niño notified. No new orders at this time. 1500: Verbal orders received from MD Jesus Niño for pt to travel without nurse and monitor. 1530: Yellow drainage saturated through pigtail drain dressing. New dressing applied. Will recheck to see if weeping continues. 1615: pt transported with transport tech to radiation. 1630: Left pigtail dressing saturated with yellow drainage. MD Jesus Niño notified. STAT CXR ordered for when pt returns from radiation    1930: Bedside shift change report given to Javier Carcamo (oncoming nurse) by Kori Nieves (offgoing nurse). Report included the following information SBAR, Kardex, MAR, Recent Results and Cardiac Rhythm SR; BBB; 1AVB.

## 2021-08-06 NOTE — CONSULTS
Palliative Medicine Consult  Garcia: [phone] ([phone])    Patient Name: Noah Eugene  YOB: 1940    Date of Initial Consult: August 6, 2021  Reason for Consult: Care decisions  Requesting Provider: Leslie Garcia  Primary Care Physician: Jim Kearns NP     SUMMARY:   Noah Eugene is a 80 y.o. female who was admitted on 7/29/2021 from home with a diagnosis of   GABINO,  afib with RVR, acute respiratory failure with hypoxia, postobstructive pneumonia/ large left effusion status post chest tube 8/2/21, pneumothorax, cellulitis bilateral lower ext, nonsustained ventricular tachycardia, sepsis, new diagnosis of small cell lung cancer. Plans for inpatient chemo and radiation if the patient is stable enough to tolerate. PMH:  hypertension, suspected small cell lung cancer, smoker, hyperlipidemia, CVA, cellulitis rt leg, dementia     Current medical issues leading to Palliative Medicine involvement include: Support with care decisions  Full code  +AMD  Primary contact: Bladimir Farias (daughter) [phone]   PALLIATIVE DIAGNOSES:   1. Shortness of breath  2. Cough  3. Hypoalbuminemia  4. Hypoxia     PLAN:   1. Patient seen without family present  2. She is profoundly debilitated and not engaged in discussion  3. She has no understanding of her condition and why she is admitted  4. Our visit prematurely ended when transport arrived to take her to radiation. I will follow-up on Tuesday  5. She denies pain and I did not appreciate much symptom burden. 6. Initial consult note routed to primary continuity provider and/or primary health care team members  7.  Communicated plan of care with: Palliative Laure [facility] Team     GOALS OF CARE / TREATMENT PREFERENCES:     GOALS OF CARE:  Patient/Health Care Proxy Stated Goals: Prolong life (Chemo and radiation)    TREATMENT PREFERENCES:   Code Status: Full Code    Advance Care Planning:  [] The [facility] Interdisciplinary Team has updated the ACP Navigator with Health Care Decision Maker and Patient Capacity      Primary Decision Maker: Ward Martínez - [phone]    Advance Care Planning 1/11/2021   Confirm Advance Directive Yes, on file   Does the patient have other document types -       Medical Interventions: Full interventions     Other Instructions:   Artificially Administered Nutrition: No feeding tube     Other:    As far as possible, the palliative care team has discussed with patient / health care proxy about goals of care / treatment preferences for patient. HISTORY:     History obtained from: Chart, team    CHIEF COMPLAINT: Patient admitted with aforementioned history and issues    HPI/SUBJECTIVE:    The patient is:   [x] Verbal and participatory  [] Non-participatory due to:   Limited due to medical condition  No complaints    Clinical Pain Assessment (nonverbal scale for severity on nonverbal patients):   Clinical Pain Assessment  Severity: 0          Duration: for how long has pt been experiencing pain (e.g., 2 days, 1 month, years)  Frequency: how often pain is an issue (e.g., several times per day, once every few days, constant)     FUNCTIONAL ASSESSMENT:     Palliative Performance Scale (PPS):  PPS: 40       PSYCHOSOCIAL/SPIRITUAL SCREENING:     Palliative IDT has assessed this patient for cultural preferences / practices and a referral made as appropriate to needs (Cultural Services, Patient Advocacy, Ethics, etc.)    Any spiritual / Hindu concerns:  [] Yes /  [] No    Caregiver Burnout:  [] Yes /  [] No /  [x] No Caregiver Present      Anticipatory grief assessment:   [] Normal  / [] Maladaptive       ESAS Anxiety: Anxiety: 0    ESAS Depression:          REVIEW OF SYSTEMS:     Positive and pertinent negative findings in ROS are noted above in HPI. The following systems were [x] reviewed / [] unable to be reviewed as noted in HPI  Other findings are noted below.   Systems: constitutional, ears/nose/mouth/throat, respiratory, gastrointestinal, genitourinary, musculoskeletal, integumentary, neurologic, psychiatric, endocrine. Positive findings noted below. Modified ESAS Completed by: provider   Fatigue: 7 Drowsiness: 0     Pain: 0   Anxiety: 0 Nausea: 0     Dyspnea: 1           Stool Occurrence(s): 1        PHYSICAL EXAM:     From RN flowsheet:  Wt Readings from Last 3 Encounters:   08/06/21 197 lb 15.6 oz (89.8 kg)   07/29/21 199 lb 15.3 oz (90.7 kg)   06/09/21 202 lb (91.6 kg)     Blood pressure (!) 136/47, pulse 72, temperature 98.1 °F (36.7 °C), resp. rate 19, height 5' 7\" (1.702 m), weight 197 lb 15.6 oz (89.8 kg), SpO2 95 %. Pain Scale 1: Numeric (0 - 10)  Pain Intensity 1: 0  Pain Onset 1: acute  Pain Location 1: Back  Pain Orientation 1: Left  Pain Description 1: Aching  Pain Intervention(s) 1: Medication (see MAR)  Last bowel movement, if known:     Constitutional: Ill-appearing  Eyes: pupils equal, anicteric  ENMT: no nasal discharge, moist mucous membranes  Cardiovascular:  distal pulses intact  Respiratory: breathing not labored, symmetric, cough  Gastrointestinal: soft non-tender  Musculoskeletal: no deformity, no tenderness to palpation  Skin: warm, dry  Neurologic: following simple commands  Psychiatric: Calm  Other:       HISTORY:     Active Problems:    Atrial fibrillation with rapid ventricular response (HCC) (7/29/2021)      Pleural effusion, left (8/3/2021)      Small cell lung cancer ([facility] (8/6/2021)      Past Medical History:   Diagnosis Date    Cellulitis     R LEG    CVA (cerebral vascular accident) ([facility] [address]     Hyperlipidemia     Hypertension       Past Surgical History:   Procedure Laterality Date    HX BLADDER SUSPENSION  01/2021    HX CHOLECYSTECTOMY      HX OTHER SURGICAL  10/2020    PARACENTESIS      Family History   Problem Relation Age of Onset    Anesth Problems Neg Hx       History reviewed, no pertinent family history.   Social History     Tobacco Use    Smoking status: Current Every Day Smoker     Packs/day: 1.50     Years: 72.00     Pack years: 108.00    Smokeless tobacco: Never Used   Substance Use Topics    Alcohol use: Not Currently     No Known Allergies   Current Facility-Administered Medications   Medication Dose Route Frequency    0.9% sodium chloride infusion  100 mL/hr IntraVENous CONTINUOUS    hydrALAZINE (APRESOLINE) 20 mg/mL injection 10 mg  10 mg IntraVENous Q6H PRN    lidocaine-EPINEPHrine (XYLOCAINE) 1 %-1:100,000 injection    PRN    amiodarone (CORDARONE) tablet 400 mg  400 mg Oral DAILY    balsam peru-castor oiL (VENELEX) ointment   Topical BID    zinc oxide-cod liver oil (DESITIN) 40 % paste   Topical BID    albuterol-ipratropium (DUO-NEB) 2.5 MG-0.5 MG/3 ML  3 mL Nebulization Q6H PRN    miconazole (MICOTIN) 2 % powder   Topical BID    amLODIPine (NORVASC) tablet 5 mg  5 mg Oral DAILY    aspirin delayed-release tablet 81 mg  81 mg Oral DAILY    atorvastatin (LIPITOR) tablet 10 mg  10 mg Oral QHS    oxyCODONE IR (ROXICODONE) tablet 5 mg  5 mg Oral Q6H PRN    sodium chloride (NS) flush 5-40 mL  5-40 mL IntraVENous Q8H    sodium chloride (NS) flush 5-40 mL  5-40 mL IntraVENous PRN    acetaminophen (TYLENOL) tablet 650 mg  650 mg Oral Q6H PRN    Or    acetaminophen (TYLENOL) suppository 650 mg  650 mg Rectal Q6H PRN    polyethylene glycol (MIRALAX) packet 17 g  17 g Oral DAILY PRN    ondansetron (ZOFRAN ODT) tablet 4 mg  4 mg Oral Q8H PRN    Or    ondansetron (ZOFRAN) injection 4 mg  4 mg IntraVENous Q6H PRN    nicotine (NICODERM CQ) 21 mg/24 hr patch 1 Patch  1 Patch TransDERmal DAILY PRN          LAB AND IMAGING FINDINGS:     Lab Results   Component Value Date/Time    WBC 7.6 08/05/2021 03:39 AM    HGB 10.3 (L) 08/05/2021 03:39 AM    PLATELET 087 88/72/9074 03:39 AM     Lab Results   Component Value Date/Time    Sodium 138 08/06/2021 12:33 AM    Potassium 4.0 08/06/2021 12:33 AM    Chloride 100 08/06/2021 12:33 AM    CO2 30 08/06/2021 12:33 AM    BUN 42 (H) 08/06/2021 12:33 AM    Creatinine 3.44 (H) 08/06/2021 12:33 AM    Calcium 8.1 (L) 08/06/2021 12:33 AM    Magnesium 2.1 08/06/2021 12:33 AM    Phosphorus 5.3 (H) 08/06/2021 12:33 AM    Phosphorus 5.4 (H) 08/06/2021 12:33 AM      Lab Results   Component Value Date/Time    Alk. phosphatase 107 07/29/2021 05:27 PM    Protein, total 6.4 07/30/2021 05:29 PM    Albumin 1.9 (L) 08/06/2021 12:33 AM    Globulin 5.5 (H) 07/29/2021 05:27 PM     Lab Results   Component Value Date/Time    INR 1.1 07/29/2021 05:27 PM    Prothrombin time 10.9 07/29/2021 05:27 PM    aPTT 28.6 08/04/2021 03:26 AM      No results found for: IRON, FE, TIBC, IBCT, PSAT, FERR   No results found for: PH, PCO2, PO2  No components found for: [place]   Lab Results   Component Value Date/Time    CK 61 07/29/2021 11:00 PM                Total time: 50 minutes  Counseling / coordination time, spent as noted above: 40 minutes  > 50% counseling / coordination?:  Yes    Prolonged service was provided for  []30 min   []75 min in face to face time in the presence of the patient, spent as noted above. Time Start:   Time End:   Note: this can only be billed with 92341 (initial) or 37936 (follow up). If multiple start / stop times, list each separately.

## 2021-08-06 NOTE — PROGRESS NOTES
[facility] [place] [address] [phone]  W: [phone]  F: [phone]    Reason for Visit:   Kian Garrett is a 80 y.o. female who is seen in consultation for new diagnosis of small cell carcinoma - UNSTAGED     Treatment History:   · No treatment YET FROM US    History of Present Illness:     Ms. Greg Sheppard is a 68-year-old female with history of hyperlipidemia, hypertension, and significant tobacco use with worsening shortness of breath as well as hemoptysis x1 episode. Initial chest x-ray and CT obtained revealed mediastinal lymphadenopathy as well as complete collapse of the left lung with large left pleural effusion/mucous plug in left mainstem bronchus. She was seen by Dr. Zaida Louis with Pulmonology and started on IV antibiotics for postobstructive pneumonia. He was also evaluated by cardiothoracic surgery. Chest tube was placed on 8/2/2021. She developed atrial fibrillation with RVR on 8/2/2021 and was seen by Cardiology. She was started on both amiodarone and heparin. TTE completed with ejection fraction 65%. She underwent EBUS with bronchoscopy on 8/4/2021 with Dr. Zaida Louis. He obtained 1 biopsy from station 7 node and 4 samples from left hilar mass. Pathology has resulted with small cell carcinoma. Cytology from effusion is still pending. She is alert this morning. Somewhat difficult time obtaining history due to dementia. I have reached out to her daughter Luz Kitchen - unable to connect/went to . She appears slightly dyspneic at rest.  She is on 5 L nasal cannula. She reports some pain on her right side. Given kidney function and creatinine of 2.78, will go ahead and obtain head CT for staging (versus brain MRI). She will also need a PET scan outpatient to determine limited versus extensive stage small cell. Interval History: I see Ms. Sienna Foy this morning. She appears fatigued.   She was seen by Dr. Mariela Gage from Radiation Oncology yesterday. The plan was for simulation this morning. Given extent of disease, we plan to administer systemic chemotherapy with carboplatin and etoposide. We had initially wanted to move forward with this today until patient was in V. tach. We are planning for Monday as long as patient stable.     Past Medical History:   Diagnosis Date    Cellulitis     R LEG    CVA (cerebral vascular accident) ([address]     Hyperlipidemia     Hypertension       Past Surgical History:   Procedure Laterality Date    HX BLADDER SUSPENSION  01/2021    HX CHOLECYSTECTOMY      HX OTHER SURGICAL  10/2020    PARACENTESIS      Social History     Tobacco Use    Smoking status: Current Every Day Smoker     Packs/day: 1.50     Years: 72.00     Pack years: 108.00    Smokeless tobacco: Never Used   Substance Use Topics    Alcohol use: Not Currently      Family History   Problem Relation Age of Onset    Anesth Problems Neg Hx      Current Facility-Administered Medications   Medication Dose Route Frequency    hydrALAZINE (APRESOLINE) 20 mg/mL injection 10 mg  10 mg IntraVENous Q6H PRN    lidocaine-EPINEPHrine (XYLOCAINE) 1 %-1:100,000 injection    PRN    amiodarone (CORDARONE) tablet 400 mg  400 mg Oral DAILY    balsam peru-castor oiL (VENELEX) ointment   Topical BID    zinc oxide-cod liver oil (DESITIN) 40 % paste   Topical BID    albuterol-ipratropium (DUO-NEB) 2.5 MG-0.5 MG/3 ML  3 mL Nebulization Q6H PRN    miconazole (MICOTIN) 2 % powder   Topical BID    amLODIPine (NORVASC) tablet 5 mg  5 mg Oral DAILY    aspirin delayed-release tablet 81 mg  81 mg Oral DAILY    atorvastatin (LIPITOR) tablet 10 mg  10 mg Oral QHS    [Held by provider] furosemide (LASIX) injection 20 mg  20 mg IntraVENous BID    oxyCODONE IR (ROXICODONE) tablet 5 mg  5 mg Oral Q6H PRN    sodium chloride (NS) flush 5-40 mL  5-40 mL IntraVENous Q8H    sodium chloride (NS) flush 5-40 mL  5-40 mL IntraVENous PRN    acetaminophen (TYLENOL) tablet 650 mg  650 mg Oral Q6H PRN    Or    acetaminophen (TYLENOL) suppository 650 mg  650 mg Rectal Q6H PRN    polyethylene glycol (MIRALAX) packet 17 g  17 g Oral DAILY PRN    ondansetron (ZOFRAN ODT) tablet 4 mg  4 mg Oral Q8H PRN    Or    ondansetron (ZOFRAN) injection 4 mg  4 mg IntraVENous Q6H PRN    nicotine (NICODERM CQ) 21 mg/24 hr patch 1 Patch  1 Patch TransDERmal DAILY PRN      No Known Allergies     Review of Systems: A complete review of systems was obtained, negative except as described above. Physical Exam:     Visit Vitals  BP (!) 150/59 (BP 1 Location: Right upper arm, BP Patient Position: At rest)   Pulse 77   Temp 98.1 °F (36.7 °C)   Resp 22   Ht 5' 7\" (1.702 m)   Wt 197 lb 15.6 oz (89.8 kg)   SpO2 92%   BMI 31.01 kg/m²     ECOG PS: 3  General: No distress  Eyes: PERRLA, anicteric sclerae  HENT: Atraumatic, OP clear  Neck: Supple  Lymphatic: No peripheral lymphadenopathy  Respiratory: Increased effort at rest  CV: HRR monitor  GI: Soft, nondistended  Skin: Warm, dry  Psych: Alert, pleasant, difficult to obtain history    Results:     Lab Results   Component Value Date/Time    WBC 7.6 08/05/2021 03:39 AM    HGB 10.3 (L) 08/05/2021 03:39 AM    HCT 33.1 (L) 08/05/2021 03:39 AM    PLATELET 377 43/72/6131 03:39 AM    MCV 92.5 08/05/2021 03:39 AM    ABS. NEUTROPHILS 6.4 08/05/2021 03:39 AM     Lab Results   Component Value Date/Time    Sodium 138 08/06/2021 12:33 AM    Potassium 4.0 08/06/2021 12:33 AM    Chloride 100 08/06/2021 12:33 AM    CO2 30 08/06/2021 12:33 AM    Glucose 96 08/06/2021 12:33 AM    BUN 42 (H) 08/06/2021 12:33 AM    Creatinine 3.44 (H) 08/06/2021 12:33 AM    GFR est AA 16 (L) 08/06/2021 12:33 AM    GFR est non-AA 13 (L) 08/06/2021 12:33 AM    Calcium 8.1 (L) 08/06/2021 12:33 AM     Lab Results   Component Value Date/Time    Bilirubin, total 0.5 07/29/2021 05:27 PM    ALT (SGPT) 28 07/29/2021 05:27 PM    Alk.  phosphatase 107 07/29/2021 05:27 PM    Protein, total 6.4 07/30/2021 05:29 PM    Albumin 1.9 (L) 08/06/2021 12:33 AM    Globulin 5.5 (H) 07/29/2021 05:27 PM     Records reviewed and summarized above. Radiology report(s) reviewed above. Pathology from 8/4/2021 left hilar node: Small cell carcinoma with necrosis    CT Abd WO 8/4/21:  IMPRESSION  1. Right hydropneumothorax. Redemonstrated is near-complete left lung collapse  with large pleural effusion. Left pigtail drainage pleural catheter. CT Chest 7/31/21:  IMPRESSION  1. Unchanged large left pleural effusion which has likely reaccumulated since  the prior thoracentesis. 2. Unchanged dense consolidation and collapse of the left lung with an abrupt  cut off of the left mainstem bronchus. The right hilum and right side of the  mediastinum appears consolidated which may be related to invasion and/or  confluent adenopathy. A large lymph node is seen in the left para-aortic space  that is unchanged. Findings are highly suspicious for malignancy. 3. New trace right pleural effusion. Assessment:   1) Small cell carcinoma. Unstaged  Mediastinal adenopathy, L lung consolidation , L effusion  CT otherwise negative  Pleural fluid cytology is benign but does not entirely r/o a malignant effusion. PET and MRI are indicated for complete staging  She has a poor ECOG PS , largely due to SOB from the effusion and Left lung consolidation  We are not able to get an inpatient PET  We discussed with her daughter that this is an aggressive malignancy. If this is limited stage then likelihood of cure with chemoRT is 20%. If this is extensive stage then this is incurable. With a poor PS , and inability to lie flat she may not be able to receive palliative RT as discussed with Dr. Arthur Roldan.     This would be attempted again Monday, we will add a lower dose of concurrent chemotheraoy for cycle 1 and once we ave her appropriately staged will decide on palliative chemo+ iimunotherapy vs continuing chemotherapy with RT    If however she tolerates treatments poorly with no improvement in O2 requirements will consider supportive measures only    2) Respiratory failure. Secondary to above, effusion. Chest tube in place to suction. Pulmonology and Thoracic Surgery are following. Cytology from effusion remains pending. 3) Atrial Fibrillation with RVR. Now rate controlled on PO amiodarone. Cardiology following. Now with episode of vtach. 4) GABINO secondary to ATN. Nephrology is following. Creatinine continues to trend up, now at 3.44. 5) Dementia. Plan:     · Plan for inpatient chemotherapy with Carboplatin/Etoposide - tentative Monday 8/9  · Tentative plan is Carboplatin AUC 3 day 1 and Etoposide 50 mg/m2 on days 1-3  · Amiodarone increases serum levels of Etoposide hence we are starting with a 50% dose reduction  · Dexamethasone and antiemetics per protocol. Avoid Zofran and use Compazine prn nausea  · It is noted that she is on Aricept hence limit Compazine to 5 mg every 6 hours as needed   · XRT per Dr. Mariela Gage - cycle 1 planned concurrently with chemotherapy, then if PS improves will need OP PET CT and additional plans  · NO central access needed for cycle 1 . If has poor venous access will consider PICC  · Palliative care support    I appreciate the opportunity to participate in Ms. Faith Almazan's care. Primary contact: Luz Kitchen (daughter) [phone]    I performed a history and physical examination of the patient and discussed his management with the NPP.  I reviewed the NPP note and agree with the documented findings and plan of care      Signed By: Starr Martínez MD

## 2021-08-06 NOTE — PROGRESS NOTES
Physician Progress Note      Ger Gonzalez  CSN #:                  [id number]  :                       1940  ADMIT DATE:       2021 7:11 PM  [address] [place] DATE:  RESPONDING  PROVIDER #:        Jose Antonio Sheikh MD          QUERY TEXT:    Patient admitted with pleural effusion requiring thoracentesis. If possible, please document in progress notes and d/c summary further specificity regarding the type/underlying cause of pleural effusion: The medical record reflects the following:  Risk Factors: smoker, mediastinal lymphadenopathy    Clinical Indicators:    H&P-  Large left pleural effusion with associated atelectasis, possible mucous plugging obstruction of left main bronchus on CT, etiology uncertain, suspect malignancy versus CHF  -Lasix added.  -Will need thoracentesis and fluid analysis. -Consider bronchoscopy with possible obstruction/mucous plug noted on CT scan of left main bronchus  -Nebulizers and chest PT ordered  -Continued on Zosyn while unable to exclude postobstructive pneumonia component pending further workup  --Lactic acid and procalcitonin ordered    US Thoracentesis -  Successful ultrasound guided left thoracentesis yielding approximately 1500 ml of fluid. Echo -  Left Ventricle Normal cavity size and systolic function (ejection fraction normal). Mild concentric hypertrophy. The estimated EF is 60 - 65%. Visually measured ejection fraction. Left Atrium Normal cavity size. Right Ventricle Normal cavity size and global systolic function. Right Atrium Normal cavity size. Aortic Valve Trileaflet valve structure, no stenosis and no regurgitation. Aortic valve sclerosis. Mitral Valve Normal valve structure and no stenosis. Mitral annular calcification. Trace regurgitation. Tricuspid Valve Normal valve structure and no stenosis. Mild regurgitation. Right Ventricular Arterial Pressure (RVSP) is 40 mmHg.  Pulmonary hypertension not suggested by Doppler findings. Pulmonic Valve Normal valve structure and no stenosis. Trace regurgitation. Aorta Normal aortic root. IVC/Hepatic Veins Normal central venous pressure (3 mmHg); IVC diameter is less than 21 mm and collapses more than 50% with respiration. Pericardium Trivial pericardial effusion noted. CXR 8/4-  1. Right hydropneumothorax with 17 mm maximum apical pleural separation and trace fluid  2.Redemonstrated left lung white out and leftward mediastinal shift (which is partially obscured). Left pleural pigtail drainage catheter. Cytology-  Pleural Fluid:- reactive mesothelial cells and abundant mature lymphocytes    Liseth PN 8/4-  Large Left Effusion / transudative : s/p CT    8/4 Pulmonary:   Large left exudative effusion- worrisome for malignant effusion- awaiting cyto    8/5 Hospitalist: Large left pleural effusion/Mediastinal Lymphadenopathy  Likely due ti malignant  S/p  thoracentesis 7/30, removed 1.5L effusion . Treatment: Thoracentesis, Echo, Lasix 20mg IV BID; Thank you,  Chaparro Bangura, RN, BSN, [facility], [place], [place]  Clinical Documentation  [phone] or [phone]  Options provided:  -- Empyema/Bacterial pleural effusion due to organism, if known, Please indicate type of organism.   -- Pleural effusion due to CHF  -- Hemothorax  -- Hydropneumothorax  -- Malignant pleural effusion due to small cell lung cancer  -- Other - I will add my own diagnosis  -- Disagree - Not applicable / Not valid  -- Disagree - Clinically unable to determine / Unknown  -- Refer to Clinical Documentation Reviewer    PROVIDER RESPONSE TEXT:    Pleural effusion likely due to malignancy    Query created by: Otilio Crigler on 8/5/2021 10:57 AM      Electronically signed by:  Luz Osman MD 8/6/2021 3:53 PM

## 2021-08-06 NOTE — PROGRESS NOTES
[facility]   [address] [facility], [address] [place] [address] Rd [address], [facility]  Phone: [phone]   [phone]       Nephrology Progress Note  Berlin Simmons     1940     [id number]  Date of Admission : 7/29/2021 08/06/21    CC: Follow up for ARF    Assessment and Plan   GABINO :  - suspected ATN --> from pre renal state + vanc/ zosyn toxicity   - renal US : unremarkable   - Urine Eos -ve ( low sensitivity test)  - Cr rising off IVF --> resume NS at 100 cc/ hr  - she is at risk for needing dialysis   - daily labs     Acute Resp failure   Large Left Effusion   Mediastinal adenopathy   Pneumothorax  Smoker   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  -Echo with preserved EF, mild TR      D/w pt and daughter at bedside      Interval History:  Seen and examined. Remains nonoliguric with 700 cc urine output in the last 24 hours. She was off IV fluids yesterday and her serum creatinine increased to 3.4 mg/dL. She  reports hemoptysis and nausea this morning    Review of Systems: A comprehensive review of systems was negative except for that written in the HPI.     Current Medications:   Current Facility-Administered Medications   Medication Dose Route Frequency    0.9% sodium chloride infusion  100 mL/hr IntraVENous CONTINUOUS    hydrALAZINE (APRESOLINE) 20 mg/mL injection 10 mg  10 mg IntraVENous Q6H PRN    lidocaine-EPINEPHrine (XYLOCAINE) 1 %-1:100,000 injection    PRN    amiodarone (CORDARONE) tablet 400 mg  400 mg Oral DAILY    balsam peru-castor oiL (VENELEX) ointment   Topical BID    zinc oxide-cod liver oil (DESITIN) 40 % paste   Topical BID    albuterol-ipratropium (DUO-NEB) 2.5 MG-0.5 MG/3 ML  3 mL Nebulization Q6H PRN    miconazole (MICOTIN) 2 % powder   Topical BID    amLODIPine (NORVASC) tablet 5 mg  5 mg Oral DAILY    aspirin delayed-release tablet 81 mg  81 mg Oral DAILY    atorvastatin (LIPITOR) tablet 10 mg  10 mg Oral QHS    oxyCODONE IR (ROXICODONE) tablet 5 mg  5 mg Oral Q6H PRN    sodium chloride (NS) flush 5-40 mL  5-40 mL IntraVENous Q8H    sodium chloride (NS) flush 5-40 mL  5-40 mL IntraVENous PRN    acetaminophen (TYLENOL) tablet 650 mg  650 mg Oral Q6H PRN    Or    acetaminophen (TYLENOL) suppository 650 mg  650 mg Rectal Q6H PRN    polyethylene glycol (MIRALAX) packet 17 g  17 g Oral DAILY PRN    ondansetron (ZOFRAN ODT) tablet 4 mg  4 mg Oral Q8H PRN    Or    ondansetron (ZOFRAN) injection 4 mg  4 mg IntraVENous Q6H PRN    nicotine (NICODERM CQ) 21 mg/24 hr patch 1 Patch  1 Patch TransDERmal DAILY PRN      No Known Allergies    Objective:  Vitals:    Vitals:    08/06/21 0617 08/06/21 0756 08/06/21 1023 08/06/21 1117   BP:  (!) 150/59  (!) 136/47   Pulse: 72 77 70 77   Resp:  22  19   Temp:  98.1 °F (36.7 °C)  98.1 °F (36.7 °C)   SpO2:  92%  95%   Weight:       Height:         Intake and Output:  No intake/output data recorded. 08/04 1901 - 08/06 0700  In: 1168.8 [P.O.:700; I.V.:468.8]  Out: 1324 [Urine:1150]    Physical Examination:  General:  Ill looking   HEENT: PERRL,+ Pallor , No Icterus  Neck: Supple,no mass palpable  Lungs : diminished   CVS: IRREGULAR, S1 S2 normal, No murmur   Abdomen: Soft, NT, BS +  Extremities: TRACE Edema         []    High complexity decision making was performed  []    Patient is at high-risk of decompensation with multiple organ involvement    Lab Data Personally Reviewed: I have reviewed all the pertinent labs, microbiology data and radiology studies during assessment.     Recent Labs     08/06/21  0033 08/05/21  0339 08/04/21  0326    136 135*   K 4.0 4.1 3.7    98 95*   CO2 30 31 33*   GLU 96 81 87   BUN 42* 26* 21*   CREA 3.44* 2.78* 2.45*   CA 8.1* 9.0 8.8   MG 2.1 2.2 2.1   PHOS 5.4*  5.3* 7.4* 5.6*   ALB 1.9*  --   --      Recent Labs     08/05/21  0339 08/04/21  0326   WBC 7.6 7.9   HGB 10.3* 10.7*   HCT 33.1* 34.2*    336     No results found for: [facility]  Lab Results   Component Value Date/Time    Culture result: Culture performed on Fluid swab specimen 08/02/2021 03:23 PM    Culture result: NO GROWTH 4 DAYS 08/02/2021 03:23 PM    Culture result:  08/01/2021 05:06 AM     MRSA NOT PRESENT. Apparent Staphylococus aureus (not MRSA noted). Culture result:  08/01/2021 05:06 AM     Screening of patient nares for MRSA is for surveillance purposes and, if positive, to facilitate isolation considerations in high risk settings. It is not intended for automatic decolonization interventions per se as regimens are not sufficiently effective to warrant routine use. Culture result: NO GROWTH 4 DAYS 07/30/2021 03:00 PM    Culture result: NO FUNGUS ISOLATED 3 DAYS 07/30/2021 03:00 PM     Recent Results (from the past 24 hour(s))   MAGNESIUM    Collection Time: 08/06/21 12:33 AM   Result Value Ref Range    Magnesium 2.1 1.6 - 2.4 mg/dL   PHOSPHORUS    Collection Time: 08/06/21 12:33 AM   Result Value Ref Range    Phosphorus 5.3 (H) 2.6 - 4.7 MG/DL   SAMPLES BEING HELD    Collection Time: 08/06/21 12:33 AM   Result Value Ref Range    SAMPLES BEING HELD 1LAV     COMMENT        Add-on orders for these samples will be processed based on acceptable specimen integrity and analyte stability, which may vary by analyte. RENAL FUNCTION PANEL    Collection Time: 08/06/21 12:33 AM   Result Value Ref Range    Sodium 138 136 - 145 mmol/L    Potassium 4.0 3.5 - 5.1 mmol/L    Chloride 100 97 - 108 mmol/L    CO2 30 21 - 32 mmol/L    Anion gap 8 5 - 15 mmol/L    Glucose 96 65 - 100 mg/dL    BUN 42 (H) 6 - 20 MG/DL    Creatinine 3.44 (H) 0.55 - 1.02 MG/DL    BUN/Creatinine ratio 12 12 - 20      GFR est AA 16 (L) >60 ml/min/1.73m2    GFR est non-AA 13 (L) >60 ml/min/1.73m2    Calcium 8.1 (L) 8.5 - 10.1 MG/DL    Phosphorus 5.4 (H) 2.6 - 4.7 MG/DL    Albumin 1.9 (L) 3.5 - 5.0 g/dL           I have reviewed the flowsheets. Chart and Pertinent Notes have been reviewed.    No change in PMH ,family and social history from Consult note.       Glynn Henson MD

## 2021-08-06 NOTE — PROGRESS NOTES
[address] [facility]    Hospitalist Progress Note  Arabella Mansfield MD  Answering service: [phone] OR   [phone] from in house phone     Date of service: 2021   Name: Burgess Wakefield  : 1940  MRN: [id number]  PCP: Dr. Phyllis Cadet, NP     Brief HPI and Hospital Course:       79-year-old female with a history of heavy tobacco use at least 2 packs a day since 13to 12years of age, hyperlipidemia, hypertension presented to the ED from [facility]  with reports of worsening shortness of breath over the last several days and 1 episode of sputum with scant blood-tinged.  She denies any ongoing hemoptysis.  She denies any fever or chills.  Denies any wheezing or chest tightness.  Denies any chest pain.  Chest x-ray was done and revealed left lung opacification.  CT of the chest was done revealed mediastinal lymphadenopathy, complete collapse of the left lung with large left pleural effusion and likely obstructing mucus in the left mainstem bronchus.  Vital signs were also notable for elevated heart rate as high as 180, mild elevation in temperature as high as 100.0, tachypnea with breathing in the 30s, and decreased SPO2 as low as 86 on room air.  She was placed on supplemental O2 with improvement in SPO2.  EKG was done was consistent with atrial fibrillation with RVR but was unremarkable for acute ischemic change.  Patient was initiated on amiodarone infusion and heparin as well.  She was also initiated on empiric antibiotics with Zosyn and vancomycin, and admitted to ICU for further evaluation and treatment.      improved after thoracentesis   Transferred out ICU on       Assessment/Plan     #Small cell lung cancer:  -FNA lymph node -Small cell carcinoma with tumor necrosis   -oncology following,plan for radiation therapy-first treatment today and oncology team planning for chemo on monday  -Will consider MRI brain when she is able to lie flat- currently on 6 lt oxygen    Acute Hypoxic Respiratory Failure:  -due to underlying malignancy,bronchial obstcution- CT-dense consolidation and collapse of the left lung with an abrupt cut off of the left mainstem bronchus  Received abx -Vanc and zosyn- 5 days  -check procal tomorrow, was wnl at admission. Acute renal failure:  -suspected ATN/pre renal state-recent abx -vanc/zosyn  On NS -100 cc/hr  Nephrology following  Creatinine continues to trend up    Large left pleural effusion/Mediastinal Lymphadenopathy  Likely due tue malignant  S/p  thoracentesis , removed 1.5L effusion . Negativel culture so far. CTS following - Chest tube with pig tail catheter placed 21 cont to drain pleural fluid. Cytology epnding. Atrial fibrillation with RVR now NSR-  New Dx, on amiodarone IV changed to PO 21,  Discussed with , patient high risk for bleeding due to cancer,recent hemoptysis - recommended against further anticoagulation  -will start 81 mg aspirin    Cellulitis Jason LE  -resolved  Recent DVT ultrasound negative,  received Zosyn and vancomycin    Hypertension Continue home amlodipine      Electrolyte imbalance : repleted  k  and Mag      1. Tobacco abuse -Nicotine patch ordered    CODE status: full  VTE prophylaxis: heparin  Discussed plan of care with Patient/Family and Nurse   Pre-admission lived at [address] [place] [address]   Discharge planning: home when w/u complete and medically stable >48Hrs     Subjective   Patient seen and examined chart reviewed. Daughter at bedside. Patient had hemoptysis today, O2 requirements upto 6 lt today. Remains sick.       Physical examination     Visit Vitals  BP (!) 136/47   Pulse 72   Temp 98.1 °F (36.7 °C)   Resp 19   Ht 5' 7\" (1.702 m)   Wt 89.8 kg (197 lb 15.6 oz)   SpO2 95%   BMI 31.01 kg/m²       Temp (24hrs), Av.2 °F (36.8 °C), Min:97.9 °F (36.6 °C), Max:98.6 °F (37 °C)      Intake/Output Summary (Last 24 hours) at 2021 7417  Last data filed at 2021 0020  Gross per 24 hour   Intake 220 ml   Output 350 ml   Net -130 ml       General:  Appears ill. HEENT Atraumatic,anicteric sclera,normal conjunctiva,EOMI           Neck: Supple             Lungs:  Decreased breath L side with Chest tube, using accessory muscles resp   Heart:  Regular rhythm, no murmur,S1,S2 wnl   Abdomen:   Soft, non-tender,non distended. Extremities: No edema           Neurologic: Oriented to person,place  Globally weak.      Data Reviewed:       Recent Labs     08/05/21  0339 08/04/21  0326   WBC 7.6 7.9   HGB 10.3* 10.7*   HCT 33.1* 34.2*    336     Recent Labs     08/06/21  0033 08/05/21 0339 08/04/21  0326    136 135*   K 4.0 4.1 3.7    98 95*   CO2 30 31 33*   GLU 96 81 87   BUN 42* 26* 21*   CREA 3.44* 2.78* 2.45*   CA 8.1* 9.0 8.8   MG 2.1 2.2 2.1   PHOS 5.4*  5.3* 7.4* 5.6*   ALB 1.9*  --   --        Medications reviewed  Current Facility-Administered Medications   Medication Dose Route Frequency    0.9% sodium chloride infusion  100 mL/hr IntraVENous CONTINUOUS    hydrALAZINE (APRESOLINE) 20 mg/mL injection 10 mg  10 mg IntraVENous Q6H PRN    lidocaine-EPINEPHrine (XYLOCAINE) 1 %-1:100,000 injection    PRN    amiodarone (CORDARONE) tablet 400 mg  400 mg Oral DAILY    balsam peru-castor oiL (VENELEX) ointment   Topical BID    zinc oxide-cod liver oil (DESITIN) 40 % paste   Topical BID    albuterol-ipratropium (DUO-NEB) 2.5 MG-0.5 MG/3 ML  3 mL Nebulization Q6H PRN    miconazole (MICOTIN) 2 % powder   Topical BID    amLODIPine (NORVASC) tablet 5 mg  5 mg Oral DAILY    aspirin delayed-release tablet 81 mg  81 mg Oral DAILY    atorvastatin (LIPITOR) tablet 10 mg  10 mg Oral QHS    oxyCODONE IR (ROXICODONE) tablet 5 mg  5 mg Oral Q6H PRN    sodium chloride (NS) flush 5-40 mL  5-40 mL IntraVENous Q8H    sodium chloride (NS) flush 5-40 mL  5-40 mL IntraVENous PRN    acetaminophen (TYLENOL) tablet 650 mg  650 mg Oral Q6H PRN    Or    acetaminophen (TYLENOL) suppository 650 mg  650 mg Rectal Q6H PRN    polyethylene glycol (MIRALAX) packet 17 g  17 g Oral DAILY PRN    ondansetron (ZOFRAN ODT) tablet 4 mg  4 mg Oral Q8H PRN    Or    ondansetron (ZOFRAN) injection 4 mg  4 mg IntraVENous Q6H PRN    nicotine (NICODERM CQ) 21 mg/24 hr patch 1 Patch  1 Patch TransDERmal DAILY PRN         Alfred Torres MD  Internal Medicine  8/6/2021

## 2021-08-07 NOTE — PROGRESS NOTES
[address] [facility]    Hospitalist Progress Note  Moshe Cruz MD  Answering service: [phone] OR   [phone] from in house phone     Date of service: 2021   Name: Alyssa Case  : 1940  MRN: [id number]  PCP: Dr. Nina Orlando NP     Brief HPI and Hospital Course:       68-year-old female with a history of heavy tobacco use at least 2 packs a day since 13to 12years of age, hyperlipidemia, hypertension presented to the ED from [facility]  with reports of worsening shortness of breath over the last several days and 1 episode of sputum with scant blood-tinged.  She denies any ongoing hemoptysis.  She denies any fever or chills.  Denies any wheezing or chest tightness.  Denies any chest pain.  Chest x-ray was done and revealed left lung opacification.  CT of the chest was done revealed mediastinal lymphadenopathy, complete collapse of the left lung with large left pleural effusion and likely obstructing mucus in the left mainstem bronchus.  Vital signs were also notable for elevated heart rate as high as 180, mild elevation in temperature as high as 100.0, tachypnea with breathing in the 30s, and decreased SPO2 as low as 86 on room air.  She was placed on supplemental O2 with improvement in SPO2.  EKG was done was consistent with atrial fibrillation with RVR but was unremarkable for acute ischemic change.  Patient was initiated on amiodarone infusion and heparin as well.  She was also initiated on empiric antibiotics with Zosyn and vancomycin, and admitted to ICU for further evaluation and treatment.      improved after thoracentesis   Transferred out ICU on       Assessment/Plan     #Small cell lung cancer:  -FNA lymph node -Small cell carcinoma with tumor necrosis   -oncology following,plan for radiation therapy-first treatment  and oncology team planning for chemo on monday  -Will consider MRI brain when she is able to lie flat    Acute Hypoxic Respiratory Failure:  -due to underlying malignancy,bronchial obstcution- CT-dense consolidation and collapse of the left lung with an abrupt cut off of the left mainstem bronchus  Received abx -Vanc and zosyn- 5 days      Acute renal failure:  -suspected ATN/pre renal state-recent abx -vanc/zosyn  IVF changed per renal   Nephrology following  Creatinine stabilizing today 3.0    Large left pleural effusion/Mediastinal Lymphadenopathy  Likely due tue malignant  S/p  thoracentesis , removed 1.5L effusion . Negativel culture so far. CTS following - Chest tube with pig tail catheter placed 21,minimal output from chest tube   Cytology epnding. Atrial fibrillation with RVR now NSR-  New Dx, on amiodarone IV changed to PO 21,  Discussed with , patient high risk for bleeding due to cancer,recent hemoptysis - recommended against further anticoagulation  -on 81 mg aspirin    Cellulitis Jason LE  -resolved  Recent DVT ultrasound negative,  received Zosyn and vancomycin    Hypertension Continue home amlodipine      Electrolyte imbalance : repleted  k  and Mag      Tobacco abuse -Nicotine patch ordered      Remains ill, Guarded prognosis overall, patient's daughter aware    CODE status: full  VTE prophylaxis: heparin  Discussed plan of care with Patient/Family and Nurse   Pre-admission lived at [address] [place] [address].   Discharge planning: TBD     Subjective   Patient seen and examined chart reviewed. Remains on 6 lt oxygen ,denied any pain. Did not have any hemoptysis today. Remains sick.       Physical examination     Visit Vitals  BP (!) 153/50 (BP 1 Location: Right upper arm, BP Patient Position: At rest)   Pulse 78   Temp 98.2 °F (36.8 °C)   Resp 25   Ht 5' 7\" (1.702 m)   Wt 91.9 kg (202 lb 9.6 oz)   SpO2 92%   BMI 31.73 kg/m²       Temp (24hrs), Av.2 °F (36.8 °C), Min:97.5 °F (36.4 °C), Max:98.7 °F (37.1 °C)      Intake/Output Summary (Last 24 hours) at 2021 1434  Last data filed at 2021 1231  Gross per 24 hour   Intake 700 ml   Output 968 ml   Net -268 ml       General:  Appears ill. HEENT Atraumatic,anicteric sclera,normal conjunctiva,EOMI           Neck: Supple             Lungs:  Decreased breath L side with Chest tube, using accessory muscles resp   Heart:  Regular rhythm, no murmur,S1,S2 wnl   Abdomen:   Soft, non-tender,non distended. Extremities: No edema           Neurologic: Oriented to person,place  Globally weak.      Data Reviewed:       Recent Labs     08/05/21 0339   WBC 7.6   HGB 10.3*   HCT 33.1*        Recent Labs     08/07/21  0033 08/06/21  0033 08/05/21 0339    138 136   K 3.8 4.0 4.1    100 98   CO2 29 30 31   * 96 81   BUN 41* 42* 26*   CREA 3.64* 3.44* 2.78*   CA 8.5 8.1* 9.0   MG 2.0 2.1 2.2   PHOS 4.9*  4.9* 5.4*  5.3* 7.4*   ALB 1.9* 1.9*  --        Medications reviewed  Current Facility-Administered Medications   Medication Dose Route Frequency    hydrALAZINE (APRESOLINE) 20 mg/mL injection 10 mg  10 mg IntraVENous Q6H PRN    lidocaine-EPINEPHrine (XYLOCAINE) 1 %-1:100,000 injection    PRN    amiodarone (CORDARONE) tablet 400 mg  400 mg Oral DAILY    balsam peru-castor oiL (VENELEX) ointment   Topical BID    zinc oxide-cod liver oil (DESITIN) 40 % paste   Topical BID    albuterol-ipratropium (DUO-NEB) 2.5 MG-0.5 MG/3 ML  3 mL Nebulization Q6H PRN    miconazole (MICOTIN) 2 % powder   Topical BID    amLODIPine (NORVASC) tablet 5 mg  5 mg Oral DAILY    aspirin delayed-release tablet 81 mg  81 mg Oral DAILY    atorvastatin (LIPITOR) tablet 10 mg  10 mg Oral QHS    oxyCODONE IR (ROXICODONE) tablet 5 mg  5 mg Oral Q6H PRN    sodium chloride (NS) flush 5-40 mL  5-40 mL IntraVENous Q8H    sodium chloride (NS) flush 5-40 mL  5-40 mL IntraVENous PRN    acetaminophen (TYLENOL) tablet 650 mg  650 mg Oral Q6H PRN    Or    acetaminophen (TYLENOL) suppository 650 mg  650 mg Rectal Q6H PRN    polyethylene glycol (MIRALAX) packet 17 g  17 g Oral DAILY PRN    ondansetron (ZOFRAN ODT) tablet 4 mg  4 mg Oral Q8H PRN    Or    ondansetron (ZOFRAN) injection 4 mg  4 mg IntraVENous Q6H PRN    nicotine (NICODERM CQ) 21 mg/24 hr patch 1 Patch  1 Patch TransDERmal DAILY PRN         Juli Montenegro MD  Internal Medicine  8/7/2021

## 2021-08-07 NOTE — PROGRESS NOTES
Bedside and Verbal shift change report given to Barb Parnell RN (oncoming nurse) by Alondra Liu RN (offgoing nurse). Report included the following information SBAR, Kardex, Intake/Output, MAR, Recent Results, Med Rec Status and Cardiac Rhythm SR BBB 1st AVB.

## 2021-08-07 NOTE — PROGRESS NOTES
Problem: Breathing Pattern - Ineffective  Goal: *Absence of hypoxia  Outcome: Progressing Towards Goal  Goal: *Use of effective breathing techniques  Outcome: Progressing Towards Goal  Goal: *PALLIATIVE CARE:  Alleviation of Dyspnea  Outcome: Progressing Towards Goal     Problem: Pressure Injury - Risk of  Goal: *Prevention of pressure injury  Description: Document Ferdinand Scale and appropriate interventions in the flowsheet. Outcome: Progressing Towards Goal  Note: Pressure Injury Interventions:  Sensory Interventions: Assess changes in LOC, Check visual cues for pain, Discuss PT/OT consult with provider    Moisture Interventions: Apply protective barrier, creams and emollients, Check for incontinence Q2 hours and as needed, Internal/External urinary devices    Activity Interventions: Increase time out of bed, PT/OT evaluation    Mobility Interventions: HOB 30 degrees or less, PT/OT evaluation, Turn and reposition approx. every two hours(pillow and wedges)    Nutrition Interventions: Document food/fluid/supplement intake, Discuss nutritional consult with provider    Friction and Shear Interventions: Apply protective barrier, creams and emollients, Lift sheet, Minimize layers     Turning Q2h, ointment as ordered, apply barrier creams, lift sheet. Problem: Patient Education: Go to Patient Education Activity  Goal: Patient/Family Education  Outcome: Progressing Towards Goal     Problem: Falls - Risk of  Goal: *Absence of Falls  Description: Document Madison Fall Risk and appropriate interventions in the flowsheet.   Outcome: Progressing Towards Goal  Note: Fall Risk Interventions:  Mobility Interventions: Bed/chair exit alarm, Communicate number of staff needed for ambulation/transfer, OT consult for ADLs, Patient to call before getting OOB, PT Consult for mobility concerns, PT Consult for assist device competence    Mentation Interventions: Bed/chair exit alarm, Door open when patient unattended, Increase mobility, More frequent rounding    Medication Interventions: Bed/chair exit alarm, Evaluate medications/consider consulting pharmacy, Patient to call before getting OOB, Teach patient to arise slowly    Elimination Interventions: Bed/chair exit alarm, Call light in reach, Patient to call for help with toileting needs, Toileting schedule/hourly rounds    Call bell within reach, siderails upx3, bed alarm/chair alarm, turning Q2h, hourly rounds            Problem: Patient Education: Go to Patient Education Activity  Goal: Patient/Family Education  Outcome: Progressing Towards Goal     Problem: Patient Education: Go to Patient Education Activity  Goal: Patient/Family Education  Outcome: Progressing Towards Goal

## 2021-08-07 NOTE — PROGRESS NOTES
[facility]   [address] [facility], [address] [place] [address], [facility]  Phone: [phone]   [phone]       Nephrology Progress Note  Clarissa Osullivan     1940     [id number]  Date of Admission : 7/29/2021 08/07/21    CC: Follow up for ARF    Assessment and Plan   GABINO :  - suspected ATN --> from pre renal state + vanc/ zosyn toxicity   - renal US : unremarkable   - Urine Eos -ve ( low sensitivity test)  - Cr continue to up trended but non oliguric  - change IVF 0.45% NS @ 75 ml/hr  - daily labs     Acute Resp failure   Large Left Effusion   Mediastinal adenopathy   Pneumothorax  Smoker   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  -Echo with preserved EF, mild TR      D/w pt      Interval History:  Seen and examined. Remains nonoliguric with 1000 cc urine output in the last 24 hours. C/o feeling weak  Review of Systems: A comprehensive review of systems was negative except for that written in the HPI.     Current Medications:   Current Facility-Administered Medications   Medication Dose Route Frequency    0.45% sodium chloride infusion  75 mL/hr IntraVENous CONTINUOUS    hydrALAZINE (APRESOLINE) 20 mg/mL injection 10 mg  10 mg IntraVENous Q6H PRN    lidocaine-EPINEPHrine (XYLOCAINE) 1 %-1:100,000 injection    PRN    amiodarone (CORDARONE) tablet 400 mg  400 mg Oral DAILY    balsam peru-castor oiL (VENELEX) ointment   Topical BID    zinc oxide-cod liver oil (DESITIN) 40 % paste   Topical BID    albuterol-ipratropium (DUO-NEB) 2.5 MG-0.5 MG/3 ML  3 mL Nebulization Q6H PRN    miconazole (MICOTIN) 2 % powder   Topical BID    amLODIPine (NORVASC) tablet 5 mg  5 mg Oral DAILY    aspirin delayed-release tablet 81 mg  81 mg Oral DAILY    atorvastatin (LIPITOR) tablet 10 mg  10 mg Oral QHS    oxyCODONE IR (ROXICODONE) tablet 5 mg  5 mg Oral Q6H PRN    sodium chloride (NS) flush 5-40 mL  5-40 mL IntraVENous Q8H    sodium chloride (NS) flush 5-40 mL  5-40 mL IntraVENous PRN    acetaminophen (TYLENOL) tablet 650 mg  650 mg Oral Q6H PRN    Or    acetaminophen (TYLENOL) suppository 650 mg  650 mg Rectal Q6H PRN    polyethylene glycol (MIRALAX) packet 17 g  17 g Oral DAILY PRN    ondansetron (ZOFRAN ODT) tablet 4 mg  4 mg Oral Q8H PRN    Or    ondansetron (ZOFRAN) injection 4 mg  4 mg IntraVENous Q6H PRN    nicotine (NICODERM CQ) 21 mg/24 hr patch 1 Patch  1 Patch TransDERmal DAILY PRN      No Known Allergies    Objective:  Vitals:    Vitals:    08/07/21 0425 08/07/21 0848 08/07/21 1107 08/07/21 1521   BP: 127/68 (!) 148/50 (!) 153/50 (!) 144/44   Pulse: 81 78 78 80   Resp: 17 28 25 24   Temp: 97.5 °F (36.4 °C) 98 °F (36.7 °C) 98.2 °F (36.8 °C) 98.1 °F (36.7 °C)   SpO2: 97% 95% 92% 93%   Weight: 91.9 kg (202 lb 9.6 oz)      Height:         Intake and Output:  08/07 0701 - 08/07 1900  In: 480 [P.O.:480]  Out: 700 [Urine:700]  08/05 1901 - 08/07 0700  In: 680 [P.O.:680]  Out: 9724 [Urine:1300]    Physical Examination:  General:  Ill looking   HEENT: PERRL,+ Pallor , No Icterus  Neck: Supple,no mass palpable  Lungs : diminished   CVS: IRREGULAR, S1 S2 normal, No murmur   Abdomen: Soft, NT, BS +  Extremities: TRACE Edema         []    High complexity decision making was performed  []    Patient is at high-risk of decompensation with multiple organ involvement    Lab Data Personally Reviewed: I have reviewed all the pertinent labs, microbiology data and radiology studies during assessment.     Recent Labs     08/07/21  0033 08/06/21  0033 08/05/21  0339    138 136   K 3.8 4.0 4.1    100 98   CO2 29 30 31   * 96 81   BUN 41* 42* 26*   CREA 3.64* 3.44* 2.78*   CA 8.5 8.1* 9.0   MG 2.0 2.1 2.2   PHOS 4.9*  4.9* 5.4*  5.3* 7.4*   ALB 1.9* 1.9*  --      Recent Labs     08/05/21  0339   WBC 7.6   HGB 10.3*   HCT 33.1*        No results found for: SDES  Lab Results   Component Value Date/Time    Culture result: Culture performed on Fluid swab specimen 08/02/2021 03:23 PM    Culture result: NO GROWTH 4 DAYS 08/02/2021 03:23 PM    Culture result:  08/01/2021 05:06 AM     MRSA NOT PRESENT. Apparent Staphylococus aureus (not MRSA noted). Culture result:  08/01/2021 05:06 AM     Screening of patient nares for MRSA is for surveillance purposes and, if positive, to facilitate isolation considerations in high risk settings. It is not intended for automatic decolonization interventions per se as regimens are not sufficiently effective to warrant routine use. Culture result: NO GROWTH 4 DAYS 07/30/2021 03:00 PM    Culture result: NO FUNGUS ISOLATED 3 DAYS 07/30/2021 03:00 PM     Recent Results (from the past 24 hour(s))   MAGNESIUM    Collection Time: 08/07/21 12:33 AM   Result Value Ref Range    Magnesium 2.0 1.6 - 2.4 mg/dL   PHOSPHORUS    Collection Time: 08/07/21 12:33 AM   Result Value Ref Range    Phosphorus 4.9 (H) 2.6 - 4.7 MG/DL   RENAL FUNCTION PANEL    Collection Time: 08/07/21 12:33 AM   Result Value Ref Range    Sodium 139 136 - 145 mmol/L    Potassium 3.8 3.5 - 5.1 mmol/L    Chloride 103 97 - 108 mmol/L    CO2 29 21 - 32 mmol/L    Anion gap 7 5 - 15 mmol/L    Glucose 129 (H) 65 - 100 mg/dL    BUN 41 (H) 6 - 20 MG/DL    Creatinine 3.64 (H) 0.55 - 1.02 MG/DL    BUN/Creatinine ratio 11 (L) 12 - 20      GFR est AA 15 (L) >60 ml/min/1.73m2    GFR est non-AA 12 (L) >60 ml/min/1.73m2    Calcium 8.5 8.5 - 10.1 MG/DL    Phosphorus 4.9 (H) 2.6 - 4.7 MG/DL    Albumin 1.9 (L) 3.5 - 5.0 g/dL           I have reviewed the flowsheets. Chart and Pertinent Notes have been reviewed. No change in PMH ,family and social history from Consult note.       Marilyn Burnett MD

## 2021-08-08 NOTE — PROGRESS NOTES
[facility]   [address] [facility], [address] [place] [address], [facility]  Phone: [phone]   [phone]       Nephrology Progress Note  Burgess Wakefield     1940     [id number]  Date of Admission : 7/29/2021 08/08/21    CC: Follow up for ARF    Assessment and Plan   GABINO :  - suspected ATN --> from pre renal state + vanc/ zosyn toxicity   - renal US : unremarkable   - Urine Eos -ve ( low sensitivity test)  - Cr better 3.0  - change IVF 0.45% NS @ 50 ml/hr  - daily labs     Acute Resp failure   Large Left Effusion   Mediastinal adenopathy   Pneumothorax  Smoker   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  -Echo with preserved EF, mild TR      D/w pt and daughter     Interval History:  Seen and examined. Remains nonoliguric with 1200 cc urine output in the last 24 hours. C/o feeling weak  Review of Systems: A comprehensive review of systems was negative except for that written in the HPI.     Current Medications:   Current Facility-Administered Medications   Medication Dose Route Frequency    0.45% sodium chloride infusion  75 mL/hr IntraVENous CONTINUOUS    hydrALAZINE (APRESOLINE) 20 mg/mL injection 10 mg  10 mg IntraVENous Q6H PRN    lidocaine-EPINEPHrine (XYLOCAINE) 1 %-1:100,000 injection    PRN    amiodarone (CORDARONE) tablet 400 mg  400 mg Oral DAILY    balsam peru-castor oiL (VENELEX) ointment   Topical BID    zinc oxide-cod liver oil (DESITIN) 40 % paste   Topical BID    albuterol-ipratropium (DUO-NEB) 2.5 MG-0.5 MG/3 ML  3 mL Nebulization Q6H PRN    miconazole (MICOTIN) 2 % powder   Topical BID    amLODIPine (NORVASC) tablet 5 mg  5 mg Oral DAILY    aspirin delayed-release tablet 81 mg  81 mg Oral DAILY    atorvastatin (LIPITOR) tablet 10 mg  10 mg Oral QHS    oxyCODONE IR (ROXICODONE) tablet 5 mg  5 mg Oral Q6H PRN    sodium chloride (NS) flush 5-40 mL  5-40 mL IntraVENous Q8H    sodium chloride (NS) flush 5-40 mL  5-40 mL IntraVENous PRN    acetaminophen (TYLENOL) tablet 650 mg  650 mg Oral Q6H PRN    Or    acetaminophen (TYLENOL) suppository 650 mg  650 mg Rectal Q6H PRN    polyethylene glycol (MIRALAX) packet 17 g  17 g Oral DAILY PRN    ondansetron (ZOFRAN ODT) tablet 4 mg  4 mg Oral Q8H PRN    Or    ondansetron (ZOFRAN) injection 4 mg  4 mg IntraVENous Q6H PRN    nicotine (NICODERM CQ) 21 mg/24 hr patch 1 Patch  1 Patch TransDERmal DAILY PRN      No Known Allergies    Objective:  Vitals:    Vitals:    08/08/21 0409 08/08/21 0609 08/08/21 0947 08/08/21 1136   BP:   (!) 207/90 (!) 156/61   Pulse: 85 69 88 74   Resp:   29 18   Temp:   98.2 °F (36.8 °C) 98.3 °F (36.8 °C)   SpO2:   94% 94%   Weight:       Height:         Intake and Output:  No intake/output data recorded. 08/06 1901 - 08/08 0700  In: 1340 [P.O.:1340]  Out: 1664 [Urine:1650]    Physical Examination:  General:  Ill looking   HEENT: PERRL,+ Pallor , No Icterus  Neck: Supple,no mass palpable  Lungs : diminished   CVS: IRREGULAR, S1 S2 normal, No murmur   Abdomen: Soft, NT, BS +  Extremities: TRACE Edema         []    High complexity decision making was performed  []    Patient is at high-risk of decompensation with multiple organ involvement    Lab Data Personally Reviewed: I have reviewed all the pertinent labs, microbiology data and radiology studies during assessment.     Recent Labs     08/08/21 0412 08/07/21  0033 08/06/21  0033    139 138   K 4.0 3.8 4.0    103 100   CO2 29 29 30   * 129* 96   BUN 37* 41* 42*   CREA 3.00* 3.64* 3.44*   CA 8.9 8.5 8.1*   MG 1.9 2.0 2.1   PHOS 4.1 4.9*  4.9* 5.4*  5.3*   ALB 2.3* 1.9* 1.9*     Recent Labs     08/08/21  0412   WBC 10.5   HGB 11.3*   HCT 37.5        No results found for: SDES  Lab Results   Component Value Date/Time    Culture result: Culture performed on Fluid swab specimen 08/02/2021 03:23 PM    Culture result: NO GROWTH 4 DAYS 08/02/2021 03:23 PM    Culture result:  08/01/2021 05:06 AM     MRSA NOT PRESENT. Apparent Staphylococus aureus (not MRSA noted). Culture result:  08/01/2021 05:06 AM     Screening of patient nares for MRSA is for surveillance purposes and, if positive, to facilitate isolation considerations in high risk settings. It is not intended for automatic decolonization interventions per se as regimens are not sufficiently effective to warrant routine use. Culture result: NO GROWTH 4 DAYS 07/30/2021 03:00 PM    Culture result: NO FUNGUS ISOLATED 3 DAYS 07/30/2021 03:00 PM     Recent Results (from the past 24 hour(s))   CBC WITH AUTOMATED DIFF    Collection Time: 08/08/21  4:12 AM   Result Value Ref Range    WBC 10.5 3.6 - 11.0 K/uL    RBC 3.97 3.80 - 5.20 M/uL    HGB 11.3 (L) 11.5 - 16.0 g/dL    HCT 37.5 35.0 - 47.0 %    MCV 94.5 80.0 - 99.0 FL    MCH 28.5 26.0 - 34.0 PG    MCHC 30.1 30.0 - 36.5 g/dL    RDW 14.3 11.5 - 14.5 %    PLATELET 526 254 - 679 K/uL    MPV 9.6 8.9 - 12.9 FL    NRBC 0.0 0  WBC    ABSOLUTE NRBC 0.00 0.00 - 0.01 K/uL    NEUTROPHILS 80 (H) 32 - 75 %    LYMPHOCYTES 7 (L) 12 - 49 %    MONOCYTES 9 5 - 13 %    EOSINOPHILS 2 0 - 7 %    BASOPHILS 1 0 - 1 %    IMMATURE GRANULOCYTES 1 (H) 0.0 - 0.5 %    ABS. NEUTROPHILS 8.5 (H) 1.8 - 8.0 K/UL    ABS. LYMPHOCYTES 0.7 (L) 0.8 - 3.5 K/UL    ABS. MONOCYTES 0.9 0.0 - 1.0 K/UL    ABS. EOSINOPHILS 0.2 0.0 - 0.4 K/UL    ABS. BASOPHILS 0.1 0.0 - 0.1 K/UL    ABS. IMM.  GRANS. 0.1 (H) 0.00 - 0.04 K/UL    DF SMEAR SCANNED      PLATELET COMMENTS Large Platelets      RBC COMMENTS NORMOCYTIC, NORMOCHROMIC     RENAL FUNCTION PANEL    Collection Time: 08/08/21  4:12 AM   Result Value Ref Range    Sodium 136 136 - 145 mmol/L    Potassium 4.0 3.5 - 5.1 mmol/L    Chloride 103 97 - 108 mmol/L    CO2 29 21 - 32 mmol/L    Anion gap 4 (L) 5 - 15 mmol/L    Glucose 103 (H) 65 - 100 mg/dL    BUN 37 (H) 6 - 20 MG/DL    Creatinine 3.00 (H) 0.55 - 1.02 MG/DL    BUN/Creatinine ratio 12 12 - 20      GFR est AA 18 (L) >60 ml/min/1.73m2    GFR est non-AA 15 (L) >60 ml/min/1.73m2    Calcium 8.9 8.5 - 10.1 MG/DL    Phosphorus 4.1 2.6 - 4.7 MG/DL    Albumin 2.3 (L) 3.5 - 5.0 g/dL   MAGNESIUM    Collection Time: 08/08/21  4:12 AM   Result Value Ref Range    Magnesium 1.9 1.6 - 2.4 mg/dL           I have reviewed the flowsheets. Chart and Pertinent Notes have been reviewed. No change in PMH ,family and social history from Consult note.       Shelly Luque MD

## 2021-08-09 NOTE — PROGRESS NOTES
[facility]   [address] [facility], [address] [place] [address], [facility]  Phone: [phone]   [phone]       Nephrology Progress Note  Ra Mcdowell     1940     [id number]  Date of Admission : 7/29/2021 08/09/21    CC: Follow up for ARF    Assessment and Plan   GABINO :  - suspected ATN --> from pre renal state + vanc/ zosyn toxicity   - renal US : unremarkable   - UOP improving, Cr stable  - cont IVF - change to NS  - daily labs and I/Os    Acute Resp failure   Large Left Effusion   Mediastinal adenopathy   Pneumothorax  Smoker   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  -Echo with preserved EF, mild TR      D/w pt and daughter     Interval History:  Seen and examined. Remains nonoliguric, Cr 2.9. Nausea, no cp, sob, n/v/d    Review of Systems: A comprehensive review of systems was negative except for that written in the HPI.     Current Medications:   Current Facility-Administered Medications   Medication Dose Route Frequency    0.45% sodium chloride infusion  50 mL/hr IntraVENous CONTINUOUS    hydrALAZINE (APRESOLINE) 20 mg/mL injection 10 mg  10 mg IntraVENous Q6H PRN    lidocaine-EPINEPHrine (XYLOCAINE) 1 %-1:100,000 injection    PRN    amiodarone (CORDARONE) tablet 400 mg  400 mg Oral DAILY    balsam peru-castor oiL (VENELEX) ointment   Topical BID    zinc oxide-cod liver oil (DESITIN) 40 % paste   Topical BID    albuterol-ipratropium (DUO-NEB) 2.5 MG-0.5 MG/3 ML  3 mL Nebulization Q6H PRN    miconazole (MICOTIN) 2 % powder   Topical BID    amLODIPine (NORVASC) tablet 5 mg  5 mg Oral DAILY    aspirin delayed-release tablet 81 mg  81 mg Oral DAILY    atorvastatin (LIPITOR) tablet 10 mg  10 mg Oral QHS    oxyCODONE IR (ROXICODONE) tablet 5 mg  5 mg Oral Q6H PRN    sodium chloride (NS) flush 5-40 mL  5-40 mL IntraVENous Q8H    sodium chloride (NS) flush 5-40 mL  5-40 mL IntraVENous PRN    acetaminophen (TYLENOL) tablet 650 mg  650 mg Oral Q6H PRN    Or    acetaminophen (TYLENOL) suppository 650 mg  650 mg Rectal Q6H PRN    polyethylene glycol (MIRALAX) packet 17 g  17 g Oral DAILY PRN    ondansetron (ZOFRAN ODT) tablet 4 mg  4 mg Oral Q8H PRN    Or    ondansetron (ZOFRAN) injection 4 mg  4 mg IntraVENous Q6H PRN    nicotine (NICODERM CQ) 21 mg/24 hr patch 1 Patch  1 Patch TransDERmal DAILY PRN      No Known Allergies    Objective:  Vitals:    Vitals:    08/09/21 0610 08/09/21 0727 08/09/21 0916 08/09/21 1033   BP:  (!) 185/63 (!) 169/64    Pulse: 81 93 89 81   Resp:  23     Temp:  98.3 °F (36.8 °C)     SpO2:  91%     Weight:       Height:         Intake and Output:  No intake/output data recorded. 08/07 1901 - 08/09 0700  In: 3149.6 [P.O.:920; I.V.:2229.6]  Out: 1858 [Urine:1850]    Physical Examination:  General:  Ill looking   HEENT: PERRL,+ Pallor , No Icterus  Neck: Supple,no mass palpable  Lungs : diminished   CVS: IRREGULAR, S1 S2 normal, No murmur   Abdomen: Soft, NT, BS +  Extremities: TRACE Edema         []    High complexity decision making was performed  []    Patient is at high-risk of decompensation with multiple organ involvement    Lab Data Personally Reviewed: I have reviewed all the pertinent labs, microbiology data and radiology studies during assessment.     Recent Labs     08/09/21 0401 08/08/21 0412 08/07/21  0033    136 139   K 3.6 4.0 3.8    103 103   CO2 30 29 29   GLU 75 103* 129*   BUN 37* 37* 41*   CREA 2.94* 3.00* 3.64*   CA 9.0 8.9 8.5   MG 2.0 1.9 2.0   PHOS 4.2 4.1 4.9*  4.9*   ALB 2.1* 2.3* 1.9*     Recent Labs     08/09/21  0401 08/08/21 0412   WBC 10.2 10.5   HGB 11.3* 11.3*   HCT 36.5 37.5    379     No results found for: SDES  Lab Results   Component Value Date/Time    Culture result: Culture performed on Fluid swab specimen 08/02/2021 03:23 PM    Culture result: NO GROWTH 4 DAYS 08/02/2021 03:23 PM    Culture result:  08/01/2021 05:06 AM     MRSA NOT PRESENT. Apparent Staphylococus aureus (not MRSA noted). Culture result:  08/01/2021 05:06 AM     Screening of patient nares for MRSA is for surveillance purposes and, if positive, to facilitate isolation considerations in high risk settings. It is not intended for automatic decolonization interventions per se as regimens are not sufficiently effective to warrant routine use. Culture result: NO GROWTH 4 DAYS 07/30/2021 03:00 PM    Culture result: NO FUNGUS ISOLATED 3 DAYS 07/30/2021 03:00 PM     Recent Results (from the past 24 hour(s))   CBC WITH AUTOMATED DIFF    Collection Time: 08/09/21  4:01 AM   Result Value Ref Range    WBC 10.2 3.6 - 11.0 K/uL    RBC 3.96 3.80 - 5.20 M/uL    HGB 11.3 (L) 11.5 - 16.0 g/dL    HCT 36.5 35.0 - 47.0 %    MCV 92.2 80.0 - 99.0 FL    MCH 28.5 26.0 - 34.0 PG    MCHC 31.0 30.0 - 36.5 g/dL    RDW 14.6 (H) 11.5 - 14.5 %    PLATELET 617 571 - 652 K/uL    MPV 9.8 8.9 - 12.9 FL    NRBC 0.0 0  WBC    ABSOLUTE NRBC 0.00 0.00 - 0.01 K/uL    NEUTROPHILS 84 (H) 32 - 75 %    LYMPHOCYTES 6 (L) 12 - 49 %    MONOCYTES 7 5 - 13 %    EOSINOPHILS 1 0 - 7 %    BASOPHILS 1 0 - 1 %    IMMATURE GRANULOCYTES 1 (H) 0.0 - 0.5 %    ABS. NEUTROPHILS 8.6 (H) 1.8 - 8.0 K/UL    ABS. LYMPHOCYTES 0.6 (L) 0.8 - 3.5 K/UL    ABS. MONOCYTES 0.7 0.0 - 1.0 K/UL    ABS. EOSINOPHILS 0.1 0.0 - 0.4 K/UL    ABS. BASOPHILS 0.1 0.0 - 0.1 K/UL    ABS. IMM.  GRANS. 0.1 (H) 0.00 - 0.04 K/UL    DF SMEAR SCANNED      RBC COMMENTS ANISOCYTOSIS  1+       RENAL FUNCTION PANEL    Collection Time: 08/09/21  4:01 AM   Result Value Ref Range    Sodium 138 136 - 145 mmol/L    Potassium 3.6 3.5 - 5.1 mmol/L    Chloride 103 97 - 108 mmol/L    CO2 30 21 - 32 mmol/L    Anion gap 5 5 - 15 mmol/L    Glucose 75 65 - 100 mg/dL    BUN 37 (H) 6 - 20 MG/DL    Creatinine 2.94 (H) 0.55 - 1.02 MG/DL    BUN/Creatinine ratio 13 12 - 20      GFR est AA 19 (L) >60 ml/min/1.73m2    GFR est non-AA 15 (L) >60 ml/min/1.73m2    Calcium 9.0 8.5 - 10.1 MG/DL    Phosphorus 4.2 2.6 - 4.7 MG/DL    Albumin 2.1 (L) 3.5 - 5.0 g/dL   MAGNESIUM    Collection Time: 08/09/21  4:01 AM   Result Value Ref Range    Magnesium 2.0 1.6 - 2.4 mg/dL           I have reviewed the flowsheets. Chart and Pertinent Notes have been reviewed. No change in PMH ,family and social history from Consult note.       Nikolay Day MD

## 2021-08-09 NOTE — PROGRESS NOTES
Thoracic Surgery Simple Progress Note    Admit Date: 2021  POD: 5 Days Post-Op      Procedure:  Procedure(s):  ENDOSCOPIC BRONCHOSCOPY ULTRASOUND (EBUS)  TRANSBRONCHIAL BIOPSY  BRONCHOSCOPY      Subjective:     Patient has complaints: No significant medical complaints    Review of Systems:  CARDIAC: positive for Afib w RVR  RESP: positive for pleural effusion, +tobacco abuse  NEURO:  negative  INCISION: Clean, dry, and intact  EXT: Denies new swelling or pain in the legs or calves  Objective:     Blood pressure (!) 169/64, pulse 81, temperature 98.3 °F (36.8 °C), resp. rate 23, height 5' 7\" (1.702 m), weight 203 lb 0.7 oz (92.1 kg), SpO2 91 %. Temp (24hrs), Av.2 °F (36.8 °C), Min:98 °F (36.7 °C), Max:98.3 °F (36.8 °C)        Hemodynamics    PAP    CO    CI    No intake/output data recorded.  1901 -  0700  In: 3149.6 [P.O.:920; I.V.:2229.6]  Out: 1858 [Urine:1850]    EXAM:  GENERAL: VSS, appears frail & tired  HEART:  regular rate and rhythm  LUNG: diminished breath sounds Left. +CT w 3ml/24 hrs output. O2 sats 91%  On 5L via NC  NEURO:  normal without focal findings  mental status, speech normal, AxO x3  INCISION: Clean, dry, and intact  EXTREMITIES:No evidence of DVT seen on physical exam.  GI/: Abd soft, nontender. Voiding    Labs:  Recent Results (from the past 24 hour(s))   CBC WITH AUTOMATED DIFF    Collection Time: 21  4:01 AM   Result Value Ref Range    WBC 10.2 3.6 - 11.0 K/uL    RBC 3.96 3.80 - 5.20 M/uL    HGB 11.3 (L) 11.5 - 16.0 g/dL    HCT 36.5 35.0 - 47.0 %    MCV 92.2 80.0 - 99.0 FL    MCH 28.5 26.0 - 34.0 PG    MCHC 31.0 30.0 - 36.5 g/dL    RDW 14.6 (H) 11.5 - 14.5 %    PLATELET 526 607 - 900 K/uL    MPV 9.8 8.9 - 12.9 FL    NRBC 0.0 0  WBC    ABSOLUTE NRBC 0.00 0.00 - 0.01 K/uL    NEUTROPHILS 84 (H) 32 - 75 %    LYMPHOCYTES 6 (L) 12 - 49 %    MONOCYTES 7 5 - 13 %    EOSINOPHILS 1 0 - 7 %    BASOPHILS 1 0 - 1 %    IMMATURE GRANULOCYTES 1 (H) 0.0 - 0.5 %    ABS. NEUTROPHILS 8.6 (H) 1.8 - 8.0 K/UL    ABS. LYMPHOCYTES 0.6 (L) 0.8 - 3.5 K/UL    ABS. MONOCYTES 0.7 0.0 - 1.0 K/UL    ABS. EOSINOPHILS 0.1 0.0 - 0.4 K/UL    ABS. BASOPHILS 0.1 0.0 - 0.1 K/UL    ABS. IMM. GRANS. 0.1 (H) 0.00 - 0.04 K/UL    DF SMEAR SCANNED      RBC COMMENTS ANISOCYTOSIS  1+       RENAL FUNCTION PANEL    Collection Time: 08/09/21  4:01 AM   Result Value Ref Range    Sodium 138 136 - 145 mmol/L    Potassium 3.6 3.5 - 5.1 mmol/L    Chloride 103 97 - 108 mmol/L    CO2 30 21 - 32 mmol/L    Anion gap 5 5 - 15 mmol/L    Glucose 75 65 - 100 mg/dL    BUN 37 (H) 6 - 20 MG/DL    Creatinine 2.94 (H) 0.55 - 1.02 MG/DL    BUN/Creatinine ratio 13 12 - 20      GFR est AA 19 (L) >60 ml/min/1.73m2    GFR est non-AA 15 (L) >60 ml/min/1.73m2    Calcium 9.0 8.5 - 10.1 MG/DL    Phosphorus 4.2 2.6 - 4.7 MG/DL    Albumin 2.1 (L) 3.5 - 5.0 g/dL   MAGNESIUM    Collection Time: 08/09/21  4:01 AM   Result Value Ref Range    Magnesium 2.0 1.6 - 2.4 mg/dL       Assessment:   No evidence of DVT.   Active Problems:    Atrial fibrillation with rapid ventricular response (HCC) (7/29/2021)      Pleural effusion, left (8/3/2021)      Small cell lung cancer ([facility] [address] (8/6/2021)      PATH/cytology-  CYTOLOGIC INTERPRETATION:  Lymph Node, Left Hilar, EBUS- guided Fine needle aspiration and cell   block:     Small cell carcinoma with tumor necrosis (see comment)                                 Plan/Recommendations:   Chest tube removal today  Rest if care per Primary & oncology teams  Please reconsult if needed    Thank you for including us in the care of your patient    Isabel Scherer, [address]  8/9/2021

## 2021-08-09 NOTE — PROGRESS NOTES
[facility] [place] [address] [address], [address]  W: [phone]  F: [phone]    Reason for Visit:   Mik Saavedra is a 80 y.o. 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 Jerald Lebron is a 82-year-old female with history of hyperlipidemia, hypertension, and significant tobacco use with worsening shortness of breath as well as hemoptysis x1 episode. Initial chest x-ray and CT obtained revealed mediastinal lymphadenopathy as well as complete collapse of the left lung with large left pleural effusion/mucous plug in left mainstem bronchus. She was seen by Dr. Kevin Reynoso with Pulmonology and started on IV antibiotics for postobstructive pneumonia. He was also evaluated by cardiothoracic surgery. Chest tube was placed on 8/2/2021. She developed atrial fibrillation with RVR on 8/2/2021 and was seen by Cardiology. She was started on both amiodarone and heparin. TTE completed with ejection fraction 65%. She underwent EBUS with bronchoscopy on 8/4/2021 with Dr. Kevin Reynoso. He obtained 1 biopsy from station 7 node and 4 samples from left hilar mass. Pathology has resulted with small cell carcinoma. Cytology from effusion is still pending. She is alert this morning. Somewhat difficult time obtaining history due to dementia. I have reached out to her daughter Ruchi Brice - unable to connect/went to . She appears slightly dyspneic at rest.  She is on 5 L nasal cannula. She reports some pain on her right side. Given kidney function and creatinine of 2.78, will go ahead and obtain head CT for staging (versus brain MRI). She will also need a PET scan outpatient to determine limited versus extensive stage small cell. Interval History: I see Ms. Gopal Fung this morning with daughter present. Breakfast has just arrived, appetite has been decreased.  We reviewed chemotherapy plan in detail, educational materials given to daughter, and consent obtained. Chemotherapy orders are in place (dose reduced Carboplatin/dose reduced Etoposide). Breathing unlabored at rest in bed, on supp oxygen. Creatinine has been trending down. XRT per Dr. Floyd Briscoe.       Past Medical History:   Diagnosis Date    Cellulitis     R LEG    CVA (cerebral vascular accident) ([address]     Hyperlipidemia     Hypertension       Past Surgical History:   Procedure Laterality Date    HX BLADDER SUSPENSION  01/2021    HX CHOLECYSTECTOMY      HX OTHER SURGICAL  10/2020    PARACENTESIS      Social History     Tobacco Use    Smoking status: Current Every Day Smoker     Packs/day: 1.50     Years: 72.00     Pack years: 108.00    Smokeless tobacco: Never Used   Substance Use Topics    Alcohol use: Not Currently      Family History   Problem Relation Age of Onset    Anesth Problems Neg Hx      Current Facility-Administered Medications   Medication Dose Route Frequency    0.9% sodium chloride infusion  50 mL/hr IntraVENous CONTINUOUS    hydrALAZINE (APRESOLINE) 20 mg/mL injection 10 mg  10 mg IntraVENous Q6H PRN    lidocaine-EPINEPHrine (XYLOCAINE) 1 %-1:100,000 injection    PRN    amiodarone (CORDARONE) tablet 400 mg  400 mg Oral DAILY    balsam peru-castor oiL (VENELEX) ointment   Topical BID    zinc oxide-cod liver oil (DESITIN) 40 % paste   Topical BID    albuterol-ipratropium (DUO-NEB) 2.5 MG-0.5 MG/3 ML  3 mL Nebulization Q6H PRN    miconazole (MICOTIN) 2 % powder   Topical BID    amLODIPine (NORVASC) tablet 5 mg  5 mg Oral DAILY    aspirin delayed-release tablet 81 mg  81 mg Oral DAILY    atorvastatin (LIPITOR) tablet 10 mg  10 mg Oral QHS    oxyCODONE IR (ROXICODONE) tablet 5 mg  5 mg Oral Q6H PRN    sodium chloride (NS) flush 5-40 mL  5-40 mL IntraVENous Q8H    sodium chloride (NS) flush 5-40 mL  5-40 mL IntraVENous PRN    acetaminophen (TYLENOL) tablet 650 mg  650 mg Oral Q6H PRN    Or    acetaminophen (TYLENOL) suppository 650 mg  650 mg Rectal Q6H PRN    polyethylene glycol (MIRALAX) packet 17 g  17 g Oral DAILY PRN    ondansetron (ZOFRAN ODT) tablet 4 mg  4 mg Oral Q8H PRN    Or    ondansetron (ZOFRAN) injection 4 mg  4 mg IntraVENous Q6H PRN    nicotine (NICODERM CQ) 21 mg/24 hr patch 1 Patch  1 Patch TransDERmal DAILY PRN      No Known Allergies     Review of Systems: A complete review of systems was obtained, negative except as described above. Physical Exam:     Visit Vitals  BP (!) 145/46 (BP 1 Location: Left upper arm, BP Patient Position: Reclining)   Pulse 76   Temp 97.5 °F (36.4 °C)   Resp 18   Ht 5' 7\" (1.702 m)   Wt 203 lb 0.7 oz (92.1 kg)   SpO2 98%   BMI 31.80 kg/m²     ECOG PS: 3  General: No distress  Eyes: PERRLA, anicteric sclerae  HENT: Atraumatic, OP clear  Neck: Supple  Lymphatic: No peripheral lymphadenopathy  Respiratory: Increased effort at rest  CV: HRR monitor  GI: Soft, nondistended  Skin: Warm, dry  Psych: Alert, pleasant, difficult to obtain history    Results:     Lab Results   Component Value Date/Time    WBC 10.2 08/09/2021 04:01 AM    HGB 11.3 (L) 08/09/2021 04:01 AM    HCT 36.5 08/09/2021 04:01 AM    PLATELET 929 81/15/2176 04:01 AM    MCV 92.2 08/09/2021 04:01 AM    ABS.  NEUTROPHILS 8.6 (H) 08/09/2021 04:01 AM     Lab Results   Component Value Date/Time    Sodium 138 08/09/2021 04:01 AM    Sodium 139 08/09/2021 04:01 AM    Potassium 3.6 08/09/2021 04:01 AM    Potassium 3.7 08/09/2021 04:01 AM    Chloride 103 08/09/2021 04:01 AM    Chloride 104 08/09/2021 04:01 AM    CO2 30 08/09/2021 04:01 AM    CO2 28 08/09/2021 04:01 AM    Glucose 75 08/09/2021 04:01 AM    Glucose 110 (H) 08/09/2021 04:01 AM    BUN 37 (H) 08/09/2021 04:01 AM    BUN 37 (H) 08/09/2021 04:01 AM    Creatinine 2.94 (H) 08/09/2021 04:01 AM    Creatinine 2.92 (H) 08/09/2021 04:01 AM    GFR est AA 19 (L) 08/09/2021 04:01 AM    GFR est AA 19 (L) 08/09/2021 04:01 AM    GFR est non-AA 15 (L) 08/09/2021 04:01 AM    GFR est non-AA 16 (L) 08/09/2021 04:01 AM    Calcium 9.0 08/09/2021 04:01 AM    Calcium 8.9 08/09/2021 04:01 AM     Lab Results   Component Value Date/Time    Bilirubin, total 0.3 08/09/2021 04:01 AM    ALT (SGPT) 19 08/09/2021 04:01 AM    Alk. phosphatase 85 08/09/2021 04:01 AM    Protein, total 7.1 08/09/2021 04:01 AM    Albumin 2.1 (L) 08/09/2021 04:01 AM    Albumin 2.1 (L) 08/09/2021 04:01 AM    Globulin 5.0 (H) 08/09/2021 04:01 AM     Records reviewed and summarized above. Radiology report(s) reviewed above. Pathology from 8/4/2021 left hilar node: Small cell carcinoma with necrosis    CT Abd WO 8/4/21:  IMPRESSION  1. Right hydropneumothorax. Redemonstrated is near-complete left lung collapse  with large pleural effusion. Left pigtail drainage pleural catheter. CT Chest 7/31/21:  IMPRESSION  1. Unchanged large left pleural effusion which has likely reaccumulated since  the prior thoracentesis. 2. Unchanged dense consolidation and collapse of the left lung with an abrupt  cut off of the left mainstem bronchus. The right hilum and right side of the  mediastinum appears consolidated which may be related to invasion and/or  confluent adenopathy. A large lymph node is seen in the left para-aortic space  that is unchanged. Findings are highly suspicious for malignancy. 3. New trace right pleural effusion. Assessment:   1) Small cell carcinoma. Unstaged  Mediastinal adenopathy, L lung consolidation , L effusion  CT otherwise negative  Pleural fluid cytology is benign but does not entirely r/o a malignant effusion. PET and MRI are indicated for complete staging  She has a poor ECOG PS , largely due to SOB from the effusion and Left lung consolidation  We are not able to get an inpatient PET  We discussed with her daughter that this is an aggressive malignancy. If this is limited stage then likelihood of cure with chemoRT is 20%. If this is extensive stage then this is incurable. With a poor PS, and inability to lie flat she may not be able to receive palliative RT as discussed with Dr. Kala Dubon. This would be attempted again today, and we plan to initiate dose reduced Carboplatin and Etoposide. Discussed chemotherapy plan in detail and potential SE with patient/daughter. Educational materials provided. Written consent obtained and to be scanned into chart. Once she is appropriately staged, will discuss palliative chemotherapy/immunotherapy vs chemoRT. If however she tolerates treatments poorly with no improvement in O2 requirements, will consider supportive measures only. 2) Respiratory failure. Secondary to above, effusion. Chest tube in place to suction. Pulmonology and Thoracic Surgery are following. Cytology from effusion negative but does not necessarily r/o malignant effusion. Continues on oxygen    3) Atrial Fibrillation with RVR. Now rate controlled on PO amiodarone. Cardiology following. 4) GABINO secondary to ATN. Nephrology is following. Creatinine slowly improving and 2.94 today. 5) Dementia. Plan:     · Plan for inpatient chemotherapy as below. Written consent obtained. · Tentative plan is Carboplatin AUC 3 day 1 and Etoposide 50 mg/m2 on days 1-3  · Amiodarone increases serum levels of Etoposide hence we are starting with a 50% dose reduction  · Dexamethasone and antiemetics per protocol. Avoid Zofran and use Compazine prn nausea  · It is noted that she is on Aricept hence limit Compazine to 5 mg every 6 hours as needed   · XRT per Dr. Kala Dubon - cycle 1 planned concurrently with chemotherapy, then if PS improves will need OP PET CT and additional plans  · NO central access needed for cycle 1 . If has poor venous access will consider PICC  · Palliative care support    This patient was seen in conjunction with Stephan Parikh NP. I personally performed a face to face diagnostic evaluation on this patient.    I personally reviewed the history and performed the key points on the exam.   I personally reviewed all points in the assessment and created treatment plan with the patient. Specifically, pt seen by me today  In bed. SOB. Nursing at bedside. For inpt chemo. Reviewed chemo to start tmw. Pt is agreeable to this. We will follow  Am covering for Dr Becky Murphy. I appreciate the opportunity to participate in Ms. Faith Almazan's care.     Primary contact: Lindsay Joiner (daughter) [phone]    Signed By: Kaylah Nj,

## 2021-08-09 NOTE — ROUTINE PROCESS
1225: Attempted to call for report, RN will call back. TRANSFER - OUT REPORT:    Verbal report given to Sandy Schmitz RN(name) on Delmy Neri  being transferred to [facility](unit) for routine progression of care       Report consisted of patients Situation, Background, Assessment and   Recommendations(SBAR). Information from the following report(s) SBAR, Kardex, ED Summary, OR Summary, Procedure Summary, Intake/Output, MAR, Accordion, Recent Results, Med Rec Status, Cardiac Rhythm NSR, Alarm Parameters  and Quality Measures was reviewed with the receiving nurse. Lines:   Peripheral IV 07/29/21 Left Antecubital (Active)   Site Assessment Clean, dry, & intact 08/09/21 1324   Phlebitis Assessment 0 08/09/21 1324   Infiltration Assessment 0 08/09/21 1324   Dressing Status Intact 08/09/21 1324   Dressing Type Transparent 08/09/21 1324   Hub Color/Line Status Pink 08/09/21 1324   Action Taken Open ports on tubing capped 08/08/21 2028   Alcohol Cap Used Yes 08/09/21 1324        Opportunity for questions and clarification was provided.       Patient transported with:   Monitor  O2 @ 5 liters  Patient-specific medications from Pharmacy  Registered Nurse

## 2021-08-09 NOTE — PROGRESS NOTES
1930: Bedside and Verbal shift change report given to [facility], [address] [place] [address] (oncoming nurse) by Veronica Escobedo RN (offgoing nurse). Report included the following information SBAR, Kardex, ED Summary, Procedure Summary, Intake/Output, MAR, Recent Results and Cardiac Rhythm NSR.     0730: Bedside and Verbal shift change report given to Danitza Wilson RN (oncoming nurse) by Valerie Patel RN (offgoing nurse). Report included the following information SBAR, Kardex, ED Summary, Procedure Summary, Intake/Output, MAR, Recent Results and Cardiac Rhythm NSR.

## 2021-08-09 NOTE — PROGRESS NOTES
Physical therapy     Reviewed chart. Pt is currently ANA LILIA at radiation. Will defer at thistime and will continue to follow.

## 2021-08-09 NOTE — PROGRESS NOTES
Chart reviewed and noted patient ANA LILIA at radiation at this time. Will follow up for OT intervention as able. Thank you.

## 2021-08-09 NOTE — PROGRESS NOTES
[address] may be administered tomorrow per Dr. Elida Don. 1522 Updated primary nurse and pt on plan for chemo tomorrow.

## 2021-08-09 NOTE — PROGRESS NOTES
Problem: Mobility Impaired (Adult and Pediatric)  Goal: *Acute Goals and Plan of Care (Insert Text)  Description: FUNCTIONAL STATUS PRIOR TO ADMISSION: Patient was independent without use of DME per her report although a questionable historian on eval.     HOME SUPPORT PRIOR TO ADMISSION: The patient lived with her daughter (reports this was a recent change in last few months). Daughter works from home. Physical Therapy Goals  Initiated 8/3/2021  1. Patient will move from supine to sit and sit to supine , scoot up and down, and roll side to side in bed with modified independence within 7 day(s). 2.  Patient will transfer from bed to chair and chair to bed with supervision/set-up using the least restrictive device within 7 day(s). 3.  Patient will perform sit to stand with supervision/set-up within 7 day(s). 4.  Patient will ambulate with supervision/set-up for 100 feet with the least restrictive device within 7 day(s). 5.  Patient will ascend/descend 5 stairs with right handrail(s) with minimal assistance/contact guard assist within 7 day(s). Outcome: Progressing Towards Goal    PHYSICAL THERAPY TREATMENT  Patient: Hai Voss (16 y.o. female)  Date: 8/9/2021  Diagnosis: Atrial fibrillation with rapid ventricular response ([address] [I48.91] <principal problem not specified>  Procedure(s) (LRB):  ENDOSCOPIC BRONCHOSCOPY ULTRASOUND (EBUS) (N/A)  TRANSBRONCHIAL BIOPSY (N/A)  BRONCHOSCOPY (N/A) 5 Days Post-Op  Precautions: Fall, Bed Alarm  Chart, physical therapy assessment, plan of care and goals were reviewed. ASSESSMENT  Patient continues with skilled PT services and is progressing towards goals. Pt agreeable to therapy with encouragement. Pt has limited activity tolerance. Pt agreeable to SPT to chair only. Pt declined any attempt to ambulate. Noted SpO2 86% on 4 L O2  with mobility with increase time to recover.  Pt needs to improve mobility if desires to return home      Current Level of Function Impacting Discharge (mobility/balance): min A x2    Other factors to consider for discharge: decrease mobility and increase assist level. PLAN :  Patient continues to benefit from skilled intervention to address the above impairments. Continue treatment per established plan of care. to address goals. Recommendation for discharge: (in order for the patient to meet his/her long term goals)  Therapy up to 5 days/week in SNF setting    This discharge recommendation:  Has not yet been discussed the attending provider and/or case management    IF patient discharges home will need the following DME: to be determined (TBD)       SUBJECTIVE:   Patient stated I need to sit.     OBJECTIVE DATA SUMMARY:   Critical Behavior:  Neurologic State: Alert  Orientation Level: Oriented X4  Cognition: Decreased attention/concentration, Follows commands, Memory loss, Poor safety awareness  Safety/Judgement: Decreased insight into deficits, Decreased awareness of need for safety, Decreased awareness of need for assistance  Functional Mobility Training:  Bed Mobility:     Supine to Sit: Moderate assistance              Transfers:  Sit to Stand: Minimum assistance;Assist x2  Stand to Sit: Minimum assistance;Assist x2        Bed to Chair: Minimum assistance;Assist x2 (with rolling walker )                    Balance:  Sitting: Intact  Standing: Impaired  Standing - Static: Fair  Standing - Dynamic : Fair  Ambulation/Gait Training:                                                        Stairs: Therapeutic Exercises:     Pain Rating:  No complaints     Activity Tolerance:   desaturates with exertion and requires oxygen    After treatment patient left in no apparent distress:   Sitting in chair, Call bell within reach, Bed / chair alarm activated, and Caregiver / family present    COMMUNICATION/COLLABORATION:   The patients plan of care was discussed with: Registered nurse.      Kathyanne Hammans, PTA   Time Calculation: 20 mins

## 2021-08-10 PROBLEM — E44.0 MODERATE PROTEIN-CALORIE MALNUTRITION (HCC): Status: ACTIVE | Noted: 2021-01-01

## 2021-08-10 NOTE — PROGRESS NOTES
Bedside shift change report given to Juan (oncoming nurse) by Aixa Morales (offgoing nurse). Report included the following information SBAR, Kardex, ED Summary and MAR.

## 2021-08-10 NOTE — PROGRESS NOTES
Bedside and Verbal shift change report given to Neema Light and Marcelo Allen (oncoming nurse) by Harsha Munoz (offgoing nurse). Report included the following information SBAR, Kardex, ED Summary, Intake/Output, MAR and Cardiac Rhythm Normal Sinus.

## 2021-08-10 NOTE — PROGRESS NOTES
[facility], [id number] [place] [address] Road, [facility]: [phone]  F: [phone]    Reason for Visit:   Ra Mcdowell is a 80 y.o. 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 Raysa Morillo is a 70-year-old female with history of hyperlipidemia, hypertension, and significant tobacco use with worsening shortness of breath as well as hemoptysis x1 episode. Initial chest x-ray and CT obtained revealed mediastinal lymphadenopathy as well as complete collapse of the left lung with large left pleural effusion/mucous plug in left mainstem bronchus. She was seen by Dr. Lashawn Lamas with Pulmonology and started on IV antibiotics for postobstructive pneumonia. He was also evaluated by cardiothoracic surgery. Chest tube was placed on 8/2/2021. She developed atrial fibrillation with RVR on 8/2/2021 and was seen by Cardiology. She was started on both amiodarone and heparin. TTE completed with ejection fraction 65%. She underwent EBUS with bronchoscopy on 8/4/2021 with Dr. Lashawn Lamas. He obtained 1 biopsy from station 7 node and 4 samples from left hilar mass. Pathology has resulted with small cell carcinoma. Cytology from effusion is still pending. She is alert this morning. Somewhat difficult time obtaining history due to dementia. I have reached out to her daughter Abe Chapman - unable to connect/went to . She appears slightly dyspneic at rest.  She is on 5 L nasal cannula. She reports some pain on her right side. Given kidney function and creatinine of 2.78, will go ahead and obtain head CT for staging (versus brain MRI). She will also need a PET scan outpatient to determine limited versus extensive stage small cell. Interval History: I see Ms. Helio Mazariegos this morning.  She remains on supp oxygen, slightly dyspneic at rest. We were unable to start chemotherapy yesterday given floor transfer/staffing of our nurses that can proceed with chemo given COVID patient assignments. CXR from this morning reviewed. Chest tube removed per CTS. Today will be day 3/5 planned radiation treatments. CBC stable, CMP reviewed with creatinine trending down.     Past Medical History:   Diagnosis Date    Cellulitis     R LEG    CVA (cerebral vascular accident) ([address]     Hyperlipidemia     Hypertension       Past Surgical History:   Procedure Laterality Date    HX BLADDER SUSPENSION  01/2021    HX CHOLECYSTECTOMY      HX OTHER SURGICAL  10/2020    PARACENTESIS      Social History     Tobacco Use    Smoking status: Current Every Day Smoker     Packs/day: 1.50     Years: 72.00     Pack years: 108.00    Smokeless tobacco: Never Used   Substance Use Topics    Alcohol use: Not Currently      Family History   Problem Relation Age of Onset    Anesth Problems Neg Hx      Current Facility-Administered Medications   Medication Dose Route Frequency    0.9% sodium chloride infusion  25 mL/hr IntraVENous CONTINUOUS    [START ON 8/11/2021] dexamethasone (DECADRON) 4 mg/mL injection 8 mg  8 mg IntraVENous Q24H    etoposide (VEPESID) 103 mg in 0.9% sodium chloride 250 mL, overfill volume 25 mL chemo infusion  50 mg/m2 (Treatment Plan Recorded) IntraVENous Q24H    0.9% sodium chloride infusion  50 mL/hr IntraVENous CONTINUOUS    hydrALAZINE (APRESOLINE) 20 mg/mL injection 10 mg  10 mg IntraVENous Q6H PRN    lidocaine-EPINEPHrine (XYLOCAINE) 1 %-1:100,000 injection    PRN    amiodarone (CORDARONE) tablet 400 mg  400 mg Oral DAILY    balsam peru-castor oiL (VENELEX) ointment   Topical BID    zinc oxide-cod liver oil (DESITIN) 40 % paste   Topical BID    albuterol-ipratropium (DUO-NEB) 2.5 MG-0.5 MG/3 ML  3 mL Nebulization Q6H PRN    miconazole (MICOTIN) 2 % powder   Topical BID    amLODIPine (NORVASC) tablet 5 mg  5 mg Oral DAILY    aspirin delayed-release tablet 81 mg 81 mg Oral DAILY    atorvastatin (LIPITOR) tablet 10 mg  10 mg Oral QHS    oxyCODONE IR (ROXICODONE) tablet 5 mg  5 mg Oral Q6H PRN    sodium chloride (NS) flush 5-40 mL  5-40 mL IntraVENous Q8H    sodium chloride (NS) flush 5-40 mL  5-40 mL IntraVENous PRN    acetaminophen (TYLENOL) tablet 650 mg  650 mg Oral Q6H PRN    Or    acetaminophen (TYLENOL) suppository 650 mg  650 mg Rectal Q6H PRN    polyethylene glycol (MIRALAX) packet 17 g  17 g Oral DAILY PRN    ondansetron (ZOFRAN ODT) tablet 4 mg  4 mg Oral Q8H PRN    Or    ondansetron (ZOFRAN) injection 4 mg  4 mg IntraVENous Q6H PRN    nicotine (NICODERM CQ) 21 mg/24 hr patch 1 Patch  1 Patch TransDERmal DAILY PRN      No Known Allergies     Review of Systems: A complete review of systems was obtained, negative except as described above. Physical Exam:     Visit Vitals  BP (!) 130/50   Pulse 73   Temp 97.8 °F (36.6 °C)   Resp 18   Ht 5' 7\" (1.702 m)   Wt 192 lb 10.9 oz (87.4 kg)   SpO2 90%   BMI 30.18 kg/m²     ECOG PS: 3  General: No distress  Eyes: PERRLA, anicteric sclerae  HENT: Atraumatic, OP clear  Neck: Supple  Lymphatic: No peripheral lymphadenopathy  Respiratory: Increased effort at rest  CV: HRR monitor  GI: Soft, nondistended  Skin: Warm, dry  Psych: Alert, pleasant, difficult to obtain history    Results:     Lab Results   Component Value Date/Time    WBC 8.6 08/10/2021 03:22 AM    HGB 10.9 (L) 08/10/2021 03:22 AM    HCT 34.8 (L) 08/10/2021 03:22 AM    PLATELET 748 (H) 98/46/3744 03:22 AM    MCV 91.3 08/10/2021 03:22 AM    ABS.  NEUTROPHILS 7.2 08/10/2021 03:22 AM     Lab Results   Component Value Date/Time    Sodium 140 08/10/2021 03:22 AM    Sodium 139 08/10/2021 03:22 AM    Potassium 3.8 08/10/2021 03:22 AM    Potassium 3.8 08/10/2021 03:22 AM    Chloride 104 08/10/2021 03:22 AM    Chloride 105 08/10/2021 03:22 AM    CO2 29 08/10/2021 03:22 AM    CO2 28 08/10/2021 03:22 AM    Glucose 104 (H) 08/10/2021 03:22 AM    Glucose 100 08/10/2021 03:22 AM    BUN 37 (H) 08/10/2021 03:22 AM    BUN 36 (H) 08/10/2021 03:22 AM    Creatinine 2.63 (H) 08/10/2021 03:22 AM    Creatinine 2.57 (H) 08/10/2021 03:22 AM    GFR est AA 21 (L) 08/10/2021 03:22 AM    GFR est AA 22 (L) 08/10/2021 03:22 AM    GFR est non-AA 18 (L) 08/10/2021 03:22 AM    GFR est non-AA 18 (L) 08/10/2021 03:22 AM    Calcium 9.0 08/10/2021 03:22 AM    Calcium 9.1 08/10/2021 03:22 AM     Lab Results   Component Value Date/Time    Bilirubin, total 0.3 08/10/2021 03:22 AM    ALT (SGPT) 18 08/10/2021 03:22 AM    Alk. phosphatase 85 08/10/2021 03:22 AM    Protein, total 6.8 08/10/2021 03:22 AM    Albumin 2.0 (L) 08/10/2021 03:22 AM    Albumin 2.0 (L) 08/10/2021 03:22 AM    Globulin 4.8 (H) 08/10/2021 03:22 AM     Records reviewed and summarized above. Radiology report(s) reviewed above. Pathology from 8/4/2021 left hilar node: Small cell carcinoma with necrosis    CT Abd WO 8/4/21:  IMPRESSION  1. Right hydropneumothorax. Redemonstrated is near-complete left lung collapse  with large pleural effusion. Left pigtail drainage pleural catheter. CT Chest 7/31/21:  IMPRESSION  1. Unchanged large left pleural effusion which has likely reaccumulated since  the prior thoracentesis. 2. Unchanged dense consolidation and collapse of the left lung with an abrupt  cut off of the left mainstem bronchus. The right hilum and right side of the  mediastinum appears consolidated which may be related to invasion and/or  confluent adenopathy. A large lymph node is seen in the left para-aortic space  that is unchanged. Findings are highly suspicious for malignancy. 3. New trace right pleural effusion. Assessment:   1) Small cell carcinoma. Unstaged  Mediastinal adenopathy, L lung consolidation , L effusion  CT otherwise negative  Pleural fluid cytology is benign but does not entirely r/o a malignant effusion.    PET and MRI are indicated for complete staging  She has a poor ECOG PS , largely due to SOB from the effusion and Left lung consolidation  We are not able to get an inpatient PET  We discussed with her daughter that this is an aggressive malignancy. If this is limited stage then likelihood of cure with chemoRT is 20%. If this is extensive stage then this is incurable. She started palliative radiation and today will be 3 of 5 planned treatments to left lung/hilum. We will initiate dose reduced Carboplatin and Etoposide today 8/10/21. Discussed chemotherapy plan in detail and potential SE with patient/daughter. Educational materials provided. Written consent obtained and to be scanned into chart. Once she is appropriately staged, will discuss palliative chemotherapy/immunotherapy vs chemoRT. If however she tolerates treatments poorly with no improvement in O2 requirements, will consider supportive measures only. 2) Respiratory failure. Secondary to above, effusion. CXR today reviewed. Chest tube removed 8/10/21 per CTS. Cytology from effusion negative but does not necessarily r/o malignant effusion. Continues on oxygen supplementation. 3) Atrial Fibrillation with RVR. Now rate controlled on PO amiodarone. Cardiology following. 4) GABINO secondary to ATN. Nephrology is following. Creatinine slowly improving daily. 5) Dementia. Plan:     · Plan for inpatient chemotherapy to start today. Written consent obtained. · Carboplatin AUC 3 day 1 and Etoposide 50 mg/m2 on days 1-3  · Amiodarone increases serum levels of Etoposide hence we are starting with a 50% dose reduction  · Dexamethasone and antiemetics per protocol. Avoid Zofran and use Compazine prn nausea  · It is noted that she is on Aricept hence limit Compazine to 5 mg every 6 hours as needed   · XRT per Dr. Francie Sheppard - today will be day 3/5 planned treatments  · NO central access needed for cycle 1 . If has poor venous access will consider PICC  · Palliative care support    This patient was seen in conjunction with DAVID Mattson NP.   I personally performed a face to face diagnostic evaluation on this patient. I personally reviewed the history and performed the key points on the exam.   I personally reviewed all points in the assessment and created treatment plan with the patient. Specifically, pt seen by me today  In bed drinking water, comfortable. Getting chemo. Nursing at bedside. Continue with chemo as planned per Dr Chaparro Sun    I appreciate the opportunity to participate in Ms. Faith Almazan's care.     Primary contact: Bladimir Farias (daughter) [phone]    Signed By: Paul Salamanca DO

## 2021-08-10 NOTE — PROGRESS NOTES
Transition of Care: TBD; home with home health with possible home O2 set up vs SNF; whatever is medically necessary    Transport Plan: TBD; in car with family vs BLS (not set up yet)     RUR: 19%    DX: atrilfibrillation with rapid ventricular response; admitted on 7/29/21    Main contact is daughter Cole Richardson- [phone]    Discharge pending:  -patient is currently on O2 at 4 L  -patient receiving inpatient chemo and radiation for small cell lung CA  -s/p chest tube removal (today 8/10)  -pending chest xray (to be done today 8/10)   -pending possible MRI  -s/p thoracentesis  -pending progress with PT/OT  -pending improvement in CRE lab  -pending complex medical progress and care recommendations      NOTE: from previous CM notes and chart; patient transferred out of ICU on 7/30; patient has history of mild dementia; moved to [place] from [place] in 2020 to live with supportive daughter; uses a walker to ambulate; moderately independent in her ADLs;     CM noted from chart     CM following  Lam Whitley RN, CRM

## 2021-08-10 NOTE — PROGRESS NOTES
Problem: Self Care Deficits Care Plan (Adult)  Goal: *Acute Goals and Plan of Care (Insert Text)  Description:   FUNCTIONAL STATUS PRIOR TO ADMISSION: The patient is a questionable historian at this time. Per patient, she has been living with her daughter for the past few months however is largely IND for ADL tasks. HOME SUPPORT: The patient lived with her daughter (per patient report). Occupational Therapy Goals  Initiated 8/3/2021  Weekly re-assessment 8/10/2021 - continue all goals. 1.  Patient will perform lower body dressing with minimal assistance/contact guard assist within 7 day(s). 2.  Patient will perform bathing with minimal assistance/contact guard assist within 7 day(s). 3.  Patient will perform grooming standing with supervision/set-up within 7 day(s). 4.  Patient will perform toilet transfers with supervision/set-up within 7 day(s). 5.  Patient will perform all aspects of toileting with minimal assistance/contact guard assist within 7 day(s). 6.  Patient will participate in upper extremity therapeutic exercise/activities with supervision/set-up for 5 minutes within 7 day(s). 7.  Patient will utilize energy conservation techniques during functional activities with verbal cues within 7 day(s). Outcome: Not Met  OCCUPATIONAL THERAPY TREATMENT/WEEKLY RE-ASSESSMENT  Patient: Clarissa Osullivan (35 y.o. female)  Date: 8/10/2021  Diagnosis: Atrial fibrillation with rapid ventricular response (HCC) [I48.91] <principal problem not specified>  Procedure(s) (LRB):  ENDOSCOPIC BRONCHOSCOPY ULTRASOUND (EBUS) (N/A)  TRANSBRONCHIAL BIOPSY (N/A)  BRONCHOSCOPY (N/A) 6 Days Post-Op  Precautions: Fall, Bed Alarm  Chart, occupational therapy assessment, plan of care, and goals were reviewed. ASSESSMENT  Patient continues with skilled OT services and is not progressing towards goals - limited by missed sessions and refusal for OOB activity.  Patient ADLs limited by volition, impaired balance, generalized weakness, decreased functional activity tolerance, and impaired cardiopulmonary endurance. Patient received in bed sitting with HOB elevated but with L lateral lean - leaning on L elbow. Patient agreeable to in bed activity - adamantly refusing to get OOB. Patient required mod A to sit up in bed after needing total A to scoot to [facility]. Patient required mod A to brush teeth. Patient left in bed with call bell in reach, RN aware, all needs met. Will continue to follow. Recommend SNF at D/C. Current Level of Function Impacting Discharge (ADLs): up to max A for ADLs    Other factors to consider for discharge: chemo, radiation         PLAN :  Goals have been updated based on progression since last assessment. Patient continues to benefit from skilled intervention to address the above impairments. Continue to follow patient 3 times a week to address goals. Recommend with staff: Recommend with nursing, ADLs with supervision/setup, once Egress Test completed then OOB to chair 3x/day with 2 assist and toileting via bedpan. Thank you for completing as able in order to maintain patient strength, endurance and independence. Recommend next OT session: [facility] transfer    Recommendation for discharge: (in order for the patient to meet his/her long term goals)  Therapy up to 5 days/week in SNF setting    This discharge recommendation:  Has been made in collaboration with the attending provider and/or case management    IF patient discharges home will need the following DME: TBD       SUBJECTIVE:   Patient stated my butt really hurts.     OBJECTIVE DATA SUMMARY:   Cognitive/Behavioral Status:  Neurologic State: Alert  Orientation Level: Oriented to person;Oriented to place;Oriented to situation;Disoriented to time  Cognition: Appropriate for age attention/concentration; Follows commands  Perception: Cues to maintain midline in sitting; Tactile;Verbal  Perseveration: No perseveration noted  Safety/Judgement: Awareness of environment;Decreased awareness of need for assistance;Decreased awareness of need for safety;Decreased insight into deficits; Fall prevention    Functional Mobility and Transfers for ADLs:  Bed Mobility:  Supine to Sit: Moderate assistance  Scooting: Maximum assistance; Additional time;Assist x2 (to scoot to [facility])    Transfers:  NT - patient refused    Balance:  Sitting: Impaired  Sitting - Static: Fair (occasional); Poor (constant support); Supported sitting (L lateral lean - patient preference)    ADL Intervention:  Grooming  Position Performed: Long sitting on bed  Brushing Teeth: Minimum assistance  Cues: Physical assistance;Verbal cues provided    Cognitive Retraining  Safety/Judgement: Awareness of environment;Decreased awareness of need for assistance;Decreased awareness of need for safety;Decreased insight into deficits; Fall prevention    Pain:  None reported     Activity Tolerance:   Fair, Poor and requires rest breaks    After treatment patient left in no apparent distress:   Call bell within reach, Side rails x 3 and RN aware, sitting upright in bed    COMMUNICATION/COLLABORATION:   The patients plan of care was discussed with: Physical therapist and Registered nurse.      Bart Gautam OT  Time Calculation: 10 mins

## 2021-08-10 NOTE — PROGRESS NOTES
Late Entry    Patient off unit for MRI at 0400. Patient unable to complete MRI due to inability to lay flat. Hospitalist Marty La Plata NP made aware.

## 2021-08-10 NOTE — PROGRESS NOTES
Problem: Breathing Pattern - Ineffective  Goal: *Absence of hypoxia  Outcome: Progressing Towards Goal  Goal: *Use of effective breathing techniques  Outcome: Progressing Towards Goal     Problem: Pressure Injury - Risk of  Goal: *Prevention of pressure injury  Description: Document Ferdinand Scale and appropriate interventions in the flowsheet. Outcome: Progressing Towards Goal  Note: Pressure Injury Interventions:  Sensory Interventions: Assess changes in LOC, Minimize linen layers    Moisture Interventions: Absorbent underpads, Internal/External urinary devices    Activity Interventions: Pressure redistribution bed/mattress(bed type)    Mobility Interventions: Pressure redistribution bed/mattress (bed type), HOB 30 degrees or less    Nutrition Interventions: Document food/fluid/supplement intake    Friction and Shear Interventions: Minimize layers, HOB 30 degrees or less                Problem: Falls - Risk of  Goal: *Absence of Falls  Description: Document Norma Fall Risk and appropriate interventions in the flowsheet.   Outcome: Progressing Towards Goal  Note: Fall Risk Interventions:  Mobility Interventions: Patient to call before getting OOB    Mentation Interventions: Adequate sleep, hydration, pain control, Door open when patient unattended    Medication Interventions: Patient to call before getting OOB, Teach patient to arise slowly    Elimination Interventions: Call light in reach, Bed/chair exit alarm, Patient to call for help with toileting needs, Toileting schedule/hourly rounds

## 2021-08-10 NOTE — PROGRESS NOTES
PCCM:    On 5lpm    Chest x-ray today showed a complete opacification of the left hemithorax following chest tube removal     Plan:    Suggest reconsult thoracic to assess need for pleurx catheter  We will will be available again to see if needed     Kd Franklin PA-C

## 2021-08-10 NOTE — WOUND CARE
WOCN Note:     Follow-up visit for assessment of sacrum, buttocks and abdominal and groin folds. Previously seen by Farheen Cortez RN. Assessed in room 449. Chart reviewed. Admitted DX:  afib with rvt    Assessment:   Patient is alert, communicative and requires assist of 2 with repositioning. Bed: foam mattress  Patient wearing briefs for incontinence and has a Pure wick. Patient reports no pain. Patient repositioned on left side with pillow. Heels offloaded with pillows. 1. POA Bilateral heel pink slow blanching. 2. POA Right buttock, Stage 2 Pressure Injury:  Resolved to blanching pink. 3.  POA Sacrum, blanching red erythema:  9 x 12 x 0 cm  4. Abdominal and groin folds resolving denudation. Wound, Pressure Prevention & Skin Care Recommendations:    1. Minimize layers of linen/pads under patient to optimize support surface. 2.  Turn/reposition approximately every 2 hours and offload heels. 3.  Manage moisture/ Keep skin folds clean and dry. 4.  Specialty bed: Prius air ordered via [facility]. Use only flat sheet and one incontinence pad. 5.  Continue venelex to heels, sacrum and buttocks. 6.  Continue desitin to abdominal and groin. Discussed above plan with patient and Maryam SPRAGUE.     Transition of Care:   Plan to follow as needed while admitted to hospital.    MIGUELANGEL MarcosN RN [facility]  Available on Perfect Serve  Pager [phone]  Office [phone]

## 2021-08-10 NOTE — PROGRESS NOTES
1000 Received call from 1161 Eladio Kershawmj Mcintyre with Oncology who asked that a standing weight be done on the patient in order to dose chemo medication for today and that a 22g or 24g be placed which cannot be in the hand, wrist or AC.     1130 Attempted to place a smaller IV but was unsuccessful. Had charge nurse try but was unsuccessful. After Oncology nurse was unable to as well a call was placed to PICC team and ICU to have them try to place an IV.     1300 Spoke with Oncology RN who made me aware that the patient will be on Chemo precautions for today and 48 hours after ending 8/14/2021. Any staff handling any bodily fluids will need to wear a blue gown, use double gloves and line the toilet with a pad when emptying urine. Staff who are pregnant or could possibly be pregnant are not to handle patients bodily fluids.

## 2021-08-10 NOTE — PROGRESS NOTES
[facility]ist [facility]    Hospitalist Progress Note  Salena Cerda MD  Answering service: [phone] OR   [phone] from in house phone     Date of service: 8/10/2021   Name: Enrike Kamara  : 1940  MRN: [id number]  PCP: Dr. Shmuel Cabral, NP     Brief HPI and Hospital Course:       81-year-old female with a history of heavy tobacco use at least 2 packs a day since 13to 12years of age, hyperlipidemia, hypertension presented to the ED from [facility]  with reports of worsening shortness of breath over the last several days and 1 episode of sputum with scant blood-tinged.  She denies any ongoing hemoptysis.  She denies any fever or chills.  Denies any wheezing or chest tightness.  Denies any chest pain.  Chest x-ray was done and revealed left lung opacification.  CT of the chest was done revealed mediastinal lymphadenopathy, complete collapse of the left lung with large left pleural effusion and likely obstructing mucus in the left mainstem bronchus.  Vital signs were also notable for elevated heart rate as high as 180, mild elevation in temperature as high as 100.0, tachypnea with breathing in the 30s, and decreased SPO2 as low as 86 on room air.  She was placed on supplemental O2 with improvement in SPO2.  EKG was done was consistent with atrial fibrillation with RVR but was unremarkable for acute ischemic change.  Patient was initiated on amiodarone infusion and heparin as well.  She was also initiated on empiric antibiotics with Zosyn and vancomycin, and admitted to ICU for further evaluation and treatment. Transferred out ICU on       Assessment/Plan     #Small cell lung cancer:  -FNA lymph node -Small cell carcinoma with tumor necrosis   -oncology following-plan for inpatient chemo -started today, receiving radiation treatments  -MRI brain to rule out mets    Acute Hypoxic Respiratory Failure:  -due to underlying malignancy,bronchial obstcution- CT-dense consolidation and collapse of the left lung with an abrupt cut off of the left mainstem bronchus  Received abx -Vanc and zosyn- 5 days  Remains on 4 lt oxygen today  -CXR similar to previous,devante get USG left chest to assess for pleural effusion as she had minimal to no output for several days prior to removal      Acute renal failure:improving  -suspected ATN/pre renal state-recent abx -vanc/zosyn  Nephrology following  Creatinine trending down    Large left pleural effusion/Mediastinal Lymphadenopathy  Likely due tue malignant  S/p  thoracentesis , removed 1.5L effusion . Negativel culture so far. CTS following - Chest tube with pig tail catheter placed 21 and removed    Cytology epnding. Atrial fibrillation with RVR now NSR-  New Dx, on amiodarone IV changed to PO 21,  Discussed with , patient high risk for bleeding due to cancer,recent hemoptysis - recommended against further anticoagulation  -on 81 mg aspirin    Cellulitis Jason LE  -resolved  Recent DVT ultrasound negative,  received Zosyn and vancomycin    Hypertension Continue home amlodipine      Electrolyte imbalance : repleted  k  and Mag      Tobacco abuse -Nicotine patch ordered      Remains ill, Guarded prognosis overall, patient's daughter aware    CODE status: full  VTE prophylaxis: heparin  Discussed plan of care with Patient/Family and Nurse   Pre-admission lived at [address] [place] [address]   Discharge planning: TBD     Subjective   Patient seen and examined chart reviewed.     She denied any pain today, aware of starting chemotherapy      Physical examination     Visit Vitals  BP (!) 130/50   Pulse 73   Temp 97.8 °F (36.6 °C)   Resp 18   Ht 5' 7\" (1.702 m)   Wt 87.4 kg (192 lb 10.9 oz)   SpO2 90%   BMI 30.18 kg/m²       Temp (24hrs), Av.7 °F (36.5 °C), Min:97.5 °F (36.4 °C), Max:97.9 °F (36.6 °C)      Intake/Output Summary (Last 24 hours) at 8/10/2021 8724  Last data filed at 8/10/2021 0616  Gross per 24 hour   Intake    Output 750 ml Net -750 ml       General:  Appears ill. HEENT Atraumatic,anicteric sclera,normal conjunctiva,EOMI           Neck: Supple             Lungs:  Decreased breath L side , not using accessory muscles resp   Heart:  Regular rhythm, no murmur,S1,S2 wnl   Abdomen:   Soft, non-tender,non distended. Extremities: No edema           Neurologic: Oriented X 2  Globally weak.      Data Reviewed:       Recent Labs     08/10/21  0322 08/09/21  0401 08/08/21  0412   WBC 8.6 10.2 10.5   HGB 10.9* 11.3* 11.3*   HCT 34.8* 36.5 37.5   * 399 379     Recent Labs     08/10/21  0322 08/09/21  0401 08/08/21  0412     140 139  138 136   K 3.8  3.8 3.7  3.6 4.0     104 104  103 103   CO2 28  29 28  30 29     104* 110*  75 103*   BUN 36*  37* 37*  37* 37*   CREA 2.57*  2.63* 2.92*  2.94* 3.00*   CA 9.1  9.0 8.9  9.0 8.9   MG 2.0 2.0 1.9   PHOS 4.4 4.2 4.1   ALB 2.0*  2.0* 2.1*  2.1* 2.3*   TBILI 0.3 0.3  --    ALT 18 19  --        Medications reviewed  Current Facility-Administered Medications   Medication Dose Route Frequency    0.9% sodium chloride infusion  25 mL/hr IntraVENous CONTINUOUS    [START ON 8/11/2021] dexamethasone (DECADRON) 4 mg/mL injection 8 mg  8 mg IntraVENous Q24H    etoposide (VEPESID) 103 mg in 0.9% sodium chloride 250 mL, overfill volume 25 mL chemo infusion  50 mg/m2 (Treatment Plan Recorded) IntraVENous Q24H    0.9% sodium chloride infusion  50 mL/hr IntraVENous CONTINUOUS    hydrALAZINE (APRESOLINE) 20 mg/mL injection 10 mg  10 mg IntraVENous Q6H PRN    lidocaine-EPINEPHrine (XYLOCAINE) 1 %-1:100,000 injection    PRN    amiodarone (CORDARONE) tablet 400 mg  400 mg Oral DAILY    balsam peru-castor oiL (VENELEX) ointment   Topical BID    zinc oxide-cod liver oil (DESITIN) 40 % paste   Topical BID    albuterol-ipratropium (DUO-NEB) 2.5 MG-0.5 MG/3 ML  3 mL Nebulization Q6H PRN    miconazole (MICOTIN) 2 % powder   Topical BID    amLODIPine (NORVASC) tablet 5 mg  5 mg Oral DAILY    aspirin delayed-release tablet 81 mg  81 mg Oral DAILY    atorvastatin (LIPITOR) tablet 10 mg  10 mg Oral QHS    oxyCODONE IR (ROXICODONE) tablet 5 mg  5 mg Oral Q6H PRN    sodium chloride (NS) flush 5-40 mL  5-40 mL IntraVENous Q8H    sodium chloride (NS) flush 5-40 mL  5-40 mL IntraVENous PRN    acetaminophen (TYLENOL) tablet 650 mg  650 mg Oral Q6H PRN    Or    acetaminophen (TYLENOL) suppository 650 mg  650 mg Rectal Q6H PRN    polyethylene glycol (MIRALAX) packet 17 g  17 g Oral DAILY PRN    ondansetron (ZOFRAN ODT) tablet 4 mg  4 mg Oral Q8H PRN    Or    ondansetron (ZOFRAN) injection 4 mg  4 mg IntraVENous Q6H PRN    nicotine (NICODERM CQ) 21 mg/24 hr patch 1 Patch  1 Patch TransDERmal DAILY PRN         Alyce Brunner, MD  Internal Medicine  8/10/2021

## 2021-08-10 NOTE — PROGRESS NOTES
[facility]   [address] [facility], [address], [facility]  Phone: [phone]   [phone]       Nephrology Progress Note  Serge Rasmussen     1940     [id number]  Date of Admission : 7/29/2021  08/10/21    CC: Follow up for ARF    Assessment and Plan   GABINO :  - suspected ATN --> from pre renal state + vanc/ zosyn toxicity   - renal US : unremarkable   - UOP improving, Cr stable  - cont NS for now  - daily labs and I/Os    Acute Resp failure   Large Left Effusion   Mediastinal adenopathy   Pneumothorax  Smoker   SSLC  - per Pulm/ Onc    HTN   - controlled     A fib   -On amiodarone.  -Echo with preserved EF, mild TR      D/w pt and daughter     Interval History:  Seen and examined. Cr better, UOP 750cc recorded. Poor po intake. No n/v reported. Review of Systems: A comprehensive review of systems was negative except for that written in the HPI.     Current Medications:   Current Facility-Administered Medications   Medication Dose Route Frequency    0.9% sodium chloride infusion  25 mL/hr IntraVENous CONTINUOUS    fosaprepitant (EMEND) 150 mg in 0.9% sodium chloride 150 mL IVPB  150 mg IntraVENous ONCE    dexamethasone (DECADRON) 12 mg in 0.9% sodium chloride 50 mL, overfill volume 5 mL IVPB  12 mg IntraVENous ONCE    [START ON 8/11/2021] dexamethasone (DECADRON) 4 mg/mL injection 8 mg  8 mg IntraVENous Q24H    CARBOplatin (PARAPLATIN) 147 mg in 0.9% sodium chloride 250 mL chemo infusion  147 mg IntraVENous ONCE    etoposide (VEPESID) 103 mg in 0.9% sodium chloride 500 mL chemo infusion  50 mg/m2 (Treatment Plan Recorded) IntraVENous Q24H    0.9% sodium chloride infusion  50 mL/hr IntraVENous CONTINUOUS    hydrALAZINE (APRESOLINE) 20 mg/mL injection 10 mg  10 mg IntraVENous Q6H PRN    lidocaine-EPINEPHrine (XYLOCAINE) 1 %-1:100,000 injection    PRN    amiodarone (CORDARONE) tablet 400 mg  400 mg Oral DAILY    balsam peru-castor oiL (VENELEX) ointment   Topical BID    zinc oxide-cod liver oil (DESITIN) 40 % paste   Topical BID    albuterol-ipratropium (DUO-NEB) 2.5 MG-0.5 MG/3 ML  3 mL Nebulization Q6H PRN    miconazole (MICOTIN) 2 % powder   Topical BID    amLODIPine (NORVASC) tablet 5 mg  5 mg Oral DAILY    aspirin delayed-release tablet 81 mg  81 mg Oral DAILY    atorvastatin (LIPITOR) tablet 10 mg  10 mg Oral QHS    oxyCODONE IR (ROXICODONE) tablet 5 mg  5 mg Oral Q6H PRN    sodium chloride (NS) flush 5-40 mL  5-40 mL IntraVENous Q8H    sodium chloride (NS) flush 5-40 mL  5-40 mL IntraVENous PRN    acetaminophen (TYLENOL) tablet 650 mg  650 mg Oral Q6H PRN    Or    acetaminophen (TYLENOL) suppository 650 mg  650 mg Rectal Q6H PRN    polyethylene glycol (MIRALAX) packet 17 g  17 g Oral DAILY PRN    ondansetron (ZOFRAN ODT) tablet 4 mg  4 mg Oral Q8H PRN    Or    ondansetron (ZOFRAN) injection 4 mg  4 mg IntraVENous Q6H PRN    nicotine (NICODERM CQ) 21 mg/24 hr patch 1 Patch  1 Patch TransDERmal DAILY PRN      No Known Allergies    Objective:  Vitals:    Vitals:    08/10/21 0800 08/10/21 0832 08/10/21 1019 08/10/21 1117   BP:  (!) 160/64  (!) 161/60   Pulse: 94 89 92 93   Resp:  23  22   Temp:  97.6 °F (36.4 °C)  97.8 °F (36.6 °C)   SpO2:  95%     Weight:    87.4 kg (192 lb 10.9 oz)   Height:    5' 7\" (1.702 m)     Intake and Output:  No intake/output data recorded. 08/08 1901 - 08/10 0700  In: 7595 [P.O.:250; I.V.:1195]  Out: 1103 [Urine:1100]    Physical Examination:  General:  Ill looking   HEENT: PERRL,+ Pallor , No Icterus  Neck: Supple,no mass palpable  Lungs : diminished   CVS: IRREGULAR, S1 S2 normal, No murmur   Abdomen: Soft, NT, BS +  Extremities: TRACE Edema         []    High complexity decision making was performed  []    Patient is at high-risk of decompensation with multiple organ involvement    Lab Data Personally Reviewed: I have reviewed all the pertinent labs, microbiology data and radiology studies during assessment.     Recent Labs 08/10/21  0322 08/09/21  0401 08/08/21  0412     140 139  138 136   K 3.8  3.8 3.7  3.6 4.0     104 104  103 103   CO2 28  29 28  30 29     104* 110*  75 103*   BUN 36*  37* 37*  37* 37*   CREA 2.57*  2.63* 2.92*  2.94* 3.00*   CA 9.1  9.0 8.9  9.0 8.9   MG 2.0 2.0 1.9   PHOS 4.4 4.2 4.1   ALB 2.0*  2.0* 2.1*  2.1* 2.3*   ALT 18 19  --      Recent Labs     08/10/21  0322 08/09/21  0401 08/08/21  0412   WBC 8.6 10.2 10.5   HGB 10.9* 11.3* 11.3*   HCT 34.8* 36.5 37.5   * 399 379     No results found for: [facility]  Lab Results   Component Value Date/Time    Culture result: Culture performed on Fluid swab specimen 08/02/2021 03:23 PM    Culture result: NO GROWTH 4 DAYS 08/02/2021 03:23 PM    Culture result:  08/01/2021 05:06 AM     MRSA NOT PRESENT. Apparent Staphylococus aureus (not MRSA noted). Culture result:  08/01/2021 05:06 AM     Screening of patient nares for MRSA is for surveillance purposes and, if positive, to facilitate isolation considerations in high risk settings. It is not intended for automatic decolonization interventions per se as regimens are not sufficiently effective to warrant routine use.     Culture result: NO GROWTH 4 DAYS 07/30/2021 03:00 PM    Culture result: NO FUNGUS ISOLATED 10 DAYS 07/30/2021 03:00 PM     Recent Results (from the past 24 hour(s))   CBC WITH AUTOMATED DIFF    Collection Time: 08/10/21  3:22 AM   Result Value Ref Range    WBC 8.6 3.6 - 11.0 K/uL    RBC 3.81 3.80 - 5.20 M/uL    HGB 10.9 (L) 11.5 - 16.0 g/dL    HCT 34.8 (L) 35.0 - 47.0 %    MCV 91.3 80.0 - 99.0 FL    MCH 28.6 26.0 - 34.0 PG    MCHC 31.3 30.0 - 36.5 g/dL    RDW 14.6 (H) 11.5 - 14.5 %    PLATELET 771 (H) 275 - 400 K/uL    MPV 9.5 8.9 - 12.9 FL    NRBC 0.0 0  WBC    ABSOLUTE NRBC 0.00 0.00 - 0.01 K/uL    NEUTROPHILS 84 (H) 32 - 75 %    LYMPHOCYTES 5 (L) 12 - 49 %    MONOCYTES 8 5 - 13 %    EOSINOPHILS 2 0 - 7 %    BASOPHILS 0 0 - 1 %    IMMATURE GRANULOCYTES 1 (H) 0.0 - 0.5 %    ABS. NEUTROPHILS 7.2 1.8 - 8.0 K/UL    ABS. LYMPHOCYTES 0.4 (L) 0.8 - 3.5 K/UL    ABS. MONOCYTES 0.7 0.0 - 1.0 K/UL    ABS. EOSINOPHILS 0.2 0.0 - 0.4 K/UL    ABS. BASOPHILS 0.0 0.0 - 0.1 K/UL    ABS. IMM. GRANS. 0.1 (H) 0.00 - 0.04 K/UL    DF SMEAR SCANNED      PLATELET COMMENTS Large Platelets      RBC COMMENTS ANISOCYTOSIS  1+       RENAL FUNCTION PANEL    Collection Time: 08/10/21  3:22 AM   Result Value Ref Range    Sodium 140 136 - 145 mmol/L    Potassium 3.8 3.5 - 5.1 mmol/L    Chloride 104 97 - 108 mmol/L    CO2 29 21 - 32 mmol/L    Anion gap 7 5 - 15 mmol/L    Glucose 104 (H) 65 - 100 mg/dL    BUN 37 (H) 6 - 20 MG/DL    Creatinine 2.63 (H) 0.55 - 1.02 MG/DL    BUN/Creatinine ratio 14 12 - 20      GFR est AA 21 (L) >60 ml/min/1.73m2    GFR est non-AA 18 (L) >60 ml/min/1.73m2    Calcium 9.0 8.5 - 10.1 MG/DL    Phosphorus 4.4 2.6 - 4.7 MG/DL    Albumin 2.0 (L) 3.5 - 5.0 g/dL   MAGNESIUM    Collection Time: 08/10/21  3:22 AM   Result Value Ref Range    Magnesium 2.0 1.6 - 2.4 mg/dL   METABOLIC PANEL, COMPREHENSIVE    Collection Time: 08/10/21  3:22 AM   Result Value Ref Range    Sodium 139 136 - 145 mmol/L    Potassium 3.8 3.5 - 5.1 mmol/L    Chloride 105 97 - 108 mmol/L    CO2 28 21 - 32 mmol/L    Anion gap 6 5 - 15 mmol/L    Glucose 100 65 - 100 mg/dL    BUN 36 (H) 6 - 20 MG/DL    Creatinine 2.57 (H) 0.55 - 1.02 MG/DL    BUN/Creatinine ratio 14 12 - 20      GFR est AA 22 (L) >60 ml/min/1.73m2    GFR est non-AA 18 (L) >60 ml/min/1.73m2    Calcium 9.1 8.5 - 10.1 MG/DL    Bilirubin, total 0.3 0.2 - 1.0 MG/DL    ALT (SGPT) 18 12 - 78 U/L    AST (SGOT) 15 15 - 37 U/L    Alk. phosphatase 85 45 - 117 U/L    Protein, total 6.8 6.4 - 8.2 g/dL    Albumin 2.0 (L) 3.5 - 5.0 g/dL    Globulin 4.8 (H) 2.0 - 4.0 g/dL    A-G Ratio 0.4 (L) 1.1 - 2.2             I have reviewed the flowsheets. Chart and Pertinent Notes have been reviewed.    No change in PMH ,family and social history from Consult note.      Chuy Polanco MD

## 2021-08-10 NOTE — PROGRESS NOTES
Physical Therapy - defer  Chart reviewed in prep for therapy session. RN notes pt is currently receiving chemo with plan for radiation after. Will follow up tomorrow.

## 2021-08-10 NOTE — PROGRESS NOTES
Patient received radiation treatment number 3 of 5 to the Right lung. Rivaroxaban/Xarelto is used to treat and prevent blood clots.  If you are not able to swallow the tablets whole, you may crush the tablets and mix them with a small amount of applesauce and promptly take within four hours. Eat some food right after you swallow the mixture. Take 2.5mg or 10mg tablets of Rivaroxaban/Xarelto with or without food. Take 15mg or 20mg tablets of Rivaroxaban/Xarelto with food. Never skip a dose of Rivaroxaban/Xarelto. If you take Rivaroxaban/Xarelto once a day and you forget to take your dose, take a dose as soon as you remember on the same day. If you take Rivaroxaban/Xarelto 2.5 mg twice a day and you forget to take your dose, skip the missed dose and take your next dose at your usual time. DO NOT take an extra pill to ‘catch up’. If you take Rivaroxaban/Xarelto 15 mg twice a day and you forget to take your dose, take a dose as soon as you remember on the same day. You may take 2 doses at the same time to make up for missed dose ONLY if you take a total of 30mg per day. Notify your doctor that you missed a dose. Take Rivaroxaban/Xarelto at the same time each morning and evening. Rivaroxaban/Xarelto may be taken with other medication or food.

## 2021-08-10 NOTE — PROGRESS NOTES
[facility] Adult Hospitalist [facility]    Hospitalist Progress Note  Alpa Griffin MD  Answering service: [phone] OR   36 from in house phone     Date of service: 8/10/2021   Name: Bob Houston  : 1940  MRN: [id number]  PCP: Dr. Micah Padilla NP     Brief HPI and Hospital Course:       74-year-old female with a history of heavy tobacco use at least 2 packs a day since 13to 12years of age, hyperlipidemia, hypertension presented to the ED from [facility]  with reports of worsening shortness of breath over the last several days and 1 episode of sputum with scant blood-tinged.  She denies any ongoing hemoptysis.  She denies any fever or chills.  Denies any wheezing or chest tightness.  Denies any chest pain.  Chest x-ray was done and revealed left lung opacification.  CT of the chest was done revealed mediastinal lymphadenopathy, complete collapse of the left lung with large left pleural effusion and likely obstructing mucus in the left mainstem bronchus.  Vital signs were also notable for elevated heart rate as high as 180, mild elevation in temperature as high as 100.0, tachypnea with breathing in the 30s, and decreased SPO2 as low as 86 on room air.  She was placed on supplemental O2 with improvement in SPO2.  EKG was done was consistent with atrial fibrillation with RVR but was unremarkable for acute ischemic change.  Patient was initiated on amiodarone infusion and heparin as well.  She was also initiated on empiric antibiotics with Zosyn and vancomycin, and admitted to ICU for further evaluation and treatment.      improved after thoracentesis   Transferred out ICU on       Assessment/Plan     #Small cell lung cancer:  -FNA lymph node -Small cell carcinoma with tumor necrosis   -oncology following-plan for inpatient chemo,unable to tolerate radiation treatment,   -MRI brain to rule out mets    Acute Hypoxic Respiratory Failure:  -due to underlying malignancy,bronchial obstcution- CT-dense consolidation and collapse of the left lung with an abrupt cut off of the left mainstem bronchus  Received abx -Vanc and zosyn- 5 days  Remains on 4 lt oxygen today  -CXR tomorrow      Acute renal failure:improving  -suspected ATN/pre renal state-recent abx -vanc/zosyn  IVF changed per renal   Nephrology following  Creatinine stabilizing today 3.0    Large left pleural effusion/Mediastinal Lymphadenopathy  Likely due tue malignant  S/p  thoracentesis , removed 1.5L effusion . Negativel culture so far. CTS following - Chest tube with pig tail catheter placed 21 and removed    Cytology epnding. Atrial fibrillation with RVR now NSR-  New Dx, on amiodarone IV changed to PO 21,  Discussed with , patient high risk for bleeding due to cancer,recent hemoptysis - recommended against further anticoagulation  -on 81 mg aspirin    Cellulitis Jason LE  -resolved  Recent DVT ultrasound negative,  received Zosyn and vancomycin    Hypertension Continue home amlodipine      Electrolyte imbalance : repleted  k  and Mag      Tobacco abuse -Nicotine patch ordered      Remains ill, Guarded prognosis overall, patient's daughter aware    CODE status: full  VTE prophylaxis: heparin  Discussed plan of care with Patient/Family and Nurse   Pre-admission lived at [address] [place] [address]   Discharge planning: TBD     Subjective   Patient seen and examined chart reviewed. States that she is doing OK      Physical examination     Visit Vitals  BP (!) 139/48   Pulse 85   Temp 97.7 °F (36.5 °C)   Resp 19   Ht 5' 7\" (1.702 m)   Wt 92.1 kg (203 lb 0.7 oz)   SpO2 93%   BMI 31.80 kg/m²       Temp (24hrs), Av.9 °F (36.6 °C), Min:97.5 °F (36.4 °C), Max:98.3 °F (36.8 °C)      Intake/Output Summary (Last 24 hours) at 8/10/2021 0034  Last data filed at 2021 1911  Gross per 24 hour   Intake 795 ml   Output 50 ml   Net 745 ml       General:  Appears ill.    HEENT Atraumatic,anicteric sclera,normal conjunctiva,EOMI           Neck: Supple             Lungs:  Decreased breath L side , not using accessory muscles resp   Heart:  Regular rhythm, no murmur,S1,S2 wnl   Abdomen:   Soft, non-tender,non distended. Extremities: No edema           Neurologic: Oriented to person,place  Globally weak.      Data Reviewed:       Recent Labs     08/09/21  0401 08/08/21  0412   WBC 10.2 10.5   HGB 11.3* 11.3*   HCT 36.5 37.5    379     Recent Labs     08/09/21  0401 08/08/21  0412     138 136   K 3.7  3.6 4.0     103 103   CO2 28  30 29   *  75 103*   BUN 37*  37* 37*   CREA 2.92*  2.94* 3.00*   CA 8.9  9.0 8.9   MG 2.0 1.9   PHOS 4.2 4.1   ALB 2.1*  2.1* 2.3*   TBILI 0.3  --    ALT 19  --        Medications reviewed  Current Facility-Administered Medications   Medication Dose Route Frequency    0.9% sodium chloride infusion  50 mL/hr IntraVENous CONTINUOUS    hydrALAZINE (APRESOLINE) 20 mg/mL injection 10 mg  10 mg IntraVENous Q6H PRN    lidocaine-EPINEPHrine (XYLOCAINE) 1 %-1:100,000 injection    PRN    amiodarone (CORDARONE) tablet 400 mg  400 mg Oral DAILY    balsam peru-castor oiL (VENELEX) ointment   Topical BID    zinc oxide-cod liver oil (DESITIN) 40 % paste   Topical BID    albuterol-ipratropium (DUO-NEB) 2.5 MG-0.5 MG/3 ML  3 mL Nebulization Q6H PRN    miconazole (MICOTIN) 2 % powder   Topical BID    amLODIPine (NORVASC) tablet 5 mg  5 mg Oral DAILY    aspirin delayed-release tablet 81 mg  81 mg Oral DAILY    atorvastatin (LIPITOR) tablet 10 mg  10 mg Oral QHS    oxyCODONE IR (ROXICODONE) tablet 5 mg  5 mg Oral Q6H PRN    sodium chloride (NS) flush 5-40 mL  5-40 mL IntraVENous Q8H    sodium chloride (NS) flush 5-40 mL  5-40 mL IntraVENous PRN    acetaminophen (TYLENOL) tablet 650 mg  650 mg Oral Q6H PRN    Or    acetaminophen (TYLENOL) suppository 650 mg  650 mg Rectal Q6H PRN    polyethylene glycol (MIRALAX) packet 17 g  17 g Oral DAILY PRN    ondansetron (ZOFRAN ODT) tablet 4 mg  4 mg Oral Q8H PRN    Or    ondansetron (ZOFRAN) injection 4 mg  4 mg IntraVENous Q6H PRN    nicotine (NICODERM CQ) 21 mg/24 hr patch 1 Patch  1 Patch TransDERmal DAILY PRN         Alpa Griffin MD  Internal Medicine  8/10/2021

## 2021-08-10 NOTE — PROGRESS NOTES
Comprehensive Nutrition Assessment    Type and Reason for Visit: Initial, RD nutrition re-screen/LOS    Nutrition Recommendations/Plan:    1. Encouragement with all meals   - use supplements to give meds   - document all PO intake  2. Continue regular weights    Nutrition Assessment:    Pt admitted for SOB. PMHx: HTN, CVA, HLD, dementia. 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 Started chemo this admit with oncology following. Day 3 of 5 of palliative radiation treatments. GABINO improving with IVF in place. Stage 2 right buttock injury noted with WOCN following. Severe wt loss of 5% x 2 months with poor PO intake noted. Pt unavailable at time of visit so will continue with Ensure Enlive BID added yesterday for 700kcal, 40g protein. Malnutrition Assessment:  Malnutrition Status: Moderate malnutrition (at least moderate malnutrition)    Context:  Acute illness     Findings of the 6 clinical characteristics of malnutrition:   Energy Intake:  7 - 50% or less of est energy requirements for 5 or more days  Weight Loss:  1.00 - 7.5% over 3 months     Body Fat Loss:  Unable to assess,     Muscle Mass Loss:  Unable to assess,    Fluid Accumulation:  Unable to assess,     Strength:  Not performed     Nutritionally Significant Medications: amiodarone, decadron, NS @ 50ml/hr    Estimated Daily Nutrient Needs:  Energy (kcal): 1987-2152kcal (MSJ x 1.2 x 1.2-1.3); Weight Used for Energy Requirements: Current (87kg)  Protein (g): 73g (1.2g//kg);  Weight Used for Protein Requirements: Current (87kg)  Fluid (ml/day): 2000; Method Used for Fluid Requirements: 1 ml/kcal    Nutrition Related Findings:       BM: 8/7   Edema: trace  Wounds:  Stage II       Current Nutrition Therapies:   Diet: regular  Supplements: Ensure Enlive BID (added by RD yesterday)  Meal intake:   Patient Vitals for the past 168 hrs:   % Diet Eaten   08/08/21 1500 1 - 25%   08/08/21 1136 1 - 25%   08/08/21 0947 1 - 25%   08/07/21 1521 26 - 50%   08/07/21 0848 76 - 100%   08/05/21 1516 51 - 75%   08/05/21 1120 76 - 100%   08/05/21 0735 0%   Supplement Intake: No data found.     Anthropometric Measures:  · Height:  5' 7\" (170.2 cm)  · Current Body Wt:  87.1 kg (192 lb)   · Admission Body Wt:  199 lb 15.3 oz    · Usual Body Wt:        · Ideal Body Wt:  135:  142.2 %   Wt Readings from Last 10 Encounters:   08/10/21 87.4 kg (192 lb 10.9 oz)   07/29/21 90.7 kg (199 lb 15.3 oz)   06/09/21 91.6 kg (202 lb)   05/25/21 90.7 kg (200 lb)   02/24/21 91.2 kg (201 lb)   02/09/21 91.6 kg (202 lb)   01/11/21 98 kg (216 lb 0.8 oz)   12/30/20 98 kg (216 lb 0.8 oz)   12/09/20 99.3 kg (219 lb)   11/25/20 99.3 kg (219 lb)     Nutrition Diagnosis:   · Inadequate protein-energy intake, Increased nutrient needs related to cognitive or neurological impairment, increased demand for energy/nutrients as evidenced by intake 0-25% (lung CA, confusion earlier)    · Increased nutrient needs related to  (wound healing) as evidenced by wounds    Nutrition Interventions:   Food and/or Nutrient Delivery: Continue current diet, Continue oral nutrition supplement  Nutrition Education and Counseling: No recommendations at this time  Coordination of Nutrition Care: Continue to monitor while inpatient    Goals:  Consumption of at least 60% meals and 1 supplements/day in 4-5 days; wt maintenance       Nutrition Monitoring and Evaluation:   Behavioral-Environmental Outcomes: None identified  Food/Nutrient Intake Outcomes: Food and nutrient intake, Supplement intake  Physical Signs/Symptoms Outcomes: Weight    Discharge Planning:    Continue oral nutrition supplement, Continue current diet     Melissa Raphael RD  [facility], Contact via new test company

## 2021-08-10 NOTE — PROGRESS NOTES
1006 Per Rodrigo Aguilar NP ok to proceed with chemo today with creatinine of 2.63.    1420 CBC not needed in AM per Dr. Isac Arce.      1540 Reviewed chemo precautions with pt's daughter per pt request.

## 2021-08-11 NOTE — PROGRESS NOTES
Physical Therapy    New order acknowledged, chart reviewed with no change in status noted since initial PT evaluation. Pt remains on active caseload. Unavailable for PT treatment at this time due to preparing for chemo per RN. Will con't to follow.     Delmis Live, PT, MPT

## 2021-08-11 NOTE — PROGRESS NOTES
Problem: Breathing Pattern - Ineffective  Goal: *Absence of hypoxia  Outcome: Progressing Towards Goal  Goal: *Use of effective breathing techniques  Outcome: Progressing Towards Goal     Problem: Pressure Injury - Risk of  Goal: *Prevention of pressure injury  Description: Document Ferdinand Scale and appropriate interventions in the flowsheet. Outcome: Progressing Towards Goal  Note: Pressure Injury Interventions:  Sensory Interventions: Assess changes in LOC, Minimize linen layers    Moisture Interventions: Absorbent underpads, Internal/External urinary devices    Activity Interventions: Pressure redistribution bed/mattress(bed type)    Mobility Interventions: Pressure redistribution bed/mattress (bed type)    Nutrition Interventions: Document food/fluid/supplement intake    Friction and Shear Interventions: Minimize layers, HOB 30 degrees or less                Problem: Patient Education: Go to Patient Education Activity  Goal: Patient/Family Education  Outcome: Progressing Towards Goal     Problem: Falls - Risk of  Goal: *Absence of Falls  Description: Document Norma Fall Risk and appropriate interventions in the flowsheet.   Outcome: Progressing Towards Goal  Note: Fall Risk Interventions:  Mobility Interventions: Patient to call before getting OOB    Mentation Interventions: Adequate sleep, hydration, pain control, Door open when patient unattended    Medication Interventions: Patient to call before getting OOB, Teach patient to arise slowly    Elimination Interventions: Call light in reach, Bed/chair exit alarm, Patient to call for help with toileting needs

## 2021-08-11 NOTE — PROGRESS NOTES
[facility]   [address] [facility], [address] [place] [address], [facility]  Phone: [phone]   [phone]       Nephrology Progress Note  Kimo Santacruz     1940     [id number]  Date of Admission : 7/29/2021 08/11/21    CC: Follow up for ARF    Assessment and Plan   GABINO :  - suspected ATN --> from pre renal state + vanc/ zosyn toxicity   - renal US : unremarkable   - cont IVF  - Cr stable  - daily labs and I/Os    Acute Resp failure   Large Left Effusion   Mediastinal adenopathy   Pneumothorax  Smoker   SSLC:  - palliative radiation  - getting etoposide and carboplatin     HTN   - controlled     A fib   -On amiodarone.  -Echo with preserved EF, mild TR     Interval History:  Seen and examined. Cr stable. UOP not all recorded. To have radiation today. No cp. No increase in SOB    Review of Systems: A comprehensive review of systems was negative except for that written in the HPI.     Current Medications:   Current Facility-Administered Medications   Medication Dose Route Frequency    dexamethasone (DECADRON) 4 mg/mL injection 8 mg  8 mg IntraVENous Q24H    etoposide (VEPESID) 103 mg in 0.9% sodium chloride 250 mL, overfill volume 25 mL chemo infusion  50 mg/m2 (Treatment Plan Recorded) IntraVENous Q24H    0.9% sodium chloride infusion  50 mL/hr IntraVENous CONTINUOUS    hydrALAZINE (APRESOLINE) 20 mg/mL injection 10 mg  10 mg IntraVENous Q6H PRN    lidocaine-EPINEPHrine (XYLOCAINE) 1 %-1:100,000 injection    PRN    amiodarone (CORDARONE) tablet 400 mg  400 mg Oral DAILY    balsam peru-castor oiL (VENELEX) ointment   Topical BID    zinc oxide-cod liver oil (DESITIN) 40 % paste   Topical BID    albuterol-ipratropium (DUO-NEB) 2.5 MG-0.5 MG/3 ML  3 mL Nebulization Q6H PRN    miconazole (MICOTIN) 2 % powder   Topical BID    amLODIPine (NORVASC) tablet 5 mg  5 mg Oral DAILY    aspirin delayed-release tablet 81 mg  81 mg Oral DAILY    atorvastatin (LIPITOR) tablet 10 mg  10 mg Oral QHS  oxyCODONE IR (ROXICODONE) tablet 5 mg  5 mg Oral Q6H PRN    sodium chloride (NS) flush 5-40 mL  5-40 mL IntraVENous Q8H    sodium chloride (NS) flush 5-40 mL  5-40 mL IntraVENous PRN    acetaminophen (TYLENOL) tablet 650 mg  650 mg Oral Q6H PRN    Or    acetaminophen (TYLENOL) suppository 650 mg  650 mg Rectal Q6H PRN    polyethylene glycol (MIRALAX) packet 17 g  17 g Oral DAILY PRN    ondansetron (ZOFRAN ODT) tablet 4 mg  4 mg Oral Q8H PRN    Or    ondansetron (ZOFRAN) injection 4 mg  4 mg IntraVENous Q6H PRN    nicotine (NICODERM CQ) 21 mg/24 hr patch 1 Patch  1 Patch TransDERmal DAILY PRN      No Known Allergies    Objective:  Vitals:    Vitals:    08/11/21 0255 08/11/21 0430 08/11/21 0501 08/11/21 0630   BP:   (!) 131/56    Pulse:  65 69 69   Resp:   16    Temp:       SpO2: 94%  95%    Weight:       Height:         Intake and Output:  No intake/output data recorded. 08/09 1901 - 08/11 0700  In: 1955 [I.V.:1955]  Out: 1250 [Urine:1250]    Physical Examination:  General:  Ill looking   HEENT: PERRL,+ Pallor , No Icterus  Neck: Supple,no mass palpable  Lungs : diminished   CVS: IRREGULAR, S1 S2 normal, No murmur   Abdomen: Soft, NT, BS +  Extremities: TRACE Edema         []    High complexity decision making was performed  []    Patient is at high-risk of decompensation with multiple organ involvement    Lab Data Personally Reviewed: I have reviewed all the pertinent labs, microbiology data and radiology studies during assessment.     Recent Labs     08/11/21  0338 08/10/21  0322 08/09/21  0401    139  140 139  138   K 4.6 3.8  3.8 3.7  3.6    105  104 104  103   CO2 31 28  29 28  30   * 100  104* 110*  75   BUN 37* 36*  37* 37*  37*   CREA 2.50* 2.57*  2.63* 2.92*  2.94*   CA 8.9 9.1  9.0 8.9  9.0   MG 2.0 2.0 2.0   PHOS 5.7* 4.4 4.2   ALB 2.0* 2.0*  2.0* 2.1*  2.1*   ALT  --  18 19     Recent Labs     08/10/21  0322 08/09/21  0401   WBC 8.6 10.2   HGB 10.9* 11.3* HCT 34.8* 36.5   * 399     No results found for: SDES  Lab Results   Component Value Date/Time    Culture result: Culture performed on Fluid swab specimen 08/02/2021 03:23 PM    Culture result: NO GROWTH 4 DAYS 08/02/2021 03:23 PM    Culture result:  08/01/2021 05:06 AM     MRSA NOT PRESENT. Apparent Staphylococus aureus (not MRSA noted). Culture result:  08/01/2021 05:06 AM     Screening of patient nares for MRSA is for surveillance purposes and, if positive, to facilitate isolation considerations in high risk settings. It is not intended for automatic decolonization interventions per se as regimens are not sufficiently effective to warrant routine use. Culture result: NO GROWTH 4 DAYS 07/30/2021 03:00 PM    Culture result: NO FUNGUS ISOLATED 10 DAYS 07/30/2021 03:00 PM     Recent Results (from the past 24 hour(s))   RENAL FUNCTION PANEL    Collection Time: 08/11/21  3:38 AM   Result Value Ref Range    Sodium 139 136 - 145 mmol/L    Potassium 4.6 3.5 - 5.1 mmol/L    Chloride 105 97 - 108 mmol/L    CO2 31 21 - 32 mmol/L    Anion gap 3 (L) 5 - 15 mmol/L    Glucose 126 (H) 65 - 100 mg/dL    BUN 37 (H) 6 - 20 MG/DL    Creatinine 2.50 (H) 0.55 - 1.02 MG/DL    BUN/Creatinine ratio 15 12 - 20      GFR est AA 23 (L) >60 ml/min/1.73m2    GFR est non-AA 19 (L) >60 ml/min/1.73m2    Calcium 8.9 8.5 - 10.1 MG/DL    Phosphorus 5.7 (H) 2.6 - 4.7 MG/DL    Albumin 2.0 (L) 3.5 - 5.0 g/dL   MAGNESIUM    Collection Time: 08/11/21  3:38 AM   Result Value Ref Range    Magnesium 2.0 1.6 - 2.4 mg/dL           I have reviewed the flowsheets. Chart and Pertinent Notes have been reviewed. No change in PMH ,family and social history from Consult note.       Phi Chan MD

## 2021-08-11 NOTE — PROGRESS NOTES
[facility] [place] [address] [address], [place]: [phone]  F: [phone]    Reason for Visit:   Clarissa Osullivan is a 80 y.o. 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 Luan Barksdale is a 53-year-old female with history of hyperlipidemia, hypertension, and significant tobacco use with worsening shortness of breath as well as hemoptysis x1 episode. Initial chest x-ray and CT obtained revealed mediastinal lymphadenopathy as well as complete collapse of the left lung with large left pleural effusion/mucous plug in left mainstem bronchus. She was seen by Dr. Madison Mcginnis with Pulmonology and started on IV antibiotics for postobstructive pneumonia. He was also evaluated by cardiothoracic surgery. Chest tube was placed on 8/2/2021. She developed atrial fibrillation with RVR on 8/2/2021 and was seen by Cardiology. She was started on both amiodarone and heparin. TTE completed with ejection fraction 65%. She underwent EBUS with bronchoscopy on 8/4/2021 with Dr. Madison Mcginnis. He obtained 1 biopsy from station 7 node and 4 samples from left hilar mass. Pathology has resulted with small cell carcinoma. Cytology from effusion is still pending. She is alert this morning. Somewhat difficult time obtaining history due to dementia. I have reached out to her daughter Iris Gregorio - unable to connect/went to . She appears slightly dyspneic at rest.  She is on 5 L nasal cannula. She reports some pain on her right side. Given kidney function and creatinine of 2.78, will go ahead and obtain head CT for staging (versus brain MRI). She will also need a PET scan outpatient to determine limited versus extensive stage small cell. Interval History:   Pt seen today for hospital fu of [address] [place] [address] on chemo. Pt is in bed. Comfortable. Denies pain.  Has not had any issues with chemo. No family present. No new issues.        Past Medical History:   Diagnosis Date    Cellulitis     R LEG    CVA (cerebral vascular accident) ([address]     Hyperlipidemia     Hypertension       Past Surgical History:   Procedure Laterality Date    HX BLADDER SUSPENSION  01/2021    HX CHOLECYSTECTOMY      HX OTHER SURGICAL  10/2020    PARACENTESIS      Social History     Tobacco Use    Smoking status: Current Every Day Smoker     Packs/day: 1.50     Years: 72.00     Pack years: 108.00    Smokeless tobacco: Never Used   Substance Use Topics    Alcohol use: Not Currently      Family History   Problem Relation Age of Onset    Anesth Problems Neg Hx      Current Facility-Administered Medications   Medication Dose Route Frequency    dexamethasone (DECADRON) 4 mg/mL injection 8 mg  8 mg IntraVENous Q24H    etoposide (VEPESID) 103 mg in 0.9% sodium chloride 250 mL, overfill volume 25 mL chemo infusion  50 mg/m2 (Treatment Plan Recorded) IntraVENous Q24H    0.9% sodium chloride infusion  50 mL/hr IntraVENous CONTINUOUS    hydrALAZINE (APRESOLINE) 20 mg/mL injection 10 mg  10 mg IntraVENous Q6H PRN    lidocaine-EPINEPHrine (XYLOCAINE) 1 %-1:100,000 injection    PRN    amiodarone (CORDARONE) tablet 400 mg  400 mg Oral DAILY    balsam peru-castor oiL (VENELEX) ointment   Topical BID    zinc oxide-cod liver oil (DESITIN) 40 % paste   Topical BID    albuterol-ipratropium (DUO-NEB) 2.5 MG-0.5 MG/3 ML  3 mL Nebulization Q6H PRN    miconazole (MICOTIN) 2 % powder   Topical BID    amLODIPine (NORVASC) tablet 5 mg  5 mg Oral DAILY    aspirin delayed-release tablet 81 mg  81 mg Oral DAILY    atorvastatin (LIPITOR) tablet 10 mg  10 mg Oral QHS    oxyCODONE IR (ROXICODONE) tablet 5 mg  5 mg Oral Q6H PRN    sodium chloride (NS) flush 5-40 mL  5-40 mL IntraVENous Q8H    sodium chloride (NS) flush 5-40 mL  5-40 mL IntraVENous PRN    acetaminophen (TYLENOL) tablet 650 mg  650 mg Oral Q6H PRN Or    acetaminophen (TYLENOL) suppository 650 mg  650 mg Rectal Q6H PRN    polyethylene glycol (MIRALAX) packet 17 g  17 g Oral DAILY PRN    ondansetron (ZOFRAN ODT) tablet 4 mg  4 mg Oral Q8H PRN    Or    ondansetron (ZOFRAN) injection 4 mg  4 mg IntraVENous Q6H PRN    nicotine (NICODERM CQ) 21 mg/24 hr patch 1 Patch  1 Patch TransDERmal DAILY PRN      No Known Allergies     Review of Systems: A complete review of systems was obtained, negative except as described above. Physical Exam:     Visit Vitals  BP (!) 131/56   Pulse 69   Temp 97.8 °F (36.6 °C)   Resp 16   Ht 5' 7\" (1.702 m)   Wt 192 lb 10.9 oz (87.4 kg)   SpO2 95%   BMI 30.18 kg/m²     ECOG PS: 3  General: No distress  Eyes:  anicteric sclerae  HENT: Atraumatic  Neck: Supple  Respiratory: normal respiratory effort  CV: HRR monitor  GI: Soft, nondistended  Skin: Warm, dry  Psych: Alert, pleasant, difficult to obtain history    Results:     Lab Results   Component Value Date/Time    WBC 8.6 08/10/2021 03:22 AM    HGB 10.9 (L) 08/10/2021 03:22 AM    HCT 34.8 (L) 08/10/2021 03:22 AM    PLATELET 577 (H) 47/92/2077 03:22 AM    MCV 91.3 08/10/2021 03:22 AM    ABS. NEUTROPHILS 7.2 08/10/2021 03:22 AM     Lab Results   Component Value Date/Time    Sodium 139 08/11/2021 03:38 AM    Potassium 4.6 08/11/2021 03:38 AM    Chloride 105 08/11/2021 03:38 AM    CO2 31 08/11/2021 03:38 AM    Glucose 126 (H) 08/11/2021 03:38 AM    BUN 37 (H) 08/11/2021 03:38 AM    Creatinine 2.50 (H) 08/11/2021 03:38 AM    GFR est AA 23 (L) 08/11/2021 03:38 AM    GFR est non-AA 19 (L) 08/11/2021 03:38 AM    Calcium 8.9 08/11/2021 03:38 AM     Lab Results   Component Value Date/Time    Bilirubin, total 0.3 08/10/2021 03:22 AM    ALT (SGPT) 18 08/10/2021 03:22 AM    Alk. phosphatase 85 08/10/2021 03:22 AM    Protein, total 6.8 08/10/2021 03:22 AM    Albumin 2.0 (L) 08/11/2021 03:38 AM    Globulin 4.8 (H) 08/10/2021 03:22 AM     Records reviewed and summarized above.   Radiology report(s) reviewed above. Pathology from 8/4/2021 left hilar node: Small cell carcinoma with necrosis    CT Abd WO 8/4/21:  IMPRESSION  1. Right hydropneumothorax. Redemonstrated is near-complete left lung collapse  with large pleural effusion. Left pigtail drainage pleural catheter. CT Chest 7/31/21:  IMPRESSION  1. Unchanged large left pleural effusion which has likely reaccumulated since  the prior thoracentesis. 2. Unchanged dense consolidation and collapse of the left lung with an abrupt  cut off of the left mainstem bronchus. The right hilum and right side of the  mediastinum appears consolidated which may be related to invasion and/or  confluent adenopathy. A large lymph node is seen in the left para-aortic space  that is unchanged. Findings are highly suspicious for malignancy. 3. New trace right pleural effusion. Assessment:   1) Small cell carcinoma. Unstaged  Mediastinal adenopathy, L lung consolidation , L effusion  CT otherwise negative  Pleural fluid cytology is benign but does not entirely r/o a malignant effusion. PET and MRI are indicated for complete staging  She has a poor ECOG PS , largely due to SOB from the effusion and Left lung consolidation  We are not able to get an inpatient PET  We discussed with her daughter that this is an aggressive malignancy. If this is limited stage then likelihood of cure with chemoRT is 20%. If this is extensive stage then this is incurable. She started palliative radiation to left lung/hilum. Pt is on dose reduced Carboplatin and Etoposide dosing per Dr Jaye Parisi with chemo. D/c post chemo when stable. Monitor labs. Pt clinically same at my visit this am.     2) Respiratory failure. Secondary to above, effusion. CXR today reviewed. Chest tube removed 8/10/21 per CTS. Cytology from effusion negative but does not necessarily r/o malignant effusion. Continues on oxygen supplementation. 3) Atrial Fibrillation with RVR.  Now rate controlled on PO amiodarone. Cardiology following. 4) GABINO secondary to ATN. Nephrology is following. Creatinine slowly improving daily. 5) Dementia.per IM>       Plan:     · ongoing inpatient chemotherapy   · Carboplatin AUC 3 day 1 and Etoposide 50 mg/m2 on days 1-3  · Amiodarone increases serum levels of Etoposide hence we are starting with a 50% dose reduction  · Dexamethasone and antiemetics per protocol. Avoid Zofran and use Compazine prn nausea  · It is noted that she is on Aricept hence limit Compazine to 5 mg every 6 hours as needed   · XRT per Dr. Francie Sheppard   · NO central access needed for cycle 1 . If has poor venous access will consider PICC  · Palliative care support  Continue with chemo as planned per Dr Joel Mcknight with Dr Indigo Cruz post d/c     I appreciate the opportunity to participate in Ms. Faith Almazan's care.     Primary contact: Shaka Wallace (daughter) [phone]    Signed By: Claudetta Shaw, DO

## 2021-08-11 NOTE — PROGRESS NOTES
Palliative Medicine Consult  Jose: [phone])    Patient Name: Enrike Kamara  YOB: 1940    Date of Initial Consult: August 6, 2021  Reason for Consult: Care decisions  Requesting Provider: Marguerite Madird  Primary Care Physician: Shmuel Cabral NP     SUMMARY:   Enrike Kamara is a 80 y.o. female who was admitted on 7/29/2021 from home with a diagnosis of   GABINO,  afib with RVR, acute respiratory failure with hypoxia, postobstructive pneumonia/ large left effusion status post chest tube 8/2/21, pneumothorax, cellulitis bilateral lower ext, nonsustained ventricular tachycardia, sepsis, new diagnosis of small cell lung cancer. Plans for inpatient chemo and radiation if the patient is stable enough to tolerate. PMH:  hypertension, suspected small cell lung cancer, smoker, hyperlipidemia, CVA, cellulitis rt leg, dementia     Current medical issues leading to Palliative Medicine involvement include: Support with care decisions  Full code  +AMD  Primary contact: Timo [place] (daughter) [phone]     PALLIATIVE DIAGNOSES:   1. Shortness of breath  2. Cough  3. Hypoalbuminemia  4. Hypoxia     PLAN:   1. Patient seen without family present  2. Pt alert eating and without pain  3. She has very poor recall and lack of understanding about her care. She is unable to tell me her diagnosis and does not recall getting radiation  4. Will reach out to family to offer support and discuss goals of care. The plan right now is chemo radiation inpatient. 5. Will follow up  6. Initial consult note routed to primary continuity provider and/or primary health care team members  7.  Communicated plan of care with: Palliative Laure [facility] Team     GOALS OF CARE / TREATMENT PREFERENCES:     GOALS OF CARE:  Patient/Health Care Proxy Stated Goals: Prolong life    TREATMENT PREFERENCES:   Code Status: Full Code    Advance Care Planning:  [] [facility] Interdisciplinary Team has updated the ACP Navigator with Health Care Decision Maker and Patient Capacity      Primary Decision Maker: Boyd Menchaca - Daughter - [phone]    Advance Care Planning 1/11/2021   Confirm Advance Directive Yes, on file   Does the patient have other document types -       Medical Interventions: Full interventions     Other Instructions:   Artificially Administered Nutrition: No feeding tube     Other:    As far as possible, the palliative care team has discussed with patient / health care proxy about goals of care / treatment preferences for patient. HISTORY:     History obtained from: Chart, team    CHIEF COMPLAINT: Patient admitted with aforementioned history and issues    HPI/SUBJECTIVE:    The patient is:   [x] Verbal and participatory  [] Non-participatory due to:   Limited due to medical condition  No complaints    Clinical Pain Assessment (nonverbal scale for severity on nonverbal patients):   Clinical Pain Assessment  Severity: 0          Duration: for how long has pt been experiencing pain (e.g., 2 days, 1 month, years)  Frequency: how often pain is an issue (e.g., several times per day, once every few days, constant)     FUNCTIONAL ASSESSMENT:     Palliative Performance Scale (PPS):  PPS: 40       PSYCHOSOCIAL/SPIRITUAL SCREENING:     Palliative IDT has assessed this patient for cultural preferences / practices and a referral made as appropriate to needs (Cultural Services, Patient Advocacy, Ethics, etc.)    Any spiritual / Jainism concerns:  [] Yes /  [] No    Caregiver Burnout:  [] Yes /  [] No /  [x] No Caregiver Present      Anticipatory grief assessment:   [] Normal  / [] Maladaptive       ESAS Anxiety: Anxiety: 0    ESAS Depression:          REVIEW OF SYSTEMS:     Positive and pertinent negative findings in ROS are noted above in HPI. The following systems were [x] reviewed / [] unable to be reviewed as noted in HPI  Other findings are noted below.   Systems: constitutional, ears/nose/mouth/throat, respiratory, gastrointestinal, genitourinary, musculoskeletal, integumentary, neurologic, psychiatric, endocrine. Positive findings noted below. Modified ESAS Completed by: provider   Fatigue: 7 Drowsiness: 0     Pain: 0   Anxiety: 0 Nausea: 0     Dyspnea: 0           Stool Occurrence(s): 1        PHYSICAL EXAM:     From RN flowsheet:  Wt Readings from Last 3 Encounters:   08/10/21 192 lb 10.9 oz (87.4 kg)   07/29/21 199 lb 15.3 oz (90.7 kg)   06/09/21 202 lb (91.6 kg)     Blood pressure (!) 145/51, pulse 72, temperature 97.8 °F (36.6 °C), resp. rate 20, height 5' 7\" (1.702 m), weight 192 lb 10.9 oz (87.4 kg), SpO2 93 %.     Pain Scale 1: Numeric (0 - 10)  Pain Intensity 1: 0  Pain Onset 1: acute  Pain Location 1: Buttocks, Back  Pain Orientation 1: Left  Pain Description 1: Aching  Pain Intervention(s) 1: Medication (see MAR)  Last bowel movement, if known:     Constitutional: alert, nad  Eyes: pupils equal, anicteric  ENMT: no nasal discharge, moist mucous membranes  Cardiovascular:  distal pulses intact  Respiratory: breathing not labored, symmetric, cough  Gastrointestinal: soft non-tender  Musculoskeletal: no deformity, no tenderness to palpation  Skin: warm, dry  Neurologic: following simple commands  Psychiatric: Calm  Other:       HISTORY:     Active Problems:    Atrial fibrillation with rapid ventricular response (HCC) (7/29/2021)      Pleural effusion, left (8/3/2021)      Small cell lung cancer (HCC) (8/6/2021)      Moderate protein-calorie malnutrition ([address] (8/10/2021)      Past Medical History:   Diagnosis Date    Cellulitis     R LEG    CVA (cerebral vascular accident) ([address]     Hyperlipidemia     Hypertension       Past Surgical History:   Procedure Laterality Date    HX BLADDER SUSPENSION  01/2021    HX CHOLECYSTECTOMY      HX OTHER SURGICAL  10/2020    PARACENTESIS      Family History   Problem Relation Age of Onset    Anesth Problems Neg Hx       History reviewed, no pertinent family history.   Social History     Tobacco Use    Smoking status: Current Every Day Smoker     Packs/day: 1.50     Years: 72.00     Pack years: 108.00    Smokeless tobacco: Never Used   Substance Use Topics    Alcohol use: Not Currently     No Known Allergies   Current Facility-Administered Medications   Medication Dose Route Frequency    dexamethasone (DECADRON) 4 mg/mL injection 8 mg  8 mg IntraVENous Q24H    etoposide (VEPESID) 103 mg in 0.9% sodium chloride 250 mL, overfill volume 25 mL chemo infusion  50 mg/m2 (Treatment Plan Recorded) IntraVENous Q24H    0.9% sodium chloride infusion  50 mL/hr IntraVENous CONTINUOUS    hydrALAZINE (APRESOLINE) 20 mg/mL injection 10 mg  10 mg IntraVENous Q6H PRN    lidocaine-EPINEPHrine (XYLOCAINE) 1 %-1:100,000 injection    PRN    amiodarone (CORDARONE) tablet 400 mg  400 mg Oral DAILY    balsam peru-castor oiL (VENELEX) ointment   Topical BID    zinc oxide-cod liver oil (DESITIN) 40 % paste   Topical BID    albuterol-ipratropium (DUO-NEB) 2.5 MG-0.5 MG/3 ML  3 mL Nebulization Q6H PRN    miconazole (MICOTIN) 2 % powder   Topical BID    amLODIPine (NORVASC) tablet 5 mg  5 mg Oral DAILY    aspirin delayed-release tablet 81 mg  81 mg Oral DAILY    atorvastatin (LIPITOR) tablet 10 mg  10 mg Oral QHS    oxyCODONE IR (ROXICODONE) tablet 5 mg  5 mg Oral Q6H PRN    sodium chloride (NS) flush 5-40 mL  5-40 mL IntraVENous Q8H    sodium chloride (NS) flush 5-40 mL  5-40 mL IntraVENous PRN    acetaminophen (TYLENOL) tablet 650 mg  650 mg Oral Q6H PRN    Or    acetaminophen (TYLENOL) suppository 650 mg  650 mg Rectal Q6H PRN    polyethylene glycol (MIRALAX) packet 17 g  17 g Oral DAILY PRN    ondansetron (ZOFRAN ODT) tablet 4 mg  4 mg Oral Q8H PRN    Or    ondansetron (ZOFRAN) injection 4 mg  4 mg IntraVENous Q6H PRN    nicotine (NICODERM CQ) 21 mg/24 hr patch 1 Patch  1 Patch TransDERmal DAILY PRN          LAB AND IMAGING FINDINGS:     Lab Results   Component Value Date/Time    WBC 8.6 08/10/2021 03:22 AM    HGB 10.9 (L) 08/10/2021 03:22 AM    PLATELET 801 (H) 01/56/0627 03:22 AM     Lab Results   Component Value Date/Time    Sodium 139 08/11/2021 03:38 AM    Potassium 4.6 08/11/2021 03:38 AM    Chloride 105 08/11/2021 03:38 AM    CO2 31 08/11/2021 03:38 AM    BUN 37 (H) 08/11/2021 03:38 AM    Creatinine 2.50 (H) 08/11/2021 03:38 AM    Calcium 8.9 08/11/2021 03:38 AM    Magnesium 2.0 08/11/2021 03:38 AM    Phosphorus 5.7 (H) 08/11/2021 03:38 AM      Lab Results   Component Value Date/Time    Alk. phosphatase 85 08/10/2021 03:22 AM    Protein, total 6.8 08/10/2021 03:22 AM    Albumin 2.0 (L) 08/11/2021 03:38 AM    Globulin 4.8 (H) 08/10/2021 03:22 AM     Lab Results   Component Value Date/Time    INR 1.1 08/11/2021 01:22 PM    Prothrombin time 11.0 08/11/2021 01:22 PM    aPTT 28.6 08/04/2021 03:26 AM      No results found for: IRON, FE, TIBC, IBCT, PSAT, FERR   No results found for: PH, PCO2, PO2  No components found for: [place]   Lab Results   Component Value Date/Time    CK 61 07/29/2021 11:00 PM                Total time: 25 minutes  Counseling / coordination time, spent as noted above: 20 minutes  > 50% counseling / coordination?:  Yes    Prolonged service was provided for  []30 min   []75 min in face to face time in the presence of the patient, spent as noted above. Time Start:   Time End:   Note: this can only be billed with 83204 (initial) or 69671 (follow up). If multiple start / stop times, list each separately.

## 2021-08-11 NOTE — PROGRESS NOTES
[facility]    Hospitalist Progress Note  Nedra Quezada MD  Answering service: [phone] OR   [phone] from in house phone     Date of service: 2021   Name: Dawn Query  : 1940  MRN: [id number]  PCP: Dr. Roni Hilliard NP     Brief HPI and Hospital Course:       62-year-old female with a history of heavy tobacco use at least 2 packs a day since 13to 12years of age, hyperlipidemia, hypertension presented to the ED from [facility]  with reports of worsening shortness of breath over the last several days and 1 episode of sputum with scant blood-tinged.  She denies any ongoing hemoptysis.  She denies any fever or chills.  Denies any wheezing or chest tightness.  Denies any chest pain.  Chest x-ray was done and revealed left lung opacification.  CT of the chest was done revealed mediastinal lymphadenopathy, complete collapse of the left lung with large left pleural effusion and likely obstructing mucus in the left mainstem bronchus.  Vital signs were also notable for elevated heart rate as high as 180, mild elevation in temperature as high as 100.0, tachypnea with breathing in the 30s, and decreased SPO2 as low as 86 on room air.  She was placed on supplemental O2 with improvement in SPO2.  EKG was done was consistent with atrial fibrillation with RVR but was unremarkable for acute ischemic change.  Patient was initiated on amiodarone infusion and heparin as well.  She was also initiated on empiric antibiotics with Zosyn and vancomycin, and admitted to ICU for further evaluation and treatment. Transferred out ICU on       Assessment/Plan     #Small cell lung cancer:  -FNA lymph node -Small cell carcinoma with tumor necrosis   -oncology following-receiving inpatient chemo and radiation treatment  -MRI brain to rule out mets but unable to lie flat due to resp issues  Will try once stable    Acute Hypoxic Respiratory Failure:  -due to underlying malignancy,bronchial obstcution- CT-dense consolidation and collapse of the left lung with an abrupt cut off of the left mainstem bronchus  Received abx -Vanc and zosyn- 5 days  -USG today moderate rt effusion and also noted other findings  -Will get one time thoracentesis on Rt side -discussed with patient's daughter  Per pulmonology-get CT chest after thoracentesis for better eval of left lung as well    Large left pleural effusion/Mediastinal Lymphadenopathy  Due to  malignancy S/p  thoracentesis , removed 1.5L effusion . Negativel culture so far. - Chest tube with pig tail catheter placed 21 and removed   Thoracic surgery signed off    Cytology epnding. Acute renal failure:improving  -suspected ATN/pre renal state-recent abx -vanc/zosyn  Nephrology following  Creatinine trending down        Atrial fibrillation with RVR now NSR-  New Dx, on amiodarone IV changed to PO 21,  Discussed with , patient high risk for bleeding due to cancer,recent hemoptysis - recommended against further anticoagulation  -on 81 mg aspirin    Cellulitis Jason LE  -resolved  Recent DVT ultrasound negative,  received Zosyn and vancomycin    Hypertension Continue home amlodipine      Electrolyte imbalance : resolved      Tobacco abuse -Nicotine patch ordered      Remains ill, Guarded prognosis overall, patient's daughter aware    CODE status: full  VTE prophylaxis: heparin  Discussed plan of care with Patient/Family and Nurse   Pre-admission lived at [address] [place] [address]   Discharge planning: TBD     Subjective   Patient seen and examined chart reviewed.     She denied any pain today, aware of starting chemotherapy      Physical examination     Visit Vitals  BP (!) 145/51 (BP 1 Location: Right arm, BP Patient Position: At rest)   Pulse 72   Temp 97.8 °F (36.6 °C)   Resp 20   Ht 5' 7\" (1.702 m)   Wt 87.4 kg (192 lb 10.9 oz)   SpO2 93%   BMI 30.18 kg/m²       Temp (24hrs), Av.8 °F (36.6 °C), Min:97.7 °F (36.5 °C), Max:97.8 °F (36.6 °C)      Intake/Output Summary (Last 24 hours) at 8/11/2021 1741  Last data filed at 8/11/2021 0430  Gross per 24 hour   Intake 1955 ml   Output 500 ml   Net 1455 ml       General:  Appears ill. HEENT Atraumatic,anicteric sclera,normal conjunctiva,EOMI           Neck: Supple             Lungs:  Decreased breath sounds b/l    Heart:  Regular rhythm, no murmur,S1,S2 wnl   Abdomen:   Soft, non-tender,non distended. Extremities: No edema           Neurologic: Oriented X 2  Globally weak.      Data Reviewed:       Recent Labs     08/10/21  0322 08/09/21  0401   WBC 8.6 10.2   HGB 10.9* 11.3*   HCT 34.8* 36.5   * 399     Recent Labs     08/11/21  1322 08/11/21  0338 08/10/21  0322 08/09/21  0401   NA  --  139 139  140 139  138   K  --  4.6 3.8  3.8 3.7  3.6   CL  --  105 105  104 104  103   CO2  --  31 28  29 28  30   GLU  --  126* 100  104* 110*  75   BUN  --  37* 36*  37* 37*  37*   CREA  --  2.50* 2.57*  2.63* 2.92*  2.94*   CA  --  8.9 9.1  9.0 8.9  9.0   MG  --  2.0 2.0 2.0   PHOS  --  5.7* 4.4 4.2   ALB  --  2.0* 2.0*  2.0* 2.1*  2.1*   TBILI  --   --  0.3 0.3   ALT  --   --  18 19   INR 1.1  --   --   --        Medications reviewed  Current Facility-Administered Medications   Medication Dose Route Frequency    dexamethasone (DECADRON) 4 mg/mL injection 8 mg  8 mg IntraVENous Q24H    etoposide (VEPESID) 103 mg in 0.9% sodium chloride 250 mL, overfill volume 25 mL chemo infusion  50 mg/m2 (Treatment Plan Recorded) IntraVENous Q24H    0.9% sodium chloride infusion  50 mL/hr IntraVENous CONTINUOUS    hydrALAZINE (APRESOLINE) 20 mg/mL injection 10 mg  10 mg IntraVENous Q6H PRN    lidocaine-EPINEPHrine (XYLOCAINE) 1 %-1:100,000 injection    PRN    amiodarone (CORDARONE) tablet 400 mg  400 mg Oral DAILY    balsam peru-castor oiL (VENELEX) ointment   Topical BID    zinc oxide-cod liver oil (DESITIN) 40 % paste   Topical BID    albuterol-ipratropium (DUO-NEB) 2.5 MG-0.5 MG/3 ML 3 mL Nebulization Q6H PRN    miconazole (MICOTIN) 2 % powder   Topical BID    amLODIPine (NORVASC) tablet 5 mg  5 mg Oral DAILY    aspirin delayed-release tablet 81 mg  81 mg Oral DAILY    atorvastatin (LIPITOR) tablet 10 mg  10 mg Oral QHS    oxyCODONE IR (ROXICODONE) tablet 5 mg  5 mg Oral Q6H PRN    sodium chloride (NS) flush 5-40 mL  5-40 mL IntraVENous Q8H    sodium chloride (NS) flush 5-40 mL  5-40 mL IntraVENous PRN    acetaminophen (TYLENOL) tablet 650 mg  650 mg Oral Q6H PRN    Or    acetaminophen (TYLENOL) suppository 650 mg  650 mg Rectal Q6H PRN    polyethylene glycol (MIRALAX) packet 17 g  17 g Oral DAILY PRN    ondansetron (ZOFRAN ODT) tablet 4 mg  4 mg Oral Q8H PRN    Or    ondansetron (ZOFRAN) injection 4 mg  4 mg IntraVENous Q6H PRN    nicotine (NICODERM CQ) 21 mg/24 hr patch 1 Patch  1 Patch TransDERmal DAILY PRN         Nedra Quezada MD  Internal Medicine  8/11/2021

## 2021-08-11 NOTE — ROUTINE PROCESS
Bedside shift change report given to [address] [place] [address] (oncoming nurse) by [facility] (offgoing nurse). Report included the following information SBAR, Kardex, MAR and Cardiac Rhythm NSR.

## 2021-08-12 NOTE — PROGRESS NOTES
Transition of Care: TBD; possibly home with [facility] and 24/7 support vs rehab (IPR/SNF)     Transport Plan: TBD; in car with family vs BLS (not set up yet)      RUR: 19%     DX: atrilfibrillation with rapid ventricular response; admitted on 7/29/21     Main contact is daughter Gibson Mora- [phone]    Patient is a [facility] patient- contact at [facility] is Hannah Curly- [phone]     Discharge pending:  -pending thoracentesis (to be done today 8/12)  -patient is currently on supplemental O2 at 6 L  -patient receiving inpatient chemo and radiation for small cell lung CA; getting chemo dose today 8/12  -s/p chest tube removal  -pending possible MRI  -pending progress with PT/OT  -pending improvement in CRE lab  -pending complex medical progress and care recommendations per palliative, oncology, nephrology, wound care,         NOTE: from previous CM notes and chart; patient transferred out of ICU on 7/30; patient has history of mild dementia; moved to [place] from [place] in 2020 to live with supportive daughter; uses a walker to ambulate; moderately independent in her ADLs;      1500: CM noted from chart that patient is a [address] [facility] patient; this CM called Hannah Rawls at [facility] to inform; she verbalized understanding; faxed her latest notes and clinicals to [phone]     CM following  Marcia Wolf RN, CRM

## 2021-08-12 NOTE — ROUTINE PROCESS
Bedside and Verbal shift change report given to Maria C Butler (oncoming nurse) by Evelio Alves (offgoing nurse). Report included the following information SBAR, Kardex, ED Summary, Intake/Output, MAR and Cardiac Rhythm a-fib.

## 2021-08-12 NOTE — WOUND CARE
WOCN Note    Patient declined to switch to specialty air mattress. Will continue to follow routinely.     BRUNO Denis  [facility] Inpatient Wound Care  Available on Perfect Serve  Pager [phone]  Office [phone]

## 2021-08-12 NOTE — PROGRESS NOTES
Bedside shift change report given to Kristian Alanis RN (oncoming nurse) by Adriano Frias RN (offgoing nurse).  Report included the following information SBAR, Intake/Output, MAR, Recent Results and Cardiac Rhythm SR.

## 2021-08-12 NOTE — PROGRESS NOTES
[facility]   [address] [facility], [address] [place] [address], [facility]  Phone: [phone]   [phone]       Nephrology Progress Note  Franky Marquez     1940     [id number]  Date of Admission : 7/29/2021 08/12/21    CC: Follow up for ARF    Assessment and Plan   GABION :  - suspected ATN --> from pre renal state + vanc/ zosyn toxicity   - renal US : unremarkable   - Cr stable  - stopped IVF. Observe off IVF   - daily labs and I/Os    Acute Resp failure   Large Left Effusion   Mediastinal adenopathy   Pneumothorax  Smoker   SSLC:  - palliative radiation  - getting etoposide and carboplatin     HTN   - controlled     A fib   -On amiodarone.  -Echo with preserved EF, mild TR     Interval History:  Seen and examined. Cr stable. UOP not charted. Getting thoracentesis today     Review of Systems: A comprehensive review of systems was negative except for that written in the HPI.     Current Medications:   Current Facility-Administered Medications   Medication Dose Route Frequency    etoposide (VEPESID) 103 mg in 0.9% sodium chloride 250 mL, overfill volume 25 mL chemo infusion  50 mg/m2 (Treatment Plan Recorded) IntraVENous Q24H    0.9% sodium chloride infusion  50 mL/hr IntraVENous CONTINUOUS    hydrALAZINE (APRESOLINE) 20 mg/mL injection 10 mg  10 mg IntraVENous Q6H PRN    lidocaine-EPINEPHrine (XYLOCAINE) 1 %-1:100,000 injection    PRN    amiodarone (CORDARONE) tablet 400 mg  400 mg Oral DAILY    balsam peru-castor oiL (VENELEX) ointment   Topical BID    zinc oxide-cod liver oil (DESITIN) 40 % paste   Topical BID    albuterol-ipratropium (DUO-NEB) 2.5 MG-0.5 MG/3 ML  3 mL Nebulization Q6H PRN    miconazole (MICOTIN) 2 % powder   Topical BID    amLODIPine (NORVASC) tablet 5 mg  5 mg Oral DAILY    aspirin delayed-release tablet 81 mg  81 mg Oral DAILY    atorvastatin (LIPITOR) tablet 10 mg  10 mg Oral QHS    oxyCODONE IR (ROXICODONE) tablet 5 mg  5 mg Oral Q6H PRN    sodium chloride (NS) flush 5-40 mL  5-40 mL IntraVENous Q8H    sodium chloride (NS) flush 5-40 mL  5-40 mL IntraVENous PRN    acetaminophen (TYLENOL) tablet 650 mg  650 mg Oral Q6H PRN    Or    acetaminophen (TYLENOL) suppository 650 mg  650 mg Rectal Q6H PRN    polyethylene glycol (MIRALAX) packet 17 g  17 g Oral DAILY PRN    ondansetron (ZOFRAN ODT) tablet 4 mg  4 mg Oral Q8H PRN    Or    ondansetron (ZOFRAN) injection 4 mg  4 mg IntraVENous Q6H PRN    nicotine (NICODERM CQ) 21 mg/24 hr patch 1 Patch  1 Patch TransDERmal DAILY PRN      No Known Allergies    Objective:  Vitals:    Vitals:    08/12/21 0908 08/12/21 1021 08/12/21 1201 08/12/21 1210   BP: (!) 161/96  (!) 130/49 (!) 130/49   Pulse: 99 93 75 73   Resp: 19  20 24   Temp: 97.4 °F (36.3 °C)  97.5 °F (36.4 °C)    SpO2:   96% 95%   Weight:       Height:         Intake and Output:  No intake/output data recorded. 08/10 1901 - 08/12 0700  In: 1953 [I.V.:1953]  Out: 500 [Urine:500]    Physical Examination:  General:  Ill looking   HEENT: PERRL,+ Pallor , No Icterus  Neck: Supple,no mass palpable  Lungs : diminished   CVS: IRREGULAR, S1 S2 normal, No murmur   Abdomen: Soft, NT, BS +  Extremities: TRACE Edema         []    High complexity decision making was performed  []    Patient is at high-risk of decompensation with multiple organ involvement    Lab Data Personally Reviewed: I have reviewed all the pertinent labs, microbiology data and radiology studies during assessment.     Recent Labs     08/12/21  0948 08/12/21  0501 08/11/21  1322 08/11/21  0338 08/10/21  0322    138  --  139 139  140   K 4.7 4.6  --  4.6 3.8  3.8    108  --  105 105  104   CO2 29 25  --  31 28  29   * 117*  --  126* 100  104*   BUN 47* 46*  --  37* 36*  37*   CREA 2.58* 2.51*  --  2.50* 2.57*  2.63*   CA 9.0 8.7  --  8.9 9.1  9.0   MG  --  2.2  --  2.0 2.0   PHOS  --  5.3*  --  5.7* 4.4   ALB 2.3* 2.0*  --  2.0* 2.0*  2.0*   ALT 19  --   --   --  18   INR  --   -- 1.1  --   --      Recent Labs     08/12/21  0948 08/10/21  0322   WBC 11.5* 8.6   HGB 11.4* 10.9*   HCT 38.1 34.8*   * 418*     No results found for: SDES  Lab Results   Component Value Date/Time    Culture result: Culture performed on Fluid swab specimen 08/02/2021 03:23 PM    Culture result: NO GROWTH 4 DAYS 08/02/2021 03:23 PM    Culture result:  08/01/2021 05:06 AM     MRSA NOT PRESENT. Apparent Staphylococus aureus (not MRSA noted). Culture result:  08/01/2021 05:06 AM     Screening of patient nares for MRSA is for surveillance purposes and, if positive, to facilitate isolation considerations in high risk settings. It is not intended for automatic decolonization interventions per se as regimens are not sufficiently effective to warrant routine use.     Culture result: NO GROWTH 4 DAYS 07/30/2021 03:00 PM    Culture result: NO FUNGUS ISOLATED 10 DAYS 07/30/2021 03:00 PM     Recent Results (from the past 24 hour(s))   PROTHROMBIN TIME + INR    Collection Time: 08/11/21  1:22 PM   Result Value Ref Range    INR 1.1 0.9 - 1.1      Prothrombin time 11.0 9.0 - 11.1 sec   RENAL FUNCTION PANEL    Collection Time: 08/12/21  5:01 AM   Result Value Ref Range    Sodium 138 136 - 145 mmol/L    Potassium 4.6 3.5 - 5.1 mmol/L    Chloride 108 97 - 108 mmol/L    CO2 25 21 - 32 mmol/L    Anion gap 5 5 - 15 mmol/L    Glucose 117 (H) 65 - 100 mg/dL    BUN 46 (H) 6 - 20 MG/DL    Creatinine 2.51 (H) 0.55 - 1.02 MG/DL    BUN/Creatinine ratio 18 12 - 20      GFR est AA 22 (L) >60 ml/min/1.73m2    GFR est non-AA 18 (L) >60 ml/min/1.73m2    Calcium 8.7 8.5 - 10.1 MG/DL    Phosphorus 5.3 (H) 2.6 - 4.7 MG/DL    Albumin 2.0 (L) 3.5 - 5.0 g/dL   MAGNESIUM    Collection Time: 08/12/21  5:01 AM   Result Value Ref Range    Magnesium 2.2 1.6 - 2.4 mg/dL   CBC WITH AUTOMATED DIFF    Collection Time: 08/12/21  9:48 AM   Result Value Ref Range    WBC 11.5 (H) 3.6 - 11.0 K/uL    RBC 3.99 3.80 - 5.20 M/uL    HGB 11.4 (L) 11.5 - 16.0 g/dL HCT 38.1 35.0 - 47.0 %    MCV 95.5 80.0 - 99.0 FL    MCH 28.6 26.0 - 34.0 PG    MCHC 29.9 (L) 30.0 - 36.5 g/dL    RDW 14.8 (H) 11.5 - 14.5 %    PLATELET 153 (H) 172 - 400 K/uL    MPV 9.4 8.9 - 12.9 FL    NRBC 0.0 0  WBC    ABSOLUTE NRBC 0.00 0.00 - 0.01 K/uL    NEUTROPHILS 91 (H) 32 - 75 %    LYMPHOCYTES 3 (L) 12 - 49 %    MONOCYTES 5 5 - 13 %    EOSINOPHILS 0 0 - 7 %    BASOPHILS 0 0 - 1 %    IMMATURE GRANULOCYTES 1 (H) 0.0 - 0.5 %    ABS. NEUTROPHILS 10.5 (H) 1.8 - 8.0 K/UL    ABS. LYMPHOCYTES 0.3 (L) 0.8 - 3.5 K/UL    ABS. MONOCYTES 0.6 0.0 - 1.0 K/UL    ABS. EOSINOPHILS 0.0 0.0 - 0.4 K/UL    ABS. BASOPHILS 0.0 0.0 - 0.1 K/UL    ABS. IMM. GRANS. 0.1 (H) 0.00 - 0.04 K/UL    DF SMEAR SCANNED      PLATELET COMMENTS Large Platelets      RBC COMMENTS ANISOCYTOSIS  1+       METABOLIC PANEL, COMPREHENSIVE    Collection Time: 08/12/21  9:48 AM   Result Value Ref Range    Sodium 138 136 - 145 mmol/L    Potassium 4.7 3.5 - 5.1 mmol/L    Chloride 105 97 - 108 mmol/L    CO2 29 21 - 32 mmol/L    Anion gap 4 (L) 5 - 15 mmol/L    Glucose 138 (H) 65 - 100 mg/dL    BUN 47 (H) 6 - 20 MG/DL    Creatinine 2.58 (H) 0.55 - 1.02 MG/DL    BUN/Creatinine ratio 18 12 - 20      GFR est AA 22 (L) >60 ml/min/1.73m2    GFR est non-AA 18 (L) >60 ml/min/1.73m2    Calcium 9.0 8.5 - 10.1 MG/DL    Bilirubin, total 0.2 0.2 - 1.0 MG/DL    ALT (SGPT) 19 12 - 78 U/L    AST (SGOT) 17 15 - 37 U/L    Alk. phosphatase 83 45 - 117 U/L    Protein, total 7.7 6.4 - 8.2 g/dL    Albumin 2.3 (L) 3.5 - 5.0 g/dL    Globulin 5.4 (H) 2.0 - 4.0 g/dL    A-G Ratio 0.4 (L) 1.1 - 2.2             I have reviewed the flowsheets. Chart and Pertinent Notes have been reviewed. No change in PMH ,family and social history from Consult note.       Alice Estrella MD

## 2021-08-12 NOTE — PROGRESS NOTES
Problem: Mobility Impaired (Adult and Pediatric)  Goal: *Acute Goals and Plan of Care (Insert Text)  Description: FUNCTIONAL STATUS PRIOR TO ADMISSION: Patient was independent without use of DME per her report although a questionable historian on eval.     HOME SUPPORT PRIOR TO ADMISSION: The patient lived with her daughter (reports this was a recent change in last few months). Daughter works from home. Physical Therapy Goals  Reviewed/ revised 8/12/2021  1. Patient will move from supine to sit and sit to supine  and roll side to side in bed with supervision/set-up within 7 day(s). 2.  Patient will transfer from bed to chair and chair to bed with moderate assistance  using the least restrictive device within 7 day(s). 3.  Patient will perform sit to stand with moderate assistance  within 7 day(s). 4.  Patient will ambulate with moderate assistance  for 5 feet with the least restrictive device within 7 day(s). Physical Therapy Goals  Initiated 8/3/2021  1. Patient will move from supine to sit and sit to supine , scoot up and down, and roll side to side in bed with modified independence within 7 day(s). 2.  Patient will transfer from bed to chair and chair to bed with supervision/set-up using the least restrictive device within 7 day(s). 3.  Patient will perform sit to stand with supervision/set-up within 7 day(s). 4.  Patient will ambulate with supervision/set-up for 100 feet with the least restrictive device within 7 day(s). 5.  Patient will ascend/descend 5 stairs with right handrail(s) with minimal assistance/contact guard assist within 7 day(s).       Outcome: Not Progressing Towards Goal   PHYSICAL THERAPY TREATMENT: WEEKLY REASSESSMENT  Patient: Hai Voss (30 y.o. female)  Date: 8/12/2021  Primary Diagnosis: Atrial fibrillation with rapid ventricular response (HCC) [I48.91]  Procedure(s) (LRB):  ENDOSCOPIC BRONCHOSCOPY ULTRASOUND (EBUS) (N/A)  TRANSBRONCHIAL BIOPSY (N/A)  BRONCHOSCOPY (N/A) 8 Days Post-Op   Precautions:   Fall, Bed Alarm      ASSESSMENT  Patient continues with skilled PT services and is not progressing towards goals due to decreased activity tolerance with increased O2 requirement, general weakness, impaired balance, increased risk for fall. Pt seen following breathing treatment with O2 at 10 L. Tolerated minimal activity due to fatigue and SpO2 84% with 10 L in place. Mobilized to EOB with mod A. Required elevated bed height, multiple attempts, and 2 person assist to reach standing for brief trial only. Pt returned to semi-reclined position at end of session. Will benefit from mobility progression with acute PT as tolerated; rehab prognosis guarded due to medical status. Patient's progression toward goals since last assessment: pt with inconsistent participation in therapy due to chemo/ radiation and decreased tolerance to activity    Current Level of Function Impacting Discharge (mobility/balance): bed mob mod A, sitting balance supervision    Functional Outcome Measure: The patient scored 1/28 on the Tinetti outcome measure which is indicative of high risk for fall. Other factors to consider for discharge: medical status         PLAN :  Goals have been updated based on progression since last assessment. Patient continues to benefit from skilled intervention to address the above impairments. Recommendations and Planned Interventions: bed mobility training, transfer training, gait training, therapeutic exercises, patient and family training/education, and therapeutic activities      Frequency/Duration: Patient will be followed by physical therapy:  3 times a week to address goals.     Recommendation for discharge: (in order for the patient to meet his/her long term goals)  Therapy up to 5 days/week in SNF setting pending GOC; would required 24/7 care for d/c to home    This discharge recommendation:  Has not yet been discussed the attending provider and/or case management    IF patient discharges home will need the following DME: hospital bed, w/c, BSC         SUBJECTIVE:   Patient  reports not feeling well    OBJECTIVE DATA SUMMARY:   HISTORY:    Past Medical History:   Diagnosis Date    Cellulitis     R LEG    CVA [facility]cere[facility] vascular accident) ([facility] [address]     Hyperlipidemia     Hypertension      Past Surgical History:   Procedure Laterality Date    HX BLADDER SUSPENSION  01/2021    HX CHOLECYSTECTOMY      HX OTHER SURGICAL  10/2020    PARACENTESIS       Personal factors and/or comorbidities impacting plan of care: CA diagnosis with active chemo/ radiation treatment    Home Situation  Home Environment: Private residence  # Steps to Enter: 5  One/Two Story Residence: One story  Living Alone: No  Support Systems: Child(latisha) (lives with daughter)  Patient Expects to be Discharged to[de-identified] Unknown  Current DME Used/Available at Home: Shower chair, Grab bars  Tub or Shower Type: Shower    EXAMINATION/PRESENTATION/DECISION MAKING:   Critical Behavior:  Neurologic State: Alert  Orientation Level: Oriented X4  Cognition: Follows commands  Safety/Judgement: Awareness of environment, Decreased awareness of need for assistance, Decreased awareness of need for safety, Decreased insight into deficits, Fall prevention  Hearing: Auditory  Auditory Impairment: Hard of hearing, bilateral    Range Of Motion:  AROM: Generally decreased, functional                       Strength:    Strength: Generally decreased, functional                    Tone & Sensation:   Tone: Normal              Sensation: Intact               Coordination:  Coordination: Within functional limits  Vision:      Functional Mobility:  Bed Mobility:     Supine to Sit: Minimum assistance; Additional time;Bed Modified  Sit to Supine: Moderate assistance (assist for LEs)  Scooting: Contact guard assistance; Additional time (for scoot to EOB)  Transfers:      Pt unable to stand this date Balance:   Sitting: Intact      Functional Measure:  Tinetti test:    Sitting Balance: 1  Arises: 0  Attempts to Rise: 0  Immediate Standing Balance: 0  Standing Balance: 0  Nudged: 0  Eyes Closed: 0  Turn 360 Degrees - Continuous/Discontinuous: 0  Turn 360 Degrees - Steady/Unsteady: 0  Sitting Down: 0  Balance Score: 1 Balance total score  Indication of Gait: 0  R Step Length/Height: 0  L Step Length/Height: 0  R Foot Clearance: 0  L Foot Clearance: 0  Step Symmetry: 0  Step Continuity: 0  Path: 0  Trunk: 0  Walking Time: 0  Gait Score: 0 Gait total score  Total Score: 1/28 Overall total score         Tinetti Tool Score Risk of Falls  <19 = High Fall Risk  19-24 = Moderate Fall Risk  25-28 = Low Fall Risk  Tinetti ME. Performance-Oriented Assessment of Mobility Problems in Elderly Patients. [address] 68; [id number]. (Scoring Description: PT Bulletin Feb. 10, 1993)    Older adults: Jamie Laguna et al, 2009; n = 1000 [facility] elderly evaluated with ABC, SOHAM, ADL, and IADL)  · Mean SOHAM score for males aged 69-68 years = 26.21(3.40)  · Mean SOHAM score for females age 69-68 years = 25.16(4.30)  · Mean SOHAM score for males over 80 years = 23.29(6.02)  · Mean SOHAM score for females over 80 years = 17.20(8.32)          Pain Rating:  No c/o pain    Activity Tolerance:   Poor, requires frequent rest breaks and desats wtih 10 L O2 in place    After treatment patient left in no apparent distress:   Call bell within reach, Side rails x 3 and sitting up in bed    COMMUNICATION/EDUCATION:   The patients plan of care was discussed with: Occupational therapist and Registered nurse. Fall prevention education was provided and the patient/caregiver indicated understanding., Patient/family have participated as able in goal setting and plan of care. and Patient/family agree to work toward stated goals and plan of care.     Thank you for this referral.  Karin Sanchez, PT   Time Calculation: 24 mins

## 2021-08-12 NOTE — PROGRESS NOTES
[facility] [place] [address] [address], [place]: [phone]  F: [phone]    Reason for Visit:   Burgess Wakefield is a 80 y.o. 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 Kale Velasquez is a 46-year-old female with history of hyperlipidemia, hypertension, and significant tobacco use with worsening shortness of breath as well as hemoptysis x1 episode. Initial chest x-ray and CT obtained revealed mediastinal lymphadenopathy as well as complete collapse of the left lung with large left pleural effusion/mucous plug in left mainstem bronchus. She was seen by Dr. Bigg Powell with Pulmonology and started on IV antibiotics for postobstructive pneumonia. He was also evaluated by cardiothoracic surgery. Chest tube was placed on 8/2/2021. She developed atrial fibrillation with RVR on 8/2/2021 and was seen by Cardiology. She was started on both amiodarone and heparin. TTE completed with ejection fraction 65%. She underwent EBUS with bronchoscopy on 8/4/2021 with Dr. Bigg Powell. He obtained 1 biopsy from station 7 node and 4 samples from left hilar mass. Pathology has resulted with small cell carcinoma. Cytology from effusion is still pending. She is alert this morning. Somewhat difficult time obtaining history due to dementia. I have reached out to her daughter Geraldine Reilly - unable to connect/went to . She appears slightly dyspneic at rest.  She is on 5 L nasal cannula. She reports some pain on her right side. Given kidney function and creatinine of 2.78, will go ahead and obtain head CT for staging (versus brain MRI). She will also need a PET scan outpatient to determine limited versus extensive stage small cell. Interval History: I see Faith this morning. Today is day 3 of carboplatin and etoposide. Received etoposide only.   She has tolerated the first 2 days without significant side effects. She will also complete radiation with 5 out of 5 treatments today. She remains on 6 L oxygen via nasal cannula. She is frustrated this morning, not really clear on why. Does not want to engage with me regarding potential SE - \"Im fine\".       Past Medical History:   Diagnosis Date    Cellulitis     R LEG    CVA (cerebral vascular accident) ([address]     Hyperlipidemia     Hypertension       Past Surgical History:   Procedure Laterality Date    HX BLADDER SUSPENSION  01/2021    HX CHOLECYSTECTOMY      HX OTHER SURGICAL  10/2020    PARACENTESIS      Social History     Tobacco Use    Smoking status: Current Every Day Smoker     Packs/day: 1.50     Years: 72.00     Pack years: 108.00    Smokeless tobacco: Never Used   Substance Use Topics    Alcohol use: Not Currently      Family History   Problem Relation Age of Onset    Anesth Problems Neg Hx      Current Facility-Administered Medications   Medication Dose Route Frequency    hydrALAZINE (APRESOLINE) 20 mg/mL injection 10 mg  10 mg IntraVENous Q6H PRN    lidocaine-EPINEPHrine (XYLOCAINE) 1 %-1:100,000 injection    PRN    amiodarone (CORDARONE) tablet 400 mg  400 mg Oral DAILY    balsam peru-castor oiL (VENELEX) ointment   Topical BID    zinc oxide-cod liver oil (DESITIN) 40 % paste   Topical BID    albuterol-ipratropium (DUO-NEB) 2.5 MG-0.5 MG/3 ML  3 mL Nebulization Q6H PRN    miconazole (MICOTIN) 2 % powder   Topical BID    amLODIPine (NORVASC) tablet 5 mg  5 mg Oral DAILY    aspirin delayed-release tablet 81 mg  81 mg Oral DAILY    atorvastatin (LIPITOR) tablet 10 mg  10 mg Oral QHS    oxyCODONE IR (ROXICODONE) tablet 5 mg  5 mg Oral Q6H PRN    sodium chloride (NS) flush 5-40 mL  5-40 mL IntraVENous Q8H    sodium chloride (NS) flush 5-40 mL  5-40 mL IntraVENous PRN    acetaminophen (TYLENOL) tablet 650 mg  650 mg Oral Q6H PRN    Or    acetaminophen (TYLENOL) suppository 650 mg 650 mg Rectal Q6H PRN    polyethylene glycol (MIRALAX) packet 17 g  17 g Oral DAILY PRN    ondansetron (ZOFRAN ODT) tablet 4 mg  4 mg Oral Q8H PRN    Or    ondansetron (ZOFRAN) injection 4 mg  4 mg IntraVENous Q6H PRN    nicotine (NICODERM CQ) 21 mg/24 hr patch 1 Patch  1 Patch TransDERmal DAILY PRN      No Known Allergies     Review of Systems: A complete review of systems was obtained, negative except as described above. Physical Exam:     Visit Vitals  BP (!) 136/53 (BP 1 Location: Left upper arm, BP Patient Position: At rest)   Pulse 79   Temp 97.5 °F (36.4 °C)   Resp 20   Ht 5' 7\" (1.702 m)   Wt 202 lb 6.4 oz (91.8 kg)   SpO2 93%   BMI 31.70 kg/m²     ECOG PS: 3  General: frustrated, no acute distress   Eyes:  anicteric sclerae  HENT: Atraumatic  Neck: Supple  Respiratory: normal respiratory effort  CV: HRR monitor  GI: Soft, nondistended  Skin: Warm, dry  Psych: Alert, difficult to obtain history    Results:     Lab Results   Component Value Date/Time    WBC 11.5 (H) 08/12/2021 09:48 AM    HGB 11.4 (L) 08/12/2021 09:48 AM    HCT 38.1 08/12/2021 09:48 AM    PLATELET 390 (H) 42/97/9776 09:48 AM    MCV 95.5 08/12/2021 09:48 AM    ABS. NEUTROPHILS 10.5 (H) 08/12/2021 09:48 AM     Lab Results   Component Value Date/Time    Sodium 138 08/12/2021 09:48 AM    Potassium 4.7 08/12/2021 09:48 AM    Chloride 105 08/12/2021 09:48 AM    CO2 29 08/12/2021 09:48 AM    Glucose 138 (H) 08/12/2021 09:48 AM    BUN 47 (H) 08/12/2021 09:48 AM    Creatinine 2.58 (H) 08/12/2021 09:48 AM    GFR est AA 22 (L) 08/12/2021 09:48 AM    GFR est non-AA 18 (L) 08/12/2021 09:48 AM    Calcium 9.0 08/12/2021 09:48 AM     Lab Results   Component Value Date/Time    Bilirubin, total 0.2 08/12/2021 09:48 AM    ALT (SGPT) 19 08/12/2021 09:48 AM    Alk.  phosphatase 83 08/12/2021 09:48 AM    Protein, total 7.7 08/12/2021 09:48 AM    Albumin 2.3 (L) 08/12/2021 09:48 AM    Globulin 5.4 (H) 08/12/2021 09:48 AM     Records reviewed and summarized above. Radiology report(s) reviewed above. Pathology from 8/4/2021 left hilar node: Small cell carcinoma with necrosis    CT Abd WO 8/4/21:  IMPRESSION  1. Right hydropneumothorax. Redemonstrated is near-complete left lung collapse  with large pleural effusion. Left pigtail drainage pleural catheter. CT Chest 7/31/21:  IMPRESSION  1. Unchanged large left pleural effusion which has likely reaccumulated since  the prior thoracentesis. 2. Unchanged dense consolidation and collapse of the left lung with an abrupt  cut off of the left mainstem bronchus. The right hilum and right side of the  mediastinum appears consolidated which may be related to invasion and/or  confluent adenopathy. A large lymph node is seen in the left para-aortic space  that is unchanged. Findings are highly suspicious for malignancy. 3. New trace right pleural effusion. US Chest 8/11/21  IMPRESSION  1. Moderate right pleural effusion is amenable to thoracentesis. 2. Large left pleural effusion. Thoracentesis on this would be of little  utility, as the left mainstem bronchus is occluded over a long segment due to  lung carcinoma. 3. Left thoracentesis was performed on 8/2/2021, with the expected outcome: No  change in complete left hemithorax opacification. Assessment:   1) Small cell carcinoma. Unstaged  Mediastinal adenopathy, L lung consolidation , L effusion  CT otherwise negative  Pleural fluid cytology is benign but does not entirely r/o a malignant effusion. PET and MRI are indicated for complete staging  She has a poor ECOG PS , largely due to SOB from the effusion and Left lung consolidation  We are not able to get an inpatient PET  We discussed with her daughter that this is an aggressive malignancy. If this is limited stage then likelihood of cure with chemoRT is 20%. If this is extensive stage then this is incurable. Today is day 3/3 dose reduced Carboplatin/Etoposide.  She completed 5/5 radiation treatments today as well. Monitor for potential SE of treatment, blood counts. Palliative Care on board. 2) Respiratory failure. Secondary to above, effusion. Chest tube removed 8/10/21 per CTS. Cytology from effusion negative but does not necessarily r/o malignant effusion. She remains on oxygen supplementation, 6L today. US chest yesterday with effusion. Thoracentesis ordered yesterday and pending. 3) Atrial Fibrillation with RVR. Now rate controlled on PO amiodarone. Cardiology following. 4) GABINO secondary to ATN. Nephrology is following. Creatinine stable. 5) Dementia. Plan:     · Proceed with Day 3: Carboplatin AUC 3 day 1 and Etoposide 50 mg/m2 on days 1-3  · Amiodarone increases serum levels of Etoposide hence we are starting with a 50% dose reduction  · Dexamethasone and antiemetics per protocol. Avoid Zofran and use Compazine prn nausea  · It is noted that she is on Aricept hence limit Compazine to 5 mg every 6 hours as needed   · XRT per Dr. Maris Mcmahan will be completed today (5 treatments)  · NO central access needed for cycle 1 . If has poor venous access will consider PICC  · Palliative care support    This patient was seen in conjunction with [facility]. I personally performed a face to face diagnostic evaluation on this patient. I personally reviewed the history and performed the key points on the exam.   I personally reviewed all points in the assessment and created treatment plan with the patient. Specifically, pt seen by me today  In bed doing ok. Wants water. No issues with chemo. Fine for d/c post chemo. F/u with Dr Cornell Crabtree as outpt post chemo. Call if questions    I appreciate the opportunity to participate in Ms. Faith Almazan's care.     Primary contact: Zoë Song (daughter) [phone]    Signed By: Nneka Gonzalez DO

## 2021-08-12 NOTE — PROGRESS NOTES
Problem: Self Care Deficits Care Plan (Adult)  Goal: *Acute Goals and Plan of Care (Insert Text)  Description:   FUNCTIONAL STATUS PRIOR TO ADMISSION: The patient is a questionable historian at this time. Per patient, she has been living with her daughter for the past few months however is largely IND for ADL tasks. HOME SUPPORT: The patient lived with her daughter (per patient report). Occupational Therapy Goals  Initiated 8/3/2021  Weekly re-assessment 8/10/2021 - continue all goals. 1.  Patient will perform lower body dressing with minimal assistance/contact guard assist within 7 day(s). 2.  Patient will perform bathing with minimal assistance/contact guard assist within 7 day(s). 3.  Patient will perform grooming standing with supervision/set-up within 7 day(s). 4.  Patient will perform toilet transfers with supervision/set-up within 7 day(s). 5.  Patient will perform all aspects of toileting with minimal assistance/contact guard assist within 7 day(s). 6.  Patient will participate in upper extremity therapeutic exercise/activities with supervision/set-up for 5 minutes within 7 day(s). 7.  Patient will utilize energy conservation techniques during functional activities with verbal cues within 7 day(s). Outcome: Progressing Towards Goal   OCCUPATIONAL THERAPY TREATMENT  Patient: Berlin Simmons (64 y.o. female)  Date: 8/12/2021  Diagnosis: Atrial fibrillation with rapid ventricular response (HCC) [I48.91] <principal problem not specified>  Procedure(s) (LRB):  ENDOSCOPIC BRONCHOSCOPY ULTRASOUND (EBUS) (N/A)  TRANSBRONCHIAL BIOPSY (N/A)  BRONCHOSCOPY (N/A) 8 Days Post-Op  Precautions: Fall, Bed Alarm  Chart, occupational therapy assessment, plan of care, and goals were reviewed. ASSESSMENT  Patient continues with skilled OT services and is SLOWLY progressing towards goals. Pt received supine in bed, leaning towards L side with HOB elevated, on 10L.   Pt seen in OT to progress with mobility and self-care. Bed mobility at mod assist, attempted standing 2x at max assist x 2, not able to achieve. Raised bed and pt was able to achieve standing for 5 sec. De-stated to 84% on 10L. Returned pt to supine. Total assist with toileting, d/t pt not able to safely access BSC. At this time, feel pt will need 24/7 care if she wants to return home. Her activity tolerance is poor. Appears prognosis is guarded. Will con't to follow. Current Level of Function Impacting Discharge (ADLs): max assist x 2    Other factors to consider for discharge: prognosis         PLAN :  Patient continues to benefit from skilled intervention to address the above impairments. Continue treatment per established plan of care to address goals. Recommend with staff:     Recommend next OT session: see goals    Recommendation for discharge: (in order for the patient to meet his/her long term goals)  To be determined: SNF vs 24/7  care    This discharge recommendation:  A follow-up discussion with the attending provider and/or case management is planned    IF patient discharges home will need the following DME: hospital bed, w/c, BSC       SUBJECTIVE:   Patient stated Sure.     OBJECTIVE DATA SUMMARY:   Cognitive/Behavioral Status:  Neurologic State: Alert  Orientation Level: Oriented X4                Functional Mobility and Transfers for ADLs:  Bed Mobility:  Supine to Sit: Minimum assistance; Additional time;Bed Modified  Sit to Supine: Moderate assistance (assist for LEs)  Scooting: Contact guard assistance; Additional time (for scoot to EOB)    Transfers:             Balance:  Sitting: Intact  Standing: Impaired; With support    ADL Intervention:       Grooming  Brushing/Combing Hair: Moderate assistance        Lower Body Dressing Assistance  Protective Undergarmet: Total assistance (dependent)    Toileting  Toileting Assistance:  Total assistance(dependent)         Therapeutic Exercises:       Pain:  No c/o pain    Activity Tolerance:   Fair and Poor    After treatment patient left in no apparent distress:   Supine in bed, HOB elevated    COMMUNICATION/COLLABORATION:   The patients plan of care was discussed with: Physical therapist and Registered nurse.      NOLA Jurado/L  Time Calculation: 24 mins

## 2021-08-12 NOTE — PROGRESS NOTES
Spiritual Care Assessment/Progress Note  [facility]      NAME: Berlin Simmons      MRN: [id number]  AGE: 80 y.o. SEX: female  Hinduism Affiliation: Non Roman Catholic   Language: English     8/12/2021     Total Time (in minutes): 16     Spiritual Assessment begun in [address] [facility] [address] through conversation with:         []Patient        [] Family    [] Friend(s)        Reason for Consult: Palliative Care, Initial/Spiritual Assessment     Spiritual beliefs: (Please include comment if needed)     [] Identifies with a stephan tradition:         [] Supported by a stephan community:            [] Claims no spiritual orientation:           [] Seeking spiritual identity:                [] Adheres to an individual form of spirituality:           [x] Not able to assess:                           Identified resources for coping:      [] Prayer                               [] Music                  [] Guided Imagery     [] Family/friends                 [] Pet visits     [] Devotional reading                         [x] Unknown     [] Other:                                              Interventions offered during this visit: (See comments for more details)    Patient Interventions: Initial visit           Plan of Care:     [] Support spiritual and/or cultural needs    [] Support AMD and/or advance care planning process      [] Support grieving process   [] Coordinate Rites and/or Rituals    [] Coordination with community clergy   [] No spiritual needs identified at this time   [] Detailed Plan of Care below (See Comments)  [] Make referral to Music Therapy  [] Make referral to Pet Therapy     [] Make referral to Addiction services  [] Make referral to [facility]  [] Make referral to Spiritual Care Partner  [] No future visits requested        [x] Follow up upon further referrals     Attempted visit for palliative initial spiritual assessment. Unable to visit patient at this time. Pt was sleeping and did not awake.  No family present. Pt's chart was consulted.   Gregoria Councilman, Chaplain, MDiv, MS, [address]  [phone] PRA [phone])

## 2021-08-13 NOTE — PROGRESS NOTES
[facility] [facility] Hospitalist [facility]    Hospitalist Progress Note  Aaron Carpenter MD  Answering service: [phone] OR   [phone] from in house phone        Name: Bob Houston  : 1940  MRN: [id number]  PCP: Dr. Micah Padilla NP     Brief HPI and Hospital Course:       68-year-old female with a history of heavy tobacco use at least 2 packs a day since 13to 12years of age, hyperlipidemia, hypertension presented to the ED from [facility]  with reports of worsening shortness of breath over the last several days and 1 episode of sputum with scant blood-tinged.  She denies any ongoing hemoptysis.  She denies any fever or chills.  Denies any wheezing or chest tightness.  Denies any chest pain.  Chest x-ray was done and revealed left lung opacification.  CT of the chest was done revealed mediastinal lymphadenopathy, complete collapse of the left lung with large left pleural effusion and likely obstructing mucus in the left mainstem bronchus.  Vital signs were also notable for elevated heart rate as high as 180, mild elevation in temperature as high as 100.0, tachypnea with breathing in the 30s, and decreased SPO2 as low as 86 on room air.  She was placed on supplemental O2 with improvement in SPO2.  EKG was done was consistent with atrial fibrillation with RVR but was unremarkable for acute ischemic change.  Patient was initiated on amiodarone infusion and heparin as well.  She was also initiated on empiric antibiotics with Zosyn and vancomycin, and admitted to ICU for further evaluation and treatment. Transferred out ICU on       Assessment/Plan     #Small cell lung cancer:  -FNA lymph node -Small cell carcinoma with tumor necrosis   -oncology following-receiving inpatient chemo and radiation treatment  -MRI brain to rule out mets but unable to lie flat due to resp issues    Acute Hypoxic Respiratory Failure:  -due to underlying malignancy,bronchial obstcution- CT-dense consolidation and collapse of the left lung with an abrupt cut off of the left mainstem bronchus  Received abx -Vanc and zosyn-   -USG today moderate rt effusion and also noted other findings   thoracentesis done on Rt side 21 -825cc output  Per pulmonology-get CT chest after thoracentesis for better eval of left lung as well    Large left pleural effusion/Mediastinal Lymphadenopathy  Due to  malignancy S/p  thoracentesis , removed 1.5L effusion . Negativel culture so far. - Chest tube with pig tail catheter placed 21 and removed   Thoracic surgery signed off    Cytology pending. Acute renal failure:improving  -suspected ATN/pre renal state-recent abx -vanc/zosyn  Nephrology following, Creatinine trending down    Atrial fibrillation with RVR now NSR-  New Dx, on amiodarone IV changed to PO 21,  Discussed with , patient high risk for bleeding due to cancer,recent hemoptysis - recommended against further anticoagulation  -on 81 mg aspirin    Cellulitis Jason LE  -resolved  Recent DVT ultrasound negative,  received Zosyn and vancomycin    Hypertension Continue home amlodipine  Electrolyte imbalance : resolved  Tobacco abuse -Nicotine patch ordered      Remains ill, Guarded prognosis overall, patient's daughter aware    CODE status: full  VTE prophylaxis: heparin  Discussed plan of care with Patient/Family and Nurse   Pre-admission lived at [address] [place] [address]   Discharge planning: TBD     Subjective   Patient seen and examined chart reviewed.     She denied any pain today, aware of starting chemotherapy      Physical examination     Visit Vitals  BP (!) 132/46 (BP 1 Location: Right upper arm, BP Patient Position: At rest)   Pulse 79   Temp 98.9 °F (37.2 °C)   Resp 29   Ht 5' 7\" (1.702 m)   Wt 90.7 kg (200 lb)   SpO2 93%   BMI 31.32 kg/m²       Temp (24hrs), Av.9 °F (36.6 °C), Min:97.4 °F (36.3 °C), Max:98.9 °F (37.2 °C)      Intake/Output Summary (Last 24 hours) at 2021 1096  Last data filed at 8/12/2021 1247  Gross per 24 hour   Intake    Output 825 ml   Net -825 ml     General:  no acute distress, mild lethargy,ill appearing  HEENT: normocephalic , EOMI  Neck: Supple,no mass palpable  Lungs : diminished breath sounds R>L   CVS: IRREGULAR, S1 S2 normal, No murmur   Abdomen: Soft, NT, BS +  Extremities: TRACE Edema    Data Reviewed:       Recent Labs     08/12/21  0948   WBC 11.5*   HGB 11.4*   HCT 38.1   *     Recent Labs     08/13/21  0419 08/12/21  0948 08/12/21  0501 08/11/21  1322 08/11/21  0338 08/11/21  0338    138 138  --    < > 139   K 5.6* 4.7 4.6  --    < > 4.6    105 108  --    < > 105   CO2 24 29 25  --    < > 31   GLU 95 138* 117*  --    < > 126*   BUN 54* 47* 46*  --    < > 37*   CREA 2.37* 2.58* 2.51*  --    < > 2.50*   CA 8.4* 9.0 8.7  --    < > 8.9   MG 2.0  --  2.2  --   --  2.0   PHOS 3.9  --  5.3*  --   --  5.7*   ALB 1.9* 2.3* 2.0*  --    < > 2.0*   TBILI  --  0.2  --   --   --   --    ALT  --  19  --   --   --   --    INR  --   --   --  1.1  --   --     < > = values in this interval not displayed.        Medications reviewed  Current Facility-Administered Medications   Medication Dose Route Frequency    hydrALAZINE (APRESOLINE) 20 mg/mL injection 10 mg  10 mg IntraVENous Q6H PRN    lidocaine-EPINEPHrine (XYLOCAINE) 1 %-1:100,000 injection    PRN    amiodarone (CORDARONE) tablet 400 mg  400 mg Oral DAILY    balsam peru-castor oiL (VENELEX) ointment   Topical BID    zinc oxide-cod liver oil (DESITIN) 40 % paste   Topical BID    albuterol-ipratropium (DUO-NEB) 2.5 MG-0.5 MG/3 ML  3 mL Nebulization Q6H PRN    miconazole (MICOTIN) 2 % powder   Topical BID    amLODIPine (NORVASC) tablet 5 mg  5 mg Oral DAILY    aspirin delayed-release tablet 81 mg  81 mg Oral DAILY    atorvastatin (LIPITOR) tablet 10 mg  10 mg Oral QHS    oxyCODONE IR (ROXICODONE) tablet 5 mg  5 mg Oral Q6H PRN    sodium chloride (NS) flush 5-40 mL  5-40 mL IntraVENous Q8H    sodium chloride (NS) flush 5-40 mL  5-40 mL IntraVENous PRN    acetaminophen (TYLENOL) tablet 650 mg  650 mg Oral Q6H PRN    Or    acetaminophen (TYLENOL) suppository 650 mg  650 mg Rectal Q6H PRN    polyethylene glycol (MIRALAX) packet 17 g  17 g Oral DAILY PRN    ondansetron (ZOFRAN ODT) tablet 4 mg  4 mg Oral Q8H PRN    Or    ondansetron (ZOFRAN) injection 4 mg  4 mg IntraVENous Q6H PRN    nicotine (NICODERM CQ) 21 mg/24 hr patch 1 Patch  1 Patch TransDERmal DAILY PRN         Micah Blair MD  Internal Medicine  8/13/2021

## 2021-08-13 NOTE — PROGRESS NOTES
[facility] [place] [address] [address], [address]  W: [phone]  F: [phone]    Reason for Visit:   Maryse Stewart is a 80 y.o. 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 Boyd Chance is a 44-year-old female with history of hyperlipidemia, hypertension, and significant tobacco use with worsening shortness of breath as well as hemoptysis x1 episode. Initial chest x-ray and CT obtained revealed mediastinal lymphadenopathy as well as complete collapse of the left lung with large left pleural effusion/mucous plug in left mainstem bronchus. She was seen by Dr. Lauren Nogueira with Pulmonology and started on IV antibiotics for postobstructive pneumonia. He was also evaluated by cardiothoracic surgery. Chest tube was placed on 8/2/2021. She developed atrial fibrillation with RVR on 8/2/2021 and was seen by Cardiology. She was started on both amiodarone and heparin. TTE completed with ejection fraction 65%. She underwent EBUS with bronchoscopy on 8/4/2021 with Dr. Lauren Nogueira. He obtained 1 biopsy from station 7 node and 4 samples from left hilar mass. Pathology has resulted with small cell carcinoma. Cytology from effusion is still pending. She is alert this morning. Somewhat difficult time obtaining history due to dementia. I have reached out to her daughter [facility] - unable to connect/went to . She appears slightly dyspneic at rest.  She is on 5 L nasal cannula. She reports some pain on her right side. Given kidney function and creatinine of 2.78, will go ahead and obtain head CT for staging (versus brain MRI). She will also need a PET scan outpatient to determine limited versus extensive stage small cell.     Interval History: Faith completed both chemotherapy and radiation yesterday. She remains fatigued, on supplemental oxygen. She is working with PT/OT - remains max assist x 2 and will require 24/7 nursing care. Minimal activity OOB due to desaturations. CXR from yesterday reviewed - effusion resolved.        Past Medical History:   Diagnosis Date    Cellulitis     R LEG    CVA [facility]cerebral vascular accident) ([facility] [address]     Hyperlipidemia     Hypertension       Past Surgical History:   Procedure Laterality Date    HX BLADDER SUSPENSION  01/2021    HX CHOLECYSTECTOMY      HX OTHER SURGICAL  10/2020    PARACENTESIS      Social History     Tobacco Use    Smoking status: Current Every Day Smoker     Packs/day: 1.50     Years: 72.00     Pack years: 108.00    Smokeless tobacco: Never Used   Substance Use Topics    Alcohol use: Not Currently      Family History   Problem Relation Age of Onset    Anesth Problems Neg Hx      Current Facility-Administered Medications   Medication Dose Route Frequency    hydrALAZINE (APRESOLINE) 20 mg/mL injection 10 mg  10 mg IntraVENous Q6H PRN    lidocaine-EPINEPHrine (XYLOCAINE) 1 %-1:100,000 injection    PRN    amiodarone (CORDARONE) tablet 400 mg  400 mg Oral DAILY    balsam peru-castor oiL (VENELEX) ointment   Topical BID    zinc oxide-cod liver oil (DESITIN) 40 % paste   Topical BID    albuterol-ipratropium (DUO-NEB) 2.5 MG-0.5 MG/3 ML  3 mL Nebulization Q6H PRN    miconazole (MICOTIN) 2 % powder   Topical BID    amLODIPine (NORVASC) tablet 5 mg  5 mg Oral DAILY    aspirin delayed-release tablet 81 mg  81 mg Oral DAILY    atorvastatin (LIPITOR) tablet 10 mg  10 mg Oral QHS    oxyCODONE IR (ROXICODONE) tablet 5 mg  5 mg Oral Q6H PRN    sodium chloride (NS) flush 5-40 mL  5-40 mL IntraVENous Q8H    sodium chloride (NS) flush 5-40 mL  5-40 mL IntraVENous PRN    acetaminophen (TYLENOL) tablet 650 mg  650 mg Oral Q6H PRN    Or    acetaminophen (TYLENOL) suppository 650 mg  650 mg Rectal Q6H PRN    polyethylene glycol (MIRALAX) packet 17 g 17 g Oral DAILY PRN    ondansetron (ZOFRAN ODT) tablet 4 mg  4 mg Oral Q8H PRN    Or    ondansetron (ZOFRAN) injection 4 mg  4 mg IntraVENous Q6H PRN    nicotine (NICODERM CQ) 21 mg/24 hr patch 1 Patch  1 Patch TransDERmal DAILY PRN      No Known Allergies     Review of Systems: A complete review of systems was obtained, negative except as described above. Physical Exam:     Visit Vitals  BP (!) 150/55 (BP 1 Location: Right arm, BP Patient Position: At rest)   Pulse 79   Temp 97.6 °F (36.4 °C)   Resp 25   Ht 5' 7\" (1.702 m)   Wt 200 lb (90.7 kg)   SpO2 93%   BMI 31.32 kg/m²     ECOG PS: 3  General: pleasant, no acute distress   Eyes:  anicteric sclerae  HENT: Atraumatic  Neck: Supple  Respiratory: normal respiratory effort  CV: HRR monitor  GI: Soft, nondistended  Skin: Warm, dry  Psych: Alert, difficult to obtain history    Results:     Lab Results   Component Value Date/Time    WBC 11.5 (H) 08/12/2021 09:48 AM    HGB 11.4 (L) 08/12/2021 09:48 AM    HCT 38.1 08/12/2021 09:48 AM    PLATELET 502 (H) 70/09/4900 09:48 AM    MCV 95.5 08/12/2021 09:48 AM    ABS. NEUTROPHILS 10.5 (H) 08/12/2021 09:48 AM     Lab Results   Component Value Date/Time    Sodium 136 08/13/2021 04:19 AM    Potassium 5.6 (H) 08/13/2021 04:19 AM    Chloride 108 08/13/2021 04:19 AM    CO2 24 08/13/2021 04:19 AM    Glucose 95 08/13/2021 04:19 AM    BUN 54 (H) 08/13/2021 04:19 AM    Creatinine 2.37 (H) 08/13/2021 04:19 AM    GFR est AA 24 (L) 08/13/2021 04:19 AM    GFR est non-AA 20 (L) 08/13/2021 04:19 AM    Calcium 8.4 (L) 08/13/2021 04:19 AM     Lab Results   Component Value Date/Time    Bilirubin, total 0.2 08/12/2021 09:48 AM    ALT (SGPT) 19 08/12/2021 09:48 AM    Alk. phosphatase 83 08/12/2021 09:48 AM    Protein, total 7.7 08/12/2021 09:48 AM    Albumin 1.9 (L) 08/13/2021 04:19 AM    Globulin 5.4 (H) 08/12/2021 09:48 AM     Records reviewed and summarized above. Radiology report(s) reviewed above.     Pathology from 8/4/2021 left hilar node: Small cell carcinoma with necrosis    CT Abd WO 8/4/21:  IMPRESSION  1. Right hydropneumothorax. Redemonstrated is near-complete left lung collapse  with large pleural effusion. Left pigtail drainage pleural catheter. CT Chest 7/31/21:  IMPRESSION  1. Unchanged large left pleural effusion which has likely reaccumulated since  the prior thoracentesis. 2. Unchanged dense consolidation and collapse of the left lung with an abrupt  cut off of the left mainstem bronchus. The right hilum and right side of the  mediastinum appears consolidated which may be related to invasion and/or  confluent adenopathy. A large lymph node is seen in the left para-aortic space  that is unchanged. Findings are highly suspicious for malignancy. 3. New trace right pleural effusion. US Chest 8/11/21  IMPRESSION  1. Moderate right pleural effusion is amenable to thoracentesis. 2. Large left pleural effusion. Thoracentesis on this would be of little  utility, as the left mainstem bronchus is occluded over a long segment due to  lung carcinoma. 3. Left thoracentesis was performed on 8/2/2021, with the expected outcome: No  change in complete left hemithorax opacification. Assessment:   1) Small cell carcinoma. Unstaged  Mediastinal adenopathy, L lung consolidation , L effusion  CT otherwise negative  Pleural fluid cytology is benign but does not entirely r/o a malignant effusion. PET and MRI are indicated for complete staging  She has a poor ECOG PS , largely due to SOB from the effusion and Left lung consolidation  We are not able to get an inpatient PET  We discussed with her daughter that this is an aggressive malignancy. If this is limited stage then likelihood of cure with chemoRT is 20%. If this is extensive stage then this is incurable.      She received 1/4 cycles of dose reduced Carboplatin/Etoposide with concurrent RT  She continues to need supplemental O2, has a poor PS and unlikely to tolerate additional chemotherapy  Agree with hospice        2) Respiratory failure  3) Atrial Fibrillation with RVR. Now rate controlled on PO amiodarone. Cardiology following. 4) GABINO secondary to ATN. Nephrology is following. Creatinine stable. 5) Dementia. 6) Generalized weakness. She is significantly deconditioned. Plan:     Agree with hospice    I appreciate the opportunity to participate in Ms. Faith Almazan's care. Primary contact: Carlota Scherer (daughter) [phone]    I performed a history and physical examination of the patient and discussed his management with the NPP.  I reviewed the NPP note and agree with the documented findings and plan of care  Small cell Lung cancer  She is doing poorly after cycle 1 of dose reduced chemotherapy with concurrent RT  Discussed with daughter and palliative care  Agree with hospice    Signed By: Nicole Early MD

## 2021-08-13 NOTE — PROGRESS NOTES
Bedside shift change report given to Johanny Lui RN (oncoming nurse) by Raysa Ybarra RN (offgoing nurse). Report included the following information SBAR, Kardex, ED Summary, Procedure Summary, Intake/Output, MAR and Recent Results.

## 2021-08-13 NOTE — PROGRESS NOTES
[facility]   [address] [facility], [address] [place] [address], [facility]  Phone: [phone]   [phone]       Nephrology Progress Note  Franky Marquez     1940     [id number]  Date of Admission : 7/29/2021 08/13/21    CC: Follow up for ARF    Assessment and Plan   GABINO :  - suspected ATN --> from pre renal state + vanc/ zosyn toxicity   - renal US : unremarkable   - Cr stable  - daily labs for now  - watch off IVF for now    Hyperkalemia:  - hemolyzed sample  - repeated today    Acute Resp failure   Large Left Effusion   Mediastinal adenopathy   Pneumothorax  Smoker   SSLC:  - palliative radiation  - getting etoposide and carboplatin     HTN   - controlled     A fib   -On amiodarone.  -Echo with preserved EF, mild TR     Interval History:  Seen and examined. Cr stable. K up. Feeling better. Plans noted for possible MRI brain today. No cp or increase in SOB. Review of Systems: A comprehensive review of systems was negative except for that written in the HPI.     Current Medications:   Current Facility-Administered Medications   Medication Dose Route Frequency    hydrALAZINE (APRESOLINE) 20 mg/mL injection 10 mg  10 mg IntraVENous Q6H PRN    lidocaine-EPINEPHrine (XYLOCAINE) 1 %-1:100,000 injection    PRN    amiodarone (CORDARONE) tablet 400 mg  400 mg Oral DAILY    balsam peru-castor oiL (VENELEX) ointment   Topical BID    zinc oxide-cod liver oil (DESITIN) 40 % paste   Topical BID    albuterol-ipratropium (DUO-NEB) 2.5 MG-0.5 MG/3 ML  3 mL Nebulization Q6H PRN    miconazole (MICOTIN) 2 % powder   Topical BID    amLODIPine (NORVASC) tablet 5 mg  5 mg Oral DAILY    aspirin delayed-release tablet 81 mg  81 mg Oral DAILY    atorvastatin (LIPITOR) tablet 10 mg  10 mg Oral QHS    oxyCODONE IR (ROXICODONE) tablet 5 mg  5 mg Oral Q6H PRN    sodium chloride (NS) flush 5-40 mL  5-40 mL IntraVENous Q8H    sodium chloride (NS) flush 5-40 mL  5-40 mL IntraVENous PRN    acetaminophen (TYLENOL) tablet 650 mg  650 mg Oral Q6H PRN    Or    acetaminophen (TYLENOL) suppository 650 mg  650 mg Rectal Q6H PRN    polyethylene glycol (MIRALAX) packet 17 g  17 g Oral DAILY PRN    ondansetron (ZOFRAN ODT) tablet 4 mg  4 mg Oral Q8H PRN    Or    ondansetron (ZOFRAN) injection 4 mg  4 mg IntraVENous Q6H PRN    nicotine (NICODERM CQ) 21 mg/24 hr patch 1 Patch  1 Patch TransDERmal DAILY PRN      No Known Allergies    Objective:  Vitals:    Vitals:    08/13/21 0430 08/13/21 0548 08/13/21 0630 08/13/21 0739   BP:    (!) 150/55   Pulse: 79  73 79   Resp:    25   Temp:    97.6 °F (36.4 °C)   SpO2:    93%   Weight:  90.7 kg (200 lb)     Height:         Intake and Output:  No intake/output data recorded. 08/11 1901 - 08/13 0700  In: -   Out: 1925 [Urine:1100]    Physical Examination:  General:  Ill looking   HEENT: PERRL,+ Pallor , No Icterus  Neck: Supple,no mass palpable  Lungs : diminished   CVS: IRREGULAR, S1 S2 normal, No murmur   Abdomen: Soft, NT, BS +  Extremities: TRACE Edema         []    High complexity decision making was performed  []    Patient is at high-risk of decompensation with multiple organ involvement    Lab Data Personally Reviewed: I have reviewed all the pertinent labs, microbiology data and radiology studies during assessment.     Recent Labs     08/13/21  0419 08/12/21  0948 08/12/21  0501 08/11/21  1322 08/11/21  0338    138 138  --  139   K 5.6* 4.7 4.6  --  4.6    105 108  --  105   CO2 24 29 25  --  31   GLU 95 138* 117*  --  126*   BUN 54* 47* 46*  --  37*   CREA 2.37* 2.58* 2.51*  --  2.50*   CA 8.4* 9.0 8.7  --  8.9   MG 2.0  --  2.2  --  2.0   PHOS 3.9  --  5.3*  --  5.7*   ALB 1.9* 2.3* 2.0*  --  2.0*   ALT  --  19  --   --   --    INR  --   --   --  1.1  --      Recent Labs     08/12/21  0948   WBC 11.5*   HGB 11.4*   HCT 38.1   *     No results found for: SDES  Lab Results   Component Value Date/Time    Culture result: PENDING 08/11/2021 12:45 PM Culture result: Culture performed on Fluid swab specimen 08/02/2021 03:23 PM    Culture result: NO GROWTH 4 DAYS 08/02/2021 03:23 PM    Culture result:  08/01/2021 05:06 AM     MRSA NOT PRESENT. Apparent Staphylococus aureus (not MRSA noted). Culture result:  08/01/2021 05:06 AM     Screening of patient nares for MRSA is for surveillance purposes and, if positive, to facilitate isolation considerations in high risk settings. It is not intended for automatic decolonization interventions per se as regimens are not sufficiently effective to warrant routine use. Recent Results (from the past 24 hour(s))   RENAL FUNCTION PANEL    Collection Time: 08/13/21  4:19 AM   Result Value Ref Range    Sodium 136 136 - 145 mmol/L    Potassium 5.6 (H) 3.5 - 5.1 mmol/L    Chloride 108 97 - 108 mmol/L    CO2 24 21 - 32 mmol/L    Anion gap 4 (L) 5 - 15 mmol/L    Glucose 95 65 - 100 mg/dL    BUN 54 (H) 6 - 20 MG/DL    Creatinine 2.37 (H) 0.55 - 1.02 MG/DL    BUN/Creatinine ratio 23 (H) 12 - 20      GFR est AA 24 (L) >60 ml/min/1.73m2    GFR est non-AA 20 (L) >60 ml/min/1.73m2    Calcium 8.4 (L) 8.5 - 10.1 MG/DL    Phosphorus 3.9 2.6 - 4.7 MG/DL    Albumin 1.9 (L) 3.5 - 5.0 g/dL   MAGNESIUM    Collection Time: 08/13/21  4:19 AM   Result Value Ref Range    Magnesium 2.0 1.6 - 2.4 mg/dL           I have reviewed the flowsheets. Chart and Pertinent Notes have been reviewed. No change in PMH ,family and social history from Consult note.       Maria Ines Ford MD

## 2021-08-13 NOTE — PROGRESS NOTES
Problem: Self Care Deficits Care Plan (Adult)  Goal: *Acute Goals and Plan of Care (Insert Text)  Description:   FUNCTIONAL STATUS PRIOR TO ADMISSION: The patient is a questionable historian at this time. Per patient, she has been living with her daughter for the past few months however is largely IND for ADL tasks. HOME SUPPORT: The patient lived with her daughter (per patient report). Occupational Therapy Goals  Initiated 8/3/2021  Weekly re-assessment 8/10/2021 - continue all goals. 1.  Patient will perform lower body dressing with minimal assistance/contact guard assist within 7 day(s). 2.  Patient will perform bathing with minimal assistance/contact guard assist within 7 day(s). 3.  Patient will perform grooming standing with supervision/set-up within 7 day(s). 4.  Patient will perform toilet transfers with supervision/set-up within 7 day(s). 5.  Patient will perform all aspects of toileting with minimal assistance/contact guard assist within 7 day(s). 6.  Patient will participate in upper extremity therapeutic exercise/activities with supervision/set-up for 5 minutes within 7 day(s). 7.  Patient will utilize energy conservation techniques during functional activities with verbal cues within 7 day(s). Outcome: Progressing Towards Goal   OCCUPATIONAL THERAPY TREATMENT  Patient: Bob Wood (24 y.o. female)  Date: 8/13/2021  Diagnosis: Atrial fibrillation with rapid ventricular response (HCC) [I48.91] <principal problem not specified>  Procedure(s) (LRB):  ENDOSCOPIC BRONCHOSCOPY ULTRASOUND (EBUS) (N/A)  TRANSBRONCHIAL BIOPSY (N/A)  BRONCHOSCOPY (N/A) 9 Days Post-Op  Precautions: Fall  Chart, occupational therapy assessment, plan of care, and goals were reviewed. ASSESSMENT  Patient continues with skilled OT services and is progressing towards goals. Nursing cleared for therapy. With encouragement from therapists and family, patient agreeable to therapy.   She is demonstrating good progression in functional mobility necessary in ADL tasks however continued to be limited secondary to activity tolerance and generalized strength. Unable to complete grooming tasks in standing secondary to impaired standing tolerance. She completed grooming task in sitting at EOB with set up. Self care transfer with min Ax2 to achieve standing with cues for hand placement/RW use and CGA x2 for pivot to chair. Family present to stay with patient. Current Level of Function Impacting Discharge (ADLs): grooming in sitting set up/supervision, self care transfer CGA-min A x2. Other factors to consider for discharge: Patient is below baseline. Recommed SNF vs  with 24 hour supervision depending progression while in hospital and ability to family to provide necessary care         PLAN :  Patient continues to benefit from skilled intervention to address the above impairments. Continue treatment per established plan of care to address goals. Recommend with staff: min A at RW level up to chair for meals    Recommend next OT session: per POC    Recommendation for discharge: (in order for the patient to meet his/her long term goals)  Therapy up to 5 days/week in SNF setting vs HH depending on progress    This discharge recommendation:  Has not yet been discussed the attending provider and/or case management    IF patient discharges home will need the following DME: AE: long handled bathing, AE: long handled dressing, bedside commode, shower chair, and walker: rolling       SUBJECTIVE:   Patient stated  .    OBJECTIVE DATA SUMMARY:   Cognitive/Behavioral Status:                      Functional Mobility and Transfers for ADLs:  Bed Mobility:  Supine to Sit: Minimum assistance;Bed Modified  Scooting: Contact guard assistance    Transfers:  Sit to Stand: Minimum assistance;Assist x2; Additional time (on second trial)     Bed to Chair: Contact guard assistance;Minimum assistance;Assist x2 (RW)    Balance:  Sitting: Intact  Sitting - Static: Good (unsupported)  Sitting - Dynamic: Fair (occasional)  Standing: Impaired  Standing - Static: Constant support; Fair  Standing - Dynamic : Constant support; Fair    ADL Intervention:     Patient instructed and indicated understanding the benefits of maintaining activity tolerance, functional mobility, and independence with self care tasks during acute stay  to ensure safe return home and to baseline. Encouraged patient to increase frequency and duration OOB, be out of bed for all meals, perform daily ADLs (as approved by RN/MD regarding bathing etc), and performing functional mobility to/from bathroom. Provided education through multi-modal cues for RW safety including proper positioning, hand placement,  and safety. Patient able to perform sit <> stand with min AX2 assistance. Fair understanding of RW use and safety. Grooming  Position Performed: Seated edge of bed  Brushing/Combing Hair: Supervision;Set-up (did not complete full task)       Pain:  No c/o pain    Activity Tolerance:   Fair    After treatment patient left in no apparent distress:   Sitting in chair, Call bell within reach, and Caregiver / family present    COMMUNICATION/COLLABORATION:   The patients plan of care was discussed with: Physical therapist and Registered nurse.      Alexandria Tan OT  Time Calculation: 17 mins

## 2021-08-13 NOTE — HOSPICE
[facility]  [phone]    Nursing Note   Patient Name: Shannon Harris  YOB: 1940  Age: 80 y.o. [address] Steve Fu RN Note:  Hospice consult noted. Chart reviewed. Will assess patient and contact family if they are not at bedside. If there are any questions, contact this hospice liaison at [phone]. Thank you for the opportunity to be of service to this patient and her family. [phone]:  Patient meets routine level of care for hospice which is at home or at a facility. Patient being seen by NP at this time and no family is in the room. Called Inez Melendez/daughter/MPOA and left message on voicemail requesting return call. Will call again tomorrow. 1850:  Received return call from [facility].   Hospice information session scheduled for tomorrow, 8/14, at 2pm. Abdominal Pain, N/V/D

## 2021-08-13 NOTE — PROGRESS NOTES
Problem: Mobility Impaired (Adult and Pediatric)  Goal: *Acute Goals and Plan of Care (Insert Text)  Description: FUNCTIONAL STATUS PRIOR TO ADMISSION: Patient was independent without use of DME per her report although a questionable historian on eval.     HOME SUPPORT PRIOR TO ADMISSION: The patient lived with her daughter (reports this was a recent change in last few months). Daughter works from home. Physical Therapy Goals  Reviewed/ revised 8/12/2021  1. Patient will move from supine to sit and sit to supine  and roll side to side in bed with supervision/set-up within 7 day(s). 2.  Patient will transfer from bed to chair and chair to bed with moderate assistance  using the least restrictive device within 7 day(s). 3.  Patient will perform sit to stand with moderate assistance  within 7 day(s). 4.  Patient will ambulate with moderate assistance  for 5 feet with the least restrictive device within 7 day(s). Physical Therapy Goals  Initiated 8/3/2021  1. Patient will move from supine to sit and sit to supine , scoot up and down, and roll side to side in bed with modified independence within 7 day(s). 2.  Patient will transfer from bed to chair and chair to bed with supervision/set-up using the least restrictive device within 7 day(s). 3.  Patient will perform sit to stand with supervision/set-up within 7 day(s). 4.  Patient will ambulate with supervision/set-up for 100 feet with the least restrictive device within 7 day(s). 5.  Patient will ascend/descend 5 stairs with right handrail(s) with minimal assistance/contact guard assist within 7 day(s).       Outcome: Progressing Towards Goal   PHYSICAL THERAPY TREATMENT  Patient: Ellyn Odom (35 y.o. female)  Date: 8/13/2021  Diagnosis: Atrial fibrillation with rapid ventricular response (HCC) [I48.91] <principal problem not specified>  Procedure(s) (LRB):  ENDOSCOPIC BRONCHOSCOPY ULTRASOUND (EBUS) (N/A)  TRANSBRONCHIAL BIOPSY (N/A)  BRONCHOSCOPY (N/A) 9 Days Post-Op  Precautions: Fall  Chart, physical therapy assessment, plan of care and goals were reviewed. ASSESSMENT  Patient continues with skilled PT services and is progressing towards goals. Patient received supine in bed, adamantly against mobilizing. Daughter present and encouraging of patient. Despite patient deferring therapy, being unavailable, etc, she was able to mobilize to the chair with min Ax2 and RW. Limited standing tolerance (<30 seconds) due to weakness. SpO2 down to 85% on 6L/min with activity but recovered while sitting in chair with pursed lip breathing. Anticipate she will need short rehab stay or 24/7 physical assist at home (possible w/c and oxygen due to poor activity tolerance and weakness if wishing for home d/c). For the weekend, recommend patient to complete as able in order to maintain strength, endurance and independence with nursing: OOB to chair 3x/day with assist x1 and ambulating with RW. PT to follow-up with patient after the weekend. Current Level of Function Impacting Discharge (mobility/balance): min Ax2    Other factors to consider for discharge: on 6L/min, limited standing and gait tolerance          PLAN :  Patient continues to benefit from skilled intervention to address the above impairments. Continue treatment per established plan of care. to address goals. Recommendation for discharge: (in order for the patient to meet his/her long term goals)  Therapy up to 5 days/week in SNF setting or home with increased equipment and 24/7 physical assist for all mobility    This discharge recommendation:  Has not yet been discussed the attending provider and/or case management    IF patient discharges home will need the following DME: portable oxygen and wheelchair, rolling walker       SUBJECTIVE:   Patient stated I want to stay in bed.     OBJECTIVE DATA SUMMARY:   Critical Behavior:  Neurologic State: Alert  Orientation Level: Oriented X4  Cognition: Follows commands, Appropriate safety awareness  Safety/Judgement: Awareness of environment, Decreased awareness of need for assistance, Decreased awareness of need for safety, Decreased insight into deficits, Fall prevention  Functional Mobility Training:  Bed Mobility:  Supine to Sit: Minimum assistance;Bed Modified  Scooting: Contact guard assistance  Transfers:  Sit to Stand: Minimum assistance;Assist x2; Additional time (on second trial)  Stand to Sit: Minimum assistance;Assist x1   Bed to Chair: Contact guard assistance;Minimum assistance;Assist x2 (RW)  Balance:  Sitting: Intact  Sitting - Static: Good (unsupported)  Sitting - Dynamic: Fair (occasional)  Standing: Impaired  Standing - Static: Constant support; Fair  Standing - Dynamic : Constant support; Fair    Activity Tolerance:   Fair, desaturates with exertion and requires oxygen, requires rest breaks, and observed SOB with activity    After treatment patient left in no apparent distress:   Sitting in chair, Call bell within reach, and Caregiver / family present    COMMUNICATION/COLLABORATION:   The patients plan of care was discussed with: Occupational therapist and Registered nurse.      Tilden Alpers, PT, DPT   Time Calculation: 23 mins

## 2021-08-13 NOTE — PROGRESS NOTES
Transitions of Care:      RUR - 21%    Disposition: Home with hospice vs home health and 24/7 support      Transport: TBD family vs BLS    Contact: Katia Lewis daughter [phone]    CM called [address] with Rossana Macario at [phone] and any hospice agency if fine per patient's choice- when patient reverts to hospice patient;s insurance will revert to straight Medicare. CM spoke with Terrance Colin daughter - she has no preference for agency - referral made to [facility] and [facility]LAY spoke with Barbette Epley in intake to make agency aware If further assistance is needed over the weekend please page weekend staff [phone].  Orestes Arce MSW

## 2021-08-13 NOTE — PROGRESS NOTES
Palliative Medicine Consult  Jose: [phone] ([phone])    Patient Name: Clarissa Osullivan  YOB: 1940    Date of Initial Consult: August 6, 2021  Reason for Consult: Care decisions  Requesting Provider: Debbie Hall  Primary Care Physician: Armin Marquez NP     SUMMARY:   Clarissa Osullivan is a 80 y.o. female who was admitted on 7/29/2021 from home with a diagnosis of   GABINO,  afib with RVR, acute respiratory failure with hypoxia, postobstructive pneumonia/ large left effusion status post chest tube 8/2/21, pneumothorax, cellulitis bilateral lower ext, nonsustained ventricular tachycardia, sepsis, new diagnosis of small cell lung cancer. Plans for inpatient chemo and radiation if the patient is stable enough to tolerate. PMH:  hypertension, suspected small cell lung cancer, smoker, hyperlipidemia, CVA, cellulitis rt leg, dementia     Current medical issues leading to Palliative Medicine involvement include: Support with care decisions  Full code  +AMD  Primary contact: Iris Gregorio (daughter) [phone]     PALLIATIVE DIAGNOSES:   1. Shortness of breath  2. Cough  3. Hypoalbuminemia  4. Hypoxia  5. Goals of care     PLAN:   1. Patient seen with daughter at the bedside. Services introduced  2. We discussed in detail the patient's current state of health and daughters understanding  3. Daughter was thinking the patient will go to rehab with the goal of getting stronger  4. I explained that skilled rehab will be of no benefit to the patient  5. Explained that her energy should be conserved now for breathing. Exercises will lead to overexertion and risk of re-admission  6. Also explained that the care she is currently receiving has been optimized and the longer she remains in the hospital~ places her at risk for infection. She is done with chemo and radiation inpatient. 7. Daughter agrees that she will not be able to tolerate MRI.   I explained that the MRI would not change the current plan of care  8. Explained that the patient can go home with hospice services. And explained hospice in detail. Daughter feels the patient would do better in her home environment  9. CODE STATUS discussed. DNR recommended. Daughter agrees with DNR but would like to speak with her brother before making any changes  10. Hospice consult placed for home  11. Please call if we can be of further support    12. Initial consult note routed to primary continuity provider and/or primary health care team members  15. Communicated plan of care with: Palliative Laure HALE 192 Team     GOALS OF CARE / TREATMENT PREFERENCES:     GOALS OF CARE:  Patient/Health Care Proxy Stated Goals: Comfort    TREATMENT PREFERENCES:   Code Status: Full Code    Advance Care Planning:  [] The [facility] Interdisciplinary Team has updated the ACP Navigator with Health Care Decision Maker and Patient Capacity      Primary Decision Maker: Chemo Carver - Daughter - [phone]    Advance Care Planning 1/11/2021   Confirm Advance Directive Yes, on file   Does the patient have other document types -       Medical Interventions: Comfort measures     Other Instructions:   Artificially Administered Nutrition: No feeding tube     Other:    As far as possible, the palliative care team has discussed with patient / health care proxy about goals of care / treatment preferences for patient. HISTORY:     History obtained from: Chart, team    CHIEF COMPLAINT: Patient admitted with aforementioned history and issues    HPI/SUBJECTIVE:    The patient is:   [x] Verbal and participatory  [] Non-participatory due to:   Limited due to medical condition  No complaints. oob in chair!     Clinical Pain Assessment (nonverbal scale for severity on nonverbal patients):   Clinical Pain Assessment  Severity: 0          Duration: for how long has pt been experiencing pain (e.g., 2 days, 1 month, years)  Frequency: how often pain is an issue (e.g., several times per day, once every few days, constant)     FUNCTIONAL ASSESSMENT:     Palliative Performance Scale (PPS):  PPS: 40       PSYCHOSOCIAL/SPIRITUAL SCREENING:     Palliative IDT has assessed this patient for cultural preferences / practices and a referral made as appropriate to needs (Cultural Services, Patient Advocacy, Ethics, etc.)    Any spiritual / Mu-ism concerns:  [] Yes /  [] No    Caregiver Burnout:  [] Yes /  [] No /  [x] No Caregiver Present      Anticipatory grief assessment:   [] Normal  / [] Maladaptive       ESAS Anxiety: Anxiety: 0    ESAS Depression:          REVIEW OF SYSTEMS:     Positive and pertinent negative findings in ROS are noted above in HPI. The following systems were [x] reviewed / [] unable to be reviewed as noted in HPI  Other findings are noted below. Systems: constitutional, ears/nose/mouth/throat, respiratory, gastrointestinal, genitourinary, musculoskeletal, integumentary, neurologic, psychiatric, endocrine. Positive findings noted below. Modified ESAS Completed by: provider   Fatigue: 6 Drowsiness: 0     Pain: 0   Anxiety: 0 Nausea: 0   Anorexia: 0 Dyspnea: 3           Stool Occurrence(s): 1        PHYSICAL EXAM:     From RN flowsheet:  Wt Readings from Last 3 Encounters:   08/13/21 200 lb (90.7 kg)   07/29/21 199 lb 15.3 oz (90.7 kg)   06/09/21 202 lb (91.6 kg)     Blood pressure (!) 142/62, pulse 76, temperature 98.2 °F (36.8 °C), resp. rate 27, height 5' 7\" (1.702 m), weight 200 lb (90.7 kg), SpO2 95 %.     Pain Scale 1: Numeric (0 - 10)  Pain Intensity 1: 0  Pain Onset 1: acute  Pain Location 1: Buttocks, Back  Pain Orientation 1: Left  Pain Description 1: Aching  Pain Intervention(s) 1: Medication (see MAR)  Last bowel movement, if known:     Constitutional: alert, nad  Eyes: pupils equal, anicteric  ENMT: no nasal discharge, moist mucous membranes  Cardiovascular:  distal pulses intact  Respiratory: breathing not labored, symmetric, cough  Gastrointestinal: soft non-tender  Musculoskeletal: no deformity, no tenderness to palpation  Skin: warm, dry  Neurologic: following simple commands  Psychiatric: Calm  Other:       HISTORY:     Active Problems:    Atrial fibrillation with rapid ventricular response (HCC) (7/29/2021)      Pleural effusion, left (8/3/2021)      Small cell lung cancer ([facility] (8/6/2021)      Moderate protein-calorie malnutrition ([facility] (8/10/2021)      Past Medical History:   Diagnosis Date    Cellulitis     R LEG    [facility]     Hyperlipidemia     Hypertension       Past Surgical History:   Procedure Laterality Date    HX BLADDER SUSPENSION  01/2021    HX CHOLECYSTECTOMY      HX OTHER SURGICAL  10/2020    PARACENTESIS      Family History   Problem Relation Age of Onset    Anesth Problems Neg Hx       History reviewed, no pertinent family history.   Social History     Tobacco Use    Smoking status: Current Every Day Smoker     Packs/day: 1.50     Years: 72.00     Pack years: 108.00    Smokeless tobacco: Never Used   Substance Use Topics    Alcohol use: Not Currently     No Known Allergies   Current Facility-Administered Medications   Medication Dose Route Frequency    hydrALAZINE (APRESOLINE) 20 mg/mL injection 10 mg  10 mg IntraVENous Q6H PRN    lidocaine-EPINEPHrine (XYLOCAINE) 1 %-1:100,000 injection    PRN    amiodarone (CORDARONE) tablet 400 mg  400 mg Oral DAILY    balsam peru-castor oiL (VENELEX) ointment   Topical BID    zinc oxide-cod liver oil (DESITIN) 40 % paste   Topical BID    albuterol-ipratropium (DUO-NEB) 2.5 MG-0.5 MG/3 ML  3 mL Nebulization Q6H PRN    miconazole (MICOTIN) 2 % powder   Topical BID    amLODIPine (NORVASC) tablet 5 mg  5 mg Oral DAILY    aspirin delayed-release tablet 81 mg  81 mg Oral DAILY    atorvastatin (LIPITOR) tablet 10 mg  10 mg Oral QHS    oxyCODONE IR (ROXICODONE) tablet 5 mg  5 mg Oral Q6H PRN    sodium chloride (NS) flush 5-40 mL  5-40 mL IntraVENous Q8H    sodium chloride (NS) flush 5-40 mL  5-40 mL IntraVENous PRN    acetaminophen (TYLENOL) tablet 650 mg  650 mg Oral Q6H PRN    Or    acetaminophen (TYLENOL) suppository 650 mg  650 mg Rectal Q6H PRN    polyethylene glycol (MIRALAX) packet 17 g  17 g Oral DAILY PRN    ondansetron (ZOFRAN ODT) tablet 4 mg  4 mg Oral Q8H PRN    Or    ondansetron (ZOFRAN) injection 4 mg  4 mg IntraVENous Q6H PRN    nicotine (NICODERM CQ) 21 mg/24 hr patch 1 Patch  1 Patch TransDERmal DAILY PRN          LAB AND IMAGING FINDINGS:     Lab Results   Component Value Date/Time    WBC 11.5 (H) 08/12/2021 09:48 AM    HGB 11.4 (L) 08/12/2021 09:48 AM    PLATELET 489 (H) 54/38/8168 09:48 AM     Lab Results   Component Value Date/Time    Sodium 136 08/13/2021 04:19 AM    Potassium 5.6 (H) 08/13/2021 04:19 AM    Chloride 108 08/13/2021 04:19 AM    CO2 24 08/13/2021 04:19 AM    BUN 54 (H) 08/13/2021 04:19 AM    Creatinine 2.37 (H) 08/13/2021 04:19 AM    Calcium 8.4 (L) 08/13/2021 04:19 AM    Magnesium 2.0 08/13/2021 04:19 AM    Phosphorus 3.9 08/13/2021 04:19 AM      Lab Results   Component Value Date/Time    Alk. phosphatase 83 08/12/2021 09:48 AM    Protein, total 7.7 08/12/2021 09:48 AM    Albumin 1.9 (L) 08/13/2021 04:19 AM    Globulin 5.4 (H) 08/12/2021 09:48 AM     Lab Results   Component Value Date/Time    INR 1.1 08/11/2021 01:22 PM    Prothrombin time 11.0 08/11/2021 01:22 PM    aPTT 28.6 08/04/2021 03:26 AM      No results found for: IRON, FE, TIBC, IBCT, PSAT, FERR   No results found for: PH, PCO2, PO2  No components found for: [place]   Lab Results   Component Value Date/Time    CK 61 07/29/2021 11:00 PM                Total time: 45 minutes  Counseling / coordination time, spent as noted above: 40 minutes  > 50% counseling / coordination?:  Yes    Prolonged service was provided for  []30 min   []75 min in face to face time in the presence of the patient, spent as noted above.   Time Start:   Time End:   Note: this can only be billed with 43398 (initial) or 65113 (follow up). If multiple start / stop times, list each separately.

## 2021-08-13 NOTE — PROGRESS NOTES
Problem: Breathing Pattern - Ineffective  Goal: *Absence of hypoxia  8/13/2021 0221 by Skyler Christine RN  Outcome: Progressing Towards Goal  8/13/2021 0220 by Skyler Christine RN  Outcome: Progressing Towards Goal  Goal: *Use of effective breathing techniques  8/13/2021 0221 by Skyler Christine RN  Outcome: Progressing Towards Goal  8/13/2021 0220 by Skyler Christine RN  Outcome: Progressing Towards Goal     Problem: Pressure Injury - Risk of  Goal: *Prevention of pressure injury  Description: Document Ferdinand Scale and appropriate interventions in the flowsheet. Outcome: Progressing Towards Goal  Note: Pressure Injury Interventions:  Sensory Interventions: Assess changes in LOC, Keep linens dry and wrinkle-free, Minimize linen layers    Moisture Interventions: Absorbent underpads, Internal/External urinary devices    Activity Interventions: Increase time out of bed, Pressure redistribution bed/mattress(bed type)    Mobility Interventions: HOB 30 degrees or less, Pressure redistribution bed/mattress (bed type)    Nutrition Interventions: Document food/fluid/supplement intake    Friction and Shear Interventions: Apply protective barrier, creams and emollients                Problem: Falls - Risk of  Goal: *Absence of Falls  Description: Document Norma Fall Risk and appropriate interventions in the flowsheet.   8/13/2021 0221 by Skyler Christine RN  Outcome: Progressing Towards Goal  Note: Fall Risk Interventions:  Mobility Interventions: Communicate number of staff needed for ambulation/transfer    Mentation Interventions: Adequate sleep, hydration, pain control, Door open when patient unattended, Toileting rounds    Medication Interventions: Teach patient to arise slowly    Elimination Interventions: Call light in reach           8/13/2021 0220 by Skyler Christine RN  Outcome: Progressing Towards Goal  Note: Fall Risk Interventions:  Mobility Interventions: Communicate number of staff needed for ambulation/transfer    Mentation Interventions: Adequate sleep, hydration, pain control, Door open when patient unattended, Toileting rounds    Medication Interventions: Teach patient to arise slowly    Elimination Interventions: Call light in reach

## 2021-08-14 NOTE — PROGRESS NOTES
Labs reviewed and stable  Cont present care  Call with any issues over the weekend        Kirby Larkin MD  [facility]   [address] [address]  Phone - [phone]   Fax - [phone]  [email or web address]

## 2021-08-14 NOTE — ROUTINE PROCESS
Bedside and Verbal shift change report given to Gwen Clemente (oncoming nurse) by Rachana Berman (offgoing nurse).  Report included the following information SBAR, Kardex, Intake/Output, MAR and Cardiac Rhythm SR.

## 2021-08-14 NOTE — ROUTINE PROCESS
Bedside and Verbal shift change report given to Neyda Mejia (oncoming nurse) by Emilee Nazario (offgoing nurse). Report included the following information SBAR, Kardex, ED Summary, Intake/Output, MAR, Recent Results and Cardiac Rhythm NSR.

## 2021-08-14 NOTE — HOSPICE
[facility]  [phone]    Patient Name: Delmy Neri  YOB: 1940  Age: 80 y.o. [address] RN Note:  Hospice consult noted. Chart reviewed. Plan of care discussed with patients nurse & care manager. In to meet with patient and daughter. Discussed Hospice philosophy, general plan of care, levels of care, services and on call procedures. Family information packet provided & reviewed with daughter. Patient and daughter in agreement for her to go home with hospice support  Daughter to move some furniture this weekend and we will have DME delivered Monday AM  Consents signed for admission for Monday 8/16/21 at 4 pm  Please set up transport for 1pm on Monday 8/16/21  SRX will be ordered from [facility] to go home with patient. Thank you for the opportunity to be of service to this patient.     Talia Ahumada RN  Clinical Nurse Liaison   [address]  B)[phone] (W) [phone]

## 2021-08-14 NOTE — PROGRESS NOTES
[address] [facility] Adult Hospitalist Group    Hospitalist Progress Note  Ainsley Tong MD  Answering service: [phone] OR   [phone] from in house phone        Name: Nabil Nagel: 1940  MRN: [id number]  PCP: Dr. Victorino Schwartz NP     Brief HPI and Hospital Course:       45-year-old female with a history of heavy tobacco use at least 2 packs a day since 13to 12years of age, hyperlipidemia, hypertension presented to the ED from [facility]  with reports of worsening shortness of breath over the last several days and 1 episode of sputum with scant blood-tinged.  She denies any ongoing hemoptysis.  She denies any fever or chills.  Denies any wheezing or chest tightness.  Denies any chest pain.  Chest x-ray was done and revealed left lung opacification.  CT of the chest was done revealed mediastinal lymphadenopathy, complete collapse of the left lung with large left pleural effusion and likely obstructing mucus in the left mainstem bronchus.  Vital signs were also notable for elevated heart rate as high as 180, mild elevation in temperature as high as 100.0, tachypnea with breathing in the 30s, and decreased SPO2 as low as 86 on room air.  She was placed on supplemental O2 with improvement in SPO2.  EKG was done was consistent with atrial fibrillation with RVR but was unremarkable for acute ischemic change.  Patient was initiated on amiodarone infusion and heparin as well.  She was also initiated on empiric antibiotics with Zosyn and vancomycin, and admitted to ICU for further evaluation and treatment. Transferred out ICU on       Assessment/Plan     #Small cell lung cancer:  -FNA lymph node -Small cell carcinoma with tumor necrosis   -oncology following-receiving inpatient chemo and radiation treatment  -MRI brain to rule out mets but unable to lie flat due to resp issues    Acute Hypoxic Respiratory Failure:  -due to underlying malignancy,bronchial obstcution- CT-dense consolidation and collapse of the left lung with an abrupt cut off of the left mainstem bronchus  Received abx -Vanc and zosyn-   -USG today moderate rt effusion and also noted other findings   thoracentesis done on Rt side 21 -825cc output  Per pulmonology-get CT chest after thoracentesis for better eval of left lung as well    Large left pleural effusion/Mediastinal Lymphadenopathy  Due to  malignancy S/p  thoracentesis , removed 1.5L effusion . Negativel culture so far. - Chest tube with pig tail catheter placed 21 and removed   Thoracic surgery signed off    Cytology pending. Acute renal failure:improving  -suspected ATN/pre renal state-recent abx -vanc/zosyn  Nephrology following, Creatinine trending down    Atrial fibrillation with RVR now NSR-  New Dx, on amiodarone IV changed to PO 21,  Discussed with , patient high risk for bleeding due to cancer,recent hemoptysis - recommended against further anticoagulation  -on 81 mg aspirin    Cellulitis Jason LE  -resolved  Recent DVT ultrasound negative,  received Zosyn and vancomycin    Hypertension Continue home amlodipine  Electrolyte imbalance : resolved  Tobacco abuse -Nicotine patch ordered      Remains ill, Guarded prognosis overall, patient's daughter aware    CODE status: full  VTE prophylaxis: heparin  Discussed plan of care with Patient/Family and Nurse   Pre-admission lived at [address] [place] [address]   Discharge planning: TBD     Subjective   Patient seen and examined chart reviewed.     She denied any pain today, aware of starting chemotherapy      Physical examination     Visit Vitals  BP (!) 148/69   Pulse 80   Temp 97.9 °F (36.6 °C)   Resp 28   Ht 5' 7\" (1.702 m)   Wt 90.7 kg (200 lb)   SpO2 93%   BMI 31.32 kg/m²       Temp (24hrs), Av °F (36.7 °C), Min:97.3 °F (36.3 °C), Max:98.9 °F (37.2 °C)      Intake/Output Summary (Last 24 hours) at 2021 5608  Last data filed at 2021 1600  Gross per 24 hour   Intake    Output 1900 ml Net -1900 ml     General:  no acute distress, mild lethargy,ill appearing  HEENT: normocephalic , EOMI  Neck: Supple,no mass palpable  Lungs : diminished breath sounds R>L   CVS: IRREGULAR, S1 S2 normal, No murmur   Abdomen: Soft, NT, BS +  Extremities: TRACE Edema    Data Reviewed:       Recent Labs     08/12/21  0948   WBC 11.5*   HGB 11.4*   HCT 38.1   *     Recent Labs     08/13/21  0419 08/12/21  0948 08/12/21  0501 08/11/21  1322 08/11/21  0338 08/11/21  0338    138 138  --    < > 139   K 5.6* 4.7 4.6  --    < > 4.6    105 108  --    < > 105   CO2 24 29 25  --    < > 31   GLU 95 138* 117*  --    < > 126*   BUN 54* 47* 46*  --    < > 37*   CREA 2.37* 2.58* 2.51*  --    < > 2.50*   CA 8.4* 9.0 8.7  --    < > 8.9   MG 2.0  --  2.2  --   --  2.0   PHOS 3.9  --  5.3*  --   --  5.7*   ALB 1.9* 2.3* 2.0*  --    < > 2.0*   TBILI  --  0.2  --   --   --   --    ALT  --  19  --   --   --   --    INR  --   --   --  1.1  --   --     < > = values in this interval not displayed.        Medications reviewed  Current Facility-Administered Medications   Medication Dose Route Frequency    hydrALAZINE (APRESOLINE) 20 mg/mL injection 10 mg  10 mg IntraVENous Q6H PRN    lidocaine-EPINEPHrine (XYLOCAINE) 1 %-1:100,000 injection    PRN    amiodarone (CORDARONE) tablet 400 mg  400 mg Oral DAILY    balsam peru-castor oiL (VENELEX) ointment   Topical BID    zinc oxide-cod liver oil (DESITIN) 40 % paste   Topical BID    albuterol-ipratropium (DUO-NEB) 2.5 MG-0.5 MG/3 ML  3 mL Nebulization Q6H PRN    miconazole (MICOTIN) 2 % powder   Topical BID    amLODIPine (NORVASC) tablet 5 mg  5 mg Oral DAILY    aspirin delayed-release tablet 81 mg  81 mg Oral DAILY    atorvastatin (LIPITOR) tablet 10 mg  10 mg Oral QHS    oxyCODONE IR (ROXICODONE) tablet 5 mg  5 mg Oral Q6H PRN    sodium chloride (NS) flush 5-40 mL  5-40 mL IntraVENous Q8H    sodium chloride (NS) flush 5-40 mL  5-40 mL IntraVENous PRN    acetaminophen (TYLENOL) tablet 650 mg  650 mg Oral Q6H PRN    Or    acetaminophen (TYLENOL) suppository 650 mg  650 mg Rectal Q6H PRN    polyethylene glycol (MIRALAX) packet 17 g  17 g Oral DAILY PRN    ondansetron (ZOFRAN ODT) tablet 4 mg  4 mg Oral Q8H PRN    Or    ondansetron (ZOFRAN) injection 4 mg  4 mg IntraVENous Q6H PRN    nicotine (NICODERM CQ) 21 mg/24 hr patch 1 Patch  1 Patch TransDERmal DAILY PRN         Aaron Carpenter MD  Internal Medicine  8/13/2021

## 2021-08-14 NOTE — PROGRESS NOTES
[facility] [facility] Adult Hospitalist Group    Hospitalist Progress Note  Antolin Calvert MD  Answering service: [phone] OR   [phone] from in house phone        Name: Cindy Chang  : 1940  MRN: [id number]  PCP: Dr. Lilian Litten, NP     Brief HPI and Hospital Course:       70-year-old female with a history of heavy tobacco use at least 2 packs a day since 13to 12years of age, hyperlipidemia, hypertension presented to the ED from [facility]  with reports of worsening shortness of breath over the last several days and 1 episode of sputum with scant blood-tinged.  She denies any ongoing hemoptysis.  She denies any fever or chills.  Denies any wheezing or chest tightness.  Denies any chest pain.  Chest x-ray was done and revealed left lung opacification.  CT of the chest was done revealed mediastinal lymphadenopathy, complete collapse of the left lung with large left pleural effusion and likely obstructing mucus in the left mainstem bronchus.  Vital signs were also notable for elevated heart rate as high as 180, mild elevation in temperature as high as 100.0, tachypnea with breathing in the 30s, and decreased SPO2 as low as 86 on room air.  She was placed on supplemental O2 with improvement in SPO2.  EKG was done was consistent with atrial fibrillation with RVR but was unremarkable for acute ischemic change.  Patient was initiated on amiodarone infusion and heparin as well.  She was also initiated on empiric antibiotics with Zosyn and vancomycin, and admitted to ICU for further evaluation and treatment. Transferred out ICU on     Subjective: patient reports no significant change from yesterday- some pain and shortness of breath    Assessment/Plan     #Small cell lung cancer:  -FNA lymph node -Small cell carcinoma with tumor necrosis   -oncology following-receiving inpatient chemo and radiation treatment  -MRI brain to rule out mets but unable to lie flat due to resp issues    Acute Hypoxic Respiratory Failure:  -due to underlying malignancy,bronchial obstcution- CT-dense consolidation and collapse of the left lung with an abrupt cut off of the left mainstem bronchus  Received abx -Vanc and zosyn-   -USG today moderate rt effusion and also noted other findings   thoracentesis done on Rt side 8/12/21 -825cc output  Per pulmonology-get CT chest after thoracentesis for better eval of left lung as well    Large left pleural effusion/Mediastinal Lymphadenopathy  Due to  malignancy S/p  thoracentesis 7/30, removed 1.5L effusion . Negativel culture so far. - Chest tube with pig tail catheter placed 8/2/21 and removed 8/9  Thoracic surgery signed off    Cytology pending. Acute renal failure:improving  -suspected ATN/pre renal state-recent abx -vanc/zosyn  Nephrology following, Creatinine trending down    Atrial fibrillation with RVR now NSR-  New Dx, on amiodarone IV changed to PO 8/4/21,  Discussed with , patient high risk for bleeding due to cancer,recent hemoptysis - recommended against further anticoagulation  -on 81 mg aspirin    Cellulitis Jason LE  -resolved  Recent DVT ultrasound negative,  received Zosyn and vancomycin    Hypertension Continue home amlodipine  Electrolyte imbalance : resolved  Tobacco abuse -Nicotine patch ordered      Remains ill, Guarded prognosis overall, patient's daughter aware    CODE status: full  VTE prophylaxis: heparin  Discussed plan of care with Patient/Family and Nurse   Pre-admission lived at [address] [place] [address].   Discharge planning: TBD     Subjective   Patient seen and examined chart reviewed.     She denied any pain today, aware of starting chemotherapy      Physical examination     Visit Vitals  BP (!) 131/48   Pulse 65   Temp 98.2 °F (36.8 °C)   Resp 28   Ht 5' 7\" (1.702 m)   Wt 90.7 kg (200 lb)   SpO2 94%   BMI 31.32 kg/m²     Patient Vitals for the past 24 hrs:   Temp Pulse Resp BP SpO2   08/14/21 0611  65      08/14/21 0413  64      08/14/21 0320 98.2 °F (36.8 °C) 78 28 (!) 131/48 94 %   21 0237  70      21 0013 97.7 °F (36.5 °C) 71 20 (!) 143/45 95 %   21 2209  80      21 2040 97.9 °F (36.6 °C) 72 28 (!) 148/69 93 %   21  69      21 1811  74      21 1600     95 %   21 1527 98.2 °F (36.8 °C) 76 27 (!) 142/62 95 %   21 1400  88      21 1200  76      21 1137 97.3 °F (36.3 °C) 77 24 (!) 123/53 92 %   21 1000  68        Temp (24hrs), Av.9 °F (36.6 °C), Min:97.3 °F (36.3 °C), Max:98.2 °F (36.8 °C)      Intake/Output Summary (Last 24 hours) at 2021 0845  Last data filed at 2021 1600  Gross per 24 hour   Intake    Output 800 ml   Net -800 ml     General:  no acute distress, mild lethargy,ill appearing  HEENT: normocephalic , EOMI  Neck: Supple,no mass palpable  Lungs : diminished breath sounds R>L   CVS: IRREGULAR, S1 S2 normal, No murmur   Abdomen: Soft, NT, BS +  Extremities: TRACE Edema    Data Reviewed:       Recent Labs     21  0948   WBC 11.5*   HGB 11.4*   HCT 38.1   *     Recent Labs     21  0044 21  0419 21  0948 21  0501 21  0501 21  1322    136 138   < > 138  --    K 4.7 5.6* 4.7   < > 4.6  --     108 105   < > 108  --    CO2 27 24 29   < > 25  --    GLU 90 95 138*   < > 117*  --    BUN 64* 54* 47*   < > 46*  --    CREA 2.42* 2.37* 2.58*   < > 2.51*  --    CA 8.5 8.4* 9.0   < > 8.7  --    MG 1.9 2.0  --   --  2.2  --    PHOS 3.4 3.9  --   --  5.3*  --    ALB 2.2* 1.9* 2.3*   < > 2.0*  --    TBILI  --   --  0.2  --   --   --    ALT  --   --  19  --   --   --    INR  --   --   --   --   --  1.1    < > = values in this interval not displayed.        Medications reviewed  Current Facility-Administered Medications   Medication Dose Route Frequency    hydrALAZINE (APRESOLINE) 20 mg/mL injection 10 mg  10 mg IntraVENous Q6H PRN    lidocaine-EPINEPHrine (XYLOCAINE) 1 %-1:100,000 injection    PRN    amiodarone (CORDARONE) tablet 400 mg  400 mg Oral DAILY    balsam peru-castor oiL (VENELEX) ointment   Topical BID    zinc oxide-cod liver oil (DESITIN) 40 % paste   Topical BID    albuterol-ipratropium (DUO-NEB) 2.5 MG-0.5 MG/3 ML  3 mL Nebulization Q6H PRN    miconazole (MICOTIN) 2 % powder   Topical BID    amLODIPine (NORVASC) tablet 5 mg  5 mg Oral DAILY    aspirin delayed-release tablet 81 mg  81 mg Oral DAILY    atorvastatin (LIPITOR) tablet 10 mg  10 mg Oral QHS    oxyCODONE IR (ROXICODONE) tablet 5 mg  5 mg Oral Q6H PRN    sodium chloride (NS) flush 5-40 mL  5-40 mL IntraVENous Q8H    sodium chloride (NS) flush 5-40 mL  5-40 mL IntraVENous PRN    acetaminophen (TYLENOL) tablet 650 mg  650 mg Oral Q6H PRN    Or    acetaminophen (TYLENOL) suppository 650 mg  650 mg Rectal Q6H PRN    polyethylene glycol (MIRALAX) packet 17 g  17 g Oral DAILY PRN    ondansetron (ZOFRAN ODT) tablet 4 mg  4 mg Oral Q8H PRN    Or    ondansetron (ZOFRAN) injection 4 mg  4 mg IntraVENous Q6H PRN    nicotine (NICODERM CQ) 21 mg/24 hr patch 1 Patch  1 Patch TransDERmal DAILY PRN         Matt Gautam MD  Internal Medicine  8/14/2021

## 2021-08-15 NOTE — HOSPICE
[place]  [facility]  [phone]     Patient Name: Alyssa Case  YOB: 1940  Age: 80 y.o. [facility] RN Note:   Patient remains alert and oriented and stable to transport home tomorrow  DDNR signed by patient and Dr Amish Gibbs and on chart to go home with her. I have called Roger [facility] and left a VM message to set up transport home at 1pm tomorrow   Thank you for the opportunity to be of service to this patient.     Bryant Schneider RN  Clinical Nurse Liaison   [facility]  R)[phone] (L) [phone]

## 2021-08-15 NOTE — PROGRESS NOTES
TRANSITIONS OF CARE:  RUR;19%  DESTINATION:HOME WITH HOSPICE  CRM has set up ambulance for transport home for Monday August 16th,2021 at 12noon  With [facility]. Patient will remain on 6 liters of nasal oxygen. PCS form and facesheet with envelope on hard copy chart and patient and daughter made aware of transport time.

## 2021-08-15 NOTE — PROGRESS NOTES
Bedside shift change report given to Ivone Lord (oncoming nurse) by Austin Forde (offgoing nurse). Report included the following information SBAR, Kardex, ED Summary, Procedure Summary, Intake/Output, MAR, Recent Results, Med Rec Status and Cardiac Rhythm NSR.

## 2021-08-15 NOTE — PROGRESS NOTES
Problem: Breathing Pattern - Ineffective  Goal: *Absence of hypoxia  Outcome: Progressing Towards Goal  Goal: *Use of effective breathing techniques  Outcome: Progressing Towards Goal  Goal: *PALLIATIVE CARE:  Alleviation of Dyspnea  Outcome: Progressing Towards Goal     Problem: Pressure Injury - Risk of  Goal: *Prevention of pressure injury  Description: Document Ferdinand Scale and appropriate interventions in the flowsheet. Outcome: Progressing Towards Goal  Note: Pressure Injury Interventions:  Sensory Interventions: Assess changes in LOC    Moisture Interventions: Apply protective barrier, creams and emollients, Absorbent underpads    Activity Interventions: PT/OT evaluation, Assess need for specialty bed    Mobility Interventions: Assess need for specialty bed    Nutrition Interventions: Document food/fluid/supplement intake    Friction and Shear Interventions: Apply protective barrier, creams and emollients                Problem: Falls - Risk of  Goal: *Absence of Falls  Description: Document Norma Fall Risk and appropriate interventions in the flowsheet. Outcome: Progressing Towards Goal  Note: Fall Risk Interventions:  Mobility Interventions: Communicate number of staff needed for ambulation/transfer    Mentation Interventions: Door open when patient unattended    Medication Interventions: Patient to call before getting OOB, Teach patient to arise slowly    Elimination Interventions: Call light in reach              Problem:  Activity Intolerance  Goal: *Oxygen saturation during activity within specified parameters  Outcome: Progressing Towards Goal  Goal: *Able to remain out of bed as prescribed  Outcome: Progressing Towards Goal

## 2021-08-16 NOTE — PROGRESS NOTES
NUTRITION    RD PLAN OF CARE:   Chart reviewed for follow-up. Diet: Regular with Ensure Enlive BID    Pt is admitted with Atrial fibrillation with rapid ventricular response ([address] [I48.91]. Plans noted for pt to d/c home with hospice today. Aggressive nutrition intervention no longer indicated and will sign off. Thank you.      Iliana Lees RD      Contact via [employer]

## 2021-08-16 NOTE — HOSPICE
[facility]  [facility]  [phone]  Patient Name: Lona Brumfield  YOB: 1940  Age: 80 yoM                                                         Principle Hospice Diagnosis:  Small cell lung cancer  Diagnoses RELATED to the terminal prognosis:  Acute hypoxic respiratory failure, acute renal failure, AFIB converted to NSR, pneumothorax, left pleural effusion,   Other Diagnosis: Cerebrovascular accidence, chronic obstructive pulmonary disease, 70+ year smoking history, hypertension, hyperlipidemia,     Date of Hospice Admission:  2021  Hospice Attending Elected by Patient: Javi Barros MD  Hospice Medical Director Elected by Patient: Marques Monet MD  Primary Care Physician:  Janeth Hamlin NP    Admitting RN: Maria Ines Holm RN  Nurse CM:  Fely Werner RN  : Leonidas Ravi [facility] [place], [place]  :  Kristy Jaimes, [address] [place] [address] DNR: Yes, not scanned into chart (Signed by Dr Nena Lloyd)   Service:  No  Appropriate for Pinning Ceremony:  No      Home: TBD    Direct Observation:   Arrived to find patient seated in chair in bedroom, watching TV. The patient is AOx3, disoriented to time, forgetful but is decisional and makes desires not to go back to hospital or have CPR performed known to this RN, speech is clear, speaks in short phrases, left lung fields diminished/absent in base, denies respiratory distress, RR 22, spO2 97 percent on continuous oxygen at 2LPM, S1/S2, HR is RRR, radial pulses +2, +2 BLE edema, normoactive bowel sounds, denies nausea/vomiting, incontinent of bladder, skin is intact and cool/pale/dry, denies pain/anxiety. The patient principle hospice diagnosis has resulted in fatigue/weakness, dyspnea, oxygen-dependence, weight loss, and pleural effusion. The patient is very deconditioned and only able to walk approximately 10 feet before severe dyspnea. Requires assistance with most ADLs.   Palliative Performance Scale:  50 percent    ER Visits/ Hospitalizations in past year:  1  Onset Date of Hospice Diagnosis:   7/2021  Summary of Disease Progression Leading to Hospice Diagnosis:  excerpted from Dr David Cast discharge summary dtd 8/16/2021 80year-old female with a history of heavy tobacco use at least 2 packs a day since 13to 12years of age, hyperlipidemia, hypertension presented to the ED from [facility]  with reports of worsening shortness of breath over the last several days and 1 episode of sputum with scant blood-tinged. She denies any ongoing hemoptysis. She denies any fever or chills. Denies any wheezing or chest tightness. Denies any chest pain. Chest x-ray was done and revealed left lung opacification. CT of the chest was done revealed mediastinal lymphadenopathy, complete collapse of the left lung with large left pleural effusion and likely obstructing mucus in the left mainstem bronchus. Vital signs were also notable for elevated heart rate as high as 180, mild elevation in temperature as high as 100.0, tachypnea with breathing in the 30s, and decreased SPO2 as low as 86 on room air. She was placed on supplemental O2 with improvement in SPO2. EKG was done was consistent with atrial fibrillation with RVR but was unremarkable for acute ischemic change. Patient was initiated on amiodarone infusion and heparin as well. She was also initiated on empiric antibiotics with Zosyn and vancomycin, and admitted to ICU for further evaluation and treatment. Transferred out ICU on 7/30  Labs/diagnostics supporting hospice diagnosis: -CT-Chest w/o contrast: (7/29/21) IMPRESSION 1. Unchanged large left pleural effusion which has likely reaccumulated since the prior thoracentesis. 2. Unchanged dense consolidation and collapse of the left lung with an abrupt cut off of the left mainstem bronchus.  The right hilum and right side of the mediastinum appears consolidated which may be related to invasion and/or confluent adenopathy. A large lymph node is seen in the left para-aortic space that is unchanged. Findings are highly suspicious for malignancy. 3. New trace right pleural effusion.  -Biopsy-Fine needle aspiration of lymph nodes (8/4/21) * * *CYTOLOGIC INTERPRETATION:* * *   Lymph Node, Left Hilar, EBUS- guided Fine needle aspiration and cell  block:    Small cell carcinoma with tumor necrosis (see comment)   * * *General Categorization* * *   Positive for malignancy. * * *Specimen Adequacy* * *   Satisfactory for evaluation. CPT: [id number], [id number], [id number], [id number], [id number]     Use LCD Guidelines and list features:   Cancer Diagnoses   ___X_____  A. Disease with distant metastases at presentation OR    ________  B. Progression from an earlier stage of disease to metastatic disease with either:                          ________  1. continued decline in spite of therapy                          ________  2. patient declines further disease directed therapy    Note: Certain cancers with poor prognoses (e.g. small cell lung cancer, brain cancer and pancreatic cancer) may be hospice eligible without fulfilling the other criteria in this section. SPIRITUAL/Social/Emotional/psych:     Dr. Twyla Vogt on [facility] of Dr Douglas Nassar    contacted, agrees to serve as attending provider for hospice and provided verbal certification of terminal illness with prognosis of 6 months or less life expectancy. Orders for hospice admission, medications and plan of treatment received. Medication reconciliation completed. Currently this patient has:  Supplemental O2: Yes  Peripheral IV:           Fistula:   PICC:                        PORT:           [place] Catheter:       NG Tube:   PEG Tube:                Ostomy:        AICD: Has ICD been deactivated?   Yes:_____   No:_____   (If no, please identify ex ([address]; [address] etc)      MEDS:  I have reviewed the patient's medication list with MD and identified the following:  Nonformulary medications: n/a  Unrelated medications:  n/a    IDT communication to include MD, , SW, [address] [place] [address] and support team.

## 2021-08-16 NOTE — PROGRESS NOTES
The Palliative Medicine team was consulted as part of your / your loved one's care in the hospital. Our team is a supportive service; we strive to relieve suffering and improve quality of life. You identified the following goal(s) as your main focus for healthcare: Patient/Health Care Proxy Stated Goals: Comfort. You will be discharging home with [facility] services. We reviewed advance care planning information, which includes the following:  Advance Care Planning 8/13/2021   Confirm Advance Directive On file    Does the patient have other document types -N/A     We reviewed / discussed your code status as: DNR     Full Code means perform CPR in the event of cardiac arrest     [facility] means do NOT perform CPR in the event of cardiac arrest     Partial Code means you have specific preferences, please discuss with your health care team     No Order means this issue was not addressed / resolved during your stay    Because of the importance of this information, we are providing you with a printed copy to share with other healthcare providers after this hospitalization is complete. If any of the above information is incomplete or incorrect, please contact the Palliative Medicine team at [phone].

## 2021-08-16 NOTE — PROGRESS NOTES
Bedside and Verbal shift change report given to Jeff Escobar (oncoming nurse) by Reece Vee (offgoing nurse). Report included the following information SBAR, ED Summary, Procedure Summary, Intake/Output, MAR and Cardiac Rhythm sinus tach.

## 2021-08-16 NOTE — DISCHARGE INSTRUCTIONS
Patient scheduled to be discharged home today 8/16/21 under the care of [facility] as per notes from   Roxanna Winn  (F)[phone] (F) [phone]    Regular Diet  Activity as tolerated  Oxygen- continuous- at 6Liters NC continuous  DNR papers signed 8/15/21      Discharge Instructions for Chemotherapy/Biotherapy    Chemotherapy has the potential to cause many side effects. The following are general precautions that chemo patients should take:   1. Practice good hand washing:    Use soap and water for at least 15 seconds, covering all areas of hands.  Always wash hands before eating.  Wash hands after contact with public surfaces such as door knobs and handles, shopping carts, telephones and elevator buttons. 2. Get plenty of rest:    You will likely experience fatigue three to five days following your treatment. It may last as long as seven days. 3. Drink plenty of fluids. Water is best.   4. Eat a well balanced diet:    Small frequent meals may help if you are having trouble with nausea or your appetite. Some people also do well with nutritional supplements. 5. Pace yourself with daily activities:    Take frequent breaks and ask for help if you need it. 6. Exercise is very important:    It will increase circulation and will help the fatigue. Do what you can each day. 7. If your regimen results in hair loss:    You will likely notice effects between two and three weeks following your first treatment. Some lose all hair while others only experience thinning. 8. Practice good oral hygiene:   Darcus Court your M.D. immediately if any mouth sores or discomfort develop. 9. Protect yourself from the sun. Signs/Symptoms of an allergic reaction and/or some side effects may require immediate medical attention.  Notify your physician if you develop one or more of the following:   Temperature of 100.5 degrees or greater;   Skin redness, itching, swelling, blistering, weeping, crusting, rash, or hives; Wheezing, chest tightness, cough, or shortness of breath;   Swelling of the face, eyelids, lips, tongue, or throat;   Severe, persistent headache;   Stuffy nose, runny nose, sneezing;   Red (bloodshot), itchy, swollen, or watery eyes;   Stomach pain, nausea, vomiting, diarrhea, or bloody stools;   Mouth sores  Your physician should also be aware of the following symptoms:   Persistent and unresolved nausea and/or vomiting;   Persistent and unresolved diarrhea or constipation;   Numbness/tingling/burning of the extremities, including the fingers and toes; Bleeding or unexplained bruising; Unexplained redness/swelling/pain in the arms or legs; Shortness of breath or fatigue that worsens;   Pain with urination or blood in the urine; Chills;   Cough, especially a productive cough;   Mouth sores or a white coating of the tongue; Redness, swelling, pain or drainage at the port-a-cath or IV site; Increased feeling of bloating or water retention; Excessive weight loss or gain;   Ringing in the ears; Difficulty swallowing;   Dizziness, vertigo, lightheadedness, or fainting. Chemotherapy can cause birth defects. It is very important not to become pregnant   or father a child while undergoing chemotherapy. During chemotherapy and for 48 hours after chemotherapy, the drugs may still be in   your urine and other body fluids. You should use the following precautions to reduce   exposure to others:  * Both men and women should sit on the toilet to cut down on splashing. Flush   toilets with the lid down. Always wash your hands well with soap and water after using the toilet. You may want to use a separate toilet if possible. * Caregivers should wear disposable gloves to handle body wastes. Dispose of   gloves after each use and wash hands.   * Any sheets or clothes soiled with bodily fluids should be machine washed separately from other laundry. Wash twice with regular laundry detergent in hot water. If they cannot be washed right away they can be stored in a sealed plastic  bag.    * If disposable adult diapers, underwear, or sanitary pads are used, they can be sealed in a plastic bag, and thrown away with regular trash.

## 2021-08-16 NOTE — PROGRESS NOTES
Bedside and Verbal shift change report given to [place] (oncoming nurse) by Piotr ortiz). Report included the following information SBAR, Intake/Output, MAR and Cardiac Rhythm sinus.

## 2021-08-16 NOTE — PROGRESS NOTES
Transition of Care: home with [address] today 8/16; info on AVS     Transport Plan: [facility] is scheduled for 12 noon today 8/16     RUR: 17%     DX: atrilfibrillation with rapid ventricular response; admitted on 7/29/21     Main contact is daughter Caio Celis- [phone]: CM noted that patient is going home with [address] today; this CM called [facility] to confirm a 12 noon pickup; they confirmed a 12 noon pickup    CM following  Nelly Alexander RN, CRM

## 2021-08-16 NOTE — PROGRESS NOTES
[facility]    Hospitalist Progress Note  Yessica Degroot MD  Answering service: [phone] OR   [phone] from in house phone        Name: Rick Gage  : 1940  MRN: [id number]  PCP: Dr. Christian Rivers NP     Brief HPI and Hospital Course:       60-year-old female with a history of heavy tobacco use at least 2 packs a day since 13to 12years of age, hyperlipidemia, hypertension presented to the ED from [facility]  with reports of worsening shortness of breath over the last several days and 1 episode of sputum with scant blood-tinged.  She denies any ongoing hemoptysis.  She denies any fever or chills.  Denies any wheezing or chest tightness.  Denies any chest pain.  Chest x-ray was done and revealed left lung opacification.  CT of the chest was done revealed mediastinal lymphadenopathy, complete collapse of the left lung with large left pleural effusion and likely obstructing mucus in the left mainstem bronchus.  Vital signs were also notable for elevated heart rate as high as 180, mild elevation in temperature as high as 100.0, tachypnea with breathing in the 30s, and decreased SPO2 as low as 86 on room air.  She was placed on supplemental O2 with improvement in SPO2.  EKG was done was consistent with atrial fibrillation with RVR but was unremarkable for acute ischemic change.  Patient was initiated on amiodarone infusion and heparin as well.  She was also initiated on empiric antibiotics with Zosyn and vancomycin, and admitted to ICU for further evaluation and treatment. Transferred out ICU on     Subjective: patient reports no significant change from yesterday- some pain and shortness of breath  Seen by hospice, code status changed to DNR    Assessment/Plan     #Small cell lung cancer:  -FNA lymph node -Small cell carcinoma with tumor necrosis   -oncology following-receiving inpatient chemo and radiation treatment  -MRI brain to rule out mets but unable to lie flat due to resp issues    Acute Hypoxic Respiratory Failure:  -due to underlying malignancy,bronchial obstcution- CT-dense consolidation and collapse of the left lung with an abrupt cut off of the left mainstem bronchus  Received abx -Vanc and zosyn-   -USG today moderate rt effusion and also noted other findings   thoracentesis done on Rt side 8/12/21 -825cc output  Per pulmonology-get CT chest after thoracentesis for better eval of left lung as well    Large left pleural effusion/Mediastinal Lymphadenopathy  Due to  malignancy S/p  thoracentesis 7/30, removed 1.5L effusion . Negativel culture so far. - Chest tube with pig tail catheter placed 8/2/21 and removed 8/9  Thoracic surgery signed off    Cytology pending. Acute renal failure:improving  -suspected ATN/pre renal state-recent abx -vanc/zosyn  Nephrology following, Creatinine trending down    Atrial fibrillation with RVR now NSR-  New Dx, on amiodarone IV changed to PO 8/4/21,  Discussed with , patient high risk for bleeding due to cancer,recent hemoptysis - recommended against further anticoagulation  -on 81 mg aspirin    Cellulitis Jason LE  -resolved  Recent DVT ultrasound negative,  received Zosyn and vancomycin    Hypertension Continue home amlodipine  Electrolyte imbalance : resolved  Tobacco abuse -Nicotine patch ordered      Remains ill, Guarded prognosis overall, plans for DC home tomorrow on hospice    CODE status: DNR  VTE prophylaxis: heparin  Discussed plan of care with Patient/Family and Nurse   Pre-admission lived at [address] [place] [address]   Discharge planning: TBD     Subjective   Patient seen and examined chart reviewed.     She denied any pain today, aware of starting chemotherapy      Physical examination     Visit Vitals  /70   Pulse 64   Temp 97.2 °F (36.2 °C)   Resp 24   Ht 5' 7\" (1.702 m)   Wt 90.1 kg (198 lb 10.2 oz)   SpO2 96%   BMI 31.11 kg/m²     Patient Vitals for the past 24 hrs:   Temp Pulse Resp BP SpO2   08/16/21 0400  64      21 0308 97.2 °F (36.2 °C) 68 24 136/70 96 %   21 0205  64      21 0000  67      08/15/21 2316 97.8 °F (36.6 °C) 72 25 (!) 139/57 100 %   08/15/21 2206  63      08/15/21 2011 97.6 °F (36.4 °C) 70 20 (!) 155/64 98 %   08/15/21 2000  68      08/15/21 1620 98.3 °F (36.8 °C) 72 27 (!) 151/45 97 %   08/15/21 1438  71      08/15/21 1400 97.6 °F (36.4 °C) 79 26 (!) 149/65 97 %   08/15/21 0821 98.2 °F (36.8 °C) 73 25 (!) 174/58 96 %     Temp (24hrs), Av.8 °F (36.6 °C), Min:97.2 °F (36.2 °C), Max:98.3 °F (36.8 °C)      Intake/Output Summary (Last 24 hours) at 2021 0553  Last data filed at 8/15/2021 2030  Gross per 24 hour   Intake 880 ml   Output 1300 ml   Net -420 ml     General:  no acute distress, mild lethargy,ill appearing  HEENT: normocephalic , EOMI  Neck: Supple,no mass palpable  Lungs : diminished breath sounds R>L   CVS: IRREGULAR, S1 S2 normal, No murmur   Abdomen: Soft, NT, BS +  Extremities: TRACE Edema    Data Reviewed:       No results for input(s): WBC, HGB, HCT, PLT, HGBEXT, HCTEXT, PLTEXT, HGBEXT, HCTEXT, PLTEXT in the last 72 hours.   Recent Labs     08/15/21  0218 21  0044    138   K 4.5 4.7    105   CO2 32 27   * 90   BUN 55* 64*   CREA 2.21* 2.42*   CA 8.7 8.5   MG 1.9 1.9   PHOS 3.7 3.4   ALB 2.2* 2.2*       Medications reviewed  Current Facility-Administered Medications   Medication Dose Route Frequency    hydrALAZINE (APRESOLINE) 20 mg/mL injection 10 mg  10 mg IntraVENous Q6H PRN    lidocaine-EPINEPHrine (XYLOCAINE) 1 %-1:100,000 injection    PRN    amiodarone (CORDARONE) tablet 400 mg  400 mg Oral DAILY    balsam peru-castor oiL (VENELEX) ointment   Topical BID    zinc oxide-cod liver oil (DESITIN) 40 % paste   Topical BID    albuterol-ipratropium (DUO-NEB) 2.5 MG-0.5 MG/3 ML  3 mL Nebulization Q6H PRN    miconazole (MICOTIN) 2 % powder   Topical BID    amLODIPine (NORVASC) tablet 5 mg  5 mg Oral DAILY  aspirin delayed-release tablet 81 mg  81 mg Oral DAILY    atorvastatin (LIPITOR) tablet 10 mg  10 mg Oral QHS    oxyCODONE IR (ROXICODONE) tablet 5 mg  5 mg Oral Q6H PRN    sodium chloride (NS) flush 5-40 mL  5-40 mL IntraVENous Q8H    sodium chloride (NS) flush 5-40 mL  5-40 mL IntraVENous PRN    acetaminophen (TYLENOL) tablet 650 mg  650 mg Oral Q6H PRN    Or    acetaminophen (TYLENOL) suppository 650 mg  650 mg Rectal Q6H PRN    polyethylene glycol (MIRALAX) packet 17 g  17 g Oral DAILY PRN    ondansetron (ZOFRAN ODT) tablet 4 mg  4 mg Oral Q8H PRN    Or    ondansetron (ZOFRAN) injection 4 mg  4 mg IntraVENous Q6H PRN    nicotine (NICODERM CQ) 21 mg/24 hr patch 1 Patch  1 Patch TransDERmal DAILY PRN         Natalia Dias MD  Internal Medicine  8/16/2021

## 2021-08-16 NOTE — HOSPICE
[facility] RN note:  Pt stable for transport which is scheduled for noon. Medications given to pt in presence of student nurse to go home with pt. DME delivered to home. CM has this RN's number should and issues arise with transition to home. Olamide Burgosbrenda [birth year] Room 449  Dominik Campos  DX: Small Cell Lung CA (new diagnosis) Date of Consult: 8/13/2021  CTI from Dr Shira Hernandez, contact Dr. Burak Lucio in AM to make her aware   Clinical Notes:      Met with patient and dtr. Patient alert and oriented. Both in agreement for her to go home with hospice. She lives with her daughter who will be primary CG. Patient signed consents (scanned)   DDNR signed, scanned and on chart to go home with patient    DME:  Charly Ross to deliver 8/16 between 9-12:  hospital bed, gel overlay, OBT, oxygen and [facility]  # [phone]   SRX profiled with Audra Jenkins and to be picked up at [facility] [place] [facility] at noon Monday along with oxy IR tablets   Transport will be set for 1pm Monday    Admission set for 4pm on Monday 8/16     Thank you for the opportunity to care for this pt and family. Please contact hospice at [phone] with any questions or concerns.

## 2021-08-16 NOTE — DISCHARGE SUMMARY
Discharge Summary       PATIENT ID: Ellyn Odom  MRN: [id number]   YOB: 1940    DATE OF ADMISSION: 7/29/2021  7:11 PM    DATE OF DISCHARGE: 8/16/21  PRIMARY CARE PROVIDER: Franco Quezada NP     ATTENDING PHYSICIAN: Fay Torres  DISCHARGING PROVIDER: Geno Knight MD    To contact this individual call [phone] and ask the  to page. If unavailable ask to be transferred the Adult Hospitalist Department.     CONSULTATIONS: IP CONSULT TO INTENSIVIST  IP CONSULT TO THORACIC SURGERY  IP CONSULT TO NEPHROLOGY  IP CONSULT TO ONCOLOGY  IP CONSULT TO RADIATION ONCOLOGY  IP CONSULT TO PALLIATIVE CARE - PROVIDER  IP CONSULT TO PALLIATIVE CARE - PROVIDER  IP CONSULT TO CARDIOLOGY  IP CONSULT TO PULMONOLOGY    PROCEDURES/SURGERIES: Procedure(s):  ENDOSCOPIC BRONCHOSCOPY ULTRASOUND (EBUS)  TRANSBRONCHIAL BIOPSY  BRONCHOSCOPY    ADMITTING [address] [place] [address] COURSE:   68-year-old female with a history of heavy tobacco use at least 2 packs a day since 13to 12years of age, hyperlipidemia, hypertension presented to the ED from [facility]7/ 29  with reports of worsening shortness of breath over the last several days and 1 episode of sputum with scant blood-tinged.  She denies any ongoing hemoptysis.  She denies any fever or chills.  Denies any wheezing or chest tightness.  Denies any chest pain.  Chest x-ray was done and revealed left lung opacification.  CT of the chest was done revealed mediastinal lymphadenopathy, complete collapse of the left lung with large left pleural effusion and likely obstructing mucus in the left mainstem bronchus.  Vital signs were also notable for elevated heart rate as high as 180, mild elevation in temperature as high as 100.0, tachypnea with breathing in the 30s, and decreased SPO2 as low as 86 on room air.  She was placed on supplemental O2 with improvement in SPO2.  EKG was done was consistent with atrial fibrillation with RVR but was unremarkable for acute ischemic change.  Patient was initiated on amiodarone infusion and heparin as well.  She was also initiated on empiric antibiotics with Zosyn and vancomycin, and admitted to ICU for further evaluation and treatment. Transferred out [address] 7/30      [address] [facility] [address] / PLAN:      #Small cell lung cancer:  -FNA lymph node -Small cell carcinoma with tumor necrosis   -oncology following-receiving inpatient chemo and radiation treatment  -MRI brain to rule out mets but unable to lie flat due to resp issues     Acute Hypoxic Respiratory Failure:  -due to underlying malignancy,bronchial obstcution- CT-dense consolidation and collapse of the left lung with an abrupt cut off of the left mainstem bronchus  Received abx -Vanc and zosyn-   -USG today moderate rt effusion and also noted other findings   thoracentesis done on Rt side 8/12/21 -825cc output     Large left pleural effusion/Mediastinal Lymphadenopathy  Due to  malignancy S/p  thoracentesis 7/30, removed 1.5L effusion .Negativel culture so far.    - Chest tube with pig tail catheter placed 8/2/21 and removed 8/9  Thoracic surgery signed off      Acute renal failure:improving  -suspected ATN/pre renal state-recent abx -vanc/zosyn  Nephrology following, Creatinine trending down     Atrial fibrillation with RVR now NSR-  New Dx, on amiodarone IV changed to PO 8/4/21,  Discussed with , patient high risk for bleeding due to cancer,recent hemoptysis - recommended against further anticoagulation  -on 81 mg aspirin     Cellulitis Ajson LE  -resolved  Recent DVT ultrasound negative,  received Zosyn and vancomycin     Hypertension Continue home amlodipine  Electrolyte imbalance : resolved  Tobacco abuse -Nicotine patch ordered        Remains ill, Guarded prognosis overall, plans for DC home today on hospice     ADDITIONAL CARE RECOMMENDATIONS:   Patient scheduled to be discharged home today 8/16/21 under the care of [facility] as per notes from   [facility]  (I)[phone] (n) [phone]    Regular Diet  Activity as tolerated  Oxygen- continuous- at 6Liters NC continuous  DNR papers signed 8/15/21       PENDING TEST RESULTS:   At the time of discharge the following test results are still pending: none    FOLLOW UP APPOINTMENTS:    Follow-up Information     Follow up With Specialties Details Why [address] [place] [address] Call for home hospice [address]    Ez Marshall NP Nurse Practitioner   [address]  [address] [facility]  [address]   [phone]           DIET: Regular Diet  ACTIVITY: Activity as tolerated  WOUND CARE: NA  EQUIPMENT needed: oxygen 6L via NC      DISCHARGE MEDICATIONS:  Current Discharge Medication List      START taking these medications    Details   amiodarone (PACERONE) 400 mg tablet Take 1 Tablet by mouth daily. Qty: 30 Tablet, Refills: 4  Start date: 8/17/2021      ondansetron (ZOFRAN ODT) 4 mg disintegrating tablet Take 1 Tablet by mouth every eight (8) hours as needed for Nausea or Vomiting. Qty: 30 Tablet, Refills: 2  Start date: 8/16/2021      oxyCODONE IR (ROXICODONE) 5 mg immediate release tablet Take 1 Tablet by mouth every six (6) hours as needed for Pain for up to 7 days. Max Daily Amount: 20 mg.  Qty: 30 Tablet, Refills: 0  Start date: 8/16/2021, End date: 8/23/2021    Associated Diagnoses: Malignant neoplasm of lung, unspecified laterality, unspecified part of lung (HCC)      polyethylene glycol (MIRALAX) 17 gram packet Take 1 Packet by mouth daily for 31 days.  Available OTC- no rx needed  Qty: 30 Packet, Refills: 1  Start date: 8/16/2021, End date: 9/16/2021         CONTINUE these medications which have NOT CHANGED    Details   albuterol (PROVENTIL HFA, VENTOLIN HFA, PROAIR HFA) 90 mcg/actuation inhaler Take 2 Puffs by inhalation every four (4) hours as needed for Wheezing for up to 360 days.  Tad Shah: 1 Inhaler, Refills: 5    Associated Diagnoses: Shortness of breath      amLODIPine (NORVASC) 5 mg tablet Take 1 Tab by mouth daily. Qty: 90 Tab, Refills: 1      aspirin delayed-release 81 mg tablet Take 1 Tab by mouth daily. Qty: 90 Tab, Refills: 1      simvastatin (ZOCOR) 20 mg tablet Take 1 Tab by mouth nightly. Qty: 90 Tab, Refills: 1      acetaminophen (TYLENOL) 325 mg tablet Take 2 Tabs by mouth every six (6) hours as needed for Pain. Qty: 30 Tab, Refills: 0         STOP taking these medications       folic acid (FOLVITE) 964 mcg tablet Comments:   Reason for Stopping:         donepeziL (Aricept) 5 mg tablet Comments:   Reason for Stopping:         menthol (GOLD BOND PAIN RELIEVING EX) Comments:   Reason for Stopping:         naproxen sodium (Aleve) 220 mg cap Comments:   Reason for Stopping:             NOTIFY YOUR PHYSICIAN FOR ANY OF THE FOLLOWING:   Fever over 101 degrees for 24 hours. Chest pain, shortness of breath, fever, chills, nausea, vomiting, diarrhea, change in mentation, falling, weakness, bleeding. Severe pain or pain not relieved by medications. Or, any other signs or symptoms that you may have questions about.     DISPOSITION:    Home With:   OT  PT  HH  RN       Long term SNF/Inpatient Rehab    Independent/assisted living   X Hospice at home    Other:       PATIENT CONDITION AT DISCHARGE:     Functional status   X Poor    X Deconditioned     Independent      Cognition   X  Lucid     Forgetful     Dementia      Catheters/lines (plus indication)    [place]     PICC     PEG    X None      Code status     Full code    X DNR      PHYSICAL EXAMINATION AT DISCHARGE:  Patient Vitals for the past 24 hrs:   Temp Pulse Resp BP SpO2   08/16/21 1027  67      08/16/21 0740 97.6 °F (36.4 °C) 72 20 (!) 159/55 98 %   08/16/21 0604  63      08/16/21 0400  64      08/16/21 0308 97.2 °F (36.2 °C) 68 24 136/70 96 %   08/16/21 0205  64      08/16/21 0000  79      08/15/21 2316 97.8 °F (36.6 °C) 72 25 (!) 139/57 100 %   08/15/21 2206  63      08/15/21 2011 97.6 °F (36.4 °C) 70 20 (!) 155/64 98 %   08/15/21 2000  68      08/15/21 1620 98.3 °F (36.8 °C) 72 27 (!) 151/45 97 %   08/15/21 1438  71      08/15/21 1400 97.6 °F (36.4 °C) 79 26 (!) 149/65 97 %     General:  no acute distress, mild lethargy,ill appearing  HEENT: normocephalic , EOMI  Neck: Supple,no mass palpable  Lungs : diminished breath sounds R>L   CVS: IRREGULAR, S1 S2 normal, No murmur   Abdomen: Soft, NT, BS +  Extremities: TRACE Edema     Data Reviewed:         No results for input(s): WBC, HGB, HCT, PLT, HGBEXT, HCTEXT, PLTEXT, HGBEXT, HCTEXT, PLTEXT in the last 72 hours.        Recent Labs     08/15/21  0218 08/14/21  0044    138   K 4.5 4.7    105   CO2 32 27   * 90   BUN 55* 64*   CREA 2.21* 2.42*   CA 8.7 8.5   MG 1.9 1.9   PHOS 3.7 3.4   ALB 2.2* 2.2*       Lymph node biopsy:  Lymph Node, Left Hilar, EBUS- guided Fine needle aspiration and cell   block:     Small cell carcinoma with tumor necrosis   Tumor is present on smears and within cell blocks 1A and 1B.  Immunostains   were performed on the tumor in cell block 1A with the following results:     Pancytokeratin positive (dot-like cytoplasmic staining)   Chromogranin positive (focal cytoplasmic staining)   Synaptophysin positive (cytoplasmic staining)   CD 56 positive (diffuse strong cytoplasmic staining)   TTF1 positive (nuclear staining)       CHRONIC MEDICAL DIAGNOSES:  Problem List as of 8/16/2021 Date Reviewed: 7/26/2021        Codes Class Noted - Resolved    Moderate protein-calorie malnutrition ([facility] [address] ICD-10-CM: E44.0  ICD-9-CM: 263.0  8/10/2021 - Present        Small cell lung cancer (HCC) ICD-10-CM: C34.90  ICD-9-CM: 162.9  8/6/2021 - Present        Pleural effusion, left ICD-10-CM: J90  ICD-9-CM: 511.9  8/3/2021 - Present        Atrial fibrillation with rapid ventricular response (HCC) ICD-10-CM: I48.91  ICD-9-CM: 427.31 7/29/2021 - Present        Cough with hemoptysis ICD-10-CM: R04.2  ICD-9-CM: 786.39  7/26/2021 - Present        Dementia without behavioral disturbance (HCC) ICD-10-CM: F03.90  ICD-9-CM: 294.20  7/26/2021 - Present        Cigarette nicotine dependence without complication [facility]-13-ES: J24.249  ICD-9-CM: 305.1  5/26/2021 - Present        Shortness of breath ICD-10-CM: R06.02  ICD-9-CM: 786.05  5/26/2021 - Present        Pleuritic chest pain ICD-10-CM: R07.81  ICD-9-CM: 786.52  5/26/2021 - Present        B12 deficiency ICD-10-CM: E53.8  ICD-9-CM: 266.2  2/27/2021 - Present        Severe obesity ([facility] [address] ICD-10-CM: E66.01  ICD-9-CM: 278.01  11/25/2020 - Present        Essential hypertension ICD-10-CM: I10  ICD-9-CM: 401.9  11/18/2020 - Present        Hyperlipidemia LDL goal <70 ICD-10-CM: E78.5  ICD-9-CM: 272.4  11/18/2020 - Present        H/O ascites ICD-10-CM: Z87.898  ICD-9-CM: V13.89  11/18/2020 - Present        Cerebrovascular accident (CVA) ([facility] [address] ICD-10-CM: I63.9  ICD-9-CM: 434.91  11/18/2020 - Present        Weakness generalized ICD-10-CM: R53.1  ICD-9-CM: 780.79  11/18/2020 - Present        RESOLVED: Pelvic mass ICD-10-CM: R19.00  ICD-9-CM: 789.30  11/18/2020 - 5/26/2021        RESOLVED: Cellulitis of right lower extremity ICD-10-CM: L03.115  ICD-9-CM: 682.6  11/18/2020 - 5/26/2021              Greater than 30 minutes were spent with the patient on counseling and coordination of care    Signed:   Mesfin Sloan MD  8/16/2021  10:30 AM   .

## 2021-08-17 NOTE — PROGRESS NOTES
No transition of care outreach indicated due to patient discharge to hospice care, [facility]

## 2021-08-18 NOTE — HOSPICE
Pt was seen in semi-fowlers position in a hospital bed which is in her bedroom of the home she shares with her daughter/primary caregiver Chelsy Mathis. Chelsy Mathis reports that pt moved in with her last November, when pt's physician in [place] stated that pt was no longer able to live independently. 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 Her BUN and Creatinine levels are elevated, and her albumin is decreased indicating kidney impairment daughter, Chelsy Mathis, had no questions.   Stated that she would call Hospice 24/7 for questions, concerns, needs, and change in pt condition

## 2021-08-25 NOTE — HOSPICE
NEW MEDICATION INITIATION DOCUMENTATION:  Metronidazole 250 mg PO TID for three days  Consulted AT MD to report change in patient status: YES  Obtained Order from Provider for initiation of Symptom relief medication / other medication needed:YES   Documentation completed in Telephone/Visit Note in [address] [facility]  Reason medication is being initiated: Rash on upper back, buttocks  MD / Provider name consulted re: change in status / initiation of new medication:Shamir Garcia  New Symptom(s): irregularly bordered rash on upper back and buttocks  New Order(s): Metronidazole 250 mg PO TID for three days  Name of person being taught: alfredito Adame  Instructions given: YES; administer one tablet thee times a day for three days  Side Effects taught:YES; GI distress  Response to teaching: Nathan Purcellkeley verbalized understanding    Pt was seen in hospital bed in the home that she shares with her daughter/primary caregiver, Nathan Adame. Pt moved in last November when pt's primary care physician stated that pt was no longer able to live independently. Pt's appetite has shown some improvement. Her oxygen saturation via pulse oxymetry measured 95%. Bilateral lung bases were diminished upon ausculation, and rhonchi was audible in pt's left lung. Pt has developed an itchy, irregularly bordered red rash on her upper back and buttocks. Skin on pt's buttocks previously was very dry; barrier creams were being applied. NP Shamir Garcia ws notified, and a three day course of TID Metronidazole 250 mg was ordered. Nathan Purcellkeley educated about potential side effects including GI distress (pt was recently experiencing loose stool, but this has resolved). Pt reports itching from rash, and evidences pain only with repositioning in bed. She otherwise denies discomfort or pain.   She was more alert and conversant during the nursing visit; was able to tell writer that where she was born, and how old she was when she immigrated to the [place] (22), though her memory appears significantly impacted by dementia.  Amna Jaime verbalized no questions or concerns, and confirmed awareness that she could phone Hospice 24/7 for questions, concerns, needs and change in pt status

## 2021-08-25 NOTE — HOSPICE
Pt was seen in a hospital bed in the bedroom of the home that pt shares with her daughter/primary caregiver. Pt moved back to [place] in November when her primary care physician stated that she was no longer able to live independently. Pt has not been able to actively participate in the interview; she was drowsy and did not reliably Shadi questions posed to her. Pt is tolerating the indwelling catheter; loose stool has stopped. Pt has had COPD for a significant period of time and evidences pursed lip breathing at times (has been smoking since the age of 13 or 12). PO intake is inconsistent. Bowel sounds are positive, and her abdomen is soft.   Josué Veras expressed no questions or concerns, and stated her intention to notify Hospice 24/7 for concerns, questions, needs, and change in pt status

## 2021-08-25 NOTE — HOSPICE
Virtual Initial SW Assessment completed on today's date with pt and daughter, Jonathan Connolly per their request rather than a in person visit. Pt able to answer basic question and presented as alert and oriented x 2. Daughter reports that pt is forgetful at times which pt endorses. Pt also tires easily. Pt resides with her daughter, Jonathan Connolly who serves as her caregiver. Pt accepting of her prognosis and reports goal of avoiding any further hospitalizations and desire to be at home with family and for comfort measures. Jonathan Connolly also presents as accepting; low bereavement risk. Hospice Medicare benefit, hospice philosophy, goals, levels of care, ancillary services, triage process and role of SW reviewed. Plan for Next 2 Weeks: SW visit in September to assess for needs. Problem: Pt has not completed pre-planning for final arrangements or made her wishes known. Community Resources: None at this time. MSW to provide resources on local  homes, cremation providers and body donation. Advance Directive: None at this time. MSW to discuss with pt and provide paperwork/forms. DNR: Pt is a DNR  Final Arrangements: Not completed at this time.

## 2021-08-27 NOTE — HOSPICE
Patient recieved lying bed awake, just completed lunch, smiling, watching TV. reports she just cannot get comfortable in bed, and c/o her buttocks burning. Katia Delvalle reports even with repositioning patient just cannot get comfortable and c/o butt pain and it is difficult for her to tolerate laying on her sides to reduce discomfort. Denies SOB during this visit. Skin cool to otuch, very dry, +1 edema to bilateral foot/ankle, elevation encouraged with pillow as tolerated. Denies falls. Abd soft, with + BS x4 quads, BM this AM. Nurse encouraged continued use of barrier cream to buttocks for irritaed skin on buttocks, massages, pillow to help with repositioning, and use of analgesic as ordered. During medication review, Katia Delvalle advised on the use of analgesic medication and symptom relief kit medication as symptoms as needed  for anxiety/restlessness during period in which patient is unable to maintain comfort in bed. Katia Delvalle verbalized understanding in use of medications. Denies need for refill of personal care supplies. Refill initiated to [facility] for Norvasc and Amidarone. Patiient and daughter Katia Delvalle appreciative of visit.

## 2021-08-27 NOTE — HOSPICE
Pt has red area under right breast, left arm, vaginal area spread to buttocks. Did not apply any cream to area. Spoke to CM about skin issue spreading.   Notified nurse [address]
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
## 2021-09-01 NOTE — HOSPICE
Late entry for 8/30/2021:  Pt was received in a hospital bed in her room of the home that she shares with her daughter, Shameka Culver. One of [address] adult daughters and a son-in-law share the residence, but are not often actively involved in direct patient care. Pt was bright and engaged in interview. Shameka Culver reports that pt had several episodes of retching/dry heaves, and evidenced some hemoptysis. Her appetite is variable; pt enjoys fruit and sometimes is able to eat eggs and sausage, but at other times eats liquids such as soup. Pt's memory is variable, and Shameka Culver expressed concern that she found pt sitting on the side of her bed x 2. Writer reviewed safety concerns with pt, who promised that she would not attempt to get out of bed without assistance. No questions or concerns were verbalized.   Shameka Culver expressed intent to notify Hospice 24/7 for questions, concerns, needs, and/or change of pt condition

## 2021-09-07 NOTE — HOSPICE
Patient found lying in bed watching television. Patient will turn down the volume and respond to questions. Patient denies c/o pain or SOB. Patient speaks with pursed lipped breathing and is reminded to apply her 02. Patient's daughter Sheyla Mora reports patient had a hard time sleeping last night, was found sitting up on the side of the bed. Sheyla Mora states her mom reported not feeling well but was unable elaborate. Sheyla Mora states she administered Dilaudid 1mg and her mom slept through the night. Sheyla Spencerroxie reports patient's rash to her buttocks has greatly improved and antifungal is applied daily. She reports while emptying patient's farnsworth bag yesterday she noticed her mom's urine had a foul odor. Brandyarline Spencerroxie states the odor was not present today and patient denies pain or burning with urination. Brandyarline Abby states the farnsworth bag is emptied once daily. Plan is to change the drainage bag next visit. Sheyla Mora reports her mom's last bowel movement was about 3 days ago. Plan is to start Miralax as ordered since patient drinks about 4 tall cups of water daily. Patient also has intermittent n/v, Fayemontana Mora will administer Compazine 10mg every six hours as needed.

## 2021-09-13 NOTE — TELEPHONE ENCOUNTER
Patient's daughter Farhana Boo- stated she needs the notes for a test done/pulmonary lab-[facility] in a written form in order for insurance to pay on this.  Ms Kwadwo Still phone: [phone]

## 2021-09-14 NOTE — HOSPICE
Patient recieved in bed awake, watching TV, with HOB elevated. Patient denies pain and SOB, noted with pursed lip breathing, shallow inspiraitons. Ruddy(daughter) reports that patient will sometimes have a frowned face or rubs at chest/ rib cage area, but denies that she is in pain or have fiddiculty breathing. Linda Carlos administers Oxy which is effective in relieving non-verbal symptoms of discomfort. Linda Carlos also reports patient had nausea/emesis x1 on Saturday, antiemetic given and was effective, patient did not consume breakfast that day but ate lunch. Encouraged Linda Carlos to use Dilaudid and Lorazepam as needed to aide in managemnt of acute symptoms. Patient's rash to her buttocks with great improvement with use of  antifungal that is applied daily. Josefajennifer Terrance would like more information/assist in preparing for her mothers caregiver needs in the near future. As she is the primary caregiver, she's worried about who is going to provide the care for her mom when she returns to work. She's currently working from home right now and she knows that very soon the office may be asking her to come back in and she does have a bit of anxiety/worry about how all of that is going to work out. Referral sent to  and  for follow up on future care needs. No medication/personal care supplies needed. No further care needs expressed.

## 2021-09-22 NOTE — HOSPICE
Late entry for 9/17/2021  Pt was seen in her hospital bed in the bedroom of the home that pt shares with her daughter Tera Briceno. Pt has been experiencing intermittent nausea and vomiting, though she is not consistently able to recall this. Use of Compazine reviewed with pts daughter Tera Briceno, and reordered. Pt was found with her nasal canula for oxygen in her hand; Tera Briceno states that she frequently needs to remind her mother to keep nasal canula in place. Pts oxygen saturation measured via pulse oxymetry was 93% on room air. Lungs are diminished, especially in the bases; air movement not audible on her left lower base. The rash on pts buttocks and back are significantly diminished. She remains at elevated risk for skin integrity issues secondary to pts primary hospice diagnosis, disease progression, generalized weakness, and bedbound status. Tera Briceno reports that pt \"has a good day, then she has a bad day;\" was educated that this pattern will continue, and that pt will have more \"bad days\" as her disease progresses. Tera Briceno stated that she has an appointment  with  Arina Kim to discuss re-submitting [employer] to see if there are more benefits available to pt, as Tera Briceno is concerned about providing care for pt if Tera Briceno is mandated back to the office vs. work from home. Ahmet # [id number] Compazine and Hydrocodone ordered.   Tera Briceno had no other questions or concerns; stated intent to notify Hospice 24/7 for questions, concerns, needs, or change in pt status

## 2021-09-29 NOTE — HOSPICE
Late entry for 9/23/2021  Pt was seen in the hospital bed of the bedroom in her daughter/primary caregiver Ruddy's  home. Pt and Onel Eastman reported that pt has been having increased difficulty with breathing; respirations were 22; oxygen saturation measured 97% on 2 l/nc. Job Most reported that pt has \"good days and bad days;\" a bad day is when pt experiences difficulty breathing, restlessness, and nausea (occasionally vomiting). Pt was able to eat eggs for breakfast this morning. Symptom Relief Kit medications have been reviewed with Onel Eastman. Disease progression was also reviewed with Onel Eastman, as well as progression towards end of life.   No questions or needs were expressed; Onel Eastman stated her intention to notify Hospice 24/7 for questions, concerns, needs, or change in pt status

## 2021-09-29 NOTE — HOSPICE
late entry for 9/9/2021  Pt was seen in a hospital bed in the bedroom of the home that pt shares with her daughter/primary caregiver, Ingrid Vasquez. Pt repotted feeling overall poorly; she kept removing her oxygen administered via nasal canula (85% on room air, 95 % on 2l/nc). Urine visualized was cloudy with some sediment. Writer contacted Nurse Practitioner Shamir Garcia who prescribed Keflex 500 mg PO BID. Order was confined at [facility] # [id number] at  [address] [place] [address]. Medication administration orders, and side effects reviewed with Ingrid Vasquez. Writer confirmed that medication had been received.   No other questions or concerns were raised by Ingrid Vasquez, who promised to notify Hospice 24/7 for questions, concerns, needs, or change of pt status    NEW MEDICATION INITIATION DOCUMENTATION: Cefalexin  Consulted AT MD to report change in patient status: YES  Obtained Order from Provider for initiation of Symptom relief medication / other medication needed:YES   Documentation completed in Telephone/Visit Note in [facility]  Reason medication is being initiated: cloudy urine with sediment  MD / Provider name consulted re: change in status / initiation of new medication:NP Shamir Garcia  New Symptom(s): cloudy urine with sediment  New Order(s): Cefalexin (keflex) 500 mg PO BID  Name of person being taught: daughter Bryanna Quiroz  Instructions given: YES  500 mg PO BID  Side Effects taught:YES GI distress  Response to teaching: verbalized understanding

## 2021-09-30 NOTE — HOSPICE
Upon arrival for visit patient observed lying in bed awake watching TV, daughter Chester Tellez present at bedside. Patient denies pain and SOB, patient observed with pursed lip breathing, oxygen saturation 94% on oxygen via NC. Skin W/D to touch. Daughter- Chester Tellez reports medicating patient with Hydromorphone for c/o Left knee pain  in the last 24 hours. Patient denies complaints of knee pain. Daughter further reports administering Compazine for patient episodes of indigestion, nausea and retching without emesis. Daughter educated to ensure patient takes medication with cracker or meal to reduces gastric discomfort in taking medication on a empty stomach. Collaboration of care completed via phone with Dr. Jalyn Langford regarding GI distress, new orders initiated for Omeprazole 20 mg tab, 1 tablet by mouth daily in the AM on a empty stomach. Chester Tellez verbalized understanding of medication education of Omeprazole. New Rx called into [phone] [facility] store# [id number]. No further care or supplies items needed at this time.     NEW MEDICATION INITIATION DOCUMENTATION:  Sonya Mclaughlin MD to report change in patient status: YES  Obtained Order from Provider for initiation of  medication needed:YES   Documentation completed in Telephone/Visit Note in [facility]  Reason medication is being initiated:retching, indigestion  MD / Provider name consulted re: change in status / initiation of new medication:YES  New Symptom(s): indigestion, retching without emesis  New Order(s): Omeprazole 20 mg tab PO QD in Am on a empty stomach  Name of person being taught: Dorado Gift  Instructions given: YES  Side Effects taught:YES  Response to teaching: Verbalized understanding

## 2021-10-05 NOTE — HOSPICE
Ms Juan Urbina was in the bed watching TV when nurse arrived  She denied any pain or shortness of breath, Anabell Knowlese( pt daughter) reports pt has dementia and is not aware of when she is having pain. we discussed watching for nonverbal cues of pain and medicating appropriately.   SN assessment completed, farnsworth patent, reviewed hospice goal of care and calling with any questions or concerns  Anabell Bailey and pt w/o concerns at this time  no supplies or medication needs at this time

## 2021-10-06 NOTE — HOSPICE
Late entry for 9/28/2021  Pt was seen in a hospital bed in the bedroom of the home that she shares with her primary caregiver/daughter June Castañeda. Pt denied experiencing pain when assessed, but daughter June Castañeda reports that her mother is often restless, and that she will administer analgesia when pt evidences a significant amount of restlessness. Pt continues to evidence nausea and occasional vomiting; PO intake is inconsistent (adequate at times, absent at others). Albuterol and Compazine were reordered [facility]al[facility] Dejesus #[id number]). Pt continues to evidence pursed lip breathing consistent with COPD diagnosis; was slightly tachyonic at 22 respirations per minute. Oxygen saturation was 97% with 2 l/nasal canula oxygen. Neither pt nor June Castañeda voiced any questions or needs at the end of the assessment; she stated her intention to notify Hospice 24/7 for questions, concerns, needs, or change in pt status.

## 2021-10-11 NOTE — HOSPICE
Ms Ct Funes was in be watching TV when nurse arrive, pt denied any pain or shortness of breath. Jaime Lino reported pain had pain in her leg last week but has not complained of pain since. no signs of acute or apparent distress on assessment, oxygen on but not in pt nose SAT 98% RA. Pt able to self report sleeping pattern is off some night, stating she will go to sleep and will wake up and state awake for a couple of hours than go back to sleep.   no significant changes, issues or concerns reported by pt and Jaime Lino, encouraged calling hospice with questions, concerns or changes in pt status

## 2021-10-11 NOTE — HOSPICE
Late Note for 9/21/2021  Pt was seen lying in the hospital bed in the bedroom of the home that she shares with her daughter/primary caregiver Franklin Camacho. Pt was alert and engaged in interview;  Franklin Camacho reports that pt has been experiencing several days of poor sleep; she has a variable PO intake with occasional bouts of nausea and vomiting, which pt does not appear to recall after experiencing them. Franklin Camacho reported speaking with JACQUES Turcios about spend-down before exploring reapplying for Medicaid in order to access more supportive services for pt. Pt's left Mid Arm circumference has decreased 0.5 cm since 8/30/21. Pt evidenced faint crackles in the base of her left lung.   Neither Franklin Camacho nor pt verbalized any questions or concerns; Franklin Camacho stated intent to notify Hospice 24/7 for questions, concerns, needs, or change in pt status

## 2021-10-12 NOTE — PROCEDURES
[facility]  PULMONARY FUNCTION TEST    Name:  Brooke Bond  MR#:  [id number]  :  1940  ACCOUNT #:  [de-identified]  DATE OF SERVICE:  2021      INDICATION:  Shortness of breath. FINDINGS:  SPIROMETRY:  Reduced FVC, reduced FEV1, reduced FEV1/FVC ratio. DIFFUSION CAPACITY:  Reduced diffusion capacity that corrects when adjusted for alveolar volume. IMPRESSION:  Moderate expiratory airflow limitation based on spirometry with more severe reduction in mid expiratory flow rates suggestive of small airways disease. Lung volume measurements may be beneficial in further evaluation of restrictive ventilatory defect. Diffusion capacity is moderately reduced that corrects when adjusted for alveolar volume.       Kim Patel MD      [id number]  D:  10/12/2021 14:05  T:  10/12/2021 15:42  JOB #:  [id number]

## 2021-10-22 NOTE — HOSPICE
Patient found lying in bed drinking a cup of coffee. Patient denies c/o pain and 02 tubing is lying next to her. Patient states she does not always require oxygen. Daughter María Elena Chin reports patient has bounced back and is having a better day today. María Elena Chin reports patient has not had any seizure activity, no c/o N/V, but patient has c/o intermittent abdominal pain when coughing. María Elena Chin reports patient receives Oxycodone 5mg and Dilaudid 1mg about 1-2 times daily. Caldwell patent with about 200ml clear yellow urine. Patient is incontinent of a medium bowel movement. Care provided by this nurse, barrier cream applied to red areas on patient's buttocks. Patient's breathing becomes slightly labored after she's turned and repositioned. This nurse encourages María Elena Chin to administer one Duoneb breathing tx which she does at this time. Patient appears to recover quickly with rest. This nurse encourages María Elena Chin to contact [phone] [employer] main number anytime with questions or concerns.  CM notified   wipes

## 2021-10-23 NOTE — HOSPICE
Pt's daughter Mehrdad Zamora phoned Hospice Triage staff after pt was found sitting in a chair adjacent to her hospital bed. Pt was unable to explain how or why she got out of bed. She called for her daughter, then had an episode where she tilted her bead back, was described as shaking, and was unresponsive for a brief period of time. Mehrdad Zamora was able to get pt back into bed without injury to pt, and notified Hospice. Pt was received in bed when writer arrived. Pt evident an episode of vomiting, but this is a frequent event. Writer replaced pt's indwelling catheter using sterile technique; Nurse Practitioner Mago July was contacted and ordered prophylactic Cipro to address increased potential for Urinary Tract Infection secondary to traumatic removal and replacement of indwelling urinary catheter. Additionally, [facility] will deliver three syringes of Lorazepam 2 mg to be administered in case of further seizure activity. Duo Nebs were ordered, and use of them in newly delivered nebulizer was reviewed. Writer made a repeat visit to pt's home later in the afternoon to confirm delivery of Cipro and Duo Nebs; [facility] stated that prefilled syringes of Lorazepam will be delivered 10/22/21. Disease progression discussed with Mehrdad Zamora, who was somewhat shaken by this episode, stating that \"I thought she would have a slow decline; I didn't expect this. \"  Writer provided support to Mehrdad Zamora, confirmed that steps and care provided to pt in the moment were entirely appropriate and in the best interest of the pt.  Mehrdad Zamora verbalized no questions at the end of the nursing visit; Mehrdad Zamora verbalized intent to notify Hospice 24/7 for questions, concerns, needs, and change in pt status    NEW MEDICATION INITIATION DOCUMENTATION: Albuterol/Ipratropium  Consulted AT MD to report change in patient status: YES  Obtained Order from Provider for initiation of Symptom relief medication / other medication needed:YES   Documentation completed in Telephone/Visit Note in [facility]  Reason medication is being initiated: increasing dyspnea  MD / Provider name consulted re: change in status / initiation of new medication:Shamir Garcia  New Symptom(s): increasing dyspnea  New Order(s): Albuterol/Ipratropium 3 ml amebule nebulized every 4 hours as needed for dyspnea  Name of person being taught: Alberto Staff  Instructions given: YES; administer one ampule every four hours, nebulized, as needed for dyspnea  Side Effects taught:YES increased heart rate, complaints of jitteriness  Response to teaching: verbalized understanding    NEW MEDICATION INITIATION DOCUMENTATION:  Cipro  Consulted AT MD to report change in patient status: YES  Obtained Order from Provider for initiation of Symptom relief medication / other medication needed:YES   Documentation completed in Telephone/Visit Note in [facility]  Reason medication is being initiated:traumatic removal of indwelling urinary catheter  MD / Provider name consulted re: change in status / initiation of new medication:Shamir Garcia NP  New Symptom(s): pt removed her indwelling urinary catheter climbing out of bed  New Order(s): 500 mg Cipro BID PO for five days  Name of person being taught: Alberto Staff  Instructions given: YES; give one tablet in the morning, and one tablet in the evening for five days  Side Effects taught:YES; GI distress, dizziness, headache  Response to teaching: verbalized understanding    NEW MEDICATION INITIATION DOCUMENTATION: Lorazepam  Consulted AT MD to report change in patient status: YES  Obtained Order from Provider for initiation of Symptom relief medication / other medication needed:YES   Documentation completed in Telephone/Visit Note in [facility]  Reason medication is being initiated:possible seizure activity  MD / Provider name consulted re: change in status / initiation of new medication:Shamir Garcia  New Symptom(s): possible seizure activity  New Order(s): administer one 2 mg syringe every five minutes up to three times for perceived seizure activity; 2mg/1ml lorazpeam in prefilled syringe  Name of person being taught: Inez Gutierrez  Instructions given: YES  Side Effects taught:YES  Response to teaching: drowsiness, increased fatigue, dizziness, decreased respirator rate

## 2021-11-01 NOTE — HOSPICE
late entry for 1/20/2021  Pt was seen in the bedroom of the home that she shares with her daughter, Tyler Campos. Indwelling catheter was changed using appropriate technique and a #14 Caldwell catheter which was then secured to pt's right thigh with a soft strap secured with Velcro; tolerated procedure well and had no complaints of discomfort. Clear yellow urine was visualized in tubing and urinary drainage bag.   Pt was at baseline mentation; neither she nor Tyler Campos had questions or requests at the end of the nursing visit; Tyler Campos stated intent to notify Hospice 24/7 for questions, concerns, or change of pt status

## 2021-11-02 NOTE — HOSPICE
Caregiver reports pt with liquid/mucus diarrhea on Saturday,Miralax held, and  patient was given Imodium which was effective, no BM since then. Caregiver encouraged to resume Miralax as ordered, encourage hydration as tolerated, provide patient with fruit that has skin for additional fiber (apples, pears, prune/dates, apricots). Caregiver further educated to administer suppository if no BM results today. Rectal exam performed by nurse, no stool felt in rectal vault. Jorge Castle verbalized understanding of education. Reiterated to call Hospice for further concern, care needs.

## 2021-11-02 NOTE — HOSPICE
SW Phone call to Khari France to schedule a SW visit. Visit for October declined citing no needs. Pt is experiencing functional decline but Khari France reports getting good education about disease progression from [facility]. SW needs denied. MSW to follow up in November.

## 2021-11-04 NOTE — HOSPICE
late entry for 10/15/2021  Pt was seen in the bedroom of the home that she shares with her daughter, Ivette Wallis. Pt has been experiencing increased dyspnea; Albuterol/Ipratropium nebulizer ordered from [employer]); nebulizer from [facility] # [id number].   Supplies ordered from [facility] [employer] # [id number]  NEW MEDICATION INITIATION DOCUMENTATION:  Consulted AT MD to report change in patient status: YES  Obtained Order from Provider for initiation of Symptom relief medication / other medication needed:YES   Documentation completed in Telephone/Visit Note in [facility]  Reason medication is being initiated:increasing dyspnea  MD / Provider name consulted re: change in status / initiation of new medication:Shamir Garcia  New Symptom(s): increasing dyspnea  New Order(s): Dyspnea  Name of person being taught: Ivette Wallis (daughter)  Instructions given: Administer via nebulization every four hours as needed for difficulty breathing  Side Effects taught: increased heart rate, feelings of jitteriness, dry mouth  Response to teaching: verbalized understanding      Ivette Wallis had no questions at the end of the nursing visit; stated intention to notify Hospice for questions, concenrns, needs, and change of pt status

## 2021-11-08 NOTE — HOSPICE
late entry for 11/4/2021  Pt was seen in the bedroom of the home that she shares with her daughter, Jorge Castle. Pt had a #16 indwelling catheter placed earlier; no urine is visible in the urinary drainage bag. Pt's abdomen was soft, and pt reported no complaints of discomfort. Writer withdrew 10 cc urine from catheter balloon, manipulated catheter and re-instilled; catheter was then flushed with sterile urine. Yellow urine with some sediment was visualized. By the end of the nursing visit, pt had produced 600 cc of urine. Jorge Castle reported that pt had complained of left popliteal pain earlier, but pt had no complaints at time of visit. Pt's lower extremities evidence  decreased pedal pulses at baseline.   Jorge Castle verbalized no question at the end of the nursing visit; stated intent to notify Hospice 24/7 for questions, concerns, needs, or change in pt status

## 2021-11-08 NOTE — HOSPICE
Patient in bed upon arrival with c/o lower abdominal pian, stating \"I have to pee but it wont come out\". Daughter reports removed catheter this AM, only 75mL urine in drainage collection bag. Nurse observed farnsworth drainage bag in the trash can. Patient with lower abdominal distension and tenderness upon palpation, patient urinated yellow urine after abdominal palpation. A new 16 FR 10mL indwelling catheter inserted under sterile technique, patient tolerated procedure well. 600mL of yellow urine output at the end of skilled nurse visit, and improved discomfort of lower abdominal region. Daughter Dunia Zuluaga re-educated to call Hospice if patient should experience a decrease urinary output or abdominal discomfort again.

## 2021-11-15 NOTE — HOSPICE
Upon arrival patient in bed, awake A/O to person, place, and watching TV. daughter Jorge Castle reports patient with c/o generalized pain and buttocks pain, which is managed effectively with current regimen and barrier cream. Jorge Castle re-educated upon turning and repositioning with offloading of buttocks with pillows. Medication refills not needed, personal care supplies ordered via [email or web address]. No further care needs expressed. Appreciative of visit.

## 2021-11-21 NOTE — HOSPICE
late entry for 11/18/2021  Pt was seen in the bedroom of the home that she shares with her daughter Sylwia Harding, and other family members. Pt has a ne onset lacelike rash developing on her back and lower abdomen; antifungal ointment applied after pt was bathed. Pt is also reported to have had nothing but Mandarin oranges to eat for the last 48 hours. She was unable to specify, but repotted that she was not feeling well, overall. Pt evidenced some crackles in her left lung; no other significant changes noted.   Neither pt nor Sylwia Harding verbalized questions at the end of the nursing visit; they stated intention notify Hospice 24-7 for questions, concerns, needs, or change in pt status

## 2021-11-21 NOTE — HOSPICE
late entry for 11/18/2021  Pt was seen in the bedroom of the home that she shares with her daughter Kriss Green and other family members. Pt was bathed, catheter care performed; lotions and barrier creams applied to pt's skin. She declined to have a shampoo, or to wear socks or have her lower extremities dressed.   Pt and daughter Samanta Sam verbalized satisfaction with bath; stated that they would notify Hospice 24/7 for questions, concerns, needs, or a change in pt status

## 2021-11-21 NOTE — HOSPICE
Recertification   Pt was seen in the bedroom of the home that she shares with her daughter/primary caregiver César Rolon. Pt denied experiencing pain at time of assessment; Javier Cotamitt pt according to orders when pt complains of restlessness, as pt does not always reliably report pain. Pt continues to experience intermittent nausea and vomiting. Pt's right pedal pulse is decreased from pt's left; this is a consistent (baseline) finding. Pt continues to live a bedbound existence; PPS remains 30%. She is consistently oriented to person and place, but not always to situation. Pt had one seizure like event during this hospice episode. Her PO intake is poor, and is inconsistent; intermittent nausea and vomiting occurs. Urinary catheter is patent, though pt was placed on antibiotics for a Urinary Tract Infection during the last Hospice period. She hs experienced intermittent constipation which has required the use of Bisacodyl suppositories to remedy. PT continues to require the use of nasal canula oxygen. She continues to evidence generalis decline. Neither pt nor Manuel Maciel voiced any questions at the end of the nursing visit. 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 It must be accompanied by narrative documentation. These are guidelines only: clinical judgement is required in each case. Construct a narrative from the information on this worksheet and information from the patient's physician and record on back. The patient should be re-evaluated at specific intervals set by the interdisciplinary team as well as at any time that the patient may be clinically stabilizing. This form may be used for initial and subsequent re-evaluation.       Pt. Name: ________Faith Tha__________________________ ID#: _____[id number]______________ Date: ______11/11/2021________    Patients will be considered to be in the terminal stage of dementia (life expectancy of six months or less) if they meet the following criteria. Patients with dementia should show all the following characteristics:    ________  1. Stage seven or beyond according to the Functional Assessment Staging Scale;  ________  2. Unable to ambulate without assistance;  ________  3. Unable to dress without assistance;  ________  4. Unable to bathe without assistance;  ________  5. Urinary and fecal incontinence, intermittent or constant;  ________  6. No consistently meaningful verbal communication: stereotypical phrases only or the ability             to speak is limited to six or fewer intelligible words. Patients should have had one of the following within the past 12 months:    ________  1. Aspiration pneumonia;  ________  2. Pyelonephritis or other upper urinary tract infection;  ________  3. Septicemia;  ________  4. Decubitus ulcers, multiple, stage 3-4;  ________  5. Fever, recurrent after antibiotics;  ________  6. Inability to maintain sufficient fluid and calorie intake with 10% weight loss during the            previous six months or serum albumin Note: This section is specific for Alzheimer's Disease            and related disorders, and is not appropriate for other types of dementia, such as multi-           infarct dementia. WORKSHEET FOR DETERMINING PROGNOSIS  CANCER DIAGNOSIS  The purpose of this worksheet is to guide initial and recertification assessments. It must be accompanied by narrative documentation. These are guidelines only: clinical judgement is required in each case. Construct a narrative from the information on this worksheet and information from the patient's physician and record on back.  The patient should be re-evaluated at specific intervals set by the interdisciplinary team as well as at any time that the patient may be clinically stabilizing. This form may be used for initial and subsequent re-evaluation. Pt. Name: ______Faith Almazan____________________________ ID#: _____[id number]____________ Date: _______11/11/2021_______    Cancer Diagnoses     ____x____  A. Disease with distant metastases at presentation OR    ________  B. Progression from an earlier stage of disease to metastatic disease with either:                          ________  1. continued decline in spite of therapy                          ________  2. patient declines further disease directed therapy    Note: Certain cancers with poor prognoses (e.g. small cell lung cancer, brain cancer and pancreatic cancer) may be hospice eligible without fulfilling the other criteria in this section. WORKSHEET FOR DETERMINING PROGNOSIS  GENERAL  Applicable for all patients - Use in conjunction with Disease-Specific Guidelines     The purpose of this worksheet is to guide initial and recertification assessments. It must be accompanied by narrative documentation. These are guidelines only: clinical judgement is required in each case. Construct a narrative from the information on this worksheet and information from the patient's physician and record on back. The patient should be re-evaluated at specific intervals set by the interdisciplinary team as well as at any time that the patient may be clinically stabilizing. This form may be used for initial and subsequent re-evaluation. 6/9/  Pt. Name: _____Faith Almazan_____________ ID#: ____[id number]________ Date: 11/11/2021_________  patient will be considered to have a life expectancy of six months or less if he/she meets the non-disease specific decline in clinical status guidelines described in Part I.  Alternatively, the baseline non-disease specific guidelines described in Part II plus the applicable disease specific guidelines listed in the appendix will establish the necessary expectancy. Part I. Decline in clinical status guidelines     Patients will be considered to have a life expectancy of six months or less if there is documented evidence of decline in clinical status based on the guidelines listed below. Other clinical variables not on this list may support a six-month or less life expectancy. These should be documented in the clinical record. 1. Progression of disease as documented by worsening clinical status, symptoms, signs and laboratory results    A. Clinical Status  ________  1) Recurrent or intractable infections such as pneumonia, sepsis or upper urinary tract. 2) Progressive inanition as documented by:                        ____x____  a) Weight loss not due to reversible causes such as depression or use of diuretics                      ________  b) Decreasing anthropomorphic measurements (mid-arm circumference,                                                              abdominal girth), not due to reversible causes such as depression or use of                                                                diuretics                      ________  c) Decreasing serum albumin or cholesterol  ________  3) Dysphagia leading to recurrent aspiration and/or inadequate oral intake documented by                           decreasing food portion consumption. B. Symptoms  ____x____  1) Dyspnea with increasing respiratory rate  ________  2) Cough, intractable   ________  3) Nausea/vomiting poorly responsive to treatment  _____x___  4) Diarrhea, intractable  ________  5) Pain requiring increasing doses of major analgesics more than briefly.     C. Signs  ________  1) Decline in systolic blood pressure to below 90 or progressive postural hypotension  ________  2) Ascites  ________  3) Venous, arterial or lymphatic obstruction due to local progression or metastatic disease  ________  4) Edema  ________  5) Pleural / pericardial effusion  ____x____  6) Weakness  ________  7) Change in level of consciousness    D. Laboratory (When available. Lab testing is not required to establish hospice eligibility.)  ________  1) Increasing pCO2 or decreasing pO2 or decreasing SaO2  ________  2) Increasing calcium, creatinine or liver function studies  ________  3) Increasing tumor markers (e.g. CEA, PSA)  ________  4) Progressively decreasing or increasing serum sodium or increasing serum potassium    2. Decline in Karnofsky Performance Status (KPS) or Palliative Performance Score (PPS) from <70% due to progression of disease. 3. Increasing emergency room visits, hospitalizations, or physician's visits related to hospice primary diagnosis    4. Progressive decline in Functional Assessment Staging (FAST) for dementia (from ? 7A on the FAST)    5. Progression to dependence on assistance with additional activities of daily living (See Part II, Section 2)    6. Progressive stage 3-4 pressure ulcers in spite of optimal care      Part II. Non-disease specific baseline guidelines   (both of these should be met)    ________  1. Physiologic impairment of functional status as demonstrated by Karnofsky Performance                           Status (KPS) or Palliative Performance Score (PPS) <70%. Note that two of the disease                            specific guidelines (HIV Disease, Stroke and Coma) establish a lower qualifying KPS or PPS. 2. Dependence on assistance for two or more activities of daily living (ADLs)  ________  A. Feeding  ____x___  B. Ambulation  ____x___  C. Continence  ________  D. Transfer  ____x____  E. Bathing  ____x___  F. Dressing    Additionally, disease specific guidelines should be used with these (Part II) baseline guidelines. The baseline guidelines do not independently qualify a patient for hospice coverage. Note:  The word should in the disease specific guidelines means that on medical review the guideline so identified will be given great weight in making a coverage determination. It does not mean, however, that meeting the guideline is obligatory. Part III. Co-morbidities   Although not the primary hospice diagnosis, the presence of disease such as the following, the severity of which is likely to contribute to a life expectancy of six months or less, should be considered in determining hospice eligibility.    ________  A. Chronic obstructive pulmonary disease  ________  B. Congestive heart failure  ____x____  C. Ischemic heart disease  ________  D. Diabetes mellitus  ________  E. Neurologic disease (CVA, ALS, MS, Parkinson's)  ________  F. Renal failure  ________  G. Liver Disease  ________  H. Neoplasia  ________  I. Acquired immune deficiency syndrome  ________  J.  Dementia

## 2021-11-23 NOTE — HOSPICE
New orders called into [facility] , Confirmation #: [id number] for delivery this eveing. Patient and daughter appreciative of visit. Encouraged to call hospice for further care needs.

## 2021-11-30 NOTE — HOSPICE
late entry for 11/26/2021  Pt was seen in the bedroom of the home that she shares with her daughter/caregiver Adis Giordano. Pt was pleasant and engaged during the nursing assessment,  Amish Mathews reported that pt's appetite had improved (pt had 96 hours where she would only eat mandarin oranges). Urinary output remains decreased; Amish Mathews encourages pt to drink more fluid. The possibility of kidney disjunction was discussed with Amish Mathews. [facility] order # [id number] (albuterol inhaler, amlodipine) ordered.   No questions were verbalized at the end of the nursing visit; Amish Mathews stated intent to notify Hospice 24/7 for questions, concerns, needs, or change of pt status

## 2021-12-01 NOTE — HOSPICE
Upon arrival patient in bed awake, alert, denies pain and SOB, oxygen not in use, noted with pursed lip breathing and dyspnea when [facility] lowered for ADL care. Patient encouraged to apply her oxygen. Daughter Samanta Sam reports patient removes it on occasion and then will reapply it. Samanta Sam reports no new concerns since last skilled nurse visit, no need for medication refill or personal care supplies. Patient and daughter reminded of Hospice availability 24 hours a day for needs. Both appreciative of visit.

## 2021-12-01 NOTE — HOSPICE
Upon arrival patient in bed awake, alert, denies pain and SOB, oxygen not in use, noted with pursed lip breathing and dyspnea when [facility] lowered for ADL care. Patient encouraged to put apply her oxygen. Daughter Fernando Rosario reports patient removes it on occasion and then will reapply it. Lianet Seth reports no new concerns since last skilled nurse visit. No need for medication refill or personal care supplies. Patient and daughter reminded of Hospice availability 24 hours a day for needs. Both appreciative of visit.

## 2021-12-03 NOTE — HOSPICE
late entry for 11/30/2021  Pt was seen in the bedroom of the home that she shares with her daughter/caregiver Rios Merchant. Pt continues to experience episodic nausea and vomiting, and her PO intake; she ate 1  \"good\" meal today according to [facility]. Arcadia reported that pt had been straining with bowel movements, and had one episode where  a small amount of bright red blood was visualized in her incontinence brief. Pt is now being administered Mirilax  every other day; no recurrence of blood has been noted. Ar[facility]ia reports that that pt continues to evidence a non productive cough; she also noted that irregularly bordered red spots on pt's upper extremities vary in color depending on the day. [facility] order # [id number] (nasal canula tubing, wipes, and Z-guard and antifungal ointment).   No questions were verbalized at the end of the nursing visit; Rios Merchant promised to notify Hospice 24/7 for questions, concerns, needs, or change of pt status

## 2021-12-14 NOTE — HOSPICE
Upon arrival patient in bed watching TV, denies pain and SOB. Noted N/C  oxygen not on her face. Daughter reports patient removes and reapplies her oxygen throughout the day. Patient states, \"Yeah I know when to put it on\", meausred oxygen saturation is 95% on RA. No needs expressed by patient. Daughter reports need for medication/personal care supply refills. Medication refill sent to [facility] for second day delivery with confirmation #: [id number]. Personal care supplies ordered via [employer] for standard delivery. No further care concerns expressed. Encouraged to call Hospice as need for care needs/concerns 24 hours daily.

## 2021-12-15 NOTE — HOSPICE
late entry for 12/9/2021  Pt was seen in the bedroom of the home that she shares with her daughter, Douglas Chance, and other family members. Pt was resting comfortably, and was able to participate in the visit. Saturnino Liriano reported that pt continues to expeience intermittent nausea and vomiting (e.g. yesterday, but not today). Her favorite food remains Mandarin oranges; other PO intake is inconsistent. Secondary to episodic constipation, Saturnino Liriano is putting Miralax in pt's coffee to increase risk   Saturnino Liriano stated that no date has been given for Return to Work, but states that placement pt will be required when Saturnino Liriano has to be in-person. Baseline right lower extremity pulse is diminished and lungs are diminished in the bases are present. No questions were voiced at the end of the nursing visit.   Pt stated intent to notify Hospice 24/7 for questions, concerns, needs, or change in pt status

## 2021-12-19 NOTE — HOSPICE
late entry for 12/16/2021  Pt was seen in the bedroom of the home that she shares with her daughter/caregiver Rosy Cruz and other family members. Pt stated that she felt \"ok, not great\" at time of assessment. Her daughter, Rosy Cruz, states that she has informed her that work has been mandated which necessitates pt being moved out of the home and into a care facility pending approval of Medicaid. Pt is eating only Mandarin oranges today. No questions verbalized at the end of the nursing visit.   Rosy Cruz stated intent to notify Hospice 24/7 for questions, concerns, needs, or change of pt status

## 2021-12-21 NOTE — HOSPICE
Upon arrival patient in bed awake watching TV, denies pain and SOB at this time. Noted wheezing slight expiratory wheezing on auscultation, oxygen saturation 98% on oxygen, nebulizer administered with effectiveness. Daughter-Caregiver Abdoulaye Tamayo reports patient experienced nausea/vomiting, increased sleepiness and diarrhea on Saturday through Sunday in which she treated with Compazine, Imodium, held Miralax, and provided Oxycodone for generalized c/o discomfort, which all were effective. Endorses poor oral intake related to GI distress, however today patient consuming oranges and coffee and increased alertness per daughter. Daughter and patient encouraged to consume warm soup broth, jello, toast/tea, mashed potato as tolerated for GI comfort. Continue to utilize compazine, loperamide PRN. Abdoulaye Tamayo verbalized understanding of education. Refill sent to [facility] for Loperamide with Confirmation #: [id number]. No personal care item refill needed at this time.

## 2021-12-27 NOTE — HOSPICE
late entry for 12/23/2021  ALISIA Barkley directly observed providing care to pt. Pt's indwelling urinary catheter was blocked and removed by writer. Pyridium 200 mg PO TID for two days ordered from NP [facility]. Pt reported feeling comfortable after catheter was removed. No questions verbalized at the end of the nursing visit. Pt's daughter Maynor Robin stated intent to notify Hospice 23/7 for questions, concerns, needs, or change of pt status.   Plan for indwelling  urinary catheter to be replaced 12/24/2021    NEW MEDICATION INITIATION DOCUMENTATION:  Consulted AT MD to report change in patient status: YES  Obtained Order from Provider for initiation of Symptom relief medication / other medication needed:YES   Documentation completed in Telephone/Visit Note in [facility]  Reason medication is being initiated:removal of indwelling urinary catheter  MD / Provider name consulted re: change in status / initiation of new medication:Shamir Garcia  New Symptom(s): indwelling urinary catheter blockage  New Order(s): Pyridium 200 mg PO TID for two days  Name of person being taught: Maynor Robin (daughter)  Instructions given: YES; take one tablet three times a day for two days  Side Effects taught:YES; urinary retention, back pain, confusion  Response to teaching: verbalized understanding

## 2022-01-01 ENCOUNTER — HOME CARE VISIT (OUTPATIENT)
Dept: SCHEDULING | Facility: HOME HEALTH | Age: 82
End: 2022-01-01
Payer: MEDICARE

## 2022-01-01 ENCOUNTER — HOME CARE VISIT (OUTPATIENT)
Dept: HOSPICE | Facility: HOSPICE | Age: 82
End: 2022-01-01
Payer: MEDICARE

## 2022-01-01 ENCOUNTER — HOSPITAL ENCOUNTER (INPATIENT)
Age: 82
LOS: 4 days | End: 2022-06-19
Attending: FAMILY MEDICINE | Admitting: FAMILY MEDICINE

## 2022-01-01 VITALS
SYSTOLIC BLOOD PRESSURE: 148 MMHG | TEMPERATURE: 98.2 F | DIASTOLIC BLOOD PRESSURE: 70 MMHG | OXYGEN SATURATION: 89 % | HEART RATE: 78 BPM

## 2022-01-01 VITALS
DIASTOLIC BLOOD PRESSURE: 68 MMHG | SYSTOLIC BLOOD PRESSURE: 139 MMHG | OXYGEN SATURATION: 93 % | HEART RATE: 78 BPM | RESPIRATION RATE: 20 BRPM | TEMPERATURE: 97.8 F

## 2022-01-01 VITALS
TEMPERATURE: 98.9 F | RESPIRATION RATE: 18 BRPM | HEART RATE: 74 BPM | SYSTOLIC BLOOD PRESSURE: 140 MMHG | DIASTOLIC BLOOD PRESSURE: 62 MMHG

## 2022-01-01 VITALS
TEMPERATURE: 97.7 F | SYSTOLIC BLOOD PRESSURE: 136 MMHG | HEART RATE: 86 BPM | RESPIRATION RATE: 20 BRPM | DIASTOLIC BLOOD PRESSURE: 58 MMHG | OXYGEN SATURATION: 91 %

## 2022-01-01 VITALS
HEART RATE: 79 BPM | RESPIRATION RATE: 14 BRPM | DIASTOLIC BLOOD PRESSURE: 73 MMHG | TEMPERATURE: 97.8 F | SYSTOLIC BLOOD PRESSURE: 140 MMHG

## 2022-01-01 VITALS
SYSTOLIC BLOOD PRESSURE: 115 MMHG | OXYGEN SATURATION: 95 % | DIASTOLIC BLOOD PRESSURE: 56 MMHG | TEMPERATURE: 97.3 F | HEART RATE: 69 BPM | RESPIRATION RATE: 22 BRPM

## 2022-01-01 VITALS
SYSTOLIC BLOOD PRESSURE: 140 MMHG | DIASTOLIC BLOOD PRESSURE: 66 MMHG | OXYGEN SATURATION: 95 % | HEART RATE: 82 BPM | RESPIRATION RATE: 20 BRPM | TEMPERATURE: 97.7 F

## 2022-01-01 VITALS
DIASTOLIC BLOOD PRESSURE: 71 MMHG | RESPIRATION RATE: 16 BRPM | TEMPERATURE: 98 F | OXYGEN SATURATION: 96 % | HEART RATE: 76 BPM | SYSTOLIC BLOOD PRESSURE: 141 MMHG

## 2022-01-01 VITALS
TEMPERATURE: 97.6 F | OXYGEN SATURATION: 98 % | RESPIRATION RATE: 20 BRPM | HEART RATE: 68 BPM | DIASTOLIC BLOOD PRESSURE: 46 MMHG | SYSTOLIC BLOOD PRESSURE: 102 MMHG

## 2022-01-01 VITALS
RESPIRATION RATE: 22 BRPM | HEART RATE: 67 BPM | HEART RATE: 68 BPM | SYSTOLIC BLOOD PRESSURE: 126 MMHG | HEART RATE: 80 BPM | TEMPERATURE: 98.7 F | TEMPERATURE: 97.9 F | TEMPERATURE: 98.4 F | OXYGEN SATURATION: 96 % | SYSTOLIC BLOOD PRESSURE: 108 MMHG | DIASTOLIC BLOOD PRESSURE: 58 MMHG | OXYGEN SATURATION: 96 % | DIASTOLIC BLOOD PRESSURE: 69 MMHG | OXYGEN SATURATION: 95 % | RESPIRATION RATE: 22 BRPM | RESPIRATION RATE: 22 BRPM | SYSTOLIC BLOOD PRESSURE: 139 MMHG | DIASTOLIC BLOOD PRESSURE: 68 MMHG

## 2022-01-01 VITALS
HEART RATE: 74 BPM | OXYGEN SATURATION: 93 % | RESPIRATION RATE: 20 BRPM | DIASTOLIC BLOOD PRESSURE: 60 MMHG | TEMPERATURE: 97.9 F | SYSTOLIC BLOOD PRESSURE: 138 MMHG

## 2022-01-01 VITALS
HEART RATE: 68 BPM | TEMPERATURE: 98.6 F | DIASTOLIC BLOOD PRESSURE: 58 MMHG | SYSTOLIC BLOOD PRESSURE: 128 MMHG | RESPIRATION RATE: 18 BRPM

## 2022-01-01 VITALS
DIASTOLIC BLOOD PRESSURE: 60 MMHG | HEART RATE: 78 BPM | RESPIRATION RATE: 20 BRPM | OXYGEN SATURATION: 95 % | SYSTOLIC BLOOD PRESSURE: 128 MMHG | TEMPERATURE: 99 F

## 2022-01-01 VITALS
RESPIRATION RATE: 20 BRPM | HEART RATE: 78 BPM | OXYGEN SATURATION: 96 % | SYSTOLIC BLOOD PRESSURE: 128 MMHG | TEMPERATURE: 97.8 F | DIASTOLIC BLOOD PRESSURE: 66 MMHG

## 2022-01-01 VITALS
TEMPERATURE: 98.8 F | OXYGEN SATURATION: 94 % | RESPIRATION RATE: 20 BRPM | DIASTOLIC BLOOD PRESSURE: 72 MMHG | SYSTOLIC BLOOD PRESSURE: 130 MMHG | HEART RATE: 84 BPM

## 2022-01-01 VITALS
OXYGEN SATURATION: 95 % | RESPIRATION RATE: 20 BRPM | DIASTOLIC BLOOD PRESSURE: 66 MMHG | SYSTOLIC BLOOD PRESSURE: 144 MMHG | HEART RATE: 82 BPM | TEMPERATURE: 99 F

## 2022-01-01 VITALS
RESPIRATION RATE: 20 BRPM | RESPIRATION RATE: 14 BRPM | TEMPERATURE: 98.2 F | DIASTOLIC BLOOD PRESSURE: 55 MMHG | HEART RATE: 80 BPM | SYSTOLIC BLOOD PRESSURE: 115 MMHG | SYSTOLIC BLOOD PRESSURE: 192 MMHG | DIASTOLIC BLOOD PRESSURE: 95 MMHG | OXYGEN SATURATION: 95 % | TEMPERATURE: 97.9 F | HEART RATE: 86 BPM

## 2022-01-01 VITALS
HEART RATE: 84 BPM | RESPIRATION RATE: 20 BRPM | TEMPERATURE: 99 F | SYSTOLIC BLOOD PRESSURE: 128 MMHG | DIASTOLIC BLOOD PRESSURE: 54 MMHG | OXYGEN SATURATION: 95 %

## 2022-01-01 VITALS
OXYGEN SATURATION: 94 % | HEART RATE: 72 BPM | RESPIRATION RATE: 24 BRPM | TEMPERATURE: 98.8 F | DIASTOLIC BLOOD PRESSURE: 65 MMHG | SYSTOLIC BLOOD PRESSURE: 126 MMHG

## 2022-01-01 VITALS
SYSTOLIC BLOOD PRESSURE: 130 MMHG | DIASTOLIC BLOOD PRESSURE: 68 MMHG | TEMPERATURE: 99 F | HEART RATE: 88 BPM | OXYGEN SATURATION: 96 % | RESPIRATION RATE: 18 BRPM

## 2022-01-01 VITALS
OXYGEN SATURATION: 98 % | DIASTOLIC BLOOD PRESSURE: 70 MMHG | RESPIRATION RATE: 20 BRPM | SYSTOLIC BLOOD PRESSURE: 150 MMHG | HEART RATE: 58 BPM

## 2022-01-01 VITALS
HEART RATE: 72 BPM | RESPIRATION RATE: 18 BRPM | OXYGEN SATURATION: 93 % | SYSTOLIC BLOOD PRESSURE: 134 MMHG | DIASTOLIC BLOOD PRESSURE: 60 MMHG | TEMPERATURE: 99 F

## 2022-01-01 VITALS
DIASTOLIC BLOOD PRESSURE: 50 MMHG | TEMPERATURE: 98.9 F | HEART RATE: 74 BPM | OXYGEN SATURATION: 94 % | SYSTOLIC BLOOD PRESSURE: 130 MMHG | RESPIRATION RATE: 18 BRPM

## 2022-01-01 VITALS
HEART RATE: 68 BPM | SYSTOLIC BLOOD PRESSURE: 119 MMHG | DIASTOLIC BLOOD PRESSURE: 58 MMHG | RESPIRATION RATE: 22 BRPM | OXYGEN SATURATION: 95 % | TEMPERATURE: 98.7 F

## 2022-01-01 VITALS
OXYGEN SATURATION: 93 % | RESPIRATION RATE: 20 BRPM | DIASTOLIC BLOOD PRESSURE: 64 MMHG | SYSTOLIC BLOOD PRESSURE: 140 MMHG | TEMPERATURE: 98 F | HEART RATE: 92 BPM

## 2022-01-01 VITALS
HEART RATE: 84 BPM | RESPIRATION RATE: 20 BRPM | SYSTOLIC BLOOD PRESSURE: 120 MMHG | DIASTOLIC BLOOD PRESSURE: 68 MMHG | TEMPERATURE: 99 F | OXYGEN SATURATION: 95 %

## 2022-01-01 VITALS
DIASTOLIC BLOOD PRESSURE: 64 MMHG | OXYGEN SATURATION: 97 % | HEART RATE: 74 BPM | SYSTOLIC BLOOD PRESSURE: 131 MMHG | TEMPERATURE: 98.4 F | RESPIRATION RATE: 22 BRPM

## 2022-01-01 VITALS
SYSTOLIC BLOOD PRESSURE: 148 MMHG | TEMPERATURE: 98.8 F | TEMPERATURE: 98 F | SYSTOLIC BLOOD PRESSURE: 124 MMHG | OXYGEN SATURATION: 96 % | RESPIRATION RATE: 25 BRPM | DIASTOLIC BLOOD PRESSURE: 54 MMHG | DIASTOLIC BLOOD PRESSURE: 53 MMHG | HEART RATE: 65 BPM | RESPIRATION RATE: 22 BRPM | OXYGEN SATURATION: 96 % | HEART RATE: 70 BPM

## 2022-01-01 VITALS
DIASTOLIC BLOOD PRESSURE: 68 MMHG | OXYGEN SATURATION: 90 % | SYSTOLIC BLOOD PRESSURE: 130 MMHG | TEMPERATURE: 97.7 F | HEART RATE: 80 BPM

## 2022-01-01 VITALS
TEMPERATURE: 97.8 F | RESPIRATION RATE: 20 BRPM | DIASTOLIC BLOOD PRESSURE: 58 MMHG | SYSTOLIC BLOOD PRESSURE: 142 MMHG | OXYGEN SATURATION: 94 %

## 2022-01-01 VITALS
TEMPERATURE: 98.5 F | HEART RATE: 70 BPM | RESPIRATION RATE: 14 BRPM | DIASTOLIC BLOOD PRESSURE: 59 MMHG | SYSTOLIC BLOOD PRESSURE: 109 MMHG

## 2022-01-01 VITALS
RESPIRATION RATE: 22 BRPM | DIASTOLIC BLOOD PRESSURE: 65 MMHG | OXYGEN SATURATION: 94 % | TEMPERATURE: 98.8 F | HEART RATE: 72 BPM | SYSTOLIC BLOOD PRESSURE: 126 MMHG | DIASTOLIC BLOOD PRESSURE: 65 MMHG | RESPIRATION RATE: 22 BRPM | SYSTOLIC BLOOD PRESSURE: 126 MMHG | OXYGEN SATURATION: 94 % | TEMPERATURE: 98.8 F | HEART RATE: 72 BPM

## 2022-01-01 VITALS
HEART RATE: 74 BPM | OXYGEN SATURATION: 95 % | SYSTOLIC BLOOD PRESSURE: 128 MMHG | RESPIRATION RATE: 20 BRPM | TEMPERATURE: 98.5 F | DIASTOLIC BLOOD PRESSURE: 62 MMHG

## 2022-01-01 VITALS
OXYGEN SATURATION: 93 % | RESPIRATION RATE: 20 BRPM | HEART RATE: 80 BPM | SYSTOLIC BLOOD PRESSURE: 130 MMHG | DIASTOLIC BLOOD PRESSURE: 62 MMHG

## 2022-01-01 VITALS
OXYGEN SATURATION: 58 % | TEMPERATURE: 99.6 F | HEART RATE: 49 BPM | DIASTOLIC BLOOD PRESSURE: 40 MMHG | RESPIRATION RATE: 18 BRPM | SYSTOLIC BLOOD PRESSURE: 100 MMHG

## 2022-01-01 VITALS
HEART RATE: 68 BPM | SYSTOLIC BLOOD PRESSURE: 132 MMHG | TEMPERATURE: 98.6 F | DIASTOLIC BLOOD PRESSURE: 65 MMHG | RESPIRATION RATE: 22 BRPM | OXYGEN SATURATION: 95 %

## 2022-01-01 VITALS — TEMPERATURE: 98.5 F | OXYGEN SATURATION: 94 % | HEART RATE: 80 BPM | RESPIRATION RATE: 20 BRPM

## 2022-01-01 VITALS
SYSTOLIC BLOOD PRESSURE: 132 MMHG | TEMPERATURE: 97.4 F | RESPIRATION RATE: 14 BRPM | HEART RATE: 71 BPM | DIASTOLIC BLOOD PRESSURE: 76 MMHG

## 2022-01-01 VITALS
HEART RATE: 66 BPM | SYSTOLIC BLOOD PRESSURE: 108 MMHG | OXYGEN SATURATION: 95 % | RESPIRATION RATE: 16 BRPM | DIASTOLIC BLOOD PRESSURE: 57 MMHG | TEMPERATURE: 98 F

## 2022-01-01 VITALS
DIASTOLIC BLOOD PRESSURE: 62 MMHG | RESPIRATION RATE: 22 BRPM | OXYGEN SATURATION: 93 % | TEMPERATURE: 97.4 F | HEART RATE: 84 BPM | SYSTOLIC BLOOD PRESSURE: 140 MMHG

## 2022-01-01 VITALS
TEMPERATURE: 98.8 F | HEART RATE: 82 BPM | OXYGEN SATURATION: 95 % | DIASTOLIC BLOOD PRESSURE: 64 MMHG | SYSTOLIC BLOOD PRESSURE: 138 MMHG | RESPIRATION RATE: 20 BRPM

## 2022-01-01 VITALS
TEMPERATURE: 99 F | HEART RATE: 78 BPM | DIASTOLIC BLOOD PRESSURE: 64 MMHG | RESPIRATION RATE: 22 BRPM | SYSTOLIC BLOOD PRESSURE: 132 MMHG

## 2022-01-01 VITALS
DIASTOLIC BLOOD PRESSURE: 59 MMHG | SYSTOLIC BLOOD PRESSURE: 127 MMHG | OXYGEN SATURATION: 95 % | RESPIRATION RATE: 22 BRPM | TEMPERATURE: 98.2 F | HEART RATE: 65 BPM

## 2022-01-01 VITALS
RESPIRATION RATE: 22 BRPM | OXYGEN SATURATION: 95 % | HEART RATE: 82 BPM | SYSTOLIC BLOOD PRESSURE: 130 MMHG | DIASTOLIC BLOOD PRESSURE: 68 MMHG | TEMPERATURE: 98.3 F

## 2022-01-01 VITALS
SYSTOLIC BLOOD PRESSURE: 153 MMHG | RESPIRATION RATE: 14 BRPM | TEMPERATURE: 98.6 F | DIASTOLIC BLOOD PRESSURE: 81 MMHG | HEART RATE: 74 BPM

## 2022-01-01 VITALS
SYSTOLIC BLOOD PRESSURE: 122 MMHG | TEMPERATURE: 98.4 F | OXYGEN SATURATION: 96 % | HEART RATE: 74 BPM | DIASTOLIC BLOOD PRESSURE: 50 MMHG

## 2022-01-01 VITALS
TEMPERATURE: 98.7 F | RESPIRATION RATE: 14 BRPM | HEART RATE: 70 BPM | SYSTOLIC BLOOD PRESSURE: 114 MMHG | DIASTOLIC BLOOD PRESSURE: 65 MMHG

## 2022-01-01 VITALS
DIASTOLIC BLOOD PRESSURE: 60 MMHG | TEMPERATURE: 98.7 F | SYSTOLIC BLOOD PRESSURE: 142 MMHG | OXYGEN SATURATION: 98 % | RESPIRATION RATE: 22 BRPM | HEART RATE: 76 BPM

## 2022-01-01 VITALS
HEART RATE: 78 BPM | SYSTOLIC BLOOD PRESSURE: 150 MMHG | TEMPERATURE: 98.8 F | DIASTOLIC BLOOD PRESSURE: 64 MMHG | RESPIRATION RATE: 20 BRPM | OXYGEN SATURATION: 92 %

## 2022-01-01 VITALS — OXYGEN SATURATION: 94 % | SYSTOLIC BLOOD PRESSURE: 128 MMHG | DIASTOLIC BLOOD PRESSURE: 52 MMHG | TEMPERATURE: 99 F

## 2022-01-01 VITALS
RESPIRATION RATE: 22 BRPM | OXYGEN SATURATION: 96 % | DIASTOLIC BLOOD PRESSURE: 62 MMHG | HEART RATE: 72 BPM | SYSTOLIC BLOOD PRESSURE: 97 MMHG | TEMPERATURE: 98.2 F

## 2022-01-01 DIAGNOSIS — F41.9 ANXIETY: ICD-10-CM

## 2022-01-01 DIAGNOSIS — C34.90 SMALL CELL LUNG CANCER (HCC): ICD-10-CM

## 2022-01-01 DIAGNOSIS — R11.0 NAUSEA: ICD-10-CM

## 2022-01-01 DIAGNOSIS — Z51.5 HOSPICE CARE: ICD-10-CM

## 2022-01-01 DIAGNOSIS — R06.02 SOB (SHORTNESS OF BREATH): ICD-10-CM

## 2022-01-01 DIAGNOSIS — R06.02 SHORTNESS OF BREATH: ICD-10-CM

## 2022-01-01 PROCEDURE — 0651 HSPC ROUTINE HOME CARE

## 2022-01-01 PROCEDURE — G0299 HHS/HOSPICE OF RN EA 15 MIN: HCPCS

## 2022-01-01 PROCEDURE — G0156 HHCP-SVS OF AIDE,EA 15 MIN: HCPCS

## 2022-01-01 PROCEDURE — 0656 HSPC GENERAL INPATIENT

## 2022-01-01 PROCEDURE — 74011250636 HC RX REV CODE- 250/636: Performed by: FAMILY MEDICINE

## 2022-01-01 PROCEDURE — HOSPICE MEDICATION HC HH HOSPICE MEDICATION

## 2022-01-01 PROCEDURE — 74011000250 HC RX REV CODE- 250: Performed by: FAMILY MEDICINE

## 2022-01-01 PROCEDURE — G0155 HHCP-SVS OF CSW,EA 15 MIN: HCPCS

## 2022-01-01 PROCEDURE — 3331090004 HSPC SERVICE INTENSITY ADD-ON

## 2022-01-01 PROCEDURE — 0655 HSPC INPATIENT RESPITE

## 2022-01-01 PROCEDURE — 74011250636 HC RX REV CODE- 250/636: Performed by: NURSE PRACTITIONER

## 2022-01-01 PROCEDURE — 99221 1ST HOSP IP/OBS SF/LOW 40: CPT | Performed by: FAMILY MEDICINE

## 2022-01-01 PROCEDURE — 99233 SBSQ HOSP IP/OBS HIGH 50: CPT | Performed by: FAMILY MEDICINE

## 2022-01-01 PROCEDURE — 74011250637 HC RX REV CODE- 250/637: Performed by: FAMILY MEDICINE

## 2022-01-01 RX ORDER — OMEPRAZOLE 20 MG/1
20 CAPSULE, DELAYED RELEASE ORAL DAILY
Status: DISCONTINUED | OUTPATIENT
Start: 2022-01-01 | End: 2022-01-01

## 2022-01-01 RX ORDER — ALBUTEROL SULFATE 0.83 MG/ML
1.25 SOLUTION RESPIRATORY (INHALATION)
Status: DISCONTINUED | OUTPATIENT
Start: 2022-01-01 | End: 2022-01-01 | Stop reason: HOSPADM

## 2022-01-01 RX ORDER — HALOPERIDOL 2 MG/ML
1 SOLUTION ORAL
Status: DISCONTINUED | OUTPATIENT
Start: 2022-01-01 | End: 2022-01-01

## 2022-01-01 RX ORDER — OXYCODONE HYDROCHLORIDE 5 MG/1
5 TABLET ORAL
Status: DISCONTINUED | OUTPATIENT
Start: 2022-01-01 | End: 2022-01-01

## 2022-01-01 RX ORDER — ACETAMINOPHEN 650 MG/1
650 SUPPOSITORY RECTAL
Status: DISCONTINUED | OUTPATIENT
Start: 2022-01-01 | End: 2022-01-01 | Stop reason: HOSPADM

## 2022-01-01 RX ORDER — AMLODIPINE BESYLATE 5 MG/1
5 TABLET ORAL DAILY
Status: DISCONTINUED | OUTPATIENT
Start: 2022-01-01 | End: 2022-01-01

## 2022-01-01 RX ORDER — ONDANSETRON 4 MG/1
4 TABLET, ORALLY DISINTEGRATING ORAL 3 TIMES DAILY
Status: DISCONTINUED | OUTPATIENT
Start: 2022-01-01 | End: 2022-01-01

## 2022-01-01 RX ORDER — BISACODYL 5 MG
5 TABLET, DELAYED RELEASE (ENTERIC COATED) ORAL DAILY
Status: DISCONTINUED | OUTPATIENT
Start: 2022-01-01 | End: 2022-01-01

## 2022-01-01 RX ORDER — HYDROMORPHONE HYDROCHLORIDE 1 MG/ML
1 INJECTION, SOLUTION INTRAMUSCULAR; INTRAVENOUS; SUBCUTANEOUS
Status: DISCONTINUED | OUTPATIENT
Start: 2022-01-01 | End: 2022-01-01

## 2022-01-01 RX ORDER — PREDNISONE 10 MG/1
10 TABLET ORAL
Status: DISCONTINUED | OUTPATIENT
Start: 2022-01-01 | End: 2022-01-01

## 2022-01-01 RX ORDER — HYDROMORPHONE HYDROCHLORIDE 2 MG/ML
2 INJECTION, SOLUTION INTRAMUSCULAR; INTRAVENOUS; SUBCUTANEOUS
Status: DISCONTINUED | OUTPATIENT
Start: 2022-01-01 | End: 2022-01-01 | Stop reason: HOSPADM

## 2022-01-01 RX ORDER — GLYCOPYRROLATE 0.2 MG/ML
0.2 INJECTION INTRAMUSCULAR; INTRAVENOUS EVERY 4 HOURS
Status: DISCONTINUED | OUTPATIENT
Start: 2022-01-01 | End: 2022-01-01 | Stop reason: HOSPADM

## 2022-01-01 RX ORDER — HYDROMORPHONE HYDROCHLORIDE 2 MG/ML
2 INJECTION, SOLUTION INTRAMUSCULAR; INTRAVENOUS; SUBCUTANEOUS EVERY 4 HOURS
Status: DISCONTINUED | OUTPATIENT
Start: 2022-01-01 | End: 2022-01-01 | Stop reason: HOSPADM

## 2022-01-01 RX ORDER — PROCHLORPERAZINE MALEATE 10 MG
10 TABLET ORAL
Status: DISCONTINUED | OUTPATIENT
Start: 2022-01-01 | End: 2022-01-01

## 2022-01-01 RX ORDER — LORAZEPAM 2 MG/ML
2 INJECTION INTRAMUSCULAR EVERY 4 HOURS
Status: DISCONTINUED | OUTPATIENT
Start: 2022-01-01 | End: 2022-01-01 | Stop reason: HOSPADM

## 2022-01-01 RX ORDER — AMIODARONE HYDROCHLORIDE 400 MG/1
400 TABLET ORAL DAILY
Status: DISCONTINUED | OUTPATIENT
Start: 2022-01-01 | End: 2022-01-01

## 2022-01-01 RX ORDER — LORAZEPAM 2 MG/ML
1 INJECTION INTRAMUSCULAR
Status: DISCONTINUED | OUTPATIENT
Start: 2022-01-01 | End: 2022-01-01

## 2022-01-01 RX ORDER — HYDROMORPHONE HYDROCHLORIDE 1 MG/ML
1 INJECTION, SOLUTION INTRAMUSCULAR; INTRAVENOUS; SUBCUTANEOUS EVERY 4 HOURS
Status: DISCONTINUED | OUTPATIENT
Start: 2022-01-01 | End: 2022-01-01

## 2022-01-01 RX ORDER — HYDROMORPHONE HYDROCHLORIDE 5 MG/5ML
1 SOLUTION ORAL
Status: DISCONTINUED | OUTPATIENT
Start: 2022-01-01 | End: 2022-01-01

## 2022-01-01 RX ORDER — FACIAL-BODY WIPES
10 EACH TOPICAL DAILY PRN
Status: DISCONTINUED | OUTPATIENT
Start: 2022-01-01 | End: 2022-01-01 | Stop reason: HOSPADM

## 2022-01-01 RX ORDER — HYOSCYAMINE SULFATE 0.12 MG/1
0.12 TABLET SUBLINGUAL
Status: DISCONTINUED | OUTPATIENT
Start: 2022-01-01 | End: 2022-01-01 | Stop reason: HOSPADM

## 2022-01-01 RX ORDER — LORAZEPAM 2 MG/ML
2 CONCENTRATE ORAL
Status: DISCONTINUED | OUTPATIENT
Start: 2022-01-01 | End: 2022-01-01

## 2022-01-01 RX ORDER — HYDROMORPHONE HYDROCHLORIDE 1 MG/ML
0.5 INJECTION, SOLUTION INTRAMUSCULAR; INTRAVENOUS; SUBCUTANEOUS
Status: DISCONTINUED | OUTPATIENT
Start: 2022-01-01 | End: 2022-01-01

## 2022-01-01 RX ORDER — LORAZEPAM 2 MG/ML
2 INJECTION INTRAMUSCULAR
Status: DISCONTINUED | OUTPATIENT
Start: 2022-01-01 | End: 2022-01-01 | Stop reason: HOSPADM

## 2022-01-01 RX ORDER — LORAZEPAM 2 MG/ML
0.5 CONCENTRATE ORAL
Status: DISCONTINUED | OUTPATIENT
Start: 2022-01-01 | End: 2022-01-01

## 2022-01-01 RX ORDER — AMOXICILLIN 250 MG
2 CAPSULE ORAL 2 TIMES DAILY
Status: DISCONTINUED | OUTPATIENT
Start: 2022-01-01 | End: 2022-01-01

## 2022-01-01 RX ORDER — BUTALBITAL, ACETAMINOPHEN AND CAFFEINE 50; 325; 40 MG/1; MG/1; MG/1
1 TABLET ORAL
Status: DISCONTINUED | OUTPATIENT
Start: 2022-01-01 | End: 2022-01-01

## 2022-01-01 RX ORDER — GLYCOPYRROLATE 0.2 MG/ML
0.2 INJECTION INTRAMUSCULAR; INTRAVENOUS
Status: DISCONTINUED | OUTPATIENT
Start: 2022-01-01 | End: 2022-01-01

## 2022-01-01 RX ORDER — ALBUTEROL SULFATE 90 UG/1
1 AEROSOL, METERED RESPIRATORY (INHALATION)
Status: DISCONTINUED | OUTPATIENT
Start: 2022-01-01 | End: 2022-01-01

## 2022-01-01 RX ADMIN — HYDROMORPHONE HYDROCHLORIDE 2 MG: 2 INJECTION, SOLUTION INTRAMUSCULAR; INTRAVENOUS; SUBCUTANEOUS at 12:13

## 2022-01-01 RX ADMIN — GLYCOPYRROLATE 0.2 MG: 0.2 INJECTION, SOLUTION INTRAMUSCULAR; INTRAVENOUS at 15:41

## 2022-01-01 RX ADMIN — LORAZEPAM 2 MG: 2 INJECTION INTRAMUSCULAR; INTRAVENOUS at 20:02

## 2022-01-01 RX ADMIN — LORAZEPAM 2 MG: 2 INJECTION INTRAMUSCULAR; INTRAVENOUS at 20:07

## 2022-01-01 RX ADMIN — GLYCOPYRROLATE 0.2 MG: 0.2 INJECTION, SOLUTION INTRAMUSCULAR; INTRAVENOUS at 21:28

## 2022-01-01 RX ADMIN — LORAZEPAM 2 MG: 2 INJECTION INTRAMUSCULAR; INTRAVENOUS at 16:49

## 2022-01-01 RX ADMIN — PROCHLORPERAZINE MALEATE 10 MG: 10 TABLET, FILM COATED ORAL at 14:41

## 2022-01-01 RX ADMIN — GLYCOPYRROLATE 0.2 MG: 0.2 INJECTION, SOLUTION INTRAMUSCULAR; INTRAVENOUS at 04:10

## 2022-01-01 RX ADMIN — HYDROMORPHONE HYDROCHLORIDE 2 MG: 2 INJECTION, SOLUTION INTRAMUSCULAR; INTRAVENOUS; SUBCUTANEOUS at 10:15

## 2022-01-01 RX ADMIN — HYDROMORPHONE HYDROCHLORIDE 1 MG: 1 INJECTION, SOLUTION INTRAMUSCULAR; INTRAVENOUS; SUBCUTANEOUS at 00:00

## 2022-01-01 RX ADMIN — GLYCOPYRROLATE 0.2 MG: 0.2 INJECTION, SOLUTION INTRAMUSCULAR; INTRAVENOUS at 20:05

## 2022-01-01 RX ADMIN — HYDROMORPHONE HYDROCHLORIDE 1 MG: 1 INJECTION, SOLUTION INTRAMUSCULAR; INTRAVENOUS; SUBCUTANEOUS at 07:23

## 2022-01-01 RX ADMIN — Medication 10 MG: at 11:00

## 2022-01-01 RX ADMIN — HYDROMORPHONE HYDROCHLORIDE 2 MG: 2 INJECTION, SOLUTION INTRAMUSCULAR; INTRAVENOUS; SUBCUTANEOUS at 00:00

## 2022-01-01 RX ADMIN — Medication 10 MG: at 10:50

## 2022-01-01 RX ADMIN — GLYCOPYRROLATE 0.2 MG: 0.2 INJECTION, SOLUTION INTRAMUSCULAR; INTRAVENOUS at 17:01

## 2022-01-01 RX ADMIN — HYDROMORPHONE HYDROCHLORIDE 2 MG: 2 INJECTION, SOLUTION INTRAMUSCULAR; INTRAVENOUS; SUBCUTANEOUS at 16:08

## 2022-01-01 RX ADMIN — LORAZEPAM 2 MG: 2 INJECTION INTRAMUSCULAR; INTRAVENOUS at 06:35

## 2022-01-01 RX ADMIN — HYDROMORPHONE HYDROCHLORIDE 2 MG: 2 INJECTION, SOLUTION INTRAMUSCULAR; INTRAVENOUS; SUBCUTANEOUS at 07:38

## 2022-01-01 RX ADMIN — GLYCOPYRROLATE 0.2 MG: 0.2 INJECTION, SOLUTION INTRAMUSCULAR; INTRAVENOUS at 20:07

## 2022-01-01 RX ADMIN — LORAZEPAM 0.5 MG: 2 CONCENTRATE ORAL at 16:02

## 2022-01-01 RX ADMIN — LORAZEPAM 2 MG: 2 INJECTION INTRAMUSCULAR; INTRAVENOUS at 00:00

## 2022-01-01 RX ADMIN — LORAZEPAM 2 MG: 2 INJECTION INTRAMUSCULAR; INTRAVENOUS at 12:13

## 2022-01-01 RX ADMIN — GLYCOPYRROLATE 0.2 MG: 0.2 INJECTION, SOLUTION INTRAMUSCULAR; INTRAVENOUS at 11:20

## 2022-01-01 RX ADMIN — HYDROMORPHONE HYDROCHLORIDE 1 MG: 1 INJECTION, SOLUTION INTRAMUSCULAR; INTRAVENOUS; SUBCUTANEOUS at 21:28

## 2022-01-01 RX ADMIN — LORAZEPAM 2 MG: 2 INJECTION INTRAMUSCULAR; INTRAVENOUS at 10:16

## 2022-01-01 RX ADMIN — HYDROMORPHONE HYDROCHLORIDE 2 MG: 2 INJECTION, SOLUTION INTRAMUSCULAR; INTRAVENOUS; SUBCUTANEOUS at 04:00

## 2022-01-01 RX ADMIN — GLYCOPYRROLATE 0.2 MG: 0.2 INJECTION, SOLUTION INTRAMUSCULAR; INTRAVENOUS at 00:00

## 2022-01-01 RX ADMIN — LORAZEPAM 1 MG: 2 INJECTION INTRAMUSCULAR; INTRAVENOUS at 17:01

## 2022-01-01 RX ADMIN — ALBUTEROL SULFATE 1.25 MG: 2.5 SOLUTION RESPIRATORY (INHALATION) at 12:44

## 2022-01-01 RX ADMIN — LORAZEPAM 2 MG: 2 INJECTION INTRAMUSCULAR; INTRAVENOUS at 20:05

## 2022-01-01 RX ADMIN — LORAZEPAM 2 MG: 2 INJECTION INTRAMUSCULAR; INTRAVENOUS at 04:09

## 2022-01-01 RX ADMIN — HYDROMORPHONE HYDROCHLORIDE 1 MG: 1 INJECTION, SOLUTION INTRAMUSCULAR; INTRAVENOUS; SUBCUTANEOUS at 20:07

## 2022-01-01 RX ADMIN — GLYCOPYRROLATE 0.2 MG: 0.2 INJECTION, SOLUTION INTRAMUSCULAR; INTRAVENOUS at 11:50

## 2022-01-01 RX ADMIN — LORAZEPAM 1 MG: 2 INJECTION INTRAMUSCULAR; INTRAVENOUS at 19:48

## 2022-01-01 RX ADMIN — LORAZEPAM 2 MG: 2 INJECTION INTRAMUSCULAR; INTRAVENOUS at 11:19

## 2022-01-01 RX ADMIN — HYDROMORPHONE HYDROCHLORIDE 0.5 MG: 1 INJECTION, SOLUTION INTRAMUSCULAR; INTRAVENOUS; SUBCUTANEOUS at 20:18

## 2022-01-01 RX ADMIN — HYDROMORPHONE HYDROCHLORIDE 2 MG: 2 INJECTION, SOLUTION INTRAMUSCULAR; INTRAVENOUS; SUBCUTANEOUS at 13:56

## 2022-01-01 RX ADMIN — LORAZEPAM 1 MG: 2 INJECTION INTRAMUSCULAR; INTRAVENOUS at 20:03

## 2022-01-01 RX ADMIN — GLYCOPYRROLATE 0.2 MG: 0.2 INJECTION, SOLUTION INTRAMUSCULAR; INTRAVENOUS at 16:49

## 2022-01-01 RX ADMIN — GLYCOPYRROLATE 0.2 MG: 0.2 INJECTION, SOLUTION INTRAMUSCULAR; INTRAVENOUS at 20:02

## 2022-01-01 RX ADMIN — HYDROMORPHONE HYDROCHLORIDE 1 MG: 5 SOLUTION ORAL at 11:16

## 2022-01-01 RX ADMIN — HYDROMORPHONE HYDROCHLORIDE 2 MG: 2 INJECTION, SOLUTION INTRAMUSCULAR; INTRAVENOUS; SUBCUTANEOUS at 20:05

## 2022-01-01 RX ADMIN — HYDROMORPHONE HYDROCHLORIDE 0.5 MG: 1 INJECTION, SOLUTION INTRAMUSCULAR; INTRAVENOUS; SUBCUTANEOUS at 17:01

## 2022-01-01 RX ADMIN — GLYCOPYRROLATE 0.2 MG: 0.2 INJECTION, SOLUTION INTRAMUSCULAR; INTRAVENOUS at 04:00

## 2022-01-01 RX ADMIN — GLYCOPYRROLATE 0.2 MG: 0.2 INJECTION, SOLUTION INTRAMUSCULAR; INTRAVENOUS at 03:59

## 2022-01-01 RX ADMIN — HYDROMORPHONE HYDROCHLORIDE 0.5 MG: 1 INJECTION, SOLUTION INTRAMUSCULAR; INTRAVENOUS; SUBCUTANEOUS at 19:49

## 2022-01-01 RX ADMIN — OXYCODONE HYDROCHLORIDE 5 MG: 5 TABLET ORAL at 14:41

## 2022-01-01 RX ADMIN — LORAZEPAM 2 MG: 2 INJECTION INTRAMUSCULAR; INTRAVENOUS at 16:08

## 2022-01-01 RX ADMIN — HYDROMORPHONE HYDROCHLORIDE 1 MG: 1 INJECTION, SOLUTION INTRAMUSCULAR; INTRAVENOUS; SUBCUTANEOUS at 15:41

## 2022-01-01 RX ADMIN — LORAZEPAM 2 MG: 2 INJECTION INTRAMUSCULAR; INTRAVENOUS at 07:38

## 2022-01-01 RX ADMIN — GLYCOPYRROLATE 0.2 MG: 0.2 INJECTION, SOLUTION INTRAMUSCULAR; INTRAVENOUS at 16:08

## 2022-01-01 RX ADMIN — LORAZEPAM 2 MG: 2 INJECTION INTRAMUSCULAR; INTRAVENOUS at 04:00

## 2022-01-01 RX ADMIN — HYDROMORPHONE HYDROCHLORIDE 0.5 MG: 1 INJECTION, SOLUTION INTRAMUSCULAR; INTRAVENOUS; SUBCUTANEOUS at 20:03

## 2022-01-01 RX ADMIN — GLYCOPYRROLATE 0.2 MG: 0.2 INJECTION, SOLUTION INTRAMUSCULAR; INTRAVENOUS at 07:23

## 2022-01-01 RX ADMIN — GLYCOPYRROLATE 0.2 MG: 0.2 INJECTION, SOLUTION INTRAMUSCULAR; INTRAVENOUS at 08:08

## 2022-01-01 RX ADMIN — Medication 10 MG: at 15:05

## 2022-01-01 RX ADMIN — LORAZEPAM 1 MG: 2 INJECTION INTRAMUSCULAR; INTRAVENOUS at 20:18

## 2022-01-01 RX ADMIN — GLYCOPYRROLATE 0.2 MG: 0.2 INJECTION, SOLUTION INTRAMUSCULAR; INTRAVENOUS at 07:38

## 2022-01-01 RX ADMIN — HYDROMORPHONE HYDROCHLORIDE 2 MG: 2 INJECTION, SOLUTION INTRAMUSCULAR; INTRAVENOUS; SUBCUTANEOUS at 20:02

## 2022-01-01 RX ADMIN — LORAZEPAM 2 MG: 2 INJECTION INTRAMUSCULAR; INTRAVENOUS at 21:28

## 2022-01-01 RX ADMIN — HYDROMORPHONE HYDROCHLORIDE 2 MG: 2 INJECTION, SOLUTION INTRAMUSCULAR; INTRAVENOUS; SUBCUTANEOUS at 11:19

## 2022-01-01 RX ADMIN — HYDROMORPHONE HYDROCHLORIDE 2 MG: 2 INJECTION, SOLUTION INTRAMUSCULAR; INTRAVENOUS; SUBCUTANEOUS at 16:49

## 2022-01-01 RX ADMIN — HYDROMORPHONE HYDROCHLORIDE 1 MG: 1 INJECTION, SOLUTION INTRAMUSCULAR; INTRAVENOUS; SUBCUTANEOUS at 11:50

## 2022-01-01 RX ADMIN — HYDROMORPHONE HYDROCHLORIDE 1 MG: 5 SOLUTION ORAL at 14:45

## 2022-01-01 RX ADMIN — ALBUTEROL SULFATE 1.25 MG: 2.5 SOLUTION RESPIRATORY (INHALATION) at 19:51

## 2022-01-01 RX ADMIN — Medication 10 MG: at 14:50

## 2022-01-01 RX ADMIN — GLYCOPYRROLATE 0.2 MG: 0.2 INJECTION, SOLUTION INTRAMUSCULAR; INTRAVENOUS at 12:14

## 2022-01-01 RX ADMIN — HYDROMORPHONE HYDROCHLORIDE 1 MG: 1 INJECTION, SOLUTION INTRAMUSCULAR; INTRAVENOUS; SUBCUTANEOUS at 07:58

## 2022-01-01 RX ADMIN — HYDROMORPHONE HYDROCHLORIDE 2 MG: 2 INJECTION, SOLUTION INTRAMUSCULAR; INTRAVENOUS; SUBCUTANEOUS at 04:10

## 2022-01-01 RX ADMIN — HYDROMORPHONE HYDROCHLORIDE 1 MG: 1 INJECTION, SOLUTION INTRAMUSCULAR; INTRAVENOUS; SUBCUTANEOUS at 03:59

## 2022-01-01 NOTE — HOSPICE
Confirmation #: [id number]  from [facility] hydromorphone refill. No further care needs expressed, encouraged to call hospice as needed with 24 hour availability.

## 2022-01-06 NOTE — HOSPICE
late entry for 12/2/2021  Pt was seen in her home; #66 silicone coated latex indwelling catheter inserted according to protocol; patent and pt reported no distress after catheter placement. RN Bryanna Moreira was present to assist with procedure.   Neither pt nor her daughter/caregiver Suellen Moses verbalized questions at the end of the nursing visit; stated intention to notify Hospice 24/7 for questions, concerns, needs, or change of pt status

## 2022-01-10 NOTE — HOSPICE
late entry for 12/30/2021  Pt was seen in the bedroom of the home that she shares with her daughter/caregiver Rosy Pitts, and other family members. Pt reports that she is not \"feeling great,\" but cannot explain how she is feeling worse. She evidenced greater discoloration of her left foot, and slight discoloration of her right toes. .  Pedal pulses remain at baseline (greater on left than right). Rosy Pitts repots that pt has been experiencing more difficulty breathing (greater effort, tachypnea, etc). Pt continues to frequently remove nasal canula oxygen, and has difficulty completing nebulizer treatment. She denies experiencing pain. No questions were verbalized at the end of the nursing visit.   Rosy Pitts stated intention to notify Hospice 24/7 for questions, concerns, needs, and change of pt status

## 2022-01-10 NOTE — HOSPICE
late entyamila for 1/6/2022  Writer met with pt in the bedroom of the home that she shares with her daughter, Rita Lopez, and other family members. Rita Lopez stated that  pt reports that she is isn't feeling well, but that pt is not miladis to identify what doesn't feel well. She denied experiencing pain at time of assessment. Pt was incontinent of stool. Indwelling urinary catheter was patent; giulia-care completed. She had blanchable redded area on her left buttocks; extra barrier cream was applied.   No significant findings

## 2022-01-10 NOTE — HOSPICE
Late entry for 12/28/2021  Pt was seen in the bedroom of the home that she shares with her daughter/primary caregiver Natalee Means, and other family members. Pt continues to evidence distress at mention of probable placement after Natalee Means is mandated to work from the office. Natalee Means reported that pt had loose stool over the weekend, but these symptoms have largely resolved. Ahmet # [id number] ordered. No questions verbalized at the end of the nursing visit.  Natalee Means stated intention to notify Hospice 24/7 for questions, concerned, needs, or change of pt status

## 2022-01-10 NOTE — HOSPICE
Late entry for 1/4/2021  Pt was seen in the bedroom of the home that she shares with her daughter/caregiver Rohini Kerr, and other family members. Pt continues to frequently remove her oxygen delivered via nasal canula; pt's SP02 measured 95% on room air. She evidenced expiratory wheezing in all lobes. Per Rohini Kerr, pt also fails to complete albuterol treatment administered via nebulizer. Sunita Rodriguez discussed probable placement of pt secondary to Sunita Rodriguez being mandated back to in-person work; this is a source of distress for pt. No questions were verbalized at the end of the nursing visit.   Chema Arteaga stated intention to notify Hospice 24/7 for questions, concerns, needs, or change of pt status

## 2022-01-11 NOTE — HOSPICE
Upon arrival patient in bed with HOB elevated, smiling watching TV, nasal canula laying on her lap. Caregiver Jackqueline John at bedside. Patient denies pain. Audibile wheezing noted and auscultated. Caregiver reports patient had not had morning nebulizer treatment as of yet. Caregiver instructed to administer during nurse visit, with relief noted. Daughter reports she continues to administer nebs Q4 hour in conjunction with Hydromorphone Q 1 hour PRN. Daughter reports patient is better with scheduled neb than  PRN. Medication refill completed via [facility]. No further care needs expressed, appreciative of visit. Encouraged to call hospice as needed. Daughter reports [facility] enroute for bathing care.

## 2022-01-17 NOTE — HOSPICE
Late entry for 1/6/2022  Pt was seen in the bedroom of the home that she shares with her daughter/caregiver Gallo Monte. Pt was bathed; verbalized no complaints. Pt's daughter/caregiver [facility] states that pt had a nosebleed yesterday which spontaneously resolved. Pt has never displayed this symptom previously.   [facility] stated intent to notify Hospice 24/7 for quesitons, concerns, needs, or change in pt status

## 2022-01-17 NOTE — HOSPICE
Late entry for 1/7/2021  Pt was seen in her bedroom of the home that she shares with her daughter/caregiver Brianne Kelly and other family members. Her daughter/caregiver Brianne Kelly reports that pt experienced a nosebleed yesterday which spontaneously resolved. Pt continues to report that she doesn't feel well, but is unable to state what is wrong. She continues to remove her nasal canula oxygen frequently; oxygen saturation on room air is 94% (96% when she puts her nasal canula oxygen back on), and is evidencing diminished bases (greater on left), with an expiratory wheeze. Pt frequently does not complete her nebulizer therapy, as she finds it irritating. She occasionally reports feeling uncomfortable, but denies experiencing pain. No questions were verbalized at the end of the nursing visit.   Pt stated intention to notify Hospice 24/7 for questions, concerns, needs, or change of pt status

## 2022-01-18 NOTE — HOSPICE
late entry for 1/13/2022  Pt was seen in the bedroom of the home that she shares with her daughter Sunita Rodriguez, and other family members. Pt had audible expiatory wheezing; she removed her nasal canula oxygen and has been declining to finish nebulizer treatments. SP02 was 955 on room air. Sunita Rodriguez reported that pt evident some low mood, stating at times \"I can't do this anymore. \"  Other days she has no such complaints. No mention was made by pt about potential move to a facility related to [address] return to in-person work. Pt denied experiencing pain at time of assessment. No questions were verbalized at the end of the nursing visit.   Sunita Rodriguez stated intention to notify Hospice 24/7 for questions, concerns, needs, or change of pt status

## 2022-01-18 NOTE — HOSPICE
Upon arrival patient in bed with c/o Lt arm/elbow pain which does not radiate to chest, patient flexing and extending her arm to exercise it. Caregiver reports patient complained of pain to her Left side over the weekend, and gave Hydromorphone which was effective, patient was unable to state exactly where on her Left side the pain was. Caregiver administered Hydromorphone during this visit for left arm pain. At end of visit,patient denied having pain to her Left arm. Now laying in bed calmly, watching TV and dozing off to sleep. Daughter encouraged to continue with current POC in management of symptoms. Personal care supplies ordered from [email or web address]. Patient has two M60 back up oxygen tanks. Caregiver reports she also has a generator. Appreciative of visit, no further care needs expressed.

## 2022-01-26 NOTE — HOSPICE
Upon arrival patient laying in bed awake, watching TV, smiling upon nurse's entrance into room. Patient denies pain, but caregivers Stevefadi Jose reports medicating patient for c/o generalized discomfort prior to nurse arrival with Hydromorphone. Skin W/D to touch. Caregiver reports patient has been approved for Medicaid, expressed concerns that all facilities [facility] are full. Lele Garcia needs assist in coordinating an admission contract with [facility], as they may have a opening in February. Per representative at [facility] they do not have a contract with [employer]. RNCM and MSW informed to contact Lele Garcia to provide further assist on the necessary items/process to aide in getting a contract/ approval for admission. Lele Garcia reports the facility [facility] is sending her an information packet to complete, and the facility is requesting patient's medical information. Refill completed for Norvasc and Amiodarone. No futher care needs expressed. Encouraged to call Hospice with further care needs as team has 24 hour availbility.

## 2022-01-27 NOTE — HOSPICE
Late entry for 1/20/2022  Pt was seen in the bedroom of the home that she shares with her daughter, Theodore Davison, and other family members. Pt has been complaining of left shoulder and left side pain, and has been requesting and receiving analgesia for these reports. Pt has not experienced nausea or vomiting over the last few days; some days she will eat 100% of meals; other days she will eat only oranges. No questions were verbalized at the end of the nursing visit.   Theodore Davison stated intent to notify Hospice 24/7 for questions, concerns, needs, or change of pt status

## 2022-02-01 NOTE — HOSPICE
Upon arrival patient in bed awake, smiling, without her oxygen on via N/C, O2 sat on RA 96% . Daughter reports appetite is good, and patient is \"cleaning her plate at meals\", was medicated prior to nurse arrival for guarding, rubbing of stomach,and  patient c/o pain to back and Left shoulder blade. Medication effective in relieving pain. Caregiver reports patient without usual bowel output daily, reports current BM is very small smears of soft stool only, and reports patient has been \"cleaning her plate for meals\". Caregiver reports she had not been giving Miralax QOD because patient had a loose stool episode, unable to recall date. Rectal exam positive for large amount of soft and firm stool, Bisacodyl suppository administered to assist in further evacuation of stool . Caregiver educated to resume Miralax as ordered, Rohini Kerr verbalized understanding. Daughter reports needing assistance with admission process to [facility] and [employer] RNCM and MSW informed.

## 2022-02-04 NOTE — HOSPICE
Late entry for 1/27/2022  Pt was seen in the bedroom of the home that she shares with her daughter/caregiver Molly Reid, and other family members. Pt remains alert and oriented to person and place, and was fully engaged in the visit. Her expiratory wheeze is worsening. Pt continues to remove her oxygen and nebulizer before completion of therapy, but pt's oxygen saturation was 98% via pulse oxymetry. She denies experienicng pain when assessed. Stanislav Martin # [id number] [employer] # [id number] ordered.   No questions were verbalized at the end of the nursing visit; Promise Hernandez stated intention to notify Hospice 24/7 for questions, concerns, needs, or change of pt status

## 2022-02-07 NOTE — HOSPICE
Late entry for 2/3/2022  Pt was seen in the bedroom of the home that she shares with her daughter Theodore Davison, and other family members,  NP Asher Garcia was present for a face to face meeting in accordance with Hospice recertification guidelines. Pt was alert and engaged in the nursing visit. Theodore Davison states that pt has continued to make episodic complaints of pain, thus has started administering Hydrocodone in the morning, and will administer an additional dose should pt continue to have complaints. Pt is evidencing an expiratory wheeze with diminished bases. She continue to remove her nasal oxygen periodically, and is stops nebulizer tretment before they are completed at times. Some sediment was visible in pt's urine; NP Shamir Garcia was notified. Pt was seen eating; did not evidence nausea or vomiting during the visit, and none reported during he last 48 hours. Her Left MAC has decreased fro 36 at admission to 31.5 currently. Pt remains bedbound; no falls reported, though she remain at elevated risk to fall realted to [facility] [place] diagnosis and disease progression. Skin remains incontinent of bowel and bladder and currently evidences no skin integrity issues, though she remains at increased risk to do so. PPS is  30. Pt continues to evidence decline. No questions were verbalized at the end of the nursing visit. Theodore Davison stated intent to notify Hospice 24/7 for questions, concerns, needs, or change of pt status. Chest x-ray ordered for facility placement    WORKSHEET FOR DETERMINING PROGNOSIS  PULMONARY DISEASE  The purpose of this worksheet is to guide initial and recertification assessments. It must be accompanied by narrative documentation. These are guidelines only: clinical judgement is required in each case. Construct a narrative from the information on this worksheet and information from the patient's physician and record on back.  The patient should be re-evaluated at specific intervals set by the interdisciplinary team as well as at any time that the patient may be clinically stabilizing. This form may be used for initial and subsequent re-evaluation. Pt. Name: ___________Faith Walker___________ ID#: _____[id number]____________ Date: __________2/3/22____    Patients will be considered to be in the terminal stage of pulmonary disease (life expectancy of six months or less) if they meet the following criteria. The criteria refer to patients with various forms of advanced pulmonary disease who eventually follow a final common pathway for end stage pulmonary disease. (1 and 2 should be present. Documentation of 3, 4, and 5, will lend supporting documentation.):    ___x_____  1. Severe chronic lung disease as documented by both a and b:                          _____x___  a. Disabling dyspnea at rest, poorly or unresponsive to bronchodilators,                                                   resulting in decreased functional capacity, e.g., bed to chair existence,                                                   fatigue, and cough: (Documentation of Forced Expiratory Volume in One                                                   Second (FEV1), after bronchodilator, less than 30% of predicted is objective                                                                  evidence for disabling dyspnea, but is not necessary to obtain.)                          ________  b. Progression of end stage pulmonary disease, as evidenced by increasing visits                                                   to the emergency department or hospitalizations for pulmonary infections                                                   and/or respiratory failure or increasing physician home visits prior to initial                                                   certification.  (Documentation of serial decrease of FEV1>40 ml/year is                                                                     objective evidence for disease progression, but is not necessary to obtain.)  ________  2. Hypoxemia at rest on room air, as evidenced by pO2 ? 55 mmHg; or oxygen saturation                           ? 88%, determined either by arterial blood gases or oxygen saturation monitors; (These                            values may be obtained from recent hospital records. ) OR Hypercapnia, as evidenced by                            pCO2 ?50 mmHg. (This value may be obtained from recent [within 3 months] hospital                            records.)  ________  3. Right heart failure (RHF) secondary to pulmonary disease (Cor pulmonale) (e.g., not                           secondary to left heart disease or valvulopathy). _____x___  4. Unintentional progressive weight loss of greater than 10% of body weight over the                            preceding six months.  ________  5. Resting tachycardia >100/min. WORKSHEET FOR DETERMINING PROGNOSIS  CANCER DIAGNOSIS  The purpose of this worksheet is to guide initial and recertification assessments. It must be accompanied by narrative documentation. These are guidelines only: clinical judgement is required in each case. Construct a narrative from the information on this worksheet and information from the patient's physician and record on back. The patient should be re-evaluated at specific intervals set by the interdisciplinary team as well as at any time that the patient may be clinically stabilizing. This form may be used for initial and subsequent re-evaluation. Pt. Name: _______Faith Almazan___________ ID#: ________[id number]___________ Date: ______2/3/22________    Cancer Diagnoses     ____x____  A. Disease with distant metastases at presentation OR    ________  B.  Progression from an earlier stage of disease to metastatic disease with either:                          ________  1. continued decline in spite of therapy                          ________  2. patient declines further disease directed therapy    Note: Certain cancers with poor prognoses (e.g. small cell lung cancer, brain cancer and pancreatic cancer) may be hospice eligible without fulfilling the other criteria in this section.

## 2022-02-14 NOTE — HOSPICE
Late entry for 2/8/2022  Pt was seen in the bedroom of her home; daughter/caregiver Johanna Moon. Pt has been taking Cipro secondary to pneumonia and has developed a rash on her chin/lower half of her face. NP Shamir Garcia notified. Cipro discontinued and Bactrim DS started; Cipro added to allergy list.  [facility], has 'script for Bactrim DS, and verbalized that the medication would be ready for pickup later in the afternoon. Administration of medication reviewed with [facility]. Right pedal pulse more decreased than usual.  [facility] reported that pt has been having small, formed balls of stool rather than her usual , larger bowel movements. Pt was reported to have a more regularly BM today.   No other questions were verbalized at the end of the nursing visit; [facility] stated intent to notify Hospice for questions, concerns, needs, or change of pt status    NEW MEDICATION INITIATION DOCUMENTATION: Bactrim DS  Consulted AT MD to report change in patient status: YES  Obtained Order from Provider for initiation of Symptom relief medication / other medication needed:YES   Documentation completed in Telephone/Visit Note in [facility]  Reason medication is being initiated:reaction to Cipro  MD / Provider name consulted re: change in status / initiation of new medication:Shamir Rutledge  New Symptom(s): visible rash from Cipro; pneumonia  New Order(s): one tablet every 12 hours for 7 days  Name of person being taught: [facility] (daughter)  Instructions given: YES   Side Effects taught:YES GI distress, rash, seizure, decreased urination  Response to teaching: verbalized understanding

## 2022-02-15 NOTE — HOSPICE
Upon arrival patient in bed awake without her oxygen on, watching TV, appears in no distress, denies pain. Daughter Ten Curtis at bedside. O2 sat 93% on RA, oxygen re-applied by this nurse. Patient intermittently removes her oxygen and nebulizer masks during treatment. Patient with very small BM's per daughter Ten Curtis, reports stools are one small mush ball. Concerned that patient is eating well but BM not usual patterns. Patient with c/o nausea. Rectal exam positive for soft-ball shaped  stools, small amount manually removed by nurse. Bisacodyl suppository administered. Daughter encouraged to provide hydration as tolerated, and to administer Miralax today as scheduled. Daughter administered Compazine for nausea complaint. Daughter educated on encouraging  repositioning as tolerated to help with GI motility, as patient reports she is unable to bear down and effectively defecate. RNCM informed of visit concerns, bowel regimen follow up, and supply order needed. Daughter appreciative of visit and education. Educated to call Hospice for further care concerns with 24 hour availability.

## 2022-02-15 NOTE — HOSPICE
late entry for 2/11/2022  Pt was seen in the bedroom of the home that she shares with her daughter/caregiver Niecy Bruno, and other family members. Niecy Bruno reports that she is administering opiate medications more regularly in the morning secondary to pt complaints of left sided discomfort, and this allows pt significant pain free episodes throughout the day. Pt evidenced no verbal or non-verbal signs or symptoms of pain or discomfort during the nursing visit.   No questions were verbalized during the visit; Niecy Bruno stated intent to notify Hospice 24/7 for questions, concerns, needs, or change of pt status

## 2022-02-20 NOTE — HOSPICE
late entry for 2/16/2022  Pt was seen in the bedroom of the home that she shares with her daughter/caregiver Melissa Johns and other family members. Melissa Johns was present during the visit; DARCIE Man was present for co-treatment. Melissa Johns phoned Triage early in the morning, reported that pt was complaining of urinary discomfort and low output  After advice from  Triage, Melissa Johns removed pt's indwelling urinary catheter. Writer and DARCIE Man replaced pt's catheter with a #16 indwelling urinary catheter using appropriate technique. Pt had an output of 1.200 cc of clear yellow urine after farnsworth was placed, with a scant amount of cloudy urine noted. Pt evidenced no signs or symptoms of verbal or non-verbal pain or discomfort the end of the procedure/nursing visit. Pt was afebrile, and stopped a 7 day course of Bactrim DS 2/15/22. No questions or concerns noted at the end of the nursing visit. Melissa Johns stated intention to notify Hospice 24/7 for questions, concerns, needs, or change of pt status.

## 2022-02-22 NOTE — HOSPICE
Upon arrival patient in bed watching TV, post completion of bath from [facility]. Pleasant & cooperative, denies pain upon assessment, reports she felt winded just after completing her bath and re-applied her oxygen, O2 saturation 95% with oxygen in place. Caregiver reports patient having BM's daily now. Medication and personal care supplies reviewed, supplies are adequate. No further care needs expressed. Encouraged to call Hospice as need with 24 hour availability.

## 2022-02-25 NOTE — HOSPICE
Late entry for 2/18/2022  Pt was seen in the bedroom of the home that she shares with her daughter/caregiver Waqar Her, and other family members. Pt was free of complaint, but her daughter, Waqar Her, reports that had soft, almost liquid stool after addition of Senna to medication regimen. She stated intention to administer Senna every other day to see if that prevents this symptom. No questions were verbalized at the end of the nursing visit.   Waqar Her stated intention notify Hospice 24/7 for questions, concerns, needs, or change of pt status

## 2022-02-28 NOTE — HOSPICE
Late entry for 2/24/2022  Pt was seen in the bedroom of the home that she shares with her daughter/caregiver Sharita Chao and other family members. Pt evidenced some perioral redness, and reported experiencing some stinging on her buttocks when aloe wipes were applied. Linda Camarena reported that pt has been complaining of more frequent headaches; no nystagmus noted. Linda Camarena has been administering analgesia according to protocol. Linda Camarena stated that pt evidenced nausea during the last two mornings, but had eaten 100% of lunch. Pt's indwelling urinary catheter was patent, but evidenced sediment.  No questions noted at end of the visit; Linda Camarena stated intent to notify Hospice for questions, concerns, needs or change of pt status

## 2022-03-02 NOTE — HOSPICE
Patient in bed, watching TV, pleasant/smiling denies pain or difficulty breathing. Caregiver Brianne Kelly present at bedside. Caregiver reports BM's have slowed down again with no BM since 2/26/22, patient with intermittent c/o of nausea, without emesis, reports no change in nutritional intake. Digital rectal exam positive for soft/mushy stool. Patient had a small mushy BM after rectal exam, incontinent care provided. Daniel Daniels, [facility] informed. New order to increase Senna-Docusate to two tablets by mouth daily. Medication refill sent to [facility] for Senna, Norvasc, and Amiodarone conf # [id number]. Encouraged to call hospice for further care need or concerns.

## 2022-03-06 NOTE — HOSPICE
Late entry for 3/3/2022  Pt was seen in her bedroom of the home that she shares with her daughter/caregiver Theodore Davison and other family members. MSN Mickeal Tanner and Jeremiahhenok Laney was present during the nursing assessment. Pt had no visible redness on her face, and reported that she had no discomfort or stinging when her buttocks was cleaned. .  She reported that her birthday earlier this week was :just another day,\" but joked with nursing staff about aging. She ws fully  engaged in the nursing visit, and evidenced no verbal or non verbal signs or symptms of pain. Her indwelling urinary catheter is patent, but sediment has been  visualized. NP Jose L Cruz was notified; catheter and urine will be monitored. No questions or concerns were verbalized at the end of the nursing visit.   Theodore Davison stated intention to notify Hospice 24/7 for questions, concerns, needs, or change of pt status

## 2022-03-08 NOTE — HOSPICE
Upon arrival patient in bed watching TV with her oxygen off, smiling, verbally states, \"I feel OK\". Caregiver reports patient had a rough weekend (Saturday) reporting the need to give patient Oxycodone TID in one day AEB by facial grimaces, and verbal statements of not feeling well, unable to to specify source of discomfort. After PRN analgesic and nebulizer treatment, patient sustained relief and was comfortable throughout remainder of weekend. Additionally, caregiver reports patient with emesis on Saturday of clear phlegm. Appetite unchanged, no further nausea or emesis. Measured 02 saturation on RA during this assessment, resulted 95% at rest without oxygen. Caregiver requesting personal care supplies, order placed via [employer] order # [id number]. Electronic message sent to MSW per caregiver-Inez's request in assist with completing application for paid caregiver through Medicaid. Patient's granddaughter will now be the paid caregiver in the home, while Orlando Mann is at work. HHA arrived during nurse visit to provide bath. Medication reviwed, supply adequate, no refills needed. Patient /caregiver without further concerns/questions this visit. Encouraged to call hospice with 24 hour nurse availability.

## 2022-03-11 NOTE — HOSPICE
[facility] entry for 3/10/22  Pt was seen in the bedroom of the home that she shares with her daughter/primary caregiver, Rupa Banks, and other family members. RN Mgr Ozzie Hamm was present during the nursing visit. Pt was fully engaged in the assessment; pleasing and appropriate. She evidenced some expiratory wheezes and received a nebulizer treatment which she partially completed during the visit. Bilateral bases remain diminished, greater in the left than right; productive cough with white sputum reported  Rupa Phillipss  evidences use of antifungal and barrier creams; pt had some small red spots which appear to be fungal in nature in her perianal area. Anti fungal barrier cream applied by writer. Medications reconciled. No questions were verbalized at the end of the nursing visit.   Pt continues to evidence sediment in urine; NP Shamir Garcia notified; upon her recommendation, nursing staff will continue to assess and monitor   Rupa Banks stated intent to notify Hospice 24/7 for questions, concerns, needs,  Manny Mcintosh confirms reorder of Amlodipine 400 mg PO and Norvasc 5 mg PO for delivery Tuesday    NEW MEDICATION INITIATION DOCUMENTATION: Compazine/prochlorperazine  Consulted AT MD to report change in patient status: YES  Obtained Order from Provider for initiation of Symptom relief medication / other medication needed:YES   Documentation completed in Telephone/Visit Note in [address]  Reason medication is being initiated:medication accidentally discontinued from [facility]  MD / Provider name consulted re: change in status / initiation of new medication:Shamir Garcia  New Symptom(s): Nausea/Vomiting (intermittent)  New Order(s): 10 mg PO Compazine PRN every 6 hours as needed for nausea/vomiting  Name of person being taught: Rupa Banks  Instructions given: YES; take one tablet (10 mg) every six hours as needed for nausea/vomiting  Side Effects taught:YES; fatigue, drowsiness, headache  Response to teaching: verbalizes understanding    NEW MEDICATION INITIATION DOCUMENTATION: order clarification oxygen  Consulted AT MD to report change in patient status: YES  Obtained Order from Provider for initiation of Symptom relief medication / other medication needed:YES   Documentation completed in Telephone/Visit Note in [facility]  Reason medication is being initiated:order clarification; existing decrease in oxygen flow  MD / Provider name consulted re: change in status / initiation of new medication:  Shamir Garcia  New Symptom(s): order clarification; decrease oxygen to 2 l/nc to increase pt comfort  New Order(s): 2 l/nc continuous  Name of person being taught: Jason Carrillo (daughter)  Instructions given: YES oxygen to be administered at 2 l/nc  Side Effects taught:YES dry mucosa, related cough  Response to teaching: verbalized understanding    NEW MEDICATION INITIATION DOCUMENTATION: sterile saline  Consulted AT MD to report change in patient status: YES  Obtained Order from Provider for initiation of Symptom relief medication / other medication needed:YES   Documentation completed in Telephone/Visit Note in [facility]  Reason medication is being initiated:potential for occulusion of indwelling urinary catheter  MD / Provider name consulted re: change in status / initiation of new medication:Shamir Garcia  New Symptom(s): sediment in urine  New Order(s): 30 cc sterile saline instilled for obstruction or leakage of indwelling urinary catheter  Name of person being taught: Jason Carrillo (daughter)  Instructions given: YES; 30 cc sterile saline to be instilled in case of urinary obstruction or leakage  Side Effects taught:YES; bladder spasm  Response to teaching: verbalized understanding    NEW MEDICATION INITIATION DOCUMENTATION:zinc oxide  Consulted AT MD to report change in patient status: YES  Obtained Order from Provider for initiation of Symptom relief medication / other medication needed:YES   Documentation completed in Telephone/Visit Note in [address] [facility]  Reason medication is being initiated:reddened skin/fecal incontinence  MD / Provider name consulted re: change in status / initiation of new medication  Shamir Garcia  New Symptom(s): Reddened skin  New Order(s): apply thin layer of cream to reddendo skin as needed  Name of person being taught: [facility]  Instructions given: YES  apply think layer of cream to reddened skin as needed  Side Effects taught:YES dry skin; possible hives if allergic reaction   Response to teaching: verbalized understanding    NEW MEDICATION INITIATION DOCUMENTATION:  Remedy Phytoplex (Miconazole 2%)  Consulted AT MD to report change in patient status: YES  Obtained Order from Provider for initiation of Symptom relief medication / other medication needed:YES   Documentation completed in Telephone/Visit Note in [facility]  Reason medication is being initiated:reddened skin  MD / Provider name consulted re: change in status / initiation of new medication:Shamir Garcia  New Symptom(s): reddened skin  New Order(s): apply thin layer of cream to affected, reddened skin  Name of person being taught: [facility]  Instructions given: YES  apply thin layer of cream to affected, reddened skin  Side Effects taught:YES burning sensation, increased irritation, flaking of skin  Response to teaching: verbalized understanding

## 2022-03-18 NOTE — HOSPICE
Upon arrival patient in bed eating lunch post bathing by A. Denies pain or difficulty breathing. Patient reports she has no concerns verbally stating, \"I  feels fine\". Daughter Selena Leger reports patient has complained of headaches the past two days , no complaint thus far today. Lisa Marie, FOREST informed with new order for Fioricet PRN for headache. Selena Leger in agreement with plan of care, medication sent to [facility] for delivery per her preference. Personal care supply ordered. No further needs expressed. Encouraged to call hospice for further care needs.

## 2022-03-22 NOTE — HOSPICE
Ms Jourdan Lepe was in bed watching TV when nurse arrived  she denied any pain, discomfort or shortness of breath, oxygen concentrator in but nasal cannula not in pt nose. SAT 98%  pt denied headaches today, but continuous to get lightheaded when she turn to the left side  Selena Leger reported pt had a rash on her right lower back and she applied antifungal cream and rash has improved  mild rash noted to back area, no redness, blistering, warm or drainage note, per Selena Leger pt has not use anything differently that make have caused the rash. Selnea Leger reports be will occasional break out in a rash on her face, not rash present on face during visit  farnsworth assessed to be patent, sediment noted in tubing, tubing flushed and catheter drainage bag switched out  no signs if UTI noted and no reports of alteration in mental status, foul smelling urine or fever  incontinent care provided, pt incontinent of medium amount of mushy brown stool. mild rash noted to sacral area, per Selena Leger rash has improved with barrier cream  Selena Leger was concern with the fluctuation in patient blood pressure. explained fluctuation could be contributed to many factor, pt asymptomatic with blood pressure reading  Selena Leger reported had an episode in which she grasp her chest, but was not abl to explain what was going on, explained to Selena Leger this could be related to difficulty breathing or chest pain. informed Selena Leger to monitor for other episodes and to give dilaudid for discomfort as needed, Selena Leger reported she gave pt her nebulizer treatment but was not effective and patient took it off. reviewed the use of dilaudid again for any type of pain, no episodes during nurse visit.  no other issues or concerns voiced, encouraged Selena Leger to call hospice with any questions, concerns or changes in pt status  no medications or supply refills needed this visit.    plan for next sn visit: ask about other episodes of chest discomfort and how treated,

## 2022-03-22 NOTE — HOSPICE
Virtual SW Visit (via phone) completed on today's date with daughter/primary caregiver, Prasanna Lama. Tom Campbellnida reports that pt is comfortable with no needs or concerns. MSW unable to assess pt as virtual visit completed. Tom Hassan states that pt remains dependent for ADLs but can verbalize basic needs/wants. Purpose of virtual visit was to follow up on progress with setting up a family member as the paid Medicaid caregiver under the Medicaid waiver. Tom Hassan advises that she used the resources shared by MSW and made contact with Deangelo Landis of [employer] to set up with service. Tomeden Hassan is waiting to be contacted back by Deangelo Landis with additional information and will then connect her to her daughter who will serve as the paid Medicaid caregiver. Tom Hassan advises that this is a temporary solution and that she is still interested in pursuing LTC placement if a bed becomes available in a facility of her preference. Pt is reported to be happy with the temporary plan for her granddaughter to serve as her Medicaid caregiver. MSW will continue to assist Tom Hassan with LTC placement search.

## 2022-04-01 NOTE — HOSPICE
Upon arrival patient lying in bed watching TV, awake and smiling at nurse, daughter Ulice Boxer at bedside. Patient denies pain and SOB, but when asked to raise and extend her right arm verbalized, \"ouch\" and then stated, Natalie Dumas hurts when I move it\". Physical assessment demonstrates increased ROM to right arm but still remains with discomfort. Per patient and daughter pain has improved, but still there. Daughter reports patient is able to help a little more in bed mobility since last nursing visit. Caregivers reports daily BM's since last nursing visit. Patient and caregiver educated to continue plan of care in management of pain, notify hospice if pain unrelieved. No further care needs expressed. No supplies needed. Encouraged to call hospice as need for further concerns, daughter verbalized understanding.

## 2022-04-06 NOTE — HOSPICE
Upon arrival patient lying in bed watching TV, awake and smiling at nurse, oxygen not in place. Daughter Pilar Joseph at bedside, reports oxygen has been off since bath given by [facility], approximately one hour. Patient denies pain and SOB. Daughter reports pain has resolved, and patient has not complained of breathing trouble. Physical assessment demonstrates increased ROM to right arm without complaints of pain. Patient fully extended and rotated right arm. Daughter reports patient is  helping  more in bed mobility since last nursing visit. Jessica Nowak, NP informed, orders given to continue Prednisone and will re-evaluate on next visit (4/7/22). Patient and caregiver educated to continue plan of care in management of right shoulder, and to notify hospice of any unrelieved pain. [facility] called regarding missing items from last order. Spoke with representative whom will send items via standard shipping. Daughter provided with pack of wipes from car stock. Encouraged to call hospice as need for further concerns, daughter verbalized understanding.

## 2022-04-08 NOTE — HOSPICE
Upon arrival patient lying in bed watching her favorite TV shows. Denies pain and SOB. Her nasal cannula is laying on her bed, O2 sat on RA 95%. Patient reports she feels fine and has no trouble breathing. Daughter reports patient had a \"rough morning\" with complaints of \"just not feeling right\". Caregiver observed patient attempting to reposition self in the bed to get comfortable. Caregiver medicated patient with Hydromorphone solution which was effective. After physical assessment and bed mobility, patient began to utilize purse lip breathing. Patient reported difficulty breathing when having to turn, lay on her left side, patient elevated her [facility] with her bed remote. Patient instructed to re-apply her oxygen. Caregiver administered aerosol nebulizer treatment with positive effectiveness. Caregiver reports she must routinely re-apply patients' oxygen throughout the day, patient removes it and then forgets to reapply it. Patient's impaired cognition limits her ability in effectively expressing and understanding her care needs. Patient easily engages in conversation and smiles, but when needing to answer a question she looks to her daughter Aliza Cox to answer. Patient stated, \" I did not know I was sick and needed a nurse to come and check on me, is that why you always come over\". This nurse re-educated patient that her lung disease is terminal without a cure, patient replied, \"oh yeah I used to smoke 1 1/2 packs of cigarettes a day\". Patient enjoys watching Dr. Shanelle Johns and  TV shows. Patient's needs are largely anticipated by Aliza Cox who monitors for non-verbal display of pain or respiratory discomfort. Patient and caregiver report right shoulder pain has resolved. Patient demonstrated her ability to fully extend right arm above her head and abduct/ adduct without discomfort. Caregiver reports patient is able turn and reposition more and help with moving self-up  in the bed.   Loreto Bennett NP informed of resolved right shoulder pain. New orders given to decrease Prednisone 10mg by mouth daily to 5 mg by mouth daily. Haim Ceja verbalized understanding. Nurse assisted caregiver in splitting the remaining scored 10mg tablets into 1/2 tabs to equal the correct 5 mg dose. POC in the home updated. Haim Ceja has returned to in person work and has enlisted her daughter as the alternate caregiver on Monday and Wednesday. Granddaughter will be patients Medicaid caregiver, once paperwork has been finalized with the [employer] program. Haim Ceja continues to search for LTC facilities for placement, patient is on waiting list at two facilities. Haim Ceja reports that her daughter is not aware that patient has a signed DNR document. To reduce the risk of granddaughter calling 911 during the days in which she is the primary caregiver; Haim Ceja was instructed by this nurse to inform her daughter that patient is a DNR, and in the event patient is observed not breathing or without a pulse she is to call Hospice immediately. Haim Ceja verbalized understanding and will speak with her daughter. Haim Ceja to place a copy of DNR document in patients plan of care folder. Hospice phone number located on refrigerator magnet. No further care needs expressed, no medication or care supplies needed. Encouraged to call hospice for needs with 24 hour nurse availability. Primary Hospice Diagnosis: Small Cell Lung Cancer  Co-Morbidities: HTN, Dementia, COPD, Seizure disorder, CVA, AFIB, Renal Failure, Hyperlipidemia, seizure disorder  Recertification: entering her 4th Hospice certification period 4/12/2022. Face to Face completed 3/28/22 by Jayjay Cisneros   Pt shares a home with her primary caregiver/daughter aHim Ceja and other family members, but is awaiting LTC secondary to her primary caregiver returning back to work in person vs. remote work.   Pt is bedbound and has an indwelling urinary catheter and is incontinent of bowel. Problem: Respiratory status AEB expiratory wheezing. Bilateral bases remain diminished, greater in the left than right; productive cough with white sputum reported by daughter, Rupert Henderson. Pt. was on O2 at 4 LPM NC and was tapered down to 2 LPM NC on 3/10/22 for comfort and pts. tolerance. Encouraged utilization of nebulizer treatments for SOB and wheezing. Pt with an occasional productive cough of tan secretions. Problem: Impaired skin integrity AEB excoriation on bilateral buttocks at cleft and on upper, inner thighs. Zinc paste applied. Reinforced frequent positional changes to decrease pressure. Instructed pt. to report when soiled AEB patient does not report to her daughter to facilitate incontinent changes as moisture contributes to skin breakdown. 3/10/22 miconazole (Remedy Phytoplex AntifungaL) 2 % topical ointment. Plan to continue to reinforce frequent incontinent checks and application of barrier ointment with incontinent changes. Problem: Elimination/[place] Catheter 2/16/2022 pt's indwelling urinary catheter was changed. 3/22/2022 flushed by nursing staff to maintain patency. Problem: Constipation AEB irregular BMs on 3/1/22 Senna S increased from 1 tablet to 2 tablets po daily  Problem: Infection AEB S/S of catheter r/t UTI with changes in urine characteristics foul odor and urine with sedimentation. 2/7/22 Cipro 500 mg PO BID. Stopped on 2/8/2022 as she developed a facial rash. MD notified and Valencia Moshernicknura was added to her allergy list.  2/8/22 Started on Bactrim DS BID x7 days. Her left MAC has decreased from 34 cm to 33 cm on 3/15/22.                     New orders:            NEW MEDICATION INITIATION DOCUMENTATION:   3/10/22 Compazine 10 mg PO every 6 hours as needed for nausea/vomiting   3/17/2022, pt was prescribed Firocet (Butalbitol 50 mg, Acetaminophen 325 mg, Caffeine 40 mg) 1 tablet every 6 hours as needed for complaints of headache pain, after pt. had two consecutive days of severe HA unrelieved with current pain regimen   3/25/22 Omeprazole 20mg by mouth daily for acid reflux    3/30/22 Prednisone 10 mg po daily for pain management of R shoulder pain    4/7/22 Prednisone 10mg po daily discontinued. 4/7/22 Prednisone 5 mg po daily for right shoulder pain          Plan for next two weeks complete SN recertification vst, Update POC and MAR. Plan to follow up with respiratory status and symptom management, skin integrity and comfort. Per chart review nursing recommending recertification. Pt. Name: ___Faith Almazan_________ ID#: _[id number]__________ Date: 4/7/22 completion______________    Cancer Diagnoses     ________  A. Disease with distant metastases at presentation OR    ____x____ B.  Progression from an earlier stage of disease to metastatic disease with either:                          ________  1. continued decline in spite of therapy                          ____x____ 2. patient declines further disease directed therapy

## 2022-04-10 NOTE — HOSPICE
PRN visit made to assess patient comfort as oxygen concentrator was making an alarm and patient had to be switched over to oxygen tank. Patient received in bed, appears pale, c/o shortness of breath. Patient with moderate wheezing (neighbors are also burning leaves), declines nebulizer, accepts inhaler. Shortness of breath improved after inhaler use, sats in mid 90's. Juliana chamorro arrived during nursing visit and switched out oxygen concentrator with new machine, new tubing, new humidification. Daughter verbalized appreciation for care. Patient appears well palliated.

## 2022-04-13 NOTE — HOSPICE
Patient lying in bed awake watching her favorite TV show. Appears in no acute distress, denies pain and shortness of breath. Skin warm/dry to touch. Daughter Mariposa Hackett at bedside reports patient has begun to complain of right arm pain again, and rubbing /guarding it. Patient denied pain until asked by this nurse to mobilize her right am. Patient complained of pain when abducting right arm. Patient medicated with Aspercreme and Oxycodone by daughter. Elizabeth Mccann NP informed, new orders given to increase Prednisone back to 10mg PO daily. Daughter verbalized understanding to give two of the Prednisone 1/2 tabs to equal a 10mg dose daily. Caregiver reports no further breathing concerns since delivery of new concentrator on Saturday. No further care concerns expressed this visit. Medication refills to [facility] Confirmation #: [id number],  Juliana# [id number] for Thursday delivery of new/ pickup of empty M60 tank, [facility] personal care supplies Order Number : [id number]. Encouraged to call for further care concerns with 24 hour nurse availability.

## 2022-04-15 NOTE — HOSPICE
Introduced self to Patient. Patient was quiet. Spoke with daughter on the phone  and she reported Patient had been complaining of SOB, chest pain related to heartburn. Patient denied experiencing SOB or heartburn. Supplies had been delivered during assessment and medications are due to arrive by 8pm tonight. Encouraged family to contact [phone] with questions or concerns.

## 2022-04-22 NOTE — HOSPICE
Patient lying in bed awake watching TV with nasal cannula in place, denies pain and SOB. Patient quiet, less talkative with reduced emotional expression today. Daughter Mathews Curling at bedside reports patient has not complained of pain but continues with cough, and observed patient to be sleepy related to cough keeping her awake at night. Rhonchi auscultated to RUL and RML, 02 sat on oxygen, normal. Daughter initiated patient on a nebulizer, too which patient held face mask up against her face intermittently. Sheri Brownlee NP informed with need orders given for cough suspension, HYDROcodone-chlorpheniramine (TUSSIONEX) 10-8 mg/5 mL suspension PO Q 12 hours PRN. Medications and POC reviewed with Mathews Curling, no further medications concerns expressed. Daughter reports since Hospice admission patient continues to experience dizziness when turning to her left side only, and inquiring if this maybe evidence of Left sided lung cancer  moving toward left side of patient's brain. Daughter reports she is not aware of patient having any brain scans to determine cause. Brice García NP informed and reports she will follow up with Hospice medical director. No further care needs or concern expressed, encouraged to call hospice as needed.

## 2022-04-27 NOTE — HOSPICE
Upon arrival patient reported she had chest pain while SOB this morning 0645 requiring Hydromorphone x1 dose. This relieved the pain and she has not experienced any further pain. She was wearing 2LO2 via NC during assessment. No supply or medication needs. Patient is experiencing a smear and small BM daily. Instructed daughter to glove 2 Senna S in am and 2 in PM for a few days to produce a healthy BM. Encouraged Kwasi Annchante to call [phone] with questions or concerns.

## 2022-04-28 NOTE — HOSPICE
Patient lying in bed awake watching TV with nasal cannula in place, denies pain and SOB. Daughter Nereida Nye at bedside reports patient  had a very Large BM today, and reports patient is sleeping better at night related to the TUSSIONEX cough syrup. Reports appetite is varying as patient appetite was poor yesterday, but today she cleaned her entire plate for lunch. Reports fluid intake is fair. Encouraged to continue meal and fluid intake to her comfort. Medications and POC reviewed with Nereida Nye, no further medications concerns expressed. Expressed need for medication and care supply refill, orders placed with [facility] #[id number] and [facility] #[phone]. Encouraged to call hospice as needed.

## 2022-04-29 NOTE — HOSPICE
PRN visit to assess daughter's report of patient coughing up blood. Upon arrival, patient resting in bed with daughter, Aliza Cox, present. Patient denies pain or shortness of breath. O2 at 2L in place. No sores or trauma noted to patient's mouth. Aliza Cox reports patient typically coughs up clear thin \"phlegm\" but this morning she noticed some blood on the tissue. Rhonchi noted to upper lung fields and diminished in the bases. Scant amount of blood noted on tissue after patient coughed during visit, and trash can contains several tissues with scant to small amount of blood. Eleonora Plaza that the amount of blood seems to be decreasing and current medications have been helping her mother's cough. Assessment findings reported to Dr. Jf Cadet. Orders received to continue to monitor patient at this time and potential need for antibiotics in the future if there are changes. RN encouraged Aliza Cox to call hospice 24/7 if patient experiences an increase in bloody sputum or if the sputum changes to other colors or patient becomes short of breath. Eleonora Plaza understanding. She states she will try a humidifier in the room as well. No additional needs identified at this time.

## 2022-05-03 NOTE — HOSPICE
Patient receiving bed bath from [facility] upon visit arrival. Patient states she is very pleased with HHA care. Patient's daughter, Marcelo Delgado, is present in the home and is the patient's primary caregiver. Daughter reports that on Friday of last week, patient had been coughing up blood-tinged sputum. On this visit, daughter reports sputum is clear again. Daughter states she suspects the bloody sputum is related to patient's diagnosis of lung cancer. Patient denies any pain or shortness of breath during this visit. Assessment completed. Medications reviewed with daughter. No refills needed at this time. Daughter states they are not in need of any hospice supplies at the moment. Daughter verbalizes understanding to call hospice with any needs, questions, or changes in patient's condition. RNCM updated.

## 2022-05-03 NOTE — HOSPICE
Virtual Routine SW Visit (by phone) completed on today's date with daughter/caregiver, Jesica Kinsey. Jesica Kinsey reports pt is calm with no signs of distress or pain. Pt's impaired cognition does not allow her to engage in complex discussion. Pt can answer for basic questions with 1-2 word responses but needs assistance from daughter for executive decision making. Heidi Oliveira also advises that she finds herself having to anticipate pt's needs or observe her facial expressions and body language for signs of distress or pain because pt cannot always verbalize her needs. Per Inez's request; MSW has been searching for LTC placement for pt. MSW contacted [address] [facility], [address] of [facility], [address] of [employer], [employer] as well as [address] [facility],[address] of [facility] but no Medicaid beds are available at this time. Jesica Kinsey states that she has also contacted facilities with the same results. Jesica Jeffersonsrinivas advises that for now; her daughter has been successfully established as the Medicaid aide through [employer]. Pt is pleased with granddaughter caregiver for her during times when Jesica Kinsey has to go into her office to work. Jesica Kinsey reports that the current arrangement is working fine for now as she and MSW continue to seek LTC placement. MSW reminded Jesica Kinsey of the respite benefit for caregiver break but she declines this for now again citing that the current care arrangement is working fine and her daughter's assistance with caregiving affords her adequate breaks. SW to continue to follow.

## 2022-05-04 NOTE — HOSPICE
SW visit declined for month of April. Zayra Lopez reports pt is at baseline. Inez's daughter has completed paperwork with [employer] to become pt's paid Medicaid caregiver. Granddaughter Othella Jobs for pt on days when Zayra Lopez has to go into the office to work. Granddaughter has not received her first paycheck yet but advised that she will start getting paid/reimbursed soon. MSW discussed Inez's goal of continuing to pursue LTC placement. Zayra Lopez confirms this goal as she is only working in the office 2 days per week for now but it may soon increase to a full work week in the office. MSW and Zayra Lopez discussed possible facilities for MSW to check with about Medicaid bed availability.

## 2022-05-06 NOTE — HOSPICE
Collaborative visit with Nursing and NP to follow up on coughing up blood tinged sputum. No evidence of this today, daughter Miroslava Wakefield reports she had some over the weekend. Discussion that her vitals are good, she looks good today and no signs of infection at this time. This is a symptom of the disease process. No medication or supply needs at this time. Oxygen is lying across the bed as the patient tires of wearing it. She shoes no signs of SOB or distress. No coughing during assessment. Encouraged family to contact [phone] [employer] at anytime with questions or concerns.

## 2022-05-10 NOTE — HOSPICE
Upon arrival patient is sitting up in bed watching television. She is in great spirits and reports feeling better today. She reports she had a bath today. She could not recall if the HHA washed her hair. Per daughter Beto Moncada patient is still coughing up blood tinged mucous at times but nothing alarming. Discussion of the disease progression and this is very normal. To call and report this is there is a change in consistency or bright red bleeding. Beto Moncada verbalized understanding. Encouraged Beto Moncada to call with questions or concerns at anytime.

## 2022-05-12 NOTE — HOSPICE
Patient lying in bed awake watching TV, and smiling upon nurse's entrance to her room. Patient reports she is feeling well, denies pain and SOB. Caregiver Inez at bedside, reports patient has not displayed evidence of pain, or shortness since last nurse visit. Patient observed not wearing her oxygen, it was laying under her right arm. Patient verbalizes, \"oh I didn't know I needed it\". Oxygen saturation on room air WNL. Nasal cannula changed related to discoloration and re-applied to patient's face. Patient observed with intermittent dry, non-productive cough throughout visit. Caregiver encouraged to give Tussionex as it has just arrived from pharmacy. Cough observed to have subsided at end of visit. Caregiver expressed needs for medication refill, request completed via [employer] for Pacerone and Norvasc Confirmation #: [id number]. Personal care supply pending delivery from [facility]. No further care needs expressed. Encouraged to call hospice for further care needs.

## 2022-05-18 NOTE — HOSPICE
Upon arrival patient lying in bed with a flushed face, nasal cannula lying on the bed, reporting that she is hot. Caregiver-Ruddy and patient reports she is feeling hot and anxious. Patient observed throwing covers off of herself, and fanning herself in attempts to cool down. Patient offered a cool beverage and cool cloth to her forehead, along with repositioning with minimal effectiveness. Patient's fan in her room turned on, this nurse medicated with Lorazepam 0.5mg SL. After 15 minutes patient reporting she is feeling a better. No further care needs expressed, medications reviewed current supply adequate, and no personal care supplied needed. Encouraged to call for further care needs.

## 2022-06-01 NOTE — HOSPICE
Patient lying in bed awake watching TV, smiles upon nurse's entrance to her bedroom. Patient denies that she is having any issues. Caregiver Inez at her bedside. Daughter reports patient experienced N/V over the weekend of undigested food, was medicated with PRN anti-emetic which was effective. Caregiver reporting, \" I am giving her hefty portions, sometimes she eats it and sometimes she doesn't\". Further reports patients appetite waxes and wanes, and she has become picky about meal intake. Daughter indicates that patient does not ask for meals, she jsut brings them in. Daughter  encouraged to offer smaller food portions at a time to see if this allows increased time for digestion reducing the risk for indigestion,  N/V, and to ensure upright positioning in bed during meals. Daughter verbalized understanding. Per clinical chart review by Cooper Travis, Clinical Supervisor, on 5/27/22- Recommendation for hospice recertification based on small cell lunger cancer diagnosis with evidence of decline including shortness of breath, pain, and hemoptysis. Lona Brumfield is an [de-identified]year old female entering her 5th benefit period on 6/12/22. Date of last face to face: 5/13/22  PPS: 30%  She is alert and oriented x2 with noted memory loss. ADL/Mobility: She is mainly bedbound and requires assistance with ADLs  Intake: She is eating 2 meals, 2 snacks per day. During the previous benefit period, it was reported that she was eating 3 meals per day. Output: She has a farnsworth for retention and is incontinent of stool. Skin/Wounds: Her Ferdinand score decreased from a 15 on 4/7/22 to 13 on 5/19/22. She has had blanchable erythema to her lower back and buttocks and utilizes barrier cream in addition to frequent turning and repositioning. Pain (medication regimen): She has experienced right arm pain and her prednisone was increased back to 10mg on 4/12/22. She has also complained of chest pain related to shortness of breath.  She experiences headaches and finds relief with Fioricet 1 tab every 6 hours PRN. She has Dilaudid 1mg every hour PRN and oxycodone 5mg every 6 hours. She has aspercreme topical cream 4 times daily as needed for shoulder pain. In addition, she takes Prilosec for dyspepsia. Oxygen: She has shortness of breath at rest and has oxygen ordered at 2L continuously although she takes it off at times for comfort or due to forgetfulness. She has an intermittent cough which is improved with PRN Tussionex which was initiated on 4/21/22 as well as PRN nebulizer treatments. She had a PRN visit for hemoptysis on 4/29. Her lungs have intermittent rhonchi and wheezing  Psychosocial: She has had increased anxiety that improves with sublingual lorazepam. Her daughter is exploring LTC placement as she may have to return full time to the office soon. Problem: headache  Caregiver reports patient had headaches the weekend of 5/22/22 which was relieved with Fioricet. Plan: Monitor patient's pain and offer support to family as they explore caregiving options. SN visit frequency/ disciplines following:  SN 2x weekly, HHA 2x weekly, MSW following, spiritual care services declined. Use LCD Guidelines and list features:     Cancer Diagnoses     ___X_____  A. Disease with distant metastases at presentation OR        ________  B.  Progression from an earlier stage of disease to metastatic disease with either:                            ________  1. continued decline in spite of therapy                            ________  2. patient declines further disease directed therapy

## 2022-06-03 NOTE — HOSPICE
Upon arrival patient is sitting up in bed watching television without her nasal cannula on. She denies pain or discomfort. Caregiver Inez at bedside, reports continued SOB episodes which is effectively manged with Hydromorphone PRN. Encouraged to place oxygen on her face, she declines this. Daughter express no new care concerns. Encouraged to call hospice for futher care needs.

## 2022-06-08 NOTE — HOSPICE
Upon arrival patient sitting upright in bed watching TV without her nasal cannula on, observed it laying on the floor. Caregiver administered a nebulizer during this visit for patient's complaint of chest pain when coughing. Positive effects observed after nebulizer and Hydromophone given by daughter. Oxygen saturation 92% on RA. Daughter reports patient is using her Tussionex PRN for cough. Caregiver reports patient with episodes of nausea and vomiting before and after meals over the weekend. Emesis of undigested food and clear phlegm. Caregiver has administered Compazine PRN with fair effects. Last BM on Sunday which was small, prior to that caregiver reports patient had an explosive BM on Thursday. Caregiver remains concerned that BM's are becoming small again. Rectal check performed and negative for stool. Dr. Francie Bravo notified, new orders given. New prescription called in to [facility] [id number], for delivery today. Spoke with pharmacist Moraima Tiwari, and technician Crescencio Toure. Daughter Olden Fetch understanding of new orders. Daughter re-educated to administer smaller portion of meals to allow increased time for digestion. No further care needs expressed. Daughter encouraged to call hospice for further care needs. Medications reviewed, no refills needed. No personal care supplies needed. NEW MEDICATION INITIATION DOCUMENTATION:  Consulted AT MD to report change in patient status: YES  Obtained Order from Provider for initiation of Symptom relief medication / other medication needed:YES   Documentation completed in Telephone/Visit Note in [facility]  Reason medication is being initiated:nausea, emesis  MD / Provider name consulted re: change in status / initiation of new medication:Mery Justo  New Symptom(s): nausea, emesis  New Order(s):bisacodyl 5 mg tab,  Take 1 Tablet by mouth daily.  Indications: constipation     ondansetron (ZOFRAN ODT) 4 mg disintegrating tablet, Give 1 tablet by mouth as needed three times a day, 30 minutes prior to meals Indications: nausea   Name of person being taught: Jany Cooper  Instructions given: YES  Side Effects taught:YES  Response to teaching: verbalized understanding

## 2022-06-09 NOTE — HOSPICE
Upon arrival patient lying in bed with HOB elevated, oxygen not in place, and holding a trash can. Caregiver reports patient continues with nausea, and emesis of clear phlegm and undigested food. oxygen re-applied to her face. Reports patient was medicated with anti-emetic this AM. Reports large explosive BM last evening. During physical assessment observed Compazine pill laying under patient's arm. Daughter reports Zofran did not arrive from the pharmacy. Rx 3 pharmacy contacted and spoke with [facility] (pharmacist)  whom indicates medication is in route today, was delayed related to pending billing approval. Daughter re-educated to call hospice when medications do not arrive as promised. Daughter stated, \"well I knew you were coming today\". Daughter instructed to give patient a dose of Zofran upon its arrival today. Patient sucking on a hard candy, and denies that she is nauseated. Daughter given a pill box to aide her in keeping medications organized for daily administration. Pill box filled by this nurse. Upon exiting home, patient preparing to eat [employer] for lunch. Daughter educated to call hospice for further care concerns. Medication refills arrived from [place] yesterday. No personal care supplies needed.

## 2022-06-14 NOTE — HOSPICE
SW visit declined for month of June. However, SW assistance provided on 6/7/22 with accessing a community resources and [employer] to address a plumbing issue. Approval of [employer] received from [employer] and assistance coordinating for plumbing service provided by [address] [place] [address]. Last Virtual Routine SW Visit (by phone) completed on 5/3/22. Plan for Next 2 Weeks: SW visit in July. Ongoing assistance to secure LTC placement. Problem 1: Pt has not completed pre-planning for final arrangements or made her wishes known. Problem 2: Increased caregiver support/plan for event of daughter's employer ending telework and need to return to her work site. Community Resources: List of cremation providers and Alzheimer's Respite Application provided. List on contracted skilled nursing facilities and pt's Medicaid 's name and contact information shared with daughter/caregiver. Respite offered but declined. Advance Directive: None. DNR: Pt is a DNR       Final Arrangements: Not completed at this time. Desire for direct cremation with no services expressed. MSW has provided a resources list of cremation providers.

## 2022-06-15 NOTE — PROGRESS NOTES
[facility] Hospice Monitoring   Usha 15, 2022  Attending: Dr. Delmis Olea  Pharmacist monitoring provided for this [de-identified]y.o. year old female at [facility] [address]     Admitting dianosis treated appropriately : YES    Nausea/Vomiting controlled: YES    Pain Controlled: YES    Agitation/Anxiety/ Delirium controlled: YES    Depression controlled: YES    Secretions controlled : YES    Constipation/Bowel routine controlled: YES    SOB/ Dyspnea controlled: YES    Insomnia: YES

## 2022-06-15 NOTE — HOSPICE
1210 Patient arrived via hospital to home transport, patient transferred from stretcher to bed, patient tolerated activity well. Patient denies pain at this time, patient denies nausea/vomitting at this time. Patient with strong, productive cough, patient appears to wheeze with minimal activity. Patients states she has been feeling short of breath often. 5000 Dr. Carolina Flynn at the bedside. 1230 Patient states she is not hungry for lunch, patient only wants ice water and sprite. Beverages brought to patient. 1245 Patient given PRN nebulizer for shortness of breath/wheezing. 1330 Patient repositioned in bed for comfort, patient states she is uncomfortable. Patient tolerated activity well. 1415 Patient repositioned in bed for comfort, patient states she is uncomfortable. 1445 Patient complaining of back pain and that she is uncomfortable. Patient repositioned in bed with FRANCK Levin. PRN Roxicodone given as well as PRN compazine due to patient having frequent nausea/vomitting. Pills crushed and put in jello and given to patient. Patient immediatly began coughing and then vomited up pills/jello. Patient then given PRN SL dilaudid, patient let medication sit under tongue, no vomiting occurred. 1500 Patient complaining of back pain. Patient repositioned in bed with CNDAVID Spence Spindle. 1600 Patient's daughter at the bedside, MD notified of daughters arrival.    2568 Dr. Carolina Flynn speaking with patient and daughter, medications adjusted due to difficulty swallowing and increased pain. IV attempted, unsuccessful. Subcutaneous lines started. 1705 Patient given PRN lorazepam, dilaudid, and robinul, for pain, anxiety, shortness of breath, and secretions,  see MAR. Will continue to monitor. 1740 Patient resting in bed quietly, eyes are closed, neutral facial expression noted, respirations are shallow and unlabored, secretions less audible at this time. PRN medications effective.

## 2022-06-15 NOTE — H&P
[facility] Help to Those in Need  [phone]    Patient Name: Lamine Daniel  YOB: 1940    Date of Provider Hospice Visit: 06/15/22    Level of Care:   [] General Inpatient (GIP)    [] Routine   [x] Respite    Current Location of Care:  [] [facility] [] [facility] [] [facility] [] [facility] [x] Hospice House [facility]      Principle Hospice Diagnosis: Small cell lung cancer       HOSPICE SUMMARY     Chart Reviewed for patient's medical history and hospice care plan. Hospice Physician Certification/Recertification Narrative per Jw Alcaraz / other MD:     Patient presents to the hospice house for respite level care. Patient with a history of small cell lung cancer and has been on service since August 2021. In reviewing the chart, patient was diagnosed in August 2021. She did receive 1 cycle of chemotherapy and XRT at 5 fractions. Unfortunately, patient with a CT scan that showed mediastinal lymphadenopathy as well as complete collapse of the left lung with large left pleural effusion, mucous plug in the left mainstem bronchus. At that time, she received IV antibiotics and was evaluated for cardiothoracic surgery. She initially had a chest tube placed. Patient remained fatigued, on supplemental oxygen, remained max assist x2. Chest x-ray from August 12 showed resolved right pleural effusion but also stable complete opacification of the left hemithorax. Patient apparently has had increasing cough, wheezing despite her daily prednisone at 10 mg. Attempts to use oral antibiotics thinking that maybe she had a postobstructive type of pneumonia because nausea and vomiting. Patient also with underlying dementia which makes it a little bit challenging in determining symptoms. During my bedside evaluation, patient denied any pain but does admit to having increasing shortness of breath.     General-alert to person, audible wheezing  HEENT-slightly dry oral mucosa  Lungs-wheezing, no breath sounds on the left, decreased at the right base, mild increased WOB, mild accessory muscle use  CV-RRR  Abdomen-soft/NT  Extrem-no C/C/E  Neuro-nonfocal    Hospice Dx  1. SOB  2. Extensive met small cell lung cancer  3. Disease progression  4. Wheezing  5. N/V per report    Patient being admitted for Respite care x 5 days for   [x]  Caregiver exhaustion and needing break  []  Caregiver unavailable       PLAN   1. Patient's home medications were reviewed and reconciled. Continue with current home medications and plan of care as outlined in chart. 2. Increase Prednisone to 20 mg daily  3. Try duonebs q4 prn wheezing  4. Hold abx at this time  5. Comfort meds-possible could transition to EOL given symptoms  6. Left a message for daughter  7. Addendumpatient has had a very difficult day. She is having issues with recurrent vomiting/dysphagia. She essentially is unable to swallow any pills and even struggling with liquid. Continues have evidence of pain and shortness of breath. My suspicion is this is disease progression and may have disease that is impacting her esophagus. Was able to meet with daughter outside the room and discuss our concerns as patient appears to be declining. She was observing similar issues in the home setting. Patient also with underlying dementia which makes this a little more complicated as she does not appear to complain but clearly having dyspnea, pain. We agreed to start an IV/subcu access and provide medication via that route for comfort. Explained to her daughter that she may be transitioning towards end-of-life if the inability to eat or drink continues. She thought that may be the case and obviously would be difficult for her to manage in the home setting if she cannot continue on oral medication. We will keep her up-to-date about these changes and plan of care over the next 24 to 48 hours.   8. Will change patient to [facility] level care after midnight given frequent nursing assessment needs, IV medication management. 5.  and SW to support family needs. 10. Disposition: Home with hospice once respite stay is completed.

## 2022-06-15 NOTE — PROGRESS NOTES
Problem: Pressure Injury - Risk of  Goal: *Prevention of pressure injury  Description: Document Ferdinand Scale and appropriate interventions in the flowsheet. Outcome: Progressing Towards Goal  Note: Pressure Injury Interventions:  Sensory Interventions: Float heels,Minimize linen layers,Turn and reposition approx. every two hours (pillows and wedges if needed),Check visual cues for pain    Moisture Interventions: Absorbent underpads,Minimize layers,Limit adult briefs,Moisture barrier,Internal/External urinary devices    Activity Interventions: Assess need for specialty bed    Mobility Interventions: Assess need for specialty bed,Turn and reposition approx. every two hours(pillow and wedges)    Nutrition Interventions: Document food/fluid/supplement intake    Friction and Shear Interventions: Apply protective barrier, creams and emollients,Minimize layers,Lift sheet                Problem: Patient Education: Go to Patient Education Activity  Goal: Patient/Family Education  Outcome: Progressing Towards Goal     Problem: Falls - Risk of  Goal: *Absence of Falls  Description: Document Norma Fall Risk and appropriate interventions in the flowsheet.   Outcome: Progressing Towards Goal  Note: Fall Risk Interventions:       Mentation Interventions: Adequate sleep, hydration, pain control,Bed/chair exit alarm,Door open when patient unattended    Medication Interventions: Bed/chair exit alarm    Elimination Interventions: Call light in reach,Bed/chair exit alarm              Problem: Patient Education: Go to Patient Education Activity  Goal: Patient/Family Education  Outcome: Progressing Towards Goal

## 2022-06-15 NOTE — HOSPICE
1900 Received report from [place], [facility]. Assumed care of patient. 1948 Pt short of breath, using accessory muscles, having a difficulty time speaking. Coughing noted. Wheezing noted throughout. Hypoactive bowel sounds. PRN Ativan, Dilaudid administered via left subq site. Albuterol neb tx also administered. Respirations 30.  2003 Pt continues to have dyspnea, wheezing, coughing noted. PRN Ativan, Dilaudid administered via left subq site. HOB elevated. Skin cyanotic, and cool in upper and lower extremities. 2018 Pt continues to be dyspneic, wheezing throughout, use of accessory muscles, and coughing noted. PRN Ativan and Dilaudid administered via left subq site. Respirations 24.  2027 Called Leigh Ann Vogt NP on call. New order for Ativan 2mg q15m and Dilaudid 1mg q15m for dyspnea. 2100 Left hand IV obtained by DARCIE Aleman. Respirations continue to be 24.    2128 Pts brows furrowed, using accessory muscles, respirations 22. PRN Ativan 2mg, Dilaudid 1mg, and Robinul administered left hand IV. Will continue to monitor. 2216 Notified dtr Haim Ceja of increase in medications and new IV that was placed. Dtr had no questions at this time. Informed that RN will update with any other changes. 2340 Pt resting. Relaxed facial expression. Respirations shallow, unlabored. Pt in supine position. 0024 LOC changed to GIP for symptom management and requiring IV medications. 0045 Pt resting with eyes closed. Respirations shallow, unlabored. 0130 Pt sleeping. Respirations even and unlabored. 0216 Pt sleeping, no facial grimacing noted. Respirations continue to be shallow, unlabored. 0315 Pts eyes closed. Respirations continue to be even and shallow. 0415 Pt relaxed. No facial grimacing noted. 0500 Pt sleeping, respirations even and unlabored. 0530 Pt awake, requesting gingerale. Voice very soft. Difficult to understand. Gingerale given. Caldwell care performed by [facility] Anthony, FRANCK.   Respirations 20, slight use of accessory muscles. Pt denies dyspnea. Will monitor. 0629 Pt asleep, eyes closed. No facial grimacing. Respirations even and unlabored. 0700 Report given to Dakotah Peres RN.        NAME OF PATIENT:  Sandor Abraham    LEVEL OF CARE:  [facility]    REASON FOR GIP:   Unmanageable respiratory distress, Nausea and vomiting, despite changes to medications, Medication adjustment that must be monitored 24/7 and Stabilizing treatment that cannot take place at home    *PATIENT REMAINS ELIGIBLE FOR [facility] LEVEL OF CARE AS EVIDENCED BY: (MUST BE ADDRESSED OF PATIENT GIP) Symptom management and requiring IV medications.       REASON FOR RESPITE:  LOC changed to [facility]    O2 SAFETY:  Concentrator positioning (6\" from furniture/drapes), No petroleum based products on face while oxygen in use and Oxygen sign on the door    FALL INTERVENTIONS PROVIDED:   Implemented/recommended use of non-skid footwear, Implemented/recommended use of fall risk identification flag to all team members, Implemented/recommended assistive devices and encouraged their use, Implemented/recommended resources for alarm system (personal alarm, bed alarm, call bell, etc.) , Implemented/recommended environmental changes (remove hazards, lower bed, improve lighting, etc.) and Implemented/recommended increased supervision/assistance    INTERDISPLINARY COMMUNICATION/COLLABORATION:  Physician, MSW, [place] and RN, CNA    NEW MEDICATION INITIATION DOCUMENTATION:  Obtained Order from Provider for initiation of symptom relief medication /other medication needed    Reason medication is being initiated:  Several doses of PRN medications for dyspnea, wheezing, cough    MD / Provider name consulted re: change in status / initiation of new medication:  Jya Villavicencio NP     New Symptom(s):  Dyspnea, wheezing    New Order(s):  Increase Ativan 2mg q15m and Dilaudid 1mg q15m    Name of the person notified of the changes:  Brandon Greene    Name of person being taught:  Mariposa Hackett     Instructions given:  Medication dosage increased due to respiratory distress    Side Effects taught:  Fatigue    Response to teaching:  Receptive      COMFORTABLE DYING MEASURE:  Is Patient/family satisfied with symptom level?  yes    DISCHARGE PLAN:  EOL vs. Home

## 2022-06-15 NOTE — HOSPICE
PRN visit made to patient's home for COVID testing in preparation for [facility] admission today. COVID test is negative. Report called into [facility] at [facility]. Upon arrival patient with her oxygen in place, oxygen saturation 90%. Patient states, \"I think I should go to the hospital to get checked out\". Daughter reports patient had a small mushy BM yesterday evening and this morning post suppository administration. Patient continues with excessive coughing and expectoration of yellow sputum, which is thin-thick. Daughter reporting she observed blood tinge to the sputum today. Lung sounds with rhonchi throughout all fields, BS hypoactive x 4 quadrants. Rectal check, brown smears of stool. Caregiver reports she gave patient all of her medications this AM crushed, as patient was unable to swallow them whole, which resulted in patient vomiting. Caldwell cath draining yellow urine. Caregiver has prepared all belongings for pending transport at 11 AM via Hospital to home. Caregivers provided with address and phone number to [facility]. During this visit caregiver administered Hydromorphone for SOB, Hyoscyamine for increased secretions, and Tussionex for cough. 11:16AM: transporation departed the home for [facility].

## 2022-06-16 NOTE — PROGRESS NOTES
Problem: Pressure Injury - Risk of  Goal: *Prevention of pressure injury  Description: Document Ferdinand Scale and appropriate interventions in the flowsheet. Outcome: Progressing Towards Goal  Note: Pressure Injury Interventions:  Sensory Interventions: Float heels,Minimize linen layers    Moisture Interventions: Absorbent underpads,Apply protective barrier, creams and emollients    Activity Interventions: Assess need for specialty bed    Mobility Interventions: Float heels,Turn and reposition approx. every two hours(pillow and wedges)    Nutrition Interventions: Document food/fluid/supplement intake    Friction and Shear Interventions: Apply protective barrier, creams and emollients,Minimize layers                Problem: Patient Education: Go to Patient Education Activity  Goal: Patient/Family Education  Outcome: Progressing Towards Goal     Problem: Falls - Risk of  Goal: *Absence of Falls  Description: Document Norma Fall Risk and appropriate interventions in the flowsheet. Outcome: Progressing Towards Goal  Note: Fall Risk Interventions:       Mentation Interventions: Adequate sleep, hydration, pain control,Bed/chair exit alarm    Medication Interventions: Bed/chair exit alarm    Elimination Interventions: Bed/chair exit alarm,Call light in reach              Problem: Falls - Risk of  Goal: *Absence of Falls  Description: Document Norma Fall Risk and appropriate interventions in the flowsheet.   Outcome: Progressing Towards Goal  Note: Fall Risk Interventions:       Mentation Interventions: Adequate sleep, hydration, pain control,Bed/chair exit alarm    Medication Interventions: Bed/chair exit alarm    Elimination Interventions: Bed/chair exit alarm,Call light in reach              Problem: Patient Education: Go to Patient Education Activity  Goal: Patient/Family Education  Outcome: Progressing Towards Goal

## 2022-06-16 NOTE — PROGRESS NOTES
Problem: Pressure Injury - Risk of  Goal: *Prevention of pressure injury  Description: Document Ferdinand Scale and appropriate interventions in the flowsheet. Outcome: Progressing Towards Goal  Note: Pressure Injury Interventions:  Sensory Interventions: Float heels,Minimize linen layers    Moisture Interventions: Absorbent underpads,Apply protective barrier, creams and emollients    Activity Interventions: Assess need for specialty bed    Mobility Interventions: Float heels,Turn and reposition approx. every two hours(pillow and wedges)    Nutrition Interventions: Document food/fluid/supplement intake    Friction and Shear Interventions: Apply protective barrier, creams and emollients,Minimize layers                Problem: Patient Education: Go to Patient Education Activity  Goal: Patient/Family Education  Outcome: Progressing Towards Goal     Problem: Falls - Risk of  Goal: *Absence of Falls  Description: Document Norma Fall Risk and appropriate interventions in the flowsheet.   Outcome: Progressing Towards Goal  Note: Fall Risk Interventions:       Mentation Interventions: Adequate sleep, hydration, pain control,Bed/chair exit alarm,Door open when patient unattended    Medication Interventions: Bed/chair exit alarm    Elimination Interventions: Call light in reach,Bed/chair exit alarm              Problem: Patient Education: Go to Patient Education Activity  Goal: Patient/Family Education  Outcome: Progressing Towards Goal

## 2022-06-16 NOTE — PROGRESS NOTES
[facility]  [phone]    Patient Name: Yin Barillas  YOB: 1940    Date of Provider Hospice Visit: 06/16/22    Level of Care:   [] General Inpatient (GIP)    [] Routine   [x] Respite    Current Location of Care:  [] [facility] [] [facility] [] [address] [] [facility] [x] Hospice House [facility]      Principle Hospice Diagnosis: Small cell lung cancer       HOSPICE SUMMARY     Chart Reviewed for patient's medical history and hospice care plan. Hospice Physician Certification/Recertification Narrative per Venita Oquendo / madison MD:     Patient presents to the hospice house for respite level care. Patient with a history of small cell lung cancer and has been on service since August 2021. In reviewing the chart, patient was diagnosed in August 2021. She did receive 1 cycle of chemotherapy and XRT at 5 fractions. Unfortunately, patient with a CT scan that showed mediastinal lymphadenopathy as well as complete collapse of the left lung with large left pleural effusion, mucous plug in the left mainstem bronchus. At that time, she received IV antibiotics and was evaluated for cardiothoracic surgery. She initially had a chest tube placed. Patient remained fatigued, on supplemental oxygen, remained max assist x2. Chest x-ray from August 12 showed resolved right pleural effusion but also stable complete opacification of the left hemithorax. Patient apparently has had increasing cough, wheezing despite her daily prednisone at 10 mg. Attempts to use oral antibiotics thinking that maybe she had a postobstructive type of pneumonia because nausea and vomiting. Patient also with underlying dementia which makes it a little bit challenging in determining symptoms. During my bedside evaluation, patient denied any pain but does admit to having increasing shortness of breath. 6/16-patient changed to GIP level care after midnight as she is requiring IV medication.   Sublingual/oral medication was not effective as she was vomiting everything. Patient required 3 as needed doses of Dilaudid overnight and the dose had to be increased secondary to her shortness of breath. General-patient does arouse, slightly anxious, with any activity she shows evidence of increased work of breathing/respiratory distress. HEENT-slightly dry oral mucosa  Lungs-expiratory wheezing, no breath sounds on the left, decreased at the right base, mild increased WOB, mild accessory muscle use-with any activity, coughing  CV-RRR  Abdomen-soft/NT  Extrem-no C/C/E  Neuro-nonfocal    Hospice Dx  1. SOB  2. Extensive met small cell lung cancer  3. Disease progression  4. Wheezing  5. N/V per report  6. Anxiety       PLAN   1. Patient changed to [facility] level care as she has been struggling with oral medication with recurrent nausea and vomiting/dysphagia. Once again, as our notes indicate, we have concerns that this is disease progression with her advanced cancer likely causing some of this dysphagia may be even a bowel obstruction. .   2. Start Dilaudid 1 mg IV every 4 hours scheduled and every 15 minutes as needed for pain and or shortness of breath  3. Robinul 0.2 mg IV every 4 hours scheduled  4. Continue duonebs q4 prn wheezing  5. Comfort meds for all other symptoms  6. Spent time talking with daughter yesterday. She is at work today. We will continue to update her on the plan of care. Anticipate patient may transition to end-of-life at the hospice house    7.  and SW to support family needs. Disposition:  To be determined

## 2022-06-16 NOTE — HOSPICE
0700 Report received from [facility].    8002 Patient with labored breathing and audible secretions. PRN IV dilaudid and PRN IV robinul given, see MAR. Fresh ice water and ginger ale gotten for patient. 0755 Breathing less labored at this time. PRN IV medications effective. Will continue to monitor. 0840 Patient resting in bed quietly, respirations are unlabored, eyes are closed, neutral facial expression noted. 4139 Patient's daughter called, update given. 0930 Patient resting in bed quietly, eyes are closed, neutral facial expression noted, respirations are shallow and unlabored. 1030 Oral care provided, lip balm applied to lips. 0184 Dr. Annie sanchez, medications adjusted due to PRN usage. 1150 Patient given scheduled IV medications, see MAR.    1230 Patient resting in bed quietly, respirations are shallow and unlabored, neutral facial expression noted. 1325 Oral care provided, lip balm applied to lips. [phone] Patient's daughter called to give update, VM left. [address] Patient's daughter called back, update given on patient's day. Patient's daughter states she will be by after work to visit and bring patient's dog.     1520 Patient woke up briefly, fresh ice water and sprite brought to patient per patient request.     7428 Patient coughing and shortness of breath, patient given scheduled medications, see MAR.    1555 Patient's home CM called, update given. Patient no longer coughing of short of breath, scheduled medications effective. 1605 Patient's daughter at the bedside with patient's dog.    1630 Patient eating ice chips with daughter. [id number] Patient's daughter and dog left the bedside. 1740 Oral care provided, lip balm applied. [address] [place] Patient resting in bed quietly, eyes are closed, neutral facial expression noted, respirations are shallow and unlabored.        NAME OF PATIENT:  Michele Abernathy    LEVEL OF CARE:  GIP    REASON FOR GIP:   Pain, despite numerous changes in medications, Medication adjustment that must be monitored 24/7 and Stabilizing treatment that cannot take place at home    *PATIENT REMAINS ELIGIBLE FOR GIP LEVEL OF CARE AS EVIDENCED BY:  Need for PRN and scheduled IV medications for symptom management     REASON FOR RESPITE:  N/a      FALL INTERVENTIONS PROVIDED:   Implemented/recommended use of non-skid footwear and Implemented/recommended use of fall risk identification flag to all team members    INTERDISPLINARY COMMUNICATION/COLLABORATION:  Physician, MSW, [place] and RN, CNA    NEW MEDICATION INITIATION DOCUMENTATION:  Consulted AT MD to report change in pt status    Reason medication is being initiated:  Increased shortness of breath and increased secretions     MD / Provider name consulted re: change in status / initiation of new medication:   Dr. Danni Sparrow Symptom(s):  Increased shortness of breath and increased secretions     New Order(s):  Robinul 0.2 mg every four hours, dilaudid 1 mg every four hours     Name of the person notified of the changes:  Chinyere     Name of person being taught:  Daughter     Instructions given:  Yes    Side Effects taught:  Yes    Response to teaching:  Continue to reinforce       COMFORTABLE DYING MEASURE:  Is Patient/family satisfied with symptom level?   Yes    DISCHARGE PLAN:  If symptoms stabilize plan is to discharge home

## 2022-06-17 NOTE — PROGRESS NOTES
1900 Report from [facility] Assessment completed. Patient alert to self and appears drowsy. Patient reports no pain at this time, but has increased work of breathing and coarse lung sounds. 2005 Patient still has increased work of breathing and appears restless. Scheduled Dilaudid and Robinul given with PRN Ativan. 2030 Patient asleep with relaxed facial expression and unlabored respirations. 2100 Patient asleep with neutral facial expression. 2200 Patient asleep with no facial grimacing.     2300 Patient asleep with unlabored respirations. 0000 Patient given scheduled IV medication. Patient resting with eyes closed and relaxed facial expression. 0100 Patient asleep with neutral facial expression. 0200 Patient resting in bed with eyes closed and unlabored breathing. 0215 Patient repositioned in bed on L side. 0300 Patient asleep with no facial grimacing.     0400 Patient given scheduled IV medication. Patient resting in bed with neutral facial expression. 0500 Patient asleep in bed with unlabored respirations. 0600 Patient given bed bath by FRANCK Teixeira. Patient has no complaint of pain at this time. 8389 Patient's farnsworth drained 250 mL and patient turned to R side. 4671 Patient attempted to take sip of water and have ice chips. Patient now dry heaving and restless. PRN IV Ativan given.      0700 Report to oncoming nurse      NAME OF PATIENT:  Novajesstwila Polly    LEVEL OF CARE:  [facility]    REASON FOR GIP:   Pain, despite numerous changes in medications, Nausea and vomiting, despite changes to medications, Terminal agitation, despite changes to medications, Medication adjustment that must be monitored 24/7 and Stabilizing treatment that cannot take place at home    *PATIENT REMAINS ELIGIBLE FOR GIP LEVEL OF CARE AS EVIDENCED BY: frequent iv medications and nursing assessment required to manage symptoms      REASON FOR RESPITE:  n/a    O2 SAFETY:  Concentrator positioning (6\" from furniture/drapes) and No petroleum based products on face while oxygen in use    FALL INTERVENTIONS PROVIDED:   Implemented/recommended use of non-skid footwear, Implemented/recommended use of fall risk identification flag to all team members, Implemented/recommended resources for alarm system (personal alarm, bed alarm, call bell, etc.) , Implemented/recommended environmental changes (remove hazards, lower bed, improve lighting, etc.) and Implemented/recommended increased supervision/assistance    INTERDISPLINARY COMMUNICATION/COLLABORATION:  Physician, MSW, [place] and RN, CNA    NEW MEDICATION INITIATION DOCUMENTATION:  n/a    Reason medication is being initiated:  n/a    MD / Provider name consulted re: change in status / initiation of new medication:  n/a    New Symptom(s):  n/a    New Order(s):  n/a    Name of the person notified of the changes:  n/a    Name of person being taught:  n/a    Instructions given:  n/a    Side Effects taught:  n/a    Response to teaching:  n/a      COMFORTABLE DYING MEASURE:  Is Patient/family satisfied with symptom level?  yes    DISCHARGE PLAN:  Home when symptoms are managed

## 2022-06-17 NOTE — PROGRESS NOTES
Problem: Pressure Injury - Risk of  Goal: *Prevention of pressure injury  Description: Document Ferdinand Scale and appropriate interventions in the flowsheet. Outcome: Progressing Towards Goal  Note: Pressure Injury Interventions:  Sensory Interventions: Assess changes in LOC,Check visual cues for pain,Float heels,Minimize linen layers    Moisture Interventions: Absorbent underpads,Internal/External urinary devices    Activity Interventions: Pressure redistribution bed/mattress(bed type)    Mobility Interventions: Float heels    Nutrition Interventions: Document food/fluid/supplement intake    Friction and Shear Interventions: Apply protective barrier, creams and emollients,Lift sheet,Minimize layers                Problem: Falls - Risk of  Goal: *Absence of Falls  Description: Document Norma Fall Risk and appropriate interventions in the flowsheet.   Outcome: Progressing Towards Goal  Note: Fall Risk Interventions:       Mentation Interventions: Adequate sleep, hydration, pain control,Bed/chair exit alarm,Door open when patient unattended    Medication Interventions: Bed/chair exit alarm    Elimination Interventions: Call light in reach

## 2022-06-17 NOTE — PROGRESS NOTES
[address] [facility] to Those in Need  [phone]    Patient Name: May Alonzo  YOB: 1940    Date of Provider Hospice Visit: 06/17/22    Level of Care:   [] General Inpatient (GIP)    [] Routine   [x] Respite    Current Location of Care:  [] [address] [place] [address] [] [facility] [] [facility] [] [facility] [x] Hospice House THE [facility]      Principle Hospice Diagnosis: Small cell lung cancer       HOSPICE SUMMARY     Chart Reviewed for patient's medical history and hospice care plan. Hospice Physician Certification/Recertification Narrative per Georgi Randall / madison PABLO:     Patient presents to the hospice house for respite level care. Patient with a history of small cell lung cancer and has been on service since August 2021. In reviewing the chart, patient was diagnosed in August 2021. She did receive 1 cycle of chemotherapy and XRT at 5 fractions. Unfortunately, patient with a CT scan that showed mediastinal lymphadenopathy as well as complete collapse of the left lung with large left pleural effusion, mucous plug in the left mainstem bronchus. At that time, she received IV antibiotics and was evaluated for cardiothoracic surgery. She initially had a chest tube placed. Patient remained fatigued, on supplemental oxygen, remained max assist x2. Chest x-ray from August 12 showed resolved right pleural effusion but also stable complete opacification of the left hemithorax. Patient apparently has had increasing cough, wheezing despite her daily prednisone at 10 mg. Attempts to use oral antibiotics thinking that maybe she had a postobstructive type of pneumonia because nausea and vomiting. Patient also with underlying dementia which makes it a little bit challenging in determining symptoms. During my bedside evaluation, patient denied any pain but does admit to having increasing shortness of breath. 6/16-patient changed to GIP level care after midnight as she is requiring IV medication.   Sublingual/oral medication was not effective as she was vomiting everything. Patient required 3 as needed doses of Dilaudid overnight and the dose had to be increased secondary to her shortness of breath. 6/17-patient having increased nausea. She wants to drink but clearly having evidence of dysphagia even with small sips of water. Her dementia makes it a little bit more complicated to try to elicit symptoms. As I discussed with the daughter, I really think her cancer is causing the symptoms and likely causing the dysphagia and may be even obstructing/extrinsically compressing the esophagus. I do not think this is \"constipation \". Patient becoming more restless and definitely increased evidence of shortness of breath given the advanced cancer. Patient remains with minimal breath sounds on the left. General-patient sitting up in the bed but showing evidence of shortness of breath, almost anxious on her face at times, wants to drink water but difficult for her to swallow and quite frankly concerned that she may be chronically aspirating. Leonidgabbie Ro HEENT-slightly dry oral mucosa  Lungs-continues with some mild bilateral expiratory wheezes, really no breath sounds on the left, decreased with crackles at the right base, increased work of breathing with accessory muscle use  CV-tachycardia  Abdomen-soft/NT, hypoactive bowel sounds, nondistended  Extrem-no C/C/E  Neuro-nonfocal  Psych-anxious    Hospice Dx  1. SOB  2. Extensive met small cell lung cancer  3. Disease progression  4. Wheezing  5. N/V per report  6. Anxiety       PLAN   1. Patient will continue GIP level care as she has not tolerated sublingual/oral medication. Patient with ongoing issues with dysphagia and not tolerating any medications orally. Trying to redirect her a little bit about eating and drinking but a little bit challenging with her dementia.   I do think some scheduled Ativan may be helpful with some of the anxiety that she is experiencing related to her shortness of breath. ..   2. Adjust Dilaudid 2 mg IV every 4 hours scheduled and every 15 minutes as needed for pain and or shortness of breath  3. Ativan 2 mg IV every 4 hours scheduled and every 15 minutes as needed for restlessness, anxiety, nausea  4. Continue Robinul 0.2 mg IV every 4 hours scheduled  5. Continue duonebs q4 prn wheezing  6. Comfort meds for all other symptoms  7. Spent time talk with daughter outside the room. Explained the concerns that we are seeing and I do think her mom is transitioning towards end-of-life care. She seems to be accepting of the plan of care. She knows she would not be able to care for her in the home setting given his current situation. 8.  and SW to support family needs.   Disposition: Anticipate death at the hospice house

## 2022-06-17 NOTE — HOSPICE
0700  Report received from [place], [address] [facility]  Assessment complete, see flowsheet. Patient alert, struggling to speak, was able to state that her throat hurt. Liquids held due to difficulty swallowing. Dyspnea at rest noted. Scheduled Dilaudid and Robinul administered. 4465  Patient complaining of thirst.  Offered moistened sponge and patient began to gag and heave. 4569 [facility] with Dr. Summer Flanagan. Daughter arrived to visit. New orders received for scheduled Lorazepam and increased Dilaudid. Patient NPO except mouth swabs. PRN Lorazepam and Dilaudid administered. 1115  Medication effective. Patient resting quietly with eyes closed, mouth breathing, improved work of breathing noted. 1215  Scheduled medications administered. Patient awake, audible secretions noted. 1315  Medication effective. Pt resting quietly with eyes closed, mouth breathing. Neutral facial expression, audible secretions resolved. 1415  Neutral facial expression, unlabored respirations. 1515  Pt asleep. No evidence of restlessness, pain or secretions noted. 1616  Resting quietly, unlabored respirations. 80  Pt's daughter and great granddaughter arrived with pt's dog to visit. Scheduled medications administered. 1745  Patient repositioned for comfort. Awake, drowsy, no indication of dyspnea, agitation or pain noted. 1845  Pt sleeping. Neutral facial expression, unlabored respirations. 1900  Report given to oncoming nurse.        NAME OF PATIENT:  Prudencio Locke    LEVEL OF CARE:  GIP    REASON FOR GIP:   Pain, despite numerous changes in medications, Unmanageable respiratory distress, Nausea and vomiting, despite changes to medications, Terminal agitation, despite changes to medications, Medication adjustment that must be monitored 24/7 and Stabilizing treatment that cannot take place at home    *PATIENT REMAINS ELIGIBLE FOR GIP LEVEL OF CARE AS EVIDENCED BY: (MUST BE ADDRESSED OF PATIENT GIP)  Patient requires frequent nursing assessment, medication adjustment, and subcutaneous medications to manage symptoms    REASON FOR RESPITE:  NA    O2 SAFETY:  Concentrator positioning (6\" from furniture/drapes), Tanks stored in [place] , No petroleum based products on face while oxygen in use and Oxygen sign on the door    FALL INTERVENTIONS PROVIDED:   Implemented/recommended use of non-skid footwear, Implemented/recommended use of fall risk identification flag to all team members, Implemented/recommended assistive devices and encouraged their use, Implemented/recommended resources for alarm system (personal alarm, bed alarm, call bell, etc.) , Implemented/recommended environmental changes (remove hazards, lower bed, improve lighting, etc.) and Implemented/recommended increased supervision/assistance    INTERDISPLINARY COMMUNICATION/COLLABORATION:  Physician, MSW, [place] and RN, CNA    NEW MEDICATION INITIATION DOCUMENTATION:  Consulted AT MD to report change in pt status, Obtained Order from Provider for initiation of symptom relief medication /other medication needed and Documentation completed in Clinical Note in [address] [facility]    Reason medication is being initiated:  Dyspnea, pain, restlessness    MD / Provider name consulted re: change in status / initiation of new medication:  Dr. Chinyere Sheth Symptom(s):  Dyspnea, pain, restlessness    New Order(s):  Scheduled Lorazepam, increased Dilauded    Name of the person notified of the changes:  Leo    Name of person being taught:  Leo    Instructions given:  Yes    Side Effects taught:  Yes    Response to teaching:  Expressed understanding and agreement with plan of care    COMFORTABLE DYING MEASURE:  Is Patient/family satisfied with symptom level?   Yes    DISCHARGE PLAN:  End of Life vs Home with Hospice

## 2022-06-17 NOTE — PROGRESS NOTES
Problem: Pressure Injury - Risk of  Goal: *Prevention of pressure injury  Description: Document Ferdinand Scale and appropriate interventions in the flowsheet. Outcome: Progressing Towards Goal  Note: Pressure Injury Interventions:  Sensory Interventions: Assess changes in LOC    Moisture Interventions: Absorbent underpads    Activity Interventions: Pressure redistribution bed/mattress(bed type)    Mobility Interventions: Float heels    Nutrition Interventions:  (NPO)    Friction and Shear Interventions: Apply protective barrier, creams and emollients                Problem: Patient Education: Go to Patient Education Activity  Goal: Patient/Family Education  Outcome: Progressing Towards Goal     Problem: Falls - Risk of  Goal: *Absence of Falls  Description: Document Norma Fall Risk and appropriate interventions in the flowsheet.   Outcome: Progressing Towards Goal  Note: Fall Risk Interventions:       Mentation Interventions: Bed/chair exit alarm    Medication Interventions: Bed/chair exit alarm    Elimination Interventions: Bed/chair exit alarm              Problem: Patient Education: Go to Patient Education Activity  Goal: Patient/Family Education  Outcome: Progressing Towards Goal

## 2022-06-17 NOTE — PROGRESS NOTES
Problem: Pressure Injury - Risk of  Goal: *Prevention of pressure injury  Description: Document Ferdinand Scale and appropriate interventions in the flowsheet. Outcome: Progressing Towards Goal  Note: Pressure Injury Interventions:  Sensory Interventions: Float heels    Moisture Interventions: Absorbent underpads    Activity Interventions: Assess need for specialty bed    Mobility Interventions: Assess need for specialty bed    Nutrition Interventions: Document food/fluid/supplement intake    Friction and Shear Interventions: Apply protective barrier, creams and emollients                Problem: Patient Education: Go to Patient Education Activity  Goal: Patient/Family Education  Outcome: Progressing Towards Goal     Problem: Falls - Risk of  Goal: *Absence of Falls  Description: Document Norma Fall Risk and appropriate interventions in the flowsheet.   Outcome: Progressing Towards Goal  Note: Fall Risk Interventions:       Mentation Interventions: Adequate sleep, hydration, pain control    Medication Interventions: Bed/chair exit alarm    Elimination Interventions: Call light in reach              Problem: Patient Education: Go to Patient Education Activity  Goal: Patient/Family Education  Outcome: Progressing Towards Goal

## 2022-06-18 NOTE — PROGRESS NOTES
1900 Report from [facility] Assessment completed. Patient unresponsive at this time with unlabored respirations. 2005 Patient given scheduled IV medication. No facial grimacing at this time    2105 Patient remains unresponsive with shallow respirations. 2205 Patient resting with eyes closed and relaxed facial expression. 2300 Patient repositioned. Patient resting with eyes closed and neutral facial expression. 0000 Patient given scheduled IV medications. Patient has no dyspnea or facial grimacing. Mouth care provided. 0100 Patient remains unresponsive with shallow respirations. 0200 Patient resting with eyes closed and no facial grimacing.    0300 Patient repositioned and bathed. Patient remains unresponsive with relaxed facial expression. 0400 Scheduled medication given. Patient resting comfortably with no dyspnea. 0500 Patient remains unresponsive with no facial grimacing.     0600 Patient resting in bed with eyes closed and shallow respirations. 0630 This RN auscultated for 2 minutes, absent heart and lung sounds. Patient pronounced. 3809 Patient's daughter notified of patient's passing via phone and is on way to [facility].          NAME OF PATIENT:  Danna Wood    LEVEL OF CARE:  [facility]    REASON FOR GIP:   Pain, despite numerous changes in medications, Unmanageable respiratory distress, Terminal agitation, despite changes to medications, Medication adjustment that must be monitored 24/7 and Stabilizing treatment that cannot take place at home    *PATIENT REMAINS ELIGIBLE FOR [facility] LEVEL OF CARE AS EVIDENCED BY: frequent IV medication and nursing assessment required to manage symptoms      REASON FOR RESPITE:  n/a    O2 SAFETY:  Concentrator positioning (6\" from furniture/drapes), No petroleum based products on face while oxygen in use and Oxygen sign on the door    FALL INTERVENTIONS PROVIDED:   Implemented/recommended use of non-skid footwear, Implemented/recommended use of fall risk identification flag to all team members, Implemented/recommended resources for alarm system (personal alarm, bed alarm, call bell, etc.) , Implemented/recommended environmental changes (remove hazards, lower bed, improve lighting, etc.) and Implemented/recommended increased supervision/assistance    INTERDISPLINARY COMMUNICATION/COLLABORATION:  Physician, MSW, [place] and RN, CNA    NEW MEDICATION INITIATION DOCUMENTATION:  n/a    Reason medication is being initiated:  N/a      MD / Provider name consulted re: change in status / initiation of new medication:  n/a    New Symptom(s):  n/a    New Order(s):  n/a    Name of the person notified of the changes:  n/a    Name of person being taught:  n/a    Instructions given:  n/a    Side Effects taught:  n/a    Response to teaching:  n/a      COMFORTABLE DYING MEASURE:  Is Patient/family satisfied with symptom level?  yes    DISCHARGE PLAN:  Home when symptoms are managed

## 2022-06-18 NOTE — PROGRESS NOTES
Problem: Pressure Injury - Risk of  Goal: *Prevention of pressure injury  Description: Document Ferdinand Scale and appropriate interventions in the flowsheet. Outcome: Progressing Towards Goal  Note: Pressure Injury Interventions:  Sensory Interventions: Float heels,Minimize linen layers,Turn and reposition approx. every two hours (pillows and wedges if needed),Keep linens dry and wrinkle-free,Check visual cues for pain    Moisture Interventions: Absorbent underpads,Minimize layers,Limit adult briefs,Moisture barrier    Activity Interventions: Assess need for specialty bed    Mobility Interventions: Assess need for specialty bed,Turn and reposition approx. every two hours(pillow and wedges),Float heels    Nutrition Interventions: Document food/fluid/supplement intake    Friction and Shear Interventions: Apply protective barrier, creams and emollients,Minimize layers,Lift sheet                Problem: Patient Education: Go to Patient Education Activity  Goal: Patient/Family Education  Outcome: Progressing Towards Goal     Problem: Falls - Risk of  Goal: *Absence of Falls  Description: Document Norma Fall Risk and appropriate interventions in the flowsheet.   Outcome: Progressing Towards Goal  Note: Fall Risk Interventions:       Mentation Interventions: Adequate sleep, hydration, pain control,Bed/chair exit alarm,Door open when patient unattended    Medication Interventions: Bed/chair exit alarm    Elimination Interventions: Call light in reach,Bed/chair exit alarm              Problem: Patient Education: Go to Patient Education Activity  Goal: Patient/Family Education  Outcome: Progressing Towards Goal

## 2022-06-18 NOTE — PROGRESS NOTES
[facility]  [phone]    Patient Name: May Alonzo  YOB: 1940    Date of Provider Hospice Visit: 06/18/22    Level of Care:   [x] General Inpatient (GIP)    [] Routine   [] Respite    Current Location of Care:  [] [facility] [] [facility] [] [facility] [] [facility] [x] Hospice House THE [facility]      Principle Hospice Diagnosis: Small cell lung cancer       HOSPICE SUMMARY     Chart Reviewed for patient's medical history and hospice care plan. Hospice Physician Certification/Recertification Narrative per Georgi Randall / madison PABLO:     Patient presents to the hospice house for respite level care and converted to GIP LOC. Patient with a history of small cell lung cancer and has been on service since August 2021. In reviewing the chart, patient was diagnosed in August 2021. She did receive 1 cycle of chemotherapy and XRT at 5 fractions. Unfortunately, patient with a CT scan that showed mediastinal lymphadenopathy as well as complete collapse of the left lung with large left pleural effusion, mucous plug in the left mainstem bronchus. At that time, she received IV antibiotics and was evaluated for cardiothoracic surgery. She initially had a chest tube placed. Patient remained fatigued, on supplemental oxygen, remained max assist x2. Chest x-ray from August 12 showed resolved right pleural effusion but also stable complete opacification of the left hemithorax. Patient apparently has had increasing cough, wheezing despite her daily prednisone at 10 mg. Attempts to use oral antibiotics thinking that maybe she had a postobstructive type of pneumonia because nausea and vomiting. Patient also with underlying dementia which makes it a little bit challenging in determining symptoms. During my bedside evaluation, patient denied any pain but does admit to having increasing shortness of breath. 6/16-patient changed to GIP level care after midnight as she is requiring IV medication. Sublingual/oral medication was not effective as she was vomiting everything. Patient required 3 as needed doses of Dilaudid overnight and the dose had to be increased secondary to her shortness of breath. 6/17-patient having increased nausea. She wants to drink but clearly having evidence of dysphagia even with small sips of water. Her dementia makes it a little bit more complicated to try to elicit symptoms. As I discussed with the daughter, I really think her cancer is causing the symptoms and likely causing the dysphagia and may be even obstructing/extrinsically compressing the esophagus. I do not think this is \"constipation \". Patient becoming more restless and definitely increased evidence of shortness of breath given the advanced cancer. Patient remains with minimal breath sounds on the left. General-patient sitting up in the bed but showing evidence of shortness of breath, almost anxious on her face at times, wants to drink water but difficult for her to swallow and quite frankly concerned that she may be chronically aspirating. Rometta Favre HEENT-slightly dry oral mucosa  Lungs-continues with some mild bilateral expiratory wheezes, really no breath sounds on the left, decreased with crackles at the right base, increased work of breathing with accessory muscle use  CV-tachycardia  Abdomen-soft/NT, hypoactive bowel sounds, nondistended  Extrem-no C/C/E  Neuro-nonfocal  Psych-anxious    Hospice Dx  1. SOB  2. Extensive met small cell lung cancer  3. Disease progression  4. Wheezing  5. N/V per report  6. Anxiety       PLAN   1. Patient will continue [facility] level care as she has not tolerated sublingual/oral medication. Patient with ongoing issues with dysphagia and not tolerating any medications orally. She is now unresponsive. 2. Continue Dilaudid 2 mg IV every 4 hours scheduled and every 15 minutes as needed for pain and or shortness of breath  3.  Ativan 2 mg IV every 4 hours scheduled and every 15 minutes as needed for restlessness, anxiety, nausea  4. Continue Robinul 0.2 mg IV every 4 hours scheduled  5. Continue duonebs q4 prn wheezing  6. Comfort meds for all other symptoms  7. No family at bedside. 8.  and SW to support family needs.   Disposition: Anticipate death at the hospice house

## 2022-06-18 NOTE — PROGRESS NOTES
Problem: Pressure Injury - Risk of  Goal: *Prevention of pressure injury  Description: Document Ferdinand Scale and appropriate interventions in the flowsheet. Outcome: Progressing Towards Goal  Note: Pressure Injury Interventions:  Sensory Interventions: Float heels    Moisture Interventions: Absorbent underpads    Activity Interventions: Assess need for specialty bed    Mobility Interventions: Assess need for specialty bed    Nutrition Interventions:  (NPO)    Friction and Shear Interventions: Apply protective barrier, creams and emollients                Problem: Patient Education: Go to Patient Education Activity  Goal: Patient/Family Education  Outcome: Progressing Towards Goal     Problem: Falls - Risk of  Goal: *Absence of Falls  Description: Document Norma Fall Risk and appropriate interventions in the flowsheet.   Outcome: Progressing Towards Goal  Note: Fall Risk Interventions:       Mentation Interventions: Bed/chair exit alarm    Medication Interventions: Bed/chair exit alarm    Elimination Interventions: Bed/chair exit alarm              Problem: Patient Education: Go to Patient Education Activity  Goal: Patient/Family Education  Outcome: Progressing Towards Goal

## 2022-06-18 NOTE — HOSPICE
0700 Report received from [facility]. 0740 Patient resting in bed quietly, eyes are closed, neutral facial expression noted, respirations are shallow and unlabored. Patient given scheduled medications, see MAR.     1450 Oral care provided, lip balm applied to lips. 6073 Patient repositioned in bed with CNA Maicol Fennel, patient tolerated activity well. 1354 Patient's daughter called, update given on patient's morning. 1030 Oral care provided, lip balm applied to lips. Patient resting in bed quietly, eyes are closed, neutral facial expression noted, respirations are shallow and unlabored. 1120 Patient given scheduled medications, see MAR.     1200 Patient's daughter at the bedside, update given on patient's day. 1250 Patient resting in bed quietly, eyes are closed, neutral facial expression noted, respirations are shallow and unlabored. 1330 Patient's daughter left the bedside. 1356 Patient turned and repositioned in bed with CNA Maicol Fennel, patient grimaced and moaned during movement, PRN dilaudid given, see MAR.     1425 Patient no longer grimacing, PRN medication effective. 1505 Oral care provided, lip balm applied to lips. 1550 Patient resting in bed quietly, eyes are closed, neutral facial expression noted, respirations are shallow and unlabored. 1610 Patient given scheduled medications, see MAR.    1655 NP at the bedside with patient. 1740 Oral care provided, lip balm applied to lips. 1800 Patient resting in bed quietly, eyes are closed, neutral facial expression noted, respirations are shallow and unlabored. 1815 Patient turned and repositioned in bed with CNA Maicol Fennel, patient tolerated activity well. 1850 Patient resting in bed quietly, eyes are closed, neutral facial expression noted.

## 2022-06-20 ENCOUNTER — HOME CARE VISIT (OUTPATIENT)
Dept: HOSPICE | Facility: HOSPICE | Age: 82
End: 2022-06-20
Payer: MEDICARE

## 2022-06-29 ENCOUNTER — DOCUMENTATION ONLY (OUTPATIENT)
Dept: FAMILY MEDICINE CLINIC | Age: 82
End: 2022-06-29

## 2023-06-10 NOTE — ED NOTES
Unable to begin antibiotics at this time. Both IV gtts are incompatible with antibiotics and unable to obtain additional line at this time. Dr. David Beal notified. Transport team here and ready for transport. Normal rate, regular rhythm.  Heart sounds S1, S2.  No murmurs

## 2023-09-16 NOTE — ROUTINE PROCESS
Bedside and Verbal shift change report given to Britney Bernal (oncoming nurse) by Boyd Menon (offgoing nurse). Report included the following information SBAR, Kardex, ED Summary, Intake/Output, MAR, Recent Results and Cardiac Rhythm NSR. Procedure Note    Ele Evans Patient Status:  Inpatient    2004 MRN [id number]     PROCEDURE: Left Radial line placement.     INDICATION: shock    PERFORMED BY:  Dr Prajapati    SUPERVISED BY: Dr Louis    CONSENT: Indications, risks, and benefits were explained at length have discussed the indications, risks, benefits of this procedure to  mother and she expressed understanding.  Informed consent obtained.     DEVICE TYPE: 2.5 F, 5 cm Arterial Catheter    MEDICATIONS: 1% Lidocaine 1 mL    TIME OUT: \"Time Out\" completed and agreed upon by all team member present.    STERILE PRECAUTIONS USED: Mask, Cap, Hand Hygiene, Sterile gown, Sterile gloves, sterile drape and betadine    DETAILS:   Modified Erich's test was performed to confirm palmar arch patency.  The patient was placed in Supine position. Left Wrist area was prepped and draped in sterile fashion. Local anesthesia was obtained.  The Left Radial artery was palpated and with ultrasound guidance successfully cannulated on 2nd pass using introducer needle. Pulsatile, arterial blood was visualized. Introducer needle was replaced over guidewire with arterial catheter using the Seldinger technique.. Catheter was secured to skin and transparent dressing was applied. Arterial waveform was observed on the monitor and interpreted to be adequate. The patient tolerated the procedure well, without difficulty. Good distal circulation to fingers was noted post procedure.     COMPLICATIONS: None    ESTIMATED BLOOD LOSS: 1 mL    JUAQUIN Locke   2023  4:13 PM

## (undated) DEVICE — STERILE POLYISOPRENE POWDER-FREE SURGICAL GLOVES WITH EMOLLIENT COATING: Brand: PROTEXIS

## (undated) DEVICE — GLOVE SURG SZ 75 L1212IN FNGR THK138MIL BRN LTX FREE

## (undated) DEVICE — QUILTED PREMIUM COMFORT UNDERPAD,EXTRA HEAVY: Brand: WINGS

## (undated) DEVICE — DRAPE, LAVH, STERILE: Brand: MEDLINE

## (undated) DEVICE — SUTURE VCRL SZ 0 L36IN ABSRB VLT L36MM CT-1 1/2 CIR J346H

## (undated) DEVICE — YANKAUER,POOLE TIP,STERILE,50/CS: Brand: MEDLINE

## (undated) DEVICE — SEALANT HEMOSTAT W/THROM 8ML -- SURGIFLO MATRIX

## (undated) DEVICE — STRAP,POSITIONING,KNEE/BODY,FOAM,4X60": Brand: MEDLINE

## (undated) DEVICE — CATH SUC CTRL PRT TRIFLO 14FR --

## (undated) DEVICE — SINGLE USE SUCTION VALVE MAJ-209: Brand: SINGLE USE SUCTION VALVE (STERILE)

## (undated) DEVICE — ROCKER SWITCH PENCIL BLADE ELECTRODE, HOLSTER: Brand: EDGE

## (undated) DEVICE — Z INACTIVE USE 2527070 DRAPE SURG W40XL44IN UNDERBUTTOCK SMS POLYPR W/ PCH BK DISP

## (undated) DEVICE — INFECTION CONTROL KIT SYS

## (undated) DEVICE — DRAPE FLD WRM W44XL66IN C6L FOR INTRATEMP SYS THERMABASIN

## (undated) DEVICE — ELECTRODE,RADIOTRANSLUCENT,FOAM,3PK: Brand: MEDLINE

## (undated) DEVICE — TOWEL SURG W17XL27IN STD BLU COT NONFENESTRATED PREWASHED

## (undated) DEVICE — CUFF BLD PRSS AD L SZ 12L FOR 32-43CM LIMB LNG VYN SFT W/O

## (undated) DEVICE — BLADE ELECTRODE: Brand: EDGE

## (undated) DEVICE — TOTAL TRAY, DB, 100% SILI FOLEY, 16FR 10: Brand: MEDLINE

## (undated) DEVICE — SINGLE USE BIOPSY VALVE MAJ-210: Brand: SINGLE USE BIOPSY VALVE (STERILE)

## (undated) DEVICE — SUTURE VCRL SZ 2-0 L27IN ABSRB UD L26MM SH 1/2 CIR J417H

## (undated) DEVICE — PREP SKN CHLRAPRP SNGL 1.75ML --

## (undated) DEVICE — PREP SKN CHLRAPRP APL 26ML STR --

## (undated) DEVICE — NDL FLTR TIP 5 MIC 18GX1.5IN --

## (undated) DEVICE — SOL IRRIGATION INJ NACL 0.9% 500ML BTL

## (undated) DEVICE — CONTAINER SPEC 20 ML LID NEUT BUFF FORMALIN 10 % POLYPR STS

## (undated) DEVICE — REM POLYHESIVE ADULT PATIENT RETURN ELECTRODE: Brand: VALLEYLAB

## (undated) DEVICE — Device

## (undated) DEVICE — SYRINGE MED 20ML STD CLR PLAS LUERSLIP TIP N CTRL DISP

## (undated) DEVICE — COVER LT HNDL PLAS RIG 1 PER PK

## (undated) DEVICE — Device: Brand: MEDICAL ACTION INDUSTRIES

## (undated) DEVICE — SYR 3ML LL TIP 1/10ML GRAD --

## (undated) DEVICE — SOLIDIFIER FLD 3.2OZ 3000CC TRAD IN BTL LIQUI-LOC

## (undated) DEVICE — SPONGE LAP 18X18IN STRL -- 5/PK

## (undated) DEVICE — SURGICAL PROCEDURE PACK GYN ONCOLOGY CUST ST MARYS LF

## (undated) DEVICE — SURGICAL PROCEDURE PACK BASIN MAJ SET CUST NO CAUT

## (undated) DEVICE — SOLUTION IV 1000ML 0.9% SOD CHL

## (undated) DEVICE — BAG SPEC BIOHZRD 10 X 10 IN --

## (undated) DEVICE — Z CONVERTED USE 2274305 CUFF BLD PRSS AD L SZ 12 FOR 32-43CM LIMB VLY SFT W/O TUBE

## (undated) DEVICE — KIT COLON W/ 1.1OZ LUB AND 2 END

## (undated) DEVICE — SUTURE VCRL SZ 2-0 L36IN ABSRB UD L36MM CT-1 1/2 CIR J945H

## (undated) DEVICE — BITE BLK ENDOSCP AD 54FR GRN POLYETH ENDOSCP W STRP SLD

## (undated) DEVICE — TUBING, SUCTION, 3/16" X 6', STRAIGHT: Brand: MEDLINE

## (undated) DEVICE — CURVED, LARGE JAW, OPEN SEALER/DIVIDER NANO-COATED: Brand: LIGASURE IMPACT

## (undated) DEVICE — SYR 5ML 1/5 GRAD LL NSAF LF --

## (undated) DEVICE — SYR LR LCK 1ML GRAD NSAF 30ML --

## (undated) DEVICE — Z DISCONTINUED USE 2744636  DRESSING AQUACEL 14 IN ALG W3.5XL14IN POLYUR FLM CVR W/ HYDRCOLL

## (undated) DEVICE — SUTURE VCRL SZ 3-0 L27IN ABSRB UD L26MM SH 1/2 CIR J416H

## (undated) DEVICE — SINGLE USE ASPIRATION NEEDLE: Brand: SINGLE USE ASPIRATION NEEDLE

## (undated) DEVICE — SET ADMIN 16ML TBNG L100IN 2 Y INJ SITE IV PIGGY BK DISP

## (undated) DEVICE — CATHETER IV 20GA L1IN FEP STR HUB INTROCAN SFTY

## (undated) DEVICE — NEONATAL-ADULT SPO2 SENSOR: Brand: NELLCOR

## (undated) DEVICE — SUTURE PDS II SZ 1 L96IN ABSRB VLT TP-1 L65MM 1/2 CIR Z880G

## (undated) DEVICE — TRAY PREP DRY W/ PREM GLV 2 APPL 6 SPNG 2 UNDPD 1 OVERWRAP

## (undated) DEVICE — 1200 GUARD II KIT W/5MM TUBE W/O VAC TUBE: Brand: GUARDIAN

## (undated) DEVICE — GARMENT,MEDLINE,DVT,INT,CALF,MED, GEN2: Brand: MEDLINE

## (undated) DEVICE — SYRINGE MED 10CC ECC TIP W/O NDL

## (undated) DEVICE — BLADE ASSEMB CLP HAIR FINE --

## (undated) DEVICE — SOLUTION IRRIG 1000ML H2O STRL BLT

## (undated) DEVICE — BASIN SPNG 32OZ BLU STRL --

## (undated) DEVICE — 3M™ CUROS™ DISINFECTING CAP FOR NEEDLELESS CONNECTORS 270/CARTON 20 CARTONS/CASE CFF1-270: Brand: CUROS™

## (undated) DEVICE — AIRLIFE™ ADULT CUSHION NASAL CANNULA 14 FOOT (4.3) CRUSH-RESISTANT OXYGEN TUBING, AND U/CONNECT-IT ADAPTER: Brand: AIRLIFE™

## (undated) DEVICE — TRAP,MUCUS SPECIMEN, 80CC: Brand: MEDLINE